# Patient Record
Sex: MALE | Race: WHITE | NOT HISPANIC OR LATINO | Employment: OTHER | URBAN - METROPOLITAN AREA
[De-identification: names, ages, dates, MRNs, and addresses within clinical notes are randomized per-mention and may not be internally consistent; named-entity substitution may affect disease eponyms.]

---

## 2017-01-01 ENCOUNTER — APPOINTMENT (OUTPATIENT)
Dept: CARDIAC REHAB | Facility: HOSPITAL | Age: 82
End: 2017-01-01

## 2017-01-01 ENCOUNTER — TREATMENT (OUTPATIENT)
Dept: CARDIAC REHAB | Facility: HOSPITAL | Age: 82
End: 2017-01-01

## 2017-01-01 ENCOUNTER — APPOINTMENT (OUTPATIENT)
Dept: RADIATION ONCOLOGY | Facility: HOSPITAL | Age: 82
End: 2017-01-01

## 2017-01-01 ENCOUNTER — TELEPHONE (OUTPATIENT)
Dept: CARDIOLOGY | Facility: CLINIC | Age: 82
End: 2017-01-01

## 2017-01-01 ENCOUNTER — RESULTS ENCOUNTER (OUTPATIENT)
Dept: INTERNAL MEDICINE | Facility: CLINIC | Age: 82
End: 2017-01-01

## 2017-01-01 ENCOUNTER — APPOINTMENT (OUTPATIENT)
Dept: CT IMAGING | Facility: HOSPITAL | Age: 82
End: 2017-01-01

## 2017-01-01 ENCOUNTER — TELEPHONE (OUTPATIENT)
Dept: NEUROLOGY | Facility: CLINIC | Age: 82
End: 2017-01-01

## 2017-01-01 ENCOUNTER — APPOINTMENT (OUTPATIENT)
Dept: CT IMAGING | Facility: HOSPITAL | Age: 82
End: 2017-01-01
Attending: INTERNAL MEDICINE

## 2017-01-01 ENCOUNTER — TELEPHONE (OUTPATIENT)
Dept: INTERNAL MEDICINE | Facility: CLINIC | Age: 82
End: 2017-01-01

## 2017-01-01 ENCOUNTER — APPOINTMENT (OUTPATIENT)
Dept: ONCOLOGY | Facility: CLINIC | Age: 82
End: 2017-01-01

## 2017-01-01 ENCOUNTER — OFFICE VISIT (OUTPATIENT)
Dept: ONCOLOGY | Facility: CLINIC | Age: 82
End: 2017-01-01

## 2017-01-01 ENCOUNTER — HOSPITAL ENCOUNTER (OUTPATIENT)
Dept: PET IMAGING | Facility: HOSPITAL | Age: 82
Discharge: HOME | End: 2017-10-25

## 2017-01-01 ENCOUNTER — APPOINTMENT (OUTPATIENT)
Dept: MRI IMAGING | Facility: HOSPITAL | Age: 82
End: 2017-01-01

## 2017-01-01 ENCOUNTER — OFFICE VISIT (OUTPATIENT)
Dept: CARDIAC REHAB | Facility: HOSPITAL | Age: 82
End: 2017-01-01

## 2017-01-01 ENCOUNTER — OFFICE VISIT (OUTPATIENT)
Dept: CARDIOLOGY | Facility: CLINIC | Age: 82
End: 2017-01-01

## 2017-01-01 ENCOUNTER — ANESTHESIA EVENT (OUTPATIENT)
Dept: GASTROENTEROLOGY | Facility: HOSPITAL | Age: 82
End: 2017-01-01

## 2017-01-01 ENCOUNTER — ANESTHESIA (OUTPATIENT)
Dept: GASTROENTEROLOGY | Facility: HOSPITAL | Age: 82
End: 2017-01-01

## 2017-01-01 ENCOUNTER — TELEPHONE (OUTPATIENT)
Dept: RADIATION ONCOLOGY | Facility: HOSPITAL | Age: 82
End: 2017-01-01

## 2017-01-01 ENCOUNTER — RADIATION ONCOLOGY WEEKLY ASSESSMENT (OUTPATIENT)
Dept: RADIATION ONCOLOGY | Facility: HOSPITAL | Age: 82
End: 2017-01-01

## 2017-01-01 ENCOUNTER — LAB (OUTPATIENT)
Dept: LAB | Facility: HOSPITAL | Age: 82
End: 2017-01-01

## 2017-01-01 ENCOUNTER — HOSPITAL ENCOUNTER (INPATIENT)
Facility: HOSPITAL | Age: 82
LOS: 10 days | Discharge: HOME HEALTH | End: 2017-09-22
Attending: EMERGENCY MEDICINE | Admitting: INTERNAL MEDICINE

## 2017-01-01 ENCOUNTER — HOSPITAL ENCOUNTER (OUTPATIENT)
Dept: GENERAL RADIOLOGY | Facility: HOSPITAL | Age: 82
Discharge: HOME | End: 2017-01-27

## 2017-01-01 ENCOUNTER — LAB REQUISITION (OUTPATIENT)
Dept: LAB | Facility: HOSPITAL | Age: 82
End: 2017-01-01

## 2017-01-01 ENCOUNTER — TRANSCRIBE ORDERS (OUTPATIENT)
Dept: ADMINISTRATIVE | Facility: HOSPITAL | Age: 82
End: 2017-01-01

## 2017-01-01 ENCOUNTER — APPOINTMENT (OUTPATIENT)
Dept: GENERAL RADIOLOGY | Facility: HOSPITAL | Age: 82
End: 2017-01-01
Attending: INTERNAL MEDICINE

## 2017-01-01 ENCOUNTER — APPOINTMENT (OUTPATIENT)
Dept: GENERAL RADIOLOGY | Facility: HOSPITAL | Age: 82
End: 2017-01-01

## 2017-01-01 ENCOUNTER — HOSPITAL ENCOUNTER (INPATIENT)
Facility: HOSPITAL | Age: 82
LOS: 4 days | Discharge: HOME HEALTH | End: 2017-06-08
Attending: EMERGENCY MEDICINE | Admitting: INTERNAL MEDICINE

## 2017-01-01 ENCOUNTER — TELEPHONE (OUTPATIENT)
Dept: CARDIOLOGY | Facility: HOSPITAL | Age: 82
End: 2017-01-01

## 2017-01-01 ENCOUNTER — HOSPITAL ENCOUNTER (INPATIENT)
Facility: HOSPITAL | Age: 82
LOS: 1 days | End: 2017-12-05
Attending: EMERGENCY MEDICINE | Admitting: INTERNAL MEDICINE

## 2017-01-01 ENCOUNTER — OFFICE VISIT (OUTPATIENT)
Dept: INTERNAL MEDICINE | Facility: CLINIC | Age: 82
End: 2017-01-01

## 2017-01-01 ENCOUNTER — TELEPHONE (OUTPATIENT)
Dept: GASTROENTEROLOGY | Facility: CLINIC | Age: 82
End: 2017-01-01

## 2017-01-01 ENCOUNTER — LAB (OUTPATIENT)
Dept: LAB | Facility: HOSPITAL | Age: 82
End: 2017-01-01
Attending: INTERNAL MEDICINE

## 2017-01-01 ENCOUNTER — DOCUMENTATION (OUTPATIENT)
Dept: PULMONOLOGY | Facility: HOSPITAL | Age: 82
End: 2017-01-01

## 2017-01-01 ENCOUNTER — DOCUMENTATION (OUTPATIENT)
Dept: RADIATION ONCOLOGY | Facility: HOSPITAL | Age: 82
End: 2017-01-01

## 2017-01-01 ENCOUNTER — OFFICE VISIT (OUTPATIENT)
Dept: GASTROENTEROLOGY | Facility: CLINIC | Age: 82
End: 2017-01-01

## 2017-01-01 ENCOUNTER — HOSPITAL ENCOUNTER (OUTPATIENT)
Facility: HOSPITAL | Age: 82
Setting detail: HOSPITAL OUTPATIENT SURGERY
Discharge: HOME | End: 2017-03-01
Attending: INTERNAL MEDICINE | Admitting: INTERNAL MEDICINE

## 2017-01-01 ENCOUNTER — HOSPITAL ENCOUNTER (INPATIENT)
Facility: HOSPITAL | Age: 82
LOS: 13 days | Discharge: NURSING, REHAB OR LONG-TERM CARE | End: 2017-12-01
Attending: EMERGENCY MEDICINE | Admitting: HOSPITALIST

## 2017-01-01 ENCOUNTER — HOSPITAL ENCOUNTER (OUTPATIENT)
Dept: CT IMAGING | Facility: HOSPITAL | Age: 82
Discharge: HOME | End: 2017-01-27
Attending: INTERNAL MEDICINE | Admitting: INTERNAL MEDICINE

## 2017-01-01 ENCOUNTER — HOSPITAL ENCOUNTER (OUTPATIENT)
Dept: CT IMAGING | Facility: HOSPITAL | Age: 82
Discharge: HOME | End: 2017-08-01
Attending: SURGERY | Admitting: SURGERY

## 2017-01-01 ENCOUNTER — HOSPITAL ENCOUNTER (OUTPATIENT)
Dept: PET IMAGING | Facility: HOSPITAL | Age: 82
Discharge: HOME | End: 2017-10-25
Admitting: NURSE PRACTITIONER

## 2017-01-01 ENCOUNTER — DOCUMENTATION (OUTPATIENT)
Dept: CARDIOLOGY | Facility: CLINIC | Age: 82
End: 2017-01-01

## 2017-01-01 ENCOUNTER — HOSPITAL ENCOUNTER (INPATIENT)
Facility: HOSPITAL | Age: 82
LOS: 11 days | Discharge: NURSING, REHAB OR LONG-TERM CARE | End: 2017-10-12
Attending: EMERGENCY MEDICINE | Admitting: HOSPITALIST

## 2017-01-01 ENCOUNTER — OFFICE VISIT (OUTPATIENT)
Dept: NEUROLOGY | Facility: CLINIC | Age: 82
End: 2017-01-01

## 2017-01-01 ENCOUNTER — HOSPITAL ENCOUNTER (INPATIENT)
Facility: HOSPITAL | Age: 82
LOS: 2 days | Discharge: HOME | End: 2017-08-28
Attending: INTERNAL MEDICINE | Admitting: INTERNAL MEDICINE

## 2017-01-01 ENCOUNTER — HOSPITAL ENCOUNTER (OUTPATIENT)
Dept: NUCLEAR MEDICINE | Facility: HOSPITAL | Age: 82
Discharge: HOME | End: 2017-01-27
Attending: INTERNAL MEDICINE

## 2017-01-01 ENCOUNTER — APPOINTMENT (OUTPATIENT)
Dept: CARDIOLOGY | Facility: HOSPITAL | Age: 82
End: 2017-01-01
Attending: INTERNAL MEDICINE

## 2017-01-01 ENCOUNTER — CLINICAL SUPPORT NO REQUIREMENTS (OUTPATIENT)
Dept: CARDIOLOGY | Facility: CLINIC | Age: 82
End: 2017-01-01

## 2017-01-01 ENCOUNTER — HOSPITAL ENCOUNTER (OUTPATIENT)
Dept: CARDIOLOGY | Facility: HOSPITAL | Age: 82
Discharge: HOME | End: 2017-08-25

## 2017-01-01 ENCOUNTER — CONSULT (OUTPATIENT)
Dept: RADIATION ONCOLOGY | Facility: HOSPITAL | Age: 82
End: 2017-01-01

## 2017-01-01 VITALS
SYSTOLIC BLOOD PRESSURE: 122 MMHG | HEIGHT: 70 IN | BODY MASS INDEX: 27.83 KG/M2 | HEART RATE: 97 BPM | WEIGHT: 194.4 LBS | DIASTOLIC BLOOD PRESSURE: 70 MMHG

## 2017-01-01 VITALS
HEART RATE: 88 BPM | OXYGEN SATURATION: 95 % | RESPIRATION RATE: 18 BRPM | TEMPERATURE: 95 F | WEIGHT: 185.13 LBS | SYSTOLIC BLOOD PRESSURE: 106 MMHG | BODY MASS INDEX: 26.5 KG/M2 | HEIGHT: 70 IN | DIASTOLIC BLOOD PRESSURE: 72 MMHG

## 2017-01-01 VITALS
OXYGEN SATURATION: 99 % | SYSTOLIC BLOOD PRESSURE: 121 MMHG | DIASTOLIC BLOOD PRESSURE: 60 MMHG | TEMPERATURE: 97.7 F | HEART RATE: 84 BPM | HEIGHT: 70 IN | WEIGHT: 171 LBS | RESPIRATION RATE: 17 BRPM | BODY MASS INDEX: 24.48 KG/M2

## 2017-01-01 VITALS
BODY MASS INDEX: 26.05 KG/M2 | HEART RATE: 80 BPM | RESPIRATION RATE: 16 BRPM | OXYGEN SATURATION: 96 % | SYSTOLIC BLOOD PRESSURE: 130 MMHG | WEIGHT: 182 LBS | HEIGHT: 70 IN | DIASTOLIC BLOOD PRESSURE: 54 MMHG

## 2017-01-01 VITALS
OXYGEN SATURATION: 96 % | BODY MASS INDEX: 27.2 KG/M2 | HEIGHT: 70 IN | SYSTOLIC BLOOD PRESSURE: 142 MMHG | WEIGHT: 190 LBS | DIASTOLIC BLOOD PRESSURE: 68 MMHG | HEART RATE: 91 BPM | RESPIRATION RATE: 16 BRPM

## 2017-01-01 VITALS
OXYGEN SATURATION: 97 % | TEMPERATURE: 98.2 F | DIASTOLIC BLOOD PRESSURE: 66 MMHG | HEART RATE: 77 BPM | RESPIRATION RATE: 16 BRPM | BODY MASS INDEX: 26.13 KG/M2 | SYSTOLIC BLOOD PRESSURE: 134 MMHG | WEIGHT: 182.13 LBS

## 2017-01-01 VITALS
BODY MASS INDEX: 27.97 KG/M2 | WEIGHT: 195.4 LBS | DIASTOLIC BLOOD PRESSURE: 74 MMHG | SYSTOLIC BLOOD PRESSURE: 124 MMHG | HEIGHT: 70 IN

## 2017-01-01 VITALS
WEIGHT: 175 LBS | HEART RATE: 89 BPM | OXYGEN SATURATION: 98 % | BODY MASS INDEX: 25.92 KG/M2 | SYSTOLIC BLOOD PRESSURE: 126 MMHG | RESPIRATION RATE: 18 BRPM | HEIGHT: 69 IN | DIASTOLIC BLOOD PRESSURE: 60 MMHG

## 2017-01-01 VITALS
BODY MASS INDEX: 27.19 KG/M2 | HEIGHT: 70 IN | SYSTOLIC BLOOD PRESSURE: 110 MMHG | HEART RATE: 75 BPM | DIASTOLIC BLOOD PRESSURE: 60 MMHG | WEIGHT: 189.9 LBS

## 2017-01-01 VITALS
DIASTOLIC BLOOD PRESSURE: 61 MMHG | TEMPERATURE: 97.2 F | WEIGHT: 175 LBS | SYSTOLIC BLOOD PRESSURE: 90 MMHG | BODY MASS INDEX: 26.22 KG/M2 | RESPIRATION RATE: 16 BRPM | OXYGEN SATURATION: 98 % | HEART RATE: 89 BPM

## 2017-01-01 VITALS
HEIGHT: 69 IN | RESPIRATION RATE: 16 BRPM | SYSTOLIC BLOOD PRESSURE: 118 MMHG | DIASTOLIC BLOOD PRESSURE: 76 MMHG | OXYGEN SATURATION: 98 % | WEIGHT: 175.4 LBS | TEMPERATURE: 97.5 F | HEART RATE: 88 BPM | BODY MASS INDEX: 25.98 KG/M2

## 2017-01-01 VITALS
HEIGHT: 70 IN | BODY MASS INDEX: 26.05 KG/M2 | HEART RATE: 68 BPM | DIASTOLIC BLOOD PRESSURE: 72 MMHG | WEIGHT: 182 LBS | SYSTOLIC BLOOD PRESSURE: 110 MMHG

## 2017-01-01 VITALS
HEIGHT: 70 IN | SYSTOLIC BLOOD PRESSURE: 124 MMHG | WEIGHT: 194.8 LBS | BODY MASS INDEX: 27.89 KG/M2 | RESPIRATION RATE: 18 BRPM | OXYGEN SATURATION: 96 % | DIASTOLIC BLOOD PRESSURE: 66 MMHG | HEART RATE: 82 BPM

## 2017-01-01 VITALS
DIASTOLIC BLOOD PRESSURE: 82 MMHG | BODY MASS INDEX: 26.48 KG/M2 | WEIGHT: 185 LBS | OXYGEN SATURATION: 97 % | HEART RATE: 84 BPM | HEIGHT: 70 IN | SYSTOLIC BLOOD PRESSURE: 138 MMHG | TEMPERATURE: 96.4 F | RESPIRATION RATE: 18 BRPM

## 2017-01-01 VITALS
HEART RATE: 77 BPM | WEIGHT: 188.5 LBS | DIASTOLIC BLOOD PRESSURE: 72 MMHG | HEIGHT: 70 IN | BODY MASS INDEX: 26.99 KG/M2 | SYSTOLIC BLOOD PRESSURE: 128 MMHG

## 2017-01-01 VITALS
HEART RATE: 100 BPM | OXYGEN SATURATION: 97 % | HEIGHT: 70 IN | BODY MASS INDEX: 25.91 KG/M2 | WEIGHT: 181 LBS | SYSTOLIC BLOOD PRESSURE: 144 MMHG | DIASTOLIC BLOOD PRESSURE: 80 MMHG

## 2017-01-01 VITALS
SYSTOLIC BLOOD PRESSURE: 138 MMHG | OXYGEN SATURATION: 100 % | WEIGHT: 192 LBS | DIASTOLIC BLOOD PRESSURE: 78 MMHG | HEIGHT: 70 IN | BODY MASS INDEX: 27.49 KG/M2 | HEART RATE: 75 BPM

## 2017-01-01 VITALS
OXYGEN SATURATION: 99 % | HEART RATE: 84 BPM | RESPIRATION RATE: 16 BRPM | TEMPERATURE: 96.6 F | WEIGHT: 187 LBS | SYSTOLIC BLOOD PRESSURE: 132 MMHG | BODY MASS INDEX: 26.77 KG/M2 | DIASTOLIC BLOOD PRESSURE: 66 MMHG | HEIGHT: 70 IN

## 2017-01-01 VITALS
SYSTOLIC BLOOD PRESSURE: 130 MMHG | WEIGHT: 192 LBS | HEIGHT: 70 IN | BODY MASS INDEX: 27.49 KG/M2 | HEART RATE: 89 BPM | DIASTOLIC BLOOD PRESSURE: 66 MMHG

## 2017-01-01 VITALS
WEIGHT: 190.6 LBS | HEART RATE: 80 BPM | HEIGHT: 70 IN | TEMPERATURE: 97.6 F | SYSTOLIC BLOOD PRESSURE: 120 MMHG | DIASTOLIC BLOOD PRESSURE: 60 MMHG | OXYGEN SATURATION: 98 % | BODY MASS INDEX: 27.29 KG/M2

## 2017-01-01 VITALS
WEIGHT: 184 LBS | BODY MASS INDEX: 26.34 KG/M2 | HEART RATE: 83 BPM | OXYGEN SATURATION: 96 % | DIASTOLIC BLOOD PRESSURE: 72 MMHG | SYSTOLIC BLOOD PRESSURE: 118 MMHG | HEIGHT: 70 IN

## 2017-01-01 VITALS
WEIGHT: 190 LBS | DIASTOLIC BLOOD PRESSURE: 68 MMHG | BODY MASS INDEX: 27.2 KG/M2 | HEIGHT: 70 IN | HEART RATE: 71 BPM | SYSTOLIC BLOOD PRESSURE: 128 MMHG | OXYGEN SATURATION: 97 % | RESPIRATION RATE: 16 BRPM

## 2017-01-01 VITALS
HEART RATE: 84 BPM | BODY MASS INDEX: 27.35 KG/M2 | OXYGEN SATURATION: 93 % | WEIGHT: 191 LBS | HEIGHT: 70 IN | DIASTOLIC BLOOD PRESSURE: 64 MMHG | SYSTOLIC BLOOD PRESSURE: 120 MMHG

## 2017-01-01 VITALS
HEART RATE: 73 BPM | HEIGHT: 70 IN | BODY MASS INDEX: 28.15 KG/M2 | TEMPERATURE: 97.1 F | RESPIRATION RATE: 18 BRPM | SYSTOLIC BLOOD PRESSURE: 141 MMHG | OXYGEN SATURATION: 96 % | WEIGHT: 196.6 LBS | DIASTOLIC BLOOD PRESSURE: 72 MMHG

## 2017-01-01 VITALS
BODY MASS INDEX: 27.83 KG/M2 | OXYGEN SATURATION: 96 % | HEIGHT: 70 IN | WEIGHT: 194.4 LBS | SYSTOLIC BLOOD PRESSURE: 124 MMHG | DIASTOLIC BLOOD PRESSURE: 74 MMHG | HEART RATE: 57 BPM

## 2017-01-01 VITALS
SYSTOLIC BLOOD PRESSURE: 100 MMHG | BODY MASS INDEX: 22.37 KG/M2 | OXYGEN SATURATION: 24 % | WEIGHT: 165.12 LBS | HEIGHT: 72 IN | DIASTOLIC BLOOD PRESSURE: 55 MMHG | TEMPERATURE: 104.4 F

## 2017-01-01 VITALS
HEART RATE: 106 BPM | BODY MASS INDEX: 27.49 KG/M2 | HEIGHT: 70 IN | SYSTOLIC BLOOD PRESSURE: 126 MMHG | WEIGHT: 192 LBS | OXYGEN SATURATION: 97 % | RESPIRATION RATE: 22 BRPM | DIASTOLIC BLOOD PRESSURE: 76 MMHG

## 2017-01-01 DIAGNOSIS — I71.30 RUPTURED ABDOMINAL AORTIC ANEURYSM (AAA) (HCC): ICD-10-CM

## 2017-01-01 DIAGNOSIS — I21.4 NSTEMI (NON-ST ELEVATED MYOCARDIAL INFARCTION) (HCC): Primary | ICD-10-CM

## 2017-01-01 DIAGNOSIS — I10 ESSENTIAL HYPERTENSION: ICD-10-CM

## 2017-01-01 DIAGNOSIS — I48.0 PAF (PAROXYSMAL ATRIAL FIBRILLATION) (HCC): ICD-10-CM

## 2017-01-01 DIAGNOSIS — I25.10 CORONARY ARTERY DISEASE INVOLVING NATIVE HEART WITHOUT ANGINA PECTORIS, UNSPECIFIED VESSEL OR LESION TYPE: ICD-10-CM

## 2017-01-01 DIAGNOSIS — E78.5 HYPERLIPIDEMIA, UNSPECIFIED HYPERLIPIDEMIA TYPE: ICD-10-CM

## 2017-01-01 DIAGNOSIS — L20.9 ATOPIC DERMATITIS, UNSPECIFIED TYPE: ICD-10-CM

## 2017-01-01 DIAGNOSIS — N30.01 ACUTE CYSTITIS WITH HEMATURIA: Primary | ICD-10-CM

## 2017-01-01 DIAGNOSIS — R53.1 GENERAL WEAKNESS: ICD-10-CM

## 2017-01-01 DIAGNOSIS — R91.8 LUNG MASS: Primary | ICD-10-CM

## 2017-01-01 DIAGNOSIS — C34.12 MALIGNANT NEOPLASM OF BRONCHUS OF LEFT UPPER LOBE (HCC): Primary | ICD-10-CM

## 2017-01-01 DIAGNOSIS — I71.40 ABDOMINAL AORTIC ANEURYSM (AAA) WITHOUT RUPTURE (HCC): Chronic | ICD-10-CM

## 2017-01-01 DIAGNOSIS — I42.9 CARDIOMYOPATHY (HCC): ICD-10-CM

## 2017-01-01 DIAGNOSIS — R31.9 HEMATURIA: ICD-10-CM

## 2017-01-01 DIAGNOSIS — R21 RASH OF BODY: ICD-10-CM

## 2017-01-01 DIAGNOSIS — R53.1 WEAKNESS: ICD-10-CM

## 2017-01-01 DIAGNOSIS — R41.82 ALTERED MENTAL STATUS, UNSPECIFIED ALTERED MENTAL STATUS TYPE: ICD-10-CM

## 2017-01-01 DIAGNOSIS — I50.32 DIASTOLIC CHF, CHRONIC (HCC): ICD-10-CM

## 2017-01-01 DIAGNOSIS — N18.30 CKD (CHRONIC KIDNEY DISEASE), STAGE III (HCC): ICD-10-CM

## 2017-01-01 DIAGNOSIS — T83.511A URINARY TRACT INFECTION ASSOCIATED WITH CATHETERIZATION OF URINARY TRACT, UNSPECIFIED INDWELLING URINARY CATHETER TYPE, INITIAL ENCOUNTER (HCC): ICD-10-CM

## 2017-01-01 DIAGNOSIS — R26.89 IMBALANCE: ICD-10-CM

## 2017-01-01 DIAGNOSIS — I50.23 ACUTE ON CHRONIC SYSTOLIC CONGESTIVE HEART FAILURE (HCC): ICD-10-CM

## 2017-01-01 DIAGNOSIS — J43.8 OTHER EMPHYSEMA (HCC): ICD-10-CM

## 2017-01-01 DIAGNOSIS — I63.212 CEREBROVASCULAR ACCIDENT (CVA) DUE TO OCCLUSION OF LEFT VERTEBRAL ARTERY (HCC): Primary | ICD-10-CM

## 2017-01-01 DIAGNOSIS — E78.5 HYPERLIPIDEMIA, UNSPECIFIED HYPERLIPIDEMIA TYPE: Primary | ICD-10-CM

## 2017-01-01 DIAGNOSIS — R21 RASH OF BODY: Primary | ICD-10-CM

## 2017-01-01 DIAGNOSIS — R41.0 DELIRIUM: ICD-10-CM

## 2017-01-01 DIAGNOSIS — K22.70 BARRETT'S ESOPHAGUS WITHOUT DYSPLASIA: Primary | ICD-10-CM

## 2017-01-01 DIAGNOSIS — I50.32 DIASTOLIC CHF, CHRONIC (HCC): Primary | ICD-10-CM

## 2017-01-01 DIAGNOSIS — J43.8 OTHER EMPHYSEMA (HCC): Primary | ICD-10-CM

## 2017-01-01 DIAGNOSIS — I25.5 ISCHEMIC CARDIOMYOPATHY: ICD-10-CM

## 2017-01-01 DIAGNOSIS — E53.8 B12 DEFICIENCY: ICD-10-CM

## 2017-01-01 DIAGNOSIS — R06.02 SOB (SHORTNESS OF BREATH): ICD-10-CM

## 2017-01-01 DIAGNOSIS — Z95.5 HISTORY OF CORONARY ARTERY STENT PLACEMENT: ICD-10-CM

## 2017-01-01 DIAGNOSIS — N39.0 URINARY TRACT INFECTION: ICD-10-CM

## 2017-01-01 DIAGNOSIS — R79.1 SUPRATHERAPEUTIC INR: ICD-10-CM

## 2017-01-01 DIAGNOSIS — L30.9 ECZEMA, UNSPECIFIED TYPE: ICD-10-CM

## 2017-01-01 DIAGNOSIS — Z23 NEED FOR PNEUMOCOCCAL VACCINATION: ICD-10-CM

## 2017-01-01 DIAGNOSIS — R50.9 FEVER, UNSPECIFIED FEVER CAUSE: Primary | ICD-10-CM

## 2017-01-01 DIAGNOSIS — N39.0 ACUTE UTI: ICD-10-CM

## 2017-01-01 DIAGNOSIS — I25.10 CORONARY ARTERY DISEASE INVOLVING NATIVE CORONARY ARTERY OF NATIVE HEART WITHOUT ANGINA PECTORIS: Primary | ICD-10-CM

## 2017-01-01 DIAGNOSIS — I25.5 ISCHEMIC CARDIOMYOPATHY: Primary | ICD-10-CM

## 2017-01-01 DIAGNOSIS — I48.0 PAF (PAROXYSMAL ATRIAL FIBRILLATION) (HCC): Primary | ICD-10-CM

## 2017-01-01 DIAGNOSIS — J18.9 HCAP (HEALTHCARE-ASSOCIATED PNEUMONIA): ICD-10-CM

## 2017-01-01 DIAGNOSIS — J44.1 CHRONIC OBSTRUCTIVE PULMONARY DISEASE WITH ACUTE EXACERBATION (HCC): ICD-10-CM

## 2017-01-01 DIAGNOSIS — C34.12 MALIGNANT NEOPLASM OF BRONCHUS OF LEFT UPPER LOBE (HCC): ICD-10-CM

## 2017-01-01 DIAGNOSIS — A41.9 SEPSIS, DUE TO UNSPECIFIED ORGANISM: Primary | ICD-10-CM

## 2017-01-01 DIAGNOSIS — Z79.01 LONG TERM CURRENT USE OF ANTICOAGULANT: ICD-10-CM

## 2017-01-01 DIAGNOSIS — E11.8 TYPE 2 DIABETES MELLITUS WITH COMPLICATION, WITHOUT LONG-TERM CURRENT USE OF INSULIN (HCC): ICD-10-CM

## 2017-01-01 DIAGNOSIS — I63.9 CEREBROVASCULAR ACCIDENT (CVA), UNSPECIFIED MECHANISM (HCC): Primary | ICD-10-CM

## 2017-01-01 DIAGNOSIS — R06.09 DYSPNEA ON EXERTION: ICD-10-CM

## 2017-01-01 DIAGNOSIS — R91.8 LUNG MASS: ICD-10-CM

## 2017-01-01 DIAGNOSIS — R50.9 FEVER AND CHILLS: Primary | ICD-10-CM

## 2017-01-01 DIAGNOSIS — N28.9 RENAL INSUFFICIENCY: ICD-10-CM

## 2017-01-01 DIAGNOSIS — R19.7 DIARRHEA, UNSPECIFIED TYPE: ICD-10-CM

## 2017-01-01 DIAGNOSIS — I48.91 ATRIAL FIBRILLATION WITH RVR (HCC): Primary | ICD-10-CM

## 2017-01-01 DIAGNOSIS — R06.02 SHORTNESS OF BREATH: Primary | ICD-10-CM

## 2017-01-01 DIAGNOSIS — N17.9 AKI (ACUTE KIDNEY INJURY) (HCC): ICD-10-CM

## 2017-01-01 DIAGNOSIS — E78.49 OTHER HYPERLIPIDEMIA: Primary | ICD-10-CM

## 2017-01-01 DIAGNOSIS — N18.30 CHRONIC KIDNEY DISEASE, STAGE 3 (HCC): ICD-10-CM

## 2017-01-01 DIAGNOSIS — I48.0 PAROXYSMAL ATRIAL FIBRILLATION (HCC): ICD-10-CM

## 2017-01-01 DIAGNOSIS — E11.9 TYPE 2 DIABETES MELLITUS WITHOUT COMPLICATION, WITHOUT LONG-TERM CURRENT USE OF INSULIN (HCC): ICD-10-CM

## 2017-01-01 DIAGNOSIS — I50.42 CHRONIC COMBINED SYSTOLIC AND DIASTOLIC CONGESTIVE HEART FAILURE (HCC): ICD-10-CM

## 2017-01-01 DIAGNOSIS — R33.9 URINARY RETENTION: ICD-10-CM

## 2017-01-01 DIAGNOSIS — N18.30 CHRONIC KIDNEY DISEASE, STAGE 3 (HCC): Primary | ICD-10-CM

## 2017-01-01 DIAGNOSIS — R73.9 HYPERGLYCEMIA: ICD-10-CM

## 2017-01-01 DIAGNOSIS — I10 ESSENTIAL HYPERTENSION: Primary | ICD-10-CM

## 2017-01-01 DIAGNOSIS — R53.1 GENERALIZED WEAKNESS: ICD-10-CM

## 2017-01-01 DIAGNOSIS — Z95.0 CARDIAC RESYNCHRONIZATION THERAPY PACEMAKER (CRT-P) IN PLACE: ICD-10-CM

## 2017-01-01 DIAGNOSIS — I95.9 HYPOTENSION, UNSPECIFIED HYPOTENSION TYPE: ICD-10-CM

## 2017-01-01 DIAGNOSIS — R77.8 ELEVATED TROPONIN: ICD-10-CM

## 2017-01-01 DIAGNOSIS — I25.10 CORONARY ARTERY DISEASE INVOLVING NATIVE HEART WITHOUT ANGINA PECTORIS, UNSPECIFIED VESSEL OR LESION TYPE: Primary | ICD-10-CM

## 2017-01-01 DIAGNOSIS — N39.0 URINARY TRACT INFECTION ASSOCIATED WITH CATHETERIZATION OF URINARY TRACT, UNSPECIFIED INDWELLING URINARY CATHETER TYPE, INITIAL ENCOUNTER (HCC): ICD-10-CM

## 2017-01-01 DIAGNOSIS — R91.8 PULMONARY INFILTRATE: Primary | ICD-10-CM

## 2017-01-01 DIAGNOSIS — E53.8 B12 DEFICIENCY: Primary | ICD-10-CM

## 2017-01-01 DIAGNOSIS — G47.00 INSOMNIA, UNSPECIFIED TYPE: Primary | ICD-10-CM

## 2017-01-01 DIAGNOSIS — R06.02 SHORTNESS OF BREATH: ICD-10-CM

## 2017-01-01 DIAGNOSIS — R26.2 DIFFICULTY WALKING: ICD-10-CM

## 2017-01-01 DIAGNOSIS — N40.0 PROSTATE HYPERTROPHY: ICD-10-CM

## 2017-01-01 DIAGNOSIS — K63.5 COLON POLYPS: ICD-10-CM

## 2017-01-01 DIAGNOSIS — I71.30 RUPTURED ABDOMINAL AORTIC ANEURYSM (AAA) (HCC): Primary | ICD-10-CM

## 2017-01-01 DIAGNOSIS — R91.8 PULMONARY INFILTRATE: ICD-10-CM

## 2017-01-01 DIAGNOSIS — I25.10 CORONARY ARTERY DISEASE INVOLVING NATIVE CORONARY ARTERY OF NATIVE HEART WITHOUT ANGINA PECTORIS: ICD-10-CM

## 2017-01-01 LAB
ABO GROUP BLD: NORMAL
ACT BLD: 265 SECONDS (ref 82–152)
ALBUMIN SERPL-MCNC: 2.5 G/DL (ref 3.5–5.2)
ALBUMIN SERPL-MCNC: 2.6 G/DL (ref 3.5–5.2)
ALBUMIN SERPL-MCNC: 2.7 G/DL (ref 3.5–5.2)
ALBUMIN SERPL-MCNC: 2.9 G/DL (ref 3.5–5.2)
ALBUMIN SERPL-MCNC: 3.1 G/DL (ref 3.5–5.2)
ALBUMIN SERPL-MCNC: 3.2 G/DL (ref 3.5–5.2)
ALBUMIN SERPL-MCNC: 3.3 G/DL (ref 3.5–5.2)
ALBUMIN SERPL-MCNC: 3.3 G/DL (ref 3.5–5.2)
ALBUMIN SERPL-MCNC: 3.7 G/DL (ref 3.5–5.2)
ALBUMIN SERPL-MCNC: 3.8 G/DL (ref 3.5–5.2)
ALBUMIN SERPL-MCNC: 3.9 G/DL (ref 3.5–5.2)
ALBUMIN SERPL-MCNC: 4 G/DL (ref 3.5–5.2)
ALBUMIN SERPL-MCNC: 4.1 G/DL (ref 3.5–5.2)
ALBUMIN SERPL-MCNC: 4.1 G/DL (ref 3.5–5.2)
ALBUMIN SERPL-MCNC: 4.3 G/DL (ref 3.5–5.2)
ALBUMIN SERPL-MCNC: 4.4 G/DL (ref 3.5–5.2)
ALBUMIN/GLOB SERPL: 0.7 G/DL
ALBUMIN/GLOB SERPL: 0.8 G/DL
ALBUMIN/GLOB SERPL: 0.8 G/DL
ALBUMIN/GLOB SERPL: 0.9 G/DL
ALBUMIN/GLOB SERPL: 1.1 G/DL
ALBUMIN/GLOB SERPL: 1.1 G/DL
ALBUMIN/GLOB SERPL: 1.2 G/DL
ALBUMIN/GLOB SERPL: 1.3 G/DL
ALBUMIN/GLOB SERPL: 1.4 G/DL
ALBUMIN/GLOB SERPL: 1.4 G/DL
ALBUMIN/GLOB SERPL: 1.6 G/DL
ALP SERPL-CCNC: 48 U/L (ref 40–129)
ALP SERPL-CCNC: 51 U/L (ref 39–117)
ALP SERPL-CCNC: 55 U/L (ref 39–117)
ALP SERPL-CCNC: 55 U/L (ref 39–117)
ALP SERPL-CCNC: 60 U/L (ref 39–117)
ALP SERPL-CCNC: 60 U/L (ref 39–117)
ALP SERPL-CCNC: 62 U/L (ref 39–117)
ALP SERPL-CCNC: 65 U/L (ref 40–129)
ALP SERPL-CCNC: 65 U/L (ref 40–129)
ALP SERPL-CCNC: 66 U/L (ref 40–129)
ALP SERPL-CCNC: 67 U/L (ref 39–117)
ALP SERPL-CCNC: 68 U/L (ref 39–117)
ALP SERPL-CCNC: 69 U/L (ref 39–117)
ALP SERPL-CCNC: 73 U/L (ref 39–117)
ALP SERPL-CCNC: 74 U/L (ref 39–117)
ALT SERPL W P-5'-P-CCNC: 10 U/L (ref 1–41)
ALT SERPL W P-5'-P-CCNC: 10 U/L (ref 1–41)
ALT SERPL W P-5'-P-CCNC: 12 U/L (ref 1–41)
ALT SERPL W P-5'-P-CCNC: 12 U/L (ref 1–41)
ALT SERPL W P-5'-P-CCNC: 12 U/L (ref 5–41)
ALT SERPL W P-5'-P-CCNC: 13 U/L (ref 1–41)
ALT SERPL W P-5'-P-CCNC: 15 U/L (ref 1–41)
ALT SERPL W P-5'-P-CCNC: 15 U/L (ref 1–41)
ALT SERPL W P-5'-P-CCNC: 16 U/L (ref 1–41)
ALT SERPL W P-5'-P-CCNC: 24 U/L (ref 1–41)
ALT SERPL W P-5'-P-CCNC: 31 U/L (ref 1–41)
ALT SERPL W P-5'-P-CCNC: 8 U/L (ref 1–41)
ALT SERPL W P-5'-P-CCNC: 9 U/L (ref 5–41)
ALT SERPL-CCNC: 11 U/L (ref 5–41)
ALT SERPL-CCNC: 13 U/L (ref 5–41)
AMMONIA BLD-SCNC: 32 UMOL/L (ref 16–60)
AMPHET+METHAMPHET UR QL: NEGATIVE
ANION GAP SERPL CALCULATED.3IONS-SCNC: 10.1 MMOL/L
ANION GAP SERPL CALCULATED.3IONS-SCNC: 11.1 MMOL/L
ANION GAP SERPL CALCULATED.3IONS-SCNC: 11.3 MMOL/L
ANION GAP SERPL CALCULATED.3IONS-SCNC: 11.4 MMOL/L
ANION GAP SERPL CALCULATED.3IONS-SCNC: 12 MMOL/L
ANION GAP SERPL CALCULATED.3IONS-SCNC: 12 MMOL/L
ANION GAP SERPL CALCULATED.3IONS-SCNC: 12.1 MMOL/L
ANION GAP SERPL CALCULATED.3IONS-SCNC: 12.2 MMOL/L
ANION GAP SERPL CALCULATED.3IONS-SCNC: 12.4 MMOL/L
ANION GAP SERPL CALCULATED.3IONS-SCNC: 12.5 MMOL/L
ANION GAP SERPL CALCULATED.3IONS-SCNC: 12.8 MMOL/L
ANION GAP SERPL CALCULATED.3IONS-SCNC: 12.9 MMOL/L
ANION GAP SERPL CALCULATED.3IONS-SCNC: 13 MMOL/L
ANION GAP SERPL CALCULATED.3IONS-SCNC: 13.2 MMOL/L
ANION GAP SERPL CALCULATED.3IONS-SCNC: 13.5 MMOL/L
ANION GAP SERPL CALCULATED.3IONS-SCNC: 13.6 MMOL/L
ANION GAP SERPL CALCULATED.3IONS-SCNC: 13.6 MMOL/L
ANION GAP SERPL CALCULATED.3IONS-SCNC: 13.7 MMOL/L
ANION GAP SERPL CALCULATED.3IONS-SCNC: 13.7 MMOL/L
ANION GAP SERPL CALCULATED.3IONS-SCNC: 13.8 MMOL/L
ANION GAP SERPL CALCULATED.3IONS-SCNC: 13.8 MMOL/L
ANION GAP SERPL CALCULATED.3IONS-SCNC: 13.9 MMOL/L
ANION GAP SERPL CALCULATED.3IONS-SCNC: 14.2 MMOL/L
ANION GAP SERPL CALCULATED.3IONS-SCNC: 14.5 MMOL/L
ANION GAP SERPL CALCULATED.3IONS-SCNC: 14.6 MMOL/L
ANION GAP SERPL CALCULATED.3IONS-SCNC: 14.8 MMOL/L
ANION GAP SERPL CALCULATED.3IONS-SCNC: 14.8 MMOL/L
ANION GAP SERPL CALCULATED.3IONS-SCNC: 14.9 MMOL/L
ANION GAP SERPL CALCULATED.3IONS-SCNC: 15 MMOL/L
ANION GAP SERPL CALCULATED.3IONS-SCNC: 15.3 MMOL/L
ANION GAP SERPL CALCULATED.3IONS-SCNC: 15.7 MMOL/L
ANION GAP SERPL CALCULATED.3IONS-SCNC: 15.8 MMOL/L
ANION GAP SERPL CALCULATED.3IONS-SCNC: 15.8 MMOL/L
ANION GAP SERPL CALCULATED.3IONS-SCNC: 16 MMOL/L
ANION GAP SERPL CALCULATED.3IONS-SCNC: 16.1 MMOL/L
ANION GAP SERPL CALCULATED.3IONS-SCNC: 16.4 MMOL/L
ANION GAP SERPL CALCULATED.3IONS-SCNC: 16.8 MMOL/L
ANION GAP SERPL CALCULATED.3IONS-SCNC: 16.8 MMOL/L
ANION GAP SERPL CALCULATED.3IONS-SCNC: 17 MMOL/L
ANION GAP SERPL CALCULATED.3IONS-SCNC: 17.1 MMOL/L
ANION GAP SERPL CALCULATED.3IONS-SCNC: 17.2 MMOL/L
ANION GAP SERPL CALCULATED.3IONS-SCNC: 17.4 MMOL/L
ANION GAP SERPL CALCULATED.3IONS-SCNC: 17.5 MMOL/L
ANION GAP SERPL CALCULATED.3IONS-SCNC: 8 MMOL/L
ANION GAP SERPL CALCULATED.3IONS-SCNC: 8.4 MMOL/L
ANION GAP SERPL CALCULATED.3IONS-SCNC: 8.5 MMOL/L
ANION GAP SERPL CALCULATED.3IONS-SCNC: 9.3 MMOL/L
ANION GAP SERPL CALCULATED.3IONS-SCNC: 9.8 MMOL/L
ANISOCYTOSIS BLD QL: NORMAL
APTT PPP: 32 SECONDS (ref 22.7–35.4)
ARTERIAL PATENCY WRIST A: POSITIVE
ASA PLATELET INHIBITION: 523 ARU
ASCENDING AORTA: 3.5 CM
AST SERPL-CCNC: 12 U/L (ref 1–40)
AST SERPL-CCNC: 14 U/L (ref 5–40)
AST SERPL-CCNC: 16 U/L (ref 1–40)
AST SERPL-CCNC: 17 U/L (ref 1–40)
AST SERPL-CCNC: 17 U/L (ref 5–40)
AST SERPL-CCNC: 18 U/L (ref 1–40)
AST SERPL-CCNC: 18 U/L (ref 5–40)
AST SERPL-CCNC: 19 U/L (ref 1–40)
AST SERPL-CCNC: 19 U/L (ref 5–40)
AST SERPL-CCNC: 27 U/L (ref 1–40)
AST SERPL-CCNC: 28 U/L (ref 1–40)
ATMOSPHERIC PRESS: 748.3 MMHG
B PERT DNA SPEC QL NAA+PROBE: NOT DETECTED
BACTERIA BLD CULT: ABNORMAL
BACTERIA SPEC AEROBE CULT: ABNORMAL
BACTERIA SPEC AEROBE CULT: NO GROWTH
BACTERIA SPEC AEROBE CULT: NORMAL
BACTERIA UR QL AUTO: ABNORMAL /HPF
BARBITURATES UR QL SCN: NEGATIVE
BASE EXCESS BLDA CALC-SCNC: -7.3 MMOL/L (ref 0–2)
BASOPHILS # BLD AUTO: 0 10*3/MM3 (ref 0–0.2)
BASOPHILS # BLD AUTO: 0.01 10*3/MM3 (ref 0–0.2)
BASOPHILS # BLD AUTO: 0.02 10*3/MM3 (ref 0–0.2)
BASOPHILS # BLD AUTO: 0.03 10*3/MM3 (ref 0–0.2)
BASOPHILS # BLD AUTO: 0.04 10*3/MM3 (ref 0–0.2)
BASOPHILS # BLD AUTO: 0.04 10*3/MM3 (ref 0–0.2)
BASOPHILS # BLD AUTO: 0.05 10*3/MM3 (ref 0–0.1)
BASOPHILS # BLD AUTO: 0.05 10*3/MM3 (ref 0–0.2)
BASOPHILS # BLD AUTO: 0.06 10*3/MM3 (ref 0–0.2)
BASOPHILS # BLD AUTO: 0.06 10*3/MM3 (ref 0–0.2)
BASOPHILS # BLD AUTO: 0.08 10*3/MM3 (ref 0–0.2)
BASOPHILS # BLD AUTO: 0.08 10*3/MM3 (ref 0–0.2)
BASOPHILS # BLD AUTO: 0.13 10*3/MM3 (ref 0–0.2)
BASOPHILS NFR BLD AUTO: 0 % (ref 0–1.5)
BASOPHILS NFR BLD AUTO: 0.1 % (ref 0–1.5)
BASOPHILS NFR BLD AUTO: 0.2 % (ref 0–1.5)
BASOPHILS NFR BLD AUTO: 0.3 % (ref 0–1.5)
BASOPHILS NFR BLD AUTO: 0.4 % (ref 0–1.5)
BASOPHILS NFR BLD AUTO: 0.5 % (ref 0–1.1)
BASOPHILS NFR BLD AUTO: 0.5 % (ref 0–1.5)
BASOPHILS NFR BLD AUTO: 0.6 % (ref 0–1.5)
BASOPHILS NFR BLD AUTO: 0.8 % (ref 0–1.5)
BASOPHILS NFR BLD AUTO: 0.9 % (ref 0–2)
BASOPHILS NFR BLD AUTO: 1 % (ref 0–2)
BASOPHILS NFR BLD AUTO: 1.6 % (ref 0–2)
BDY SITE: ABNORMAL
BENZODIAZ UR QL SCN: NEGATIVE
BH CV ECHO MEAS - ACS: 1.8 CM
BH CV ECHO MEAS - AO MAX PG (FULL): 3.9 MMHG
BH CV ECHO MEAS - AO MAX PG: 5.9 MMHG
BH CV ECHO MEAS - AO MEAN PG (FULL): 2 MMHG
BH CV ECHO MEAS - AO MEAN PG: 3 MMHG
BH CV ECHO MEAS - AO ROOT AREA (BSA CORRECTED): 1.7
BH CV ECHO MEAS - AO ROOT AREA: 8.6 CM^2
BH CV ECHO MEAS - AO ROOT DIAM: 3.3 CM
BH CV ECHO MEAS - AO V2 MAX: 121 CM/SEC
BH CV ECHO MEAS - AO V2 MEAN: 82.5 CM/SEC
BH CV ECHO MEAS - AO V2 VTI: 26.6 CM
BH CV ECHO MEAS - ASC AORTA: 3.5 CM
BH CV ECHO MEAS - AVA(I,A): 2.3 CM^2
BH CV ECHO MEAS - AVA(I,D): 2.3 CM^2
BH CV ECHO MEAS - AVA(V,A): 2.4 CM^2
BH CV ECHO MEAS - AVA(V,D): 2.4 CM^2
BH CV ECHO MEAS - BSA(HAYCOCK): 2 M^2
BH CV ECHO MEAS - BSA: 1.9 M^2
BH CV ECHO MEAS - BZI_BMI: 31.5 KILOGRAMS/M^2
BH CV ECHO MEAS - BZI_METRIC_HEIGHT: 165.1 CM
BH CV ECHO MEAS - BZI_METRIC_WEIGHT: 85.7 KG
BH CV ECHO MEAS - CONTRAST EF (2CH): 43.6 ML/M^2
BH CV ECHO MEAS - CONTRAST EF 4CH: 26.9 ML/M^2
BH CV ECHO MEAS - EDV(CUBED): 132.7 ML
BH CV ECHO MEAS - EDV(MOD-SP2): 165 ML
BH CV ECHO MEAS - EDV(MOD-SP4): 104 ML
BH CV ECHO MEAS - EDV(TEICH): 123.8 ML
BH CV ECHO MEAS - EF(CUBED): 26.6 %
BH CV ECHO MEAS - EF(MOD-SP2): 43.6 %
BH CV ECHO MEAS - EF(MOD-SP4): 26.9 %
BH CV ECHO MEAS - EF(TEICH): 21.4 %
BH CV ECHO MEAS - ESV(CUBED): 97.3 ML
BH CV ECHO MEAS - ESV(MOD-SP2): 93 ML
BH CV ECHO MEAS - ESV(MOD-SP4): 76 ML
BH CV ECHO MEAS - ESV(TEICH): 97.3 ML
BH CV ECHO MEAS - FS: 9.8 %
BH CV ECHO MEAS - IVS/LVPW: 1
BH CV ECHO MEAS - IVSD: 1.3 CM
BH CV ECHO MEAS - LAT PEAK E' VEL: 5 CM/SEC
BH CV ECHO MEAS - LV DIASTOLIC VOL/BSA (35-75): 53.9 ML/M^2
BH CV ECHO MEAS - LV MASS(C)D: 270.1 GRAMS
BH CV ECHO MEAS - LV MASS(C)DI: 139.8 GRAMS/M^2
BH CV ECHO MEAS - LV MAX PG: 2 MMHG
BH CV ECHO MEAS - LV MEAN PG: 1 MMHG
BH CV ECHO MEAS - LV SYSTOLIC VOL/BSA (12-30): 39.4 ML/M^2
BH CV ECHO MEAS - LV V1 MAX: 70.7 CM/SEC
BH CV ECHO MEAS - LV V1 MEAN: 44.1 CM/SEC
BH CV ECHO MEAS - LV V1 VTI: 14.5 CM
BH CV ECHO MEAS - LVIDD: 5.1 CM
BH CV ECHO MEAS - LVIDS: 4.6 CM
BH CV ECHO MEAS - LVLD AP2: 10.2 CM
BH CV ECHO MEAS - LVLD AP4: 9.1 CM
BH CV ECHO MEAS - LVLS AP2: 9.2 CM
BH CV ECHO MEAS - LVLS AP4: 9 CM
BH CV ECHO MEAS - LVOT AREA (M): 4.2 CM^2
BH CV ECHO MEAS - LVOT AREA: 4.2 CM^2
BH CV ECHO MEAS - LVOT DIAM: 2.3 CM
BH CV ECHO MEAS - LVPWD: 1.3 CM
BH CV ECHO MEAS - MED PEAK E' VEL: 5 CM/SEC
BH CV ECHO MEAS - MR MAX PG: 31.4 MMHG
BH CV ECHO MEAS - MR MAX VEL: 280 CM/SEC
BH CV ECHO MEAS - MV A DUR: 0.22 SEC
BH CV ECHO MEAS - MV A MAX VEL: 81.3 CM/SEC
BH CV ECHO MEAS - MV DEC SLOPE: 92.4 CM/SEC^2
BH CV ECHO MEAS - MV DEC TIME: 0.56 SEC
BH CV ECHO MEAS - MV E MAX VEL: 33.8 CM/SEC
BH CV ECHO MEAS - MV E/A: 0.42
BH CV ECHO MEAS - MV MAX PG: 3 MMHG
BH CV ECHO MEAS - MV MEAN PG: 1 MMHG
BH CV ECHO MEAS - MV P1/2T MAX VEL: 36.8 CM/SEC
BH CV ECHO MEAS - MV P1/2T: 116.7 MSEC
BH CV ECHO MEAS - MV V2 MAX: 87.3 CM/SEC
BH CV ECHO MEAS - MV V2 MEAN: 47.9 CM/SEC
BH CV ECHO MEAS - MV V2 VTI: 19.3 CM
BH CV ECHO MEAS - MVA P1/2T LCG: 6 CM^2
BH CV ECHO MEAS - MVA(P1/2T): 1.9 CM^2
BH CV ECHO MEAS - MVA(VTI): 3.1 CM^2
BH CV ECHO MEAS - PA ACC TIME: 0.11 SEC
BH CV ECHO MEAS - PA MAX PG (FULL): 3.6 MMHG
BH CV ECHO MEAS - PA MAX PG: 5.2 MMHG
BH CV ECHO MEAS - PA PR(ACCEL): 31.3 MMHG
BH CV ECHO MEAS - PA V2 MAX: 114 CM/SEC
BH CV ECHO MEAS - PULM A REVS DUR: 0.11 SEC
BH CV ECHO MEAS - PULM A REVS VEL: 32.2 CM/SEC
BH CV ECHO MEAS - PULM DIAS VEL: 46 CM/SEC
BH CV ECHO MEAS - PULM S/D: 0.95
BH CV ECHO MEAS - PULM SYS VEL: 43.7 CM/SEC
BH CV ECHO MEAS - PVA(V,A): 2.5 CM^2
BH CV ECHO MEAS - PVA(V,D): 2.5 CM^2
BH CV ECHO MEAS - QP/QS: 1
BH CV ECHO MEAS - RAP SYSTOLE: 8 MMHG
BH CV ECHO MEAS - RV MAX PG: 1.6 MMHG
BH CV ECHO MEAS - RV MEAN PG: 1 MMHG
BH CV ECHO MEAS - RV V1 MAX: 62.8 CM/SEC
BH CV ECHO MEAS - RV V1 MEAN: 38.6 CM/SEC
BH CV ECHO MEAS - RV V1 VTI: 13.3 CM
BH CV ECHO MEAS - RVOT AREA: 4.5 CM^2
BH CV ECHO MEAS - RVOT DIAM: 2.4 CM
BH CV ECHO MEAS - RVSP: 24 MMHG
BH CV ECHO MEAS - SI(AO): 117.8 ML/M^2
BH CV ECHO MEAS - SI(CUBED): 18.3 ML/M^2
BH CV ECHO MEAS - SI(LVOT): 31.2 ML/M^2
BH CV ECHO MEAS - SI(MOD-SP2): 37.3 ML/M^2
BH CV ECHO MEAS - SI(MOD-SP4): 14.5 ML/M^2
BH CV ECHO MEAS - SI(TEICH): 13.7 ML/M^2
BH CV ECHO MEAS - SV(AO): 227.5 ML
BH CV ECHO MEAS - SV(CUBED): 35.3 ML
BH CV ECHO MEAS - SV(LVOT): 60.2 ML
BH CV ECHO MEAS - SV(MOD-SP2): 72 ML
BH CV ECHO MEAS - SV(MOD-SP4): 28 ML
BH CV ECHO MEAS - SV(RVOT): 60.2 ML
BH CV ECHO MEAS - SV(TEICH): 26.5 ML
BH CV ECHO MEAS - TAPSE (>1.6): 1.8 CM2
BH CV ECHO MEAS - TR MAX VEL: 200 CM/SEC
BH CV VAS BP RIGHT ARM: NORMAL MMHG
BH CV XLRA - RV BASE: 3.4 CM
BH CV XLRA - TDI S': 13 CM/SEC
BILIRUB BLD-MCNC: NEGATIVE MG/DL
BILIRUB SERPL-MCNC: 0.3 MG/DL (ref 0.2–1.2)
BILIRUB SERPL-MCNC: 0.4 MG/DL (ref 0.1–1.2)
BILIRUB SERPL-MCNC: 0.5 MG/DL (ref 0.1–1.2)
BILIRUB SERPL-MCNC: 0.5 MG/DL (ref 0.2–1.2)
BILIRUB SERPL-MCNC: 0.6 MG/DL (ref 0.1–1.2)
BILIRUB SERPL-MCNC: 0.6 MG/DL (ref 0.1–1.2)
BILIRUB SERPL-MCNC: 0.6 MG/DL (ref 0.2–1.2)
BILIRUB SERPL-MCNC: 0.7 MG/DL (ref 0.1–1.2)
BILIRUB SERPL-MCNC: 0.7 MG/DL (ref 0.2–1.2)
BILIRUB SERPL-MCNC: 0.8 MG/DL (ref 0.1–1.2)
BILIRUB SERPL-MCNC: 1 MG/DL (ref 0.1–1.2)
BILIRUB SERPL-MCNC: 1 MG/DL (ref 0.1–1.2)
BILIRUB SERPL-MCNC: 1.1 MG/DL (ref 0.1–1.2)
BILIRUB SERPL-MCNC: 1.1 MG/DL (ref 0.1–1.2)
BILIRUB SERPL-MCNC: 1.2 MG/DL (ref 0.1–1.2)
BILIRUB UR QL STRIP: NEGATIVE
BLD GP AB SCN SERPL QL: NEGATIVE
BUN BLD-MCNC: 11 MG/DL (ref 8–23)
BUN BLD-MCNC: 12 MG/DL (ref 8–23)
BUN BLD-MCNC: 13 MG/DL (ref 8–23)
BUN BLD-MCNC: 14 MG/DL (ref 8–23)
BUN BLD-MCNC: 14 MG/DL (ref 8–23)
BUN BLD-MCNC: 15 MG/DL (ref 8–23)
BUN BLD-MCNC: 16 MG/DL (ref 8–23)
BUN BLD-MCNC: 17 MG/DL (ref 8–23)
BUN BLD-MCNC: 18 MG/DL (ref 8–23)
BUN BLD-MCNC: 18 MG/DL (ref 8–23)
BUN BLD-MCNC: 19 MG/DL (ref 8–23)
BUN BLD-MCNC: 21 MG/DL (ref 8–23)
BUN BLD-MCNC: 23 MG/DL (ref 8–23)
BUN BLD-MCNC: 24 MG/DL (ref 8–23)
BUN BLD-MCNC: 25 MG/DL (ref 8–23)
BUN BLD-MCNC: 25 MG/DL (ref 8–23)
BUN BLD-MCNC: 26 MG/DL (ref 8–23)
BUN BLD-MCNC: 27 MG/DL (ref 8–23)
BUN BLD-MCNC: 29 MG/DL (ref 8–23)
BUN BLD-MCNC: 30 MG/DL (ref 8–23)
BUN BLD-MCNC: 30 MG/DL (ref 8–23)
BUN BLD-MCNC: 31 MG/DL (ref 8–23)
BUN BLD-MCNC: 33 MG/DL (ref 8–23)
BUN BLD-MCNC: 34 MG/DL (ref 8–23)
BUN BLD-MCNC: 35 MG/DL (ref 8–23)
BUN BLD-MCNC: 36 MG/DL (ref 8–23)
BUN BLD-MCNC: 37 MG/DL (ref 8–23)
BUN BLD-MCNC: 38 MG/DL (ref 8–23)
BUN BLD-MCNC: 39 MG/DL (ref 8–23)
BUN BLD-MCNC: 40 MG/DL (ref 8–23)
BUN BLD-MCNC: 45 MG/DL (ref 8–23)
BUN BLD-MCNC: 47 MG/DL (ref 8–23)
BUN BLD-MCNC: 50 MG/DL (ref 8–23)
BUN SERPL-MCNC: 32 MG/DL (ref 8–23)
BUN SERPL-MCNC: 33 MG/DL (ref 8–23)
BUN/CREAT SERPL: 10.4 (ref 7–25)
BUN/CREAT SERPL: 11 (ref 7–25)
BUN/CREAT SERPL: 11.4 (ref 7–25)
BUN/CREAT SERPL: 11.6 (ref 7–25)
BUN/CREAT SERPL: 11.7 (ref 7–25)
BUN/CREAT SERPL: 12.3 (ref 7–25)
BUN/CREAT SERPL: 13 (ref 7–25)
BUN/CREAT SERPL: 13.4 (ref 7–25)
BUN/CREAT SERPL: 13.7 (ref 7–25)
BUN/CREAT SERPL: 13.8 (ref 7–25)
BUN/CREAT SERPL: 13.9 (ref 7–25)
BUN/CREAT SERPL: 14 (ref 7–25)
BUN/CREAT SERPL: 14.4 (ref 7–25)
BUN/CREAT SERPL: 14.6 (ref 7–25)
BUN/CREAT SERPL: 14.7 (ref 7–25)
BUN/CREAT SERPL: 15 (ref 7–25)
BUN/CREAT SERPL: 15.3 (ref 7–25)
BUN/CREAT SERPL: 15.4 (ref 7–25)
BUN/CREAT SERPL: 15.5 (ref 7–25)
BUN/CREAT SERPL: 16 (ref 7–25)
BUN/CREAT SERPL: 16.1 (ref 7–25)
BUN/CREAT SERPL: 16.3 (ref 7–25)
BUN/CREAT SERPL: 16.6 (ref 7–25)
BUN/CREAT SERPL: 16.8 (ref 7–25)
BUN/CREAT SERPL: 16.9 (ref 7–25)
BUN/CREAT SERPL: 17.2 (ref 7–25)
BUN/CREAT SERPL: 17.4 (ref 7–25)
BUN/CREAT SERPL: 17.6 (ref 7–25)
BUN/CREAT SERPL: 18 (ref 7–25)
BUN/CREAT SERPL: 18.3 (ref 7–25)
BUN/CREAT SERPL: 18.3 (ref 7–25)
BUN/CREAT SERPL: 18.6 (ref 7–25)
BUN/CREAT SERPL: 19.3 (ref 7–25)
BUN/CREAT SERPL: 19.6 (ref 7–25)
BUN/CREAT SERPL: 20.4 (ref 7–25)
BUN/CREAT SERPL: 21.1 (ref 7–25)
BUN/CREAT SERPL: 6.1 (ref 7–25)
BUN/CREAT SERPL: 6.6 (ref 7–25)
BUN/CREAT SERPL: 6.7 (ref 7–25)
BUN/CREAT SERPL: 7 (ref 7–25)
BUN/CREAT SERPL: 7.1 (ref 7–25)
BUN/CREAT SERPL: 7.2 (ref 7–25)
BUN/CREAT SERPL: 7.5 (ref 7–25)
BUN/CREAT SERPL: 7.5 (ref 7–25)
BUN/CREAT SERPL: 7.6 (ref 7–25)
BUN/CREAT SERPL: 7.7 (ref 7–25)
BUN/CREAT SERPL: 7.9 (ref 7–25)
BUN/CREAT SERPL: 8.2 (ref 7–25)
BUN/CREAT SERPL: 9.3 (ref 7–25)
BUN/CREAT SERPL: 9.4 (ref 7–25)
BUN/CREAT SERPL: 9.4 (ref 7–25)
BUN/CREAT SERPL: 9.8 (ref 7–25)
BURR CELLS BLD QL SMEAR: NORMAL
C DIFF TOX GENS STL QL NAA+PROBE: NEGATIVE
C PNEUM DNA NPH QL NAA+NON-PROBE: NOT DETECTED
C-ANCA TITR SER IF: ABNORMAL TITER
CALCIUM SERPL-MCNC: 8.6 MG/DL (ref 8.8–10.5)
CALCIUM SERPL-MCNC: 9 MG/DL (ref 8.8–10.5)
CALCIUM SPEC-SCNC: 7.5 MG/DL (ref 8.6–10.5)
CALCIUM SPEC-SCNC: 7.7 MG/DL (ref 8.6–10.5)
CALCIUM SPEC-SCNC: 7.8 MG/DL (ref 8.6–10.5)
CALCIUM SPEC-SCNC: 7.8 MG/DL (ref 8.6–10.5)
CALCIUM SPEC-SCNC: 7.9 MG/DL (ref 8.6–10.5)
CALCIUM SPEC-SCNC: 8 MG/DL (ref 8.6–10.5)
CALCIUM SPEC-SCNC: 8.1 MG/DL (ref 8.6–10.5)
CALCIUM SPEC-SCNC: 8.1 MG/DL (ref 8.6–10.5)
CALCIUM SPEC-SCNC: 8.2 MG/DL (ref 8.6–10.5)
CALCIUM SPEC-SCNC: 8.3 MG/DL (ref 8.6–10.5)
CALCIUM SPEC-SCNC: 8.3 MG/DL (ref 8.8–10.5)
CALCIUM SPEC-SCNC: 8.4 MG/DL (ref 8.6–10.5)
CALCIUM SPEC-SCNC: 8.5 MG/DL (ref 8.6–10.5)
CALCIUM SPEC-SCNC: 8.6 MG/DL (ref 8.6–10.5)
CALCIUM SPEC-SCNC: 8.6 MG/DL (ref 8.8–10.5)
CALCIUM SPEC-SCNC: 8.7 MG/DL (ref 8.6–10.5)
CALCIUM SPEC-SCNC: 8.7 MG/DL (ref 8.6–10.5)
CALCIUM SPEC-SCNC: 8.8 MG/DL (ref 8.6–10.5)
CALCIUM SPEC-SCNC: 8.9 MG/DL (ref 8.6–10.5)
CALCIUM SPEC-SCNC: 9 MG/DL (ref 8.6–10.5)
CALCIUM SPEC-SCNC: 9.1 MG/DL (ref 8.6–10.5)
CALCIUM SPEC-SCNC: 9.1 MG/DL (ref 8.6–10.5)
CALCIUM SPEC-SCNC: 9.2 MG/DL (ref 8.6–10.5)
CALCIUM SPEC-SCNC: 9.5 MG/DL (ref 8.6–10.5)
CALCIUM SPEC-SCNC: 9.5 MG/DL (ref 8.6–10.5)
CANNABINOIDS SERPL QL: NEGATIVE
CHLORIDE SERPL-SCNC: 100 MMOL/L (ref 98–107)
CHLORIDE SERPL-SCNC: 100 MMOL/L (ref 98–107)
CHLORIDE SERPL-SCNC: 101 MMOL/L (ref 98–107)
CHLORIDE SERPL-SCNC: 102 MMOL/L (ref 98–107)
CHLORIDE SERPL-SCNC: 103 MMOL/L (ref 98–107)
CHLORIDE SERPL-SCNC: 104 MMOL/L (ref 98–107)
CHLORIDE SERPL-SCNC: 105 MMOL/L (ref 98–107)
CHLORIDE SERPL-SCNC: 106 MMOL/L (ref 98–107)
CHLORIDE SERPL-SCNC: 107 MMOL/L (ref 98–107)
CHLORIDE SERPL-SCNC: 107 MMOL/L (ref 98–107)
CHLORIDE SERPL-SCNC: 108 MMOL/L (ref 98–107)
CHLORIDE SERPL-SCNC: 109 MMOL/L (ref 98–107)
CHLORIDE SERPL-SCNC: 109 MMOL/L (ref 98–107)
CHLORIDE SERPL-SCNC: 96 MMOL/L (ref 98–107)
CHLORIDE SERPL-SCNC: 99 MMOL/L (ref 98–107)
CHOLEST SERPL-MCNC: 110 MG/DL (ref 0–200)
CHOLEST SERPL-MCNC: 112 MG/DL (ref 0–200)
CHOLEST SERPL-MCNC: 142 MG/DL (ref 0–200)
CHOLEST SERPL-MCNC: 189 MG/DL (ref 0–200)
CHOLEST/HDLC SERPL: 3.1 {RATIO}
CLARITY UR: ABNORMAL
CLARITY UR: CLEAR
CLARITY, POC: CLEAR
CO2 SERPL-SCNC: 15.2 MMOL/L (ref 22–29)
CO2 SERPL-SCNC: 16.2 MMOL/L (ref 22–29)
CO2 SERPL-SCNC: 17.6 MMOL/L (ref 22–29)
CO2 SERPL-SCNC: 18.3 MMOL/L (ref 22–29)
CO2 SERPL-SCNC: 18.5 MMOL/L (ref 22–29)
CO2 SERPL-SCNC: 18.6 MMOL/L (ref 22–29)
CO2 SERPL-SCNC: 19 MMOL/L (ref 22–29)
CO2 SERPL-SCNC: 19 MMOL/L (ref 22–29)
CO2 SERPL-SCNC: 19.2 MMOL/L (ref 22–29)
CO2 SERPL-SCNC: 19.7 MMOL/L (ref 22–29)
CO2 SERPL-SCNC: 19.9 MMOL/L (ref 22–29)
CO2 SERPL-SCNC: 20.1 MMOL/L (ref 22–29)
CO2 SERPL-SCNC: 20.9 MMOL/L (ref 22–29)
CO2 SERPL-SCNC: 21 MMOL/L (ref 22–29)
CO2 SERPL-SCNC: 21.2 MMOL/L (ref 22–29)
CO2 SERPL-SCNC: 22 MMOL/L (ref 22–29)
CO2 SERPL-SCNC: 22.2 MMOL/L (ref 22–29)
CO2 SERPL-SCNC: 22.4 MMOL/L (ref 22–29)
CO2 SERPL-SCNC: 22.5 MMOL/L (ref 22–29)
CO2 SERPL-SCNC: 22.5 MMOL/L (ref 22–29)
CO2 SERPL-SCNC: 22.6 MMOL/L (ref 22–29)
CO2 SERPL-SCNC: 22.8 MMOL/L (ref 22–29)
CO2 SERPL-SCNC: 22.9 MMOL/L (ref 22–29)
CO2 SERPL-SCNC: 23 MMOL/L (ref 22–29)
CO2 SERPL-SCNC: 23.2 MMOL/L (ref 22–29)
CO2 SERPL-SCNC: 23.3 MMOL/L (ref 22–29)
CO2 SERPL-SCNC: 23.5 MMOL/L (ref 22–29)
CO2 SERPL-SCNC: 23.6 MMOL/L (ref 22–29)
CO2 SERPL-SCNC: 23.7 MMOL/L (ref 22–29)
CO2 SERPL-SCNC: 23.8 MMOL/L (ref 22–29)
CO2 SERPL-SCNC: 24.1 MMOL/L (ref 22–29)
CO2 SERPL-SCNC: 24.1 MMOL/L (ref 22–29)
CO2 SERPL-SCNC: 24.2 MMOL/L (ref 22–29)
CO2 SERPL-SCNC: 24.6 MMOL/L (ref 22–29)
CO2 SERPL-SCNC: 25.2 MMOL/L (ref 22–29)
CO2 SERPL-SCNC: 26 MMOL/L (ref 22–29)
CO2 SERPL-SCNC: 26.2 MMOL/L (ref 22–29)
CO2 SERPL-SCNC: 27.7 MMOL/L (ref 22–29)
COCAINE UR QL: NEGATIVE
COLOR UR: ABNORMAL
COLOR UR: ABNORMAL
COLOR UR: YELLOW
CORTIS SERPL-MCNC: 13.47 MCG/DL
CREAT BLD-MCNC: 1.56 MG/DL (ref 0.76–1.27)
CREAT BLD-MCNC: 1.61 MG/DL (ref 0.76–1.27)
CREAT BLD-MCNC: 1.63 MG/DL (ref 0.76–1.27)
CREAT BLD-MCNC: 1.67 MG/DL (ref 0.76–1.27)
CREAT BLD-MCNC: 1.67 MG/DL (ref 0.76–1.27)
CREAT BLD-MCNC: 1.69 MG/DL (ref 0.76–1.27)
CREAT BLD-MCNC: 1.71 MG/DL (ref 0.76–1.27)
CREAT BLD-MCNC: 1.72 MG/DL (ref 0.76–1.27)
CREAT BLD-MCNC: 1.72 MG/DL (ref 0.76–1.27)
CREAT BLD-MCNC: 1.74 MG/DL (ref 0.76–1.27)
CREAT BLD-MCNC: 1.78 MG/DL (ref 0.76–1.27)
CREAT BLD-MCNC: 1.8 MG/DL (ref 0.76–1.27)
CREAT BLD-MCNC: 1.81 MG/DL (ref 0.76–1.27)
CREAT BLD-MCNC: 1.81 MG/DL (ref 0.76–1.27)
CREAT BLD-MCNC: 1.84 MG/DL (ref 0.76–1.27)
CREAT BLD-MCNC: 1.86 MG/DL (ref 0.76–1.27)
CREAT BLD-MCNC: 1.86 MG/DL (ref 0.76–1.27)
CREAT BLD-MCNC: 1.88 MG/DL (ref 0.76–1.27)
CREAT BLD-MCNC: 1.89 MG/DL (ref 0.76–1.27)
CREAT BLD-MCNC: 1.89 MG/DL (ref 0.76–1.27)
CREAT BLD-MCNC: 1.9 MG/DL (ref 0.76–1.27)
CREAT BLD-MCNC: 1.91 MG/DL (ref 0.76–1.27)
CREAT BLD-MCNC: 1.94 MG/DL (ref 0.76–1.27)
CREAT BLD-MCNC: 2.01 MG/DL (ref 0.76–1.27)
CREAT BLD-MCNC: 2.04 MG/DL (ref 0.76–1.27)
CREAT BLD-MCNC: 2.04 MG/DL (ref 0.76–1.27)
CREAT BLD-MCNC: 2.05 MG/DL (ref 0.76–1.27)
CREAT BLD-MCNC: 2.07 MG/DL (ref 0.76–1.27)
CREAT BLD-MCNC: 2.08 MG/DL (ref 0.76–1.27)
CREAT BLD-MCNC: 2.08 MG/DL (ref 0.76–1.27)
CREAT BLD-MCNC: 2.1 MG/DL (ref 0.76–1.27)
CREAT BLD-MCNC: 2.12 MG/DL (ref 0.76–1.27)
CREAT BLD-MCNC: 2.14 MG/DL (ref 0.76–1.27)
CREAT BLD-MCNC: 2.23 MG/DL (ref 0.76–1.27)
CREAT BLD-MCNC: 2.26 MG/DL (ref 0.76–1.27)
CREAT BLD-MCNC: 2.28 MG/DL (ref 0.76–1.27)
CREAT BLD-MCNC: 2.32 MG/DL (ref 0.76–1.27)
CREAT BLD-MCNC: 2.33 MG/DL (ref 0.76–1.27)
CREAT BLD-MCNC: 2.38 MG/DL (ref 0.76–1.27)
CREAT BLD-MCNC: 2.45 MG/DL (ref 0.76–1.27)
CREAT BLD-MCNC: 2.45 MG/DL (ref 0.76–1.27)
CREAT BLD-MCNC: 2.46 MG/DL (ref 0.76–1.27)
CREAT BLD-MCNC: 2.84 MG/DL (ref 0.76–1.27)
CREAT BLD-MCNC: 3.35 MG/DL (ref 0.76–1.27)
CREAT BLDA-MCNC: 0.7 MG/DL (ref 0.6–1.3)
CREAT BLDA-MCNC: 1.7 MG/DL (ref 0.6–1.3)
CREAT SERPL-MCNC: 1.84 MG/DL (ref 0.76–1.27)
CREAT SERPL-MCNC: 1.92 MG/DL (ref 0.76–1.27)
CREAT UR-MCNC: 29.4 MG/DL
CREAT UR-MCNC: 57.2 MG/DL
CRP SERPL-MCNC: 12.34 MG/DL (ref 0–0.5)
CRP SERPL-MCNC: 12.85 MG/DL (ref 0–0.5)
CRP SERPL-MCNC: 8.55 MG/DL (ref 0–0.5)
CYTO UR: NORMAL
CYTO UR: NORMAL
D DIMER PPP FEU-MCNC: 0.71 MCGFEU/ML (ref 0–0.49)
D-LACTATE SERPL-SCNC: 0.8 MMOL/L (ref 0.5–2)
D-LACTATE SERPL-SCNC: 1.5 MMOL/L (ref 0.5–2)
D-LACTATE SERPL-SCNC: 1.7 MMOL/L (ref 0.5–2)
D-LACTATE SERPL-SCNC: 1.7 MMOL/L (ref 0.5–2)
D-LACTATE SERPL-SCNC: 2 MMOL/L (ref 0.5–2)
D-LACTATE SERPL-SCNC: 2.9 MMOL/L (ref 0.5–2)
DEPRECATED RDW RBC AUTO: 45.7 FL (ref 37–54)
DEPRECATED RDW RBC AUTO: 46 FL (ref 37–54)
DEPRECATED RDW RBC AUTO: 46.6 FL (ref 37–54)
DEPRECATED RDW RBC AUTO: 47 FL (ref 37–54)
DEPRECATED RDW RBC AUTO: 47 FL (ref 37–54)
DEPRECATED RDW RBC AUTO: 47.1 FL (ref 37–54)
DEPRECATED RDW RBC AUTO: 47.2 FL (ref 37–54)
DEPRECATED RDW RBC AUTO: 47.2 FL (ref 37–54)
DEPRECATED RDW RBC AUTO: 47.3 FL (ref 37–54)
DEPRECATED RDW RBC AUTO: 47.3 FL (ref 37–54)
DEPRECATED RDW RBC AUTO: 47.4 FL (ref 37–54)
DEPRECATED RDW RBC AUTO: 47.4 FL (ref 37–54)
DEPRECATED RDW RBC AUTO: 47.8 FL (ref 37–54)
DEPRECATED RDW RBC AUTO: 47.9 FL (ref 37–54)
DEPRECATED RDW RBC AUTO: 47.9 FL (ref 37–54)
DEPRECATED RDW RBC AUTO: 48 FL (ref 37–54)
DEPRECATED RDW RBC AUTO: 48.1 FL (ref 37–54)
DEPRECATED RDW RBC AUTO: 48.3 FL (ref 37–54)
DEPRECATED RDW RBC AUTO: 48.5 FL (ref 37–54)
DEPRECATED RDW RBC AUTO: 48.6 FL (ref 37–49)
DEPRECATED RDW RBC AUTO: 48.7 FL (ref 37–54)
DEPRECATED RDW RBC AUTO: 48.8 FL (ref 37–54)
DEPRECATED RDW RBC AUTO: 48.9 FL (ref 37–54)
DEPRECATED RDW RBC AUTO: 48.9 FL (ref 37–54)
DEPRECATED RDW RBC AUTO: 49.3 FL (ref 37–54)
DEPRECATED RDW RBC AUTO: 49.9 FL (ref 37–54)
DEPRECATED RDW RBC AUTO: 52.5 FL (ref 37–54)
DEPRECATED RDW RBC AUTO: 56.9 FL (ref 37–54)
DEPRECATED RDW RBC AUTO: 57.5 FL (ref 37–54)
DEPRECATED RDW RBC AUTO: 57.8 FL (ref 37–54)
DEPRECATED RDW RBC AUTO: 58 FL (ref 37–54)
DEPRECATED RDW RBC AUTO: 58 FL (ref 37–54)
DEPRECATED RDW RBC AUTO: 58.1 FL (ref 37–54)
DEPRECATED RDW RBC AUTO: 58.2 FL (ref 37–54)
DEPRECATED RDW RBC AUTO: 58.3 FL (ref 37–54)
DEPRECATED RDW RBC AUTO: 58.4 FL (ref 37–54)
DEPRECATED RDW RBC AUTO: 58.9 FL (ref 37–54)
DEPRECATED RDW RBC AUTO: 59.4 FL (ref 37–54)
DEPRECATED RDW RBC AUTO: 59.5 FL (ref 37–54)
DEPRECATED RDW RBC AUTO: 59.9 FL (ref 37–54)
DEPRECATED RDW RBC AUTO: 60.1 FL (ref 37–54)
DEPRECATED RDW RBC AUTO: 60.1 FL (ref 37–54)
DEPRECATED RDW RBC AUTO: 60.7 FL (ref 37–54)
DIGOXIN SERPL-MCNC: 0.5 NG/ML (ref 0.6–1.2)
DIGOXIN SERPL-MCNC: 0.5 NG/ML (ref 0.6–1.2)
DIGOXIN SERPL-MCNC: 0.7 NG/ML (ref 0.6–1.2)
DIGOXIN SERPL-MCNC: 0.8 NG/ML (ref 0.6–1.2)
DIGOXIN SERPL-MCNC: 0.9 NG/ML (ref 0.6–1.2)
DIGOXIN SERPL-MCNC: 1 NG/ML (ref 0.6–1.2)
DIGOXIN SERPL-MCNC: 1.3 NG/ML (ref 0.6–1.2)
E/E' RATIO: 7
EOSINOPHIL # BLD AUTO: 0 10*3/MM3 (ref 0–0.7)
EOSINOPHIL # BLD AUTO: 0 10*3/MM3 (ref 0–0.7)
EOSINOPHIL # BLD AUTO: 0.01 10*3/MM3 (ref 0–0.7)
EOSINOPHIL # BLD AUTO: 0.01 10*3/MM3 (ref 0–0.7)
EOSINOPHIL # BLD AUTO: 0.04 10*3/MM3 (ref 0–0.7)
EOSINOPHIL # BLD AUTO: 0.05 10*3/MM3 (ref 0–0.7)
EOSINOPHIL # BLD AUTO: 0.17 10*3/MM3 (ref 0–0.7)
EOSINOPHIL # BLD AUTO: 0.21 10*3/MM3 (ref 0–0.7)
EOSINOPHIL # BLD AUTO: 0.22 10*3/MM3 (ref 0–0.7)
EOSINOPHIL # BLD AUTO: 0.23 10*3/MM3 (ref 0–0.7)
EOSINOPHIL # BLD AUTO: 0.24 10*3/MM3 (ref 0–0.7)
EOSINOPHIL # BLD AUTO: 0.25 10*3/MM3 (ref 0–0.7)
EOSINOPHIL # BLD AUTO: 0.25 10*3/MM3 (ref 0–0.7)
EOSINOPHIL # BLD AUTO: 0.26 10*3/MM3 (ref 0–0.7)
EOSINOPHIL # BLD AUTO: 0.26 10*3/MM3 (ref 0–0.7)
EOSINOPHIL # BLD AUTO: 0.3 10*3/MM3 (ref 0–0.7)
EOSINOPHIL # BLD AUTO: 0.34 10*3/MM3 (ref 0–0.7)
EOSINOPHIL # BLD AUTO: 0.36 10*3/MM3 (ref 0–0.7)
EOSINOPHIL # BLD AUTO: 0.38 10*3/MM3 (ref 0–0.7)
EOSINOPHIL # BLD AUTO: 0.41 10*3/MM3 (ref 0–0.36)
EOSINOPHIL # BLD AUTO: 0.42 10*3/MM3 (ref 0–0.7)
EOSINOPHIL # BLD AUTO: 0.51 10*3/MM3 (ref 0–0.7)
EOSINOPHIL # BLD AUTO: 0.53 10*3/MM3 (ref 0–0.7)
EOSINOPHIL # BLD AUTO: 0.55 10*3/MM3 (ref 0–0.7)
EOSINOPHIL # BLD AUTO: 0.55 10*3/MM3 (ref 0–0.7)
EOSINOPHIL # BLD AUTO: 0.6 10*3/MM3 (ref 0–0.7)
EOSINOPHIL # BLD AUTO: 0.61 10*3/MM3 (ref 0.1–0.3)
EOSINOPHIL # BLD AUTO: 0.62 10*3/MM3 (ref 0–0.7)
EOSINOPHIL # BLD AUTO: 0.63 10*3/MM3 (ref 0–0.7)
EOSINOPHIL # BLD AUTO: 0.65 10*3/MM3 (ref 0–0.7)
EOSINOPHIL # BLD AUTO: 0.68 10*3/MM3 (ref 0.1–0.3)
EOSINOPHIL # BLD AUTO: 0.69 10*3/MM3 (ref 0–0.7)
EOSINOPHIL # BLD AUTO: 0.73 10*3/MM3 (ref 0–0.7)
EOSINOPHIL # BLD AUTO: 0.94 10*3/MM3 (ref 0.1–0.3)
EOSINOPHIL NFR BLD AUTO: 0 % (ref 0.3–6.2)
EOSINOPHIL NFR BLD AUTO: 0 % (ref 0.3–6.2)
EOSINOPHIL NFR BLD AUTO: 0.1 % (ref 0.3–6.2)
EOSINOPHIL NFR BLD AUTO: 0.1 % (ref 0.3–6.2)
EOSINOPHIL NFR BLD AUTO: 0.4 % (ref 0.3–6.2)
EOSINOPHIL NFR BLD AUTO: 0.5 % (ref 0.3–6.2)
EOSINOPHIL NFR BLD AUTO: 1.8 % (ref 0.3–6.2)
EOSINOPHIL NFR BLD AUTO: 11.6 % (ref 0–4)
EOSINOPHIL NFR BLD AUTO: 2.4 % (ref 0.3–6.2)
EOSINOPHIL NFR BLD AUTO: 2.4 % (ref 0.3–6.2)
EOSINOPHIL NFR BLD AUTO: 2.5 % (ref 0.3–6.2)
EOSINOPHIL NFR BLD AUTO: 2.7 % (ref 0.3–6.2)
EOSINOPHIL NFR BLD AUTO: 3.1 % (ref 0.3–6.2)
EOSINOPHIL NFR BLD AUTO: 3.1 % (ref 0.3–6.2)
EOSINOPHIL NFR BLD AUTO: 3.7 % (ref 0.3–6.2)
EOSINOPHIL NFR BLD AUTO: 3.8 % (ref 0.3–6.2)
EOSINOPHIL NFR BLD AUTO: 3.9 % (ref 1–5)
EOSINOPHIL NFR BLD AUTO: 4 % (ref 0.3–6.2)
EOSINOPHIL NFR BLD AUTO: 4.4 % (ref 0.3–6.2)
EOSINOPHIL NFR BLD AUTO: 4.6 % (ref 0.3–6.2)
EOSINOPHIL NFR BLD AUTO: 4.6 % (ref 0.3–6.2)
EOSINOPHIL NFR BLD AUTO: 4.7 % (ref 0.3–6.2)
EOSINOPHIL NFR BLD AUTO: 5 % (ref 0.3–6.2)
EOSINOPHIL NFR BLD AUTO: 5 % (ref 0.3–6.2)
EOSINOPHIL NFR BLD AUTO: 5.5 % (ref 0.3–6.2)
EOSINOPHIL NFR BLD AUTO: 5.6 % (ref 0.3–6.2)
EOSINOPHIL NFR BLD AUTO: 6.2 % (ref 0.3–6.2)
EOSINOPHIL NFR BLD AUTO: 6.3 % (ref 0.3–6.2)
EOSINOPHIL NFR BLD AUTO: 6.3 % (ref 0.3–6.2)
EOSINOPHIL NFR BLD AUTO: 6.6 % (ref 0.3–6.2)
EOSINOPHIL NFR BLD AUTO: 6.8 % (ref 0.3–6.2)
EOSINOPHIL NFR BLD AUTO: 7.3 % (ref 0.3–6.2)
EOSINOPHIL NFR BLD AUTO: 7.4 % (ref 0.3–6.2)
EOSINOPHIL NFR BLD AUTO: 7.5 % (ref 0–4)
EOSINOPHIL NFR BLD AUTO: 7.5 % (ref 0–4)
EOSINOPHIL NFR BLD AUTO: 7.7 % (ref 0.3–6.2)
EOSINOPHIL SPEC QL MICRO: 0 % EOS/100 CELLS (ref 0–0)
ERYTHROCYTE [DISTWIDTH] IN BLOOD BY AUTOMATED COUNT: 15.2 % (ref 11.5–14.5)
ERYTHROCYTE [DISTWIDTH] IN BLOOD BY AUTOMATED COUNT: 15.4 % (ref 11.5–14.5)
ERYTHROCYTE [DISTWIDTH] IN BLOOD BY AUTOMATED COUNT: 15.4 % (ref 11.5–14.5)
ERYTHROCYTE [DISTWIDTH] IN BLOOD BY AUTOMATED COUNT: 15.5 % (ref 11.5–14.5)
ERYTHROCYTE [DISTWIDTH] IN BLOOD BY AUTOMATED COUNT: 15.6 % (ref 11.5–14.5)
ERYTHROCYTE [DISTWIDTH] IN BLOOD BY AUTOMATED COUNT: 15.6 % (ref 11.5–14.5)
ERYTHROCYTE [DISTWIDTH] IN BLOOD BY AUTOMATED COUNT: 15.7 % (ref 11.5–14.5)
ERYTHROCYTE [DISTWIDTH] IN BLOOD BY AUTOMATED COUNT: 15.7 % (ref 11.5–14.5)
ERYTHROCYTE [DISTWIDTH] IN BLOOD BY AUTOMATED COUNT: 15.8 % (ref 11.5–14.5)
ERYTHROCYTE [DISTWIDTH] IN BLOOD BY AUTOMATED COUNT: 15.9 % (ref 11.5–14.5)
ERYTHROCYTE [DISTWIDTH] IN BLOOD BY AUTOMATED COUNT: 16 % (ref 11.5–14.5)
ERYTHROCYTE [DISTWIDTH] IN BLOOD BY AUTOMATED COUNT: 16.1 % (ref 11.5–14.5)
ERYTHROCYTE [DISTWIDTH] IN BLOOD BY AUTOMATED COUNT: 16.1 % (ref 11.5–14.5)
ERYTHROCYTE [DISTWIDTH] IN BLOOD BY AUTOMATED COUNT: 16.2 % (ref 11.5–14.5)
ERYTHROCYTE [DISTWIDTH] IN BLOOD BY AUTOMATED COUNT: 16.3 % (ref 11.5–14.5)
ERYTHROCYTE [DISTWIDTH] IN BLOOD BY AUTOMATED COUNT: 16.5 % (ref 11.5–14.5)
ERYTHROCYTE [DISTWIDTH] IN BLOOD BY AUTOMATED COUNT: 16.6 % (ref 11.5–14.5)
ERYTHROCYTE [DISTWIDTH] IN BLOOD BY AUTOMATED COUNT: 16.7 % (ref 11.5–14.5)
ERYTHROCYTE [DISTWIDTH] IN BLOOD BY AUTOMATED COUNT: 16.9 % (ref 11.5–14.5)
ERYTHROCYTE [DISTWIDTH] IN BLOOD BY AUTOMATED COUNT: 17 % (ref 11.5–14.5)
ERYTHROCYTE [DISTWIDTH] IN BLOOD BY AUTOMATED COUNT: 17.5 % (ref 11.7–14.5)
ERYTHROCYTE [DISTWIDTH] IN BLOOD BY AUTOMATED COUNT: 20.1 % (ref 11.5–14.5)
ERYTHROCYTE [DISTWIDTH] IN BLOOD BY AUTOMATED COUNT: 20.2 % (ref 11.5–14.5)
ERYTHROCYTE [DISTWIDTH] IN BLOOD BY AUTOMATED COUNT: 20.2 % (ref 11.5–14.5)
ERYTHROCYTE [DISTWIDTH] IN BLOOD BY AUTOMATED COUNT: 20.4 % (ref 11.5–14.5)
ERYTHROCYTE [DISTWIDTH] IN BLOOD BY AUTOMATED COUNT: 20.5 % (ref 11.5–14.5)
ERYTHROCYTE [DISTWIDTH] IN BLOOD BY AUTOMATED COUNT: 20.6 % (ref 11.5–14.5)
ERYTHROCYTE [DISTWIDTH] IN BLOOD BY AUTOMATED COUNT: 20.7 % (ref 11.5–14.5)
FLUAV AG NPH QL: NEGATIVE
FLUAV H1 2009 PAND RNA NPH QL NAA+PROBE: NOT DETECTED
FLUAV H1 HA GENE NPH QL NAA+PROBE: NOT DETECTED
FLUAV H3 RNA NPH QL NAA+PROBE: NOT DETECTED
FLUAV SUBTYP SPEC NAA+PROBE: NOT DETECTED
FLUBV AG NPH QL IA: NEGATIVE
FLUBV RNA ISLT QL NAA+PROBE: NOT DETECTED
FOLATE SERPL-MCNC: 7.31 NG/ML (ref 4.78–24.2)
GAS FLOW AIRWAY: 6 LPM
GFR SERPL CREATININE-BSD FRML MDRD: 18 ML/MIN/1.73
GFR SERPL CREATININE-BSD FRML MDRD: 22 ML/MIN/1.73
GFR SERPL CREATININE-BSD FRML MDRD: 25 ML/MIN/1.73
GFR SERPL CREATININE-BSD FRML MDRD: 26 ML/MIN/1.73
GFR SERPL CREATININE-BSD FRML MDRD: 27 ML/MIN/1.73
GFR SERPL CREATININE-BSD FRML MDRD: 27 ML/MIN/1.73
GFR SERPL CREATININE-BSD FRML MDRD: 28 ML/MIN/1.73
GFR SERPL CREATININE-BSD FRML MDRD: 30 ML/MIN/1.73
GFR SERPL CREATININE-BSD FRML MDRD: 30 ML/MIN/1.73
GFR SERPL CREATININE-BSD FRML MDRD: 31 ML/MIN/1.73
GFR SERPL CREATININE-BSD FRML MDRD: 32 ML/MIN/1.73
GFR SERPL CREATININE-BSD FRML MDRD: 33 ML/MIN/1.73
GFR SERPL CREATININE-BSD FRML MDRD: 34 ML/MIN/1.73
GFR SERPL CREATININE-BSD FRML MDRD: 35 ML/MIN/1.73
GFR SERPL CREATININE-BSD FRML MDRD: 36 ML/MIN/1.73
GFR SERPL CREATININE-BSD FRML MDRD: 37 ML/MIN/1.73
GFR SERPL CREATININE-BSD FRML MDRD: 38 ML/MIN/1.73
GFR SERPL CREATININE-BSD FRML MDRD: 39 ML/MIN/1.73
GFR SERPL CREATININE-BSD FRML MDRD: 40 ML/MIN/1.73
GFR SERPL CREATININE-BSD FRML MDRD: 40 ML/MIN/1.73
GFR SERPL CREATININE-BSD FRML MDRD: 41 ML/MIN/1.73
GFR SERPL CREATININE-BSD FRML MDRD: 41 ML/MIN/1.73
GFR SERPL CREATININE-BSD FRML MDRD: 43 ML/MIN/1.73
GLOBULIN SER CALC-MCNC: 2.5 GM/DL
GLOBULIN SER CALC-MCNC: 2.9 GM/DL
GLOBULIN UR ELPH-MCNC: 2.9 GM/DL
GLOBULIN UR ELPH-MCNC: 3.1 GM/DL
GLOBULIN UR ELPH-MCNC: 3.2 GM/DL
GLOBULIN UR ELPH-MCNC: 3.2 GM/DL
GLOBULIN UR ELPH-MCNC: 3.3 GM/DL
GLOBULIN UR ELPH-MCNC: 3.6 GM/DL
GLOBULIN UR ELPH-MCNC: 3.7 GM/DL
GLOBULIN UR ELPH-MCNC: 3.8 GM/DL
GLOBULIN UR ELPH-MCNC: 3.9 GM/DL
GLOBULIN UR ELPH-MCNC: 4.1 GM/DL
GLOBULIN UR ELPH-MCNC: 4.5 GM/DL
GLUCOSE BLD-MCNC: 100 MG/DL (ref 65–99)
GLUCOSE BLD-MCNC: 100 MG/DL (ref 65–99)
GLUCOSE BLD-MCNC: 106 MG/DL (ref 65–99)
GLUCOSE BLD-MCNC: 107 MG/DL (ref 65–99)
GLUCOSE BLD-MCNC: 109 MG/DL (ref 65–99)
GLUCOSE BLD-MCNC: 109 MG/DL (ref 65–99)
GLUCOSE BLD-MCNC: 111 MG/DL (ref 65–99)
GLUCOSE BLD-MCNC: 111 MG/DL (ref 65–99)
GLUCOSE BLD-MCNC: 112 MG/DL (ref 65–99)
GLUCOSE BLD-MCNC: 113 MG/DL (ref 65–99)
GLUCOSE BLD-MCNC: 113 MG/DL (ref 65–99)
GLUCOSE BLD-MCNC: 115 MG/DL (ref 65–99)
GLUCOSE BLD-MCNC: 117 MG/DL (ref 65–99)
GLUCOSE BLD-MCNC: 120 MG/DL (ref 65–99)
GLUCOSE BLD-MCNC: 121 MG/DL (ref 65–99)
GLUCOSE BLD-MCNC: 122 MG/DL (ref 65–99)
GLUCOSE BLD-MCNC: 122 MG/DL (ref 65–99)
GLUCOSE BLD-MCNC: 124 MG/DL (ref 65–99)
GLUCOSE BLD-MCNC: 128 MG/DL (ref 65–99)
GLUCOSE BLD-MCNC: 129 MG/DL (ref 65–99)
GLUCOSE BLD-MCNC: 130 MG/DL (ref 65–99)
GLUCOSE BLD-MCNC: 131 MG/DL (ref 65–99)
GLUCOSE BLD-MCNC: 133 MG/DL (ref 65–99)
GLUCOSE BLD-MCNC: 134 MG/DL (ref 65–99)
GLUCOSE BLD-MCNC: 136 MG/DL (ref 65–99)
GLUCOSE BLD-MCNC: 137 MG/DL (ref 65–99)
GLUCOSE BLD-MCNC: 140 MG/DL (ref 65–99)
GLUCOSE BLD-MCNC: 142 MG/DL (ref 65–99)
GLUCOSE BLD-MCNC: 144 MG/DL (ref 65–99)
GLUCOSE BLD-MCNC: 145 MG/DL (ref 65–99)
GLUCOSE BLD-MCNC: 146 MG/DL (ref 65–99)
GLUCOSE BLD-MCNC: 147 MG/DL (ref 65–99)
GLUCOSE BLD-MCNC: 148 MG/DL (ref 65–99)
GLUCOSE BLD-MCNC: 151 MG/DL (ref 65–99)
GLUCOSE BLD-MCNC: 153 MG/DL (ref 65–99)
GLUCOSE BLD-MCNC: 153 MG/DL (ref 65–99)
GLUCOSE BLD-MCNC: 154 MG/DL (ref 65–99)
GLUCOSE BLD-MCNC: 160 MG/DL (ref 65–99)
GLUCOSE BLD-MCNC: 179 MG/DL (ref 65–99)
GLUCOSE BLD-MCNC: 184 MG/DL (ref 65–99)
GLUCOSE BLD-MCNC: 234 MG/DL (ref 65–99)
GLUCOSE BLD-MCNC: 90 MG/DL (ref 65–99)
GLUCOSE BLD-MCNC: 94 MG/DL (ref 65–99)
GLUCOSE BLD-MCNC: 97 MG/DL (ref 65–99)
GLUCOSE BLD-MCNC: 99 MG/DL (ref 65–99)
GLUCOSE BLDC GLUCOMTR-MCNC: 100 MG/DL (ref 70–130)
GLUCOSE BLDC GLUCOMTR-MCNC: 103 MG/DL (ref 70–130)
GLUCOSE BLDC GLUCOMTR-MCNC: 105 MG/DL (ref 70–130)
GLUCOSE BLDC GLUCOMTR-MCNC: 106 MG/DL (ref 70–130)
GLUCOSE BLDC GLUCOMTR-MCNC: 107 MG/DL (ref 70–130)
GLUCOSE BLDC GLUCOMTR-MCNC: 108 MG/DL (ref 70–130)
GLUCOSE BLDC GLUCOMTR-MCNC: 108 MG/DL (ref 70–130)
GLUCOSE BLDC GLUCOMTR-MCNC: 111 MG/DL (ref 70–130)
GLUCOSE BLDC GLUCOMTR-MCNC: 111 MG/DL (ref 70–130)
GLUCOSE BLDC GLUCOMTR-MCNC: 112 MG/DL (ref 70–130)
GLUCOSE BLDC GLUCOMTR-MCNC: 112 MG/DL (ref 70–130)
GLUCOSE BLDC GLUCOMTR-MCNC: 113 MG/DL (ref 70–130)
GLUCOSE BLDC GLUCOMTR-MCNC: 114 MG/DL (ref 70–130)
GLUCOSE BLDC GLUCOMTR-MCNC: 114 MG/DL (ref 70–130)
GLUCOSE BLDC GLUCOMTR-MCNC: 115 MG/DL (ref 70–130)
GLUCOSE BLDC GLUCOMTR-MCNC: 117 MG/DL (ref 70–130)
GLUCOSE BLDC GLUCOMTR-MCNC: 123 MG/DL (ref 70–130)
GLUCOSE BLDC GLUCOMTR-MCNC: 125 MG/DL (ref 70–130)
GLUCOSE BLDC GLUCOMTR-MCNC: 128 MG/DL (ref 70–130)
GLUCOSE BLDC GLUCOMTR-MCNC: 129 MG/DL (ref 70–130)
GLUCOSE BLDC GLUCOMTR-MCNC: 129 MG/DL (ref 70–130)
GLUCOSE BLDC GLUCOMTR-MCNC: 131 MG/DL (ref 70–130)
GLUCOSE BLDC GLUCOMTR-MCNC: 134 MG/DL (ref 70–130)
GLUCOSE BLDC GLUCOMTR-MCNC: 135 MG/DL (ref 70–130)
GLUCOSE BLDC GLUCOMTR-MCNC: 136 MG/DL (ref 70–130)
GLUCOSE BLDC GLUCOMTR-MCNC: 137 MG/DL (ref 70–130)
GLUCOSE BLDC GLUCOMTR-MCNC: 138 MG/DL (ref 70–130)
GLUCOSE BLDC GLUCOMTR-MCNC: 143 MG/DL (ref 70–130)
GLUCOSE BLDC GLUCOMTR-MCNC: 145 MG/DL (ref 70–130)
GLUCOSE BLDC GLUCOMTR-MCNC: 146 MG/DL (ref 70–130)
GLUCOSE BLDC GLUCOMTR-MCNC: 148 MG/DL (ref 70–130)
GLUCOSE BLDC GLUCOMTR-MCNC: 150 MG/DL (ref 70–130)
GLUCOSE BLDC GLUCOMTR-MCNC: 150 MG/DL (ref 70–130)
GLUCOSE BLDC GLUCOMTR-MCNC: 151 MG/DL (ref 70–130)
GLUCOSE BLDC GLUCOMTR-MCNC: 156 MG/DL (ref 70–130)
GLUCOSE BLDC GLUCOMTR-MCNC: 156 MG/DL (ref 70–130)
GLUCOSE BLDC GLUCOMTR-MCNC: 158 MG/DL (ref 70–130)
GLUCOSE BLDC GLUCOMTR-MCNC: 163 MG/DL (ref 70–130)
GLUCOSE BLDC GLUCOMTR-MCNC: 180 MG/DL (ref 70–130)
GLUCOSE BLDC GLUCOMTR-MCNC: 188 MG/DL (ref 70–130)
GLUCOSE BLDC GLUCOMTR-MCNC: 192 MG/DL (ref 70–130)
GLUCOSE BLDC GLUCOMTR-MCNC: 210 MG/DL (ref 70–130)
GLUCOSE BLDC GLUCOMTR-MCNC: 243 MG/DL (ref 70–130)
GLUCOSE BLDC GLUCOMTR-MCNC: 88 MG/DL (ref 70–130)
GLUCOSE BLDC GLUCOMTR-MCNC: 88 MG/DL (ref 70–130)
GLUCOSE BLDC GLUCOMTR-MCNC: 93 MG/DL (ref 70–130)
GLUCOSE BLDC GLUCOMTR-MCNC: 99 MG/DL (ref 70–130)
GLUCOSE SERPL-MCNC: 131 MG/DL (ref 65–99)
GLUCOSE SERPL-MCNC: 143 MG/DL (ref 65–99)
GLUCOSE UR STRIP-MCNC: ABNORMAL MG/DL
GLUCOSE UR STRIP-MCNC: NEGATIVE MG/DL
GRAM STN SPEC: NORMAL
GRAM STN SPEC: NORMAL
HADV DNA SPEC NAA+PROBE: NOT DETECTED
HBA1C MFR BLD: 5.87 % (ref 4.8–5.6)
HBA1C MFR BLD: 6 % (ref 4.8–5.6)
HBA1C MFR BLD: 6.1 % (ref 4.8–5.6)
HBA1C MFR BLD: 6.3 %
HBA1C MFR BLD: 6.57 % (ref 4.8–5.6)
HBV SURFACE AG SERPL QL IA: NORMAL
HCO3 BLDA-SCNC: 15.1 MMOL/L (ref 22–28)
HCOV 229E RNA SPEC QL NAA+PROBE: NOT DETECTED
HCOV HKU1 RNA SPEC QL NAA+PROBE: NOT DETECTED
HCOV NL63 RNA SPEC QL NAA+PROBE: NOT DETECTED
HCOV OC43 RNA SPEC QL NAA+PROBE: NOT DETECTED
HCT VFR BLD AUTO: 28.7 % (ref 40.4–52.2)
HCT VFR BLD AUTO: 29.6 % (ref 40.4–52.2)
HCT VFR BLD AUTO: 31.5 % (ref 40.4–52.2)
HCT VFR BLD AUTO: 31.8 % (ref 40.4–52.2)
HCT VFR BLD AUTO: 31.8 % (ref 40.4–52.2)
HCT VFR BLD AUTO: 32 % (ref 40.4–52.2)
HCT VFR BLD AUTO: 32.1 % (ref 40.4–52.2)
HCT VFR BLD AUTO: 32.2 % (ref 40.4–52.2)
HCT VFR BLD AUTO: 32.2 % (ref 40.4–52.2)
HCT VFR BLD AUTO: 33.1 % (ref 40.4–52.2)
HCT VFR BLD AUTO: 33.5 % (ref 40.4–52.2)
HCT VFR BLD AUTO: 33.6 % (ref 40.4–52.2)
HCT VFR BLD AUTO: 33.7 % (ref 40.4–52.2)
HCT VFR BLD AUTO: 33.9 % (ref 40.4–52.2)
HCT VFR BLD AUTO: 34 % (ref 40.4–52.2)
HCT VFR BLD AUTO: 35 % (ref 40.4–52.2)
HCT VFR BLD AUTO: 35.2 % (ref 40.4–52.2)
HCT VFR BLD AUTO: 35.2 % (ref 40.4–52.2)
HCT VFR BLD AUTO: 35.5 % (ref 40.4–52.2)
HCT VFR BLD AUTO: 35.8 % (ref 40–49)
HCT VFR BLD AUTO: 35.9 % (ref 40.4–52.2)
HCT VFR BLD AUTO: 36.1 % (ref 40.4–52.2)
HCT VFR BLD AUTO: 36.7 % (ref 40.4–52.2)
HCT VFR BLD AUTO: 36.7 % (ref 40.4–52.2)
HCT VFR BLD AUTO: 37.9 % (ref 42–52)
HCT VFR BLD AUTO: 38.1 % (ref 40.4–52.2)
HCT VFR BLD AUTO: 39.4 % (ref 40.4–52.2)
HCT VFR BLD AUTO: 40.3 % (ref 40.4–52.2)
HCT VFR BLD AUTO: 40.6 % (ref 40.4–52.2)
HCT VFR BLD AUTO: 41.3 % (ref 40.4–52.2)
HCT VFR BLD AUTO: 42.3 % (ref 40.4–52.2)
HCT VFR BLD AUTO: 43 % (ref 40.4–52.2)
HCT VFR BLD AUTO: 43 % (ref 40.4–52.2)
HCT VFR BLD AUTO: 43.1 % (ref 40.4–52.2)
HCT VFR BLD AUTO: 43.2 % (ref 40.4–52.2)
HCT VFR BLD AUTO: 43.5 % (ref 40.4–52.2)
HCT VFR BLD AUTO: 44 % (ref 40.4–52.2)
HCT VFR BLD AUTO: 44.1 % (ref 42–52)
HCT VFR BLD AUTO: 44.2 % (ref 40.4–52.2)
HCT VFR BLD AUTO: 44.3 % (ref 40.4–52.2)
HCT VFR BLD AUTO: 44.6 % (ref 40.4–52.2)
HCT VFR BLD AUTO: 44.8 % (ref 40.4–52.2)
HCT VFR BLD AUTO: 45.1 % (ref 40.4–52.2)
HCT VFR BLD AUTO: 45.4 % (ref 42–52)
HCT VFR BLD AUTO: 45.7 % (ref 40.4–52.2)
HCT VFR BLD AUTO: 46.8 % (ref 40.4–52.2)
HCV AB SER DONR QL: NORMAL
HDLC SERPL-MCNC: 33 MG/DL (ref 40–60)
HDLC SERPL-MCNC: 40 MG/DL (ref 40–60)
HDLC SERPL-MCNC: 46 MG/DL (ref 40–60)
HDLC SERPL-MCNC: 61 MG/DL (ref 40–60)
HEPATITIS C RNA-NAA: NEGATIVE
HGB BLD-MCNC: 10.2 G/DL (ref 13.7–17.6)
HGB BLD-MCNC: 10.3 G/DL (ref 13.7–17.6)
HGB BLD-MCNC: 10.3 G/DL (ref 13.7–17.6)
HGB BLD-MCNC: 10.4 G/DL (ref 13.7–17.6)
HGB BLD-MCNC: 10.6 G/DL (ref 13.7–17.6)
HGB BLD-MCNC: 10.7 G/DL (ref 13.7–17.6)
HGB BLD-MCNC: 10.8 G/DL (ref 13.7–17.6)
HGB BLD-MCNC: 10.9 G/DL (ref 13.7–17.6)
HGB BLD-MCNC: 10.9 G/DL (ref 13.7–17.6)
HGB BLD-MCNC: 11.1 G/DL (ref 13.7–17.6)
HGB BLD-MCNC: 11.2 G/DL (ref 13.5–16.5)
HGB BLD-MCNC: 11.3 G/DL (ref 13.7–17.6)
HGB BLD-MCNC: 11.6 G/DL (ref 13.7–17.6)
HGB BLD-MCNC: 11.7 G/DL (ref 13.7–17.6)
HGB BLD-MCNC: 11.7 G/DL (ref 14–18)
HGB BLD-MCNC: 12.7 G/DL (ref 13.7–17.6)
HGB BLD-MCNC: 12.7 G/DL (ref 13.7–17.6)
HGB BLD-MCNC: 13 G/DL (ref 13.7–17.6)
HGB BLD-MCNC: 13.1 G/DL (ref 13.7–17.6)
HGB BLD-MCNC: 13.2 G/DL (ref 13.7–17.6)
HGB BLD-MCNC: 13.3 G/DL (ref 13.7–17.6)
HGB BLD-MCNC: 13.4 G/DL (ref 13.7–17.6)
HGB BLD-MCNC: 13.4 G/DL (ref 13.7–17.6)
HGB BLD-MCNC: 13.5 G/DL (ref 13.7–17.6)
HGB BLD-MCNC: 13.7 G/DL (ref 13.7–17.6)
HGB BLD-MCNC: 13.8 G/DL (ref 13.7–17.6)
HGB BLD-MCNC: 13.8 G/DL (ref 13.7–17.6)
HGB BLD-MCNC: 14 G/DL (ref 13.7–17.6)
HGB BLD-MCNC: 14.1 G/DL (ref 13.7–17.6)
HGB BLD-MCNC: 14.1 G/DL (ref 13.7–17.6)
HGB BLD-MCNC: 14.1 G/DL (ref 14–18)
HGB BLD-MCNC: 14.3 G/DL (ref 13.7–17.6)
HGB BLD-MCNC: 14.4 G/DL (ref 14–18)
HGB BLD-MCNC: 14.6 G/DL (ref 13.7–17.6)
HGB BLD-MCNC: 15 G/DL (ref 13.7–17.6)
HGB BLD-MCNC: 8.8 G/DL (ref 13.7–17.6)
HGB BLD-MCNC: 9.1 G/DL (ref 13.7–17.6)
HGB BLD-MCNC: 9.5 G/DL (ref 13.7–17.6)
HGB BLD-MCNC: 9.6 G/DL (ref 13.7–17.6)
HGB BLD-MCNC: 9.6 G/DL (ref 13.7–17.6)
HGB BLD-MCNC: 9.7 G/DL (ref 13.7–17.6)
HGB UR QL STRIP.AUTO: ABNORMAL
HGB UR QL STRIP.AUTO: NEGATIVE
HIV1 P24 AG SER QL: NORMAL
HIV1+2 AB SER QL: NORMAL
HMPV RNA NPH QL NAA+NON-PROBE: NOT DETECTED
HOLD SPECIMEN: NORMAL
HPIV1 RNA SPEC QL NAA+PROBE: NOT DETECTED
HPIV2 RNA SPEC QL NAA+PROBE: NOT DETECTED
HPIV3 RNA NPH QL NAA+PROBE: NOT DETECTED
HPIV4 P GENE NPH QL NAA+PROBE: NOT DETECTED
HYALINE CASTS UR QL AUTO: ABNORMAL /LPF
HYPOCHROMIA BLD QL: NORMAL
IMM GRANULOCYTES # BLD: 0 10*3/MM3 (ref 0–0.03)
IMM GRANULOCYTES # BLD: 0 10*3/MM3 (ref 0–0.03)
IMM GRANULOCYTES # BLD: 0.01 10*3/MM3 (ref 0–0.03)
IMM GRANULOCYTES # BLD: 0.02 10*3/MM3 (ref 0–0.03)
IMM GRANULOCYTES # BLD: 0.03 10*3/MM3 (ref 0–0.03)
IMM GRANULOCYTES # BLD: 0.04 10*3/MM3 (ref 0–0.03)
IMM GRANULOCYTES # BLD: 0.05 10*3/MM3 (ref 0–0.03)
IMM GRANULOCYTES # BLD: 0.06 10*3/MM3 (ref 0–0.03)
IMM GRANULOCYTES # BLD: 0.07 10*3/MM3 (ref 0–0.03)
IMM GRANULOCYTES # BLD: 0.1 10*3/MM3 (ref 0–0.03)
IMM GRANULOCYTES # BLD: 0.12 10*3/MM3 (ref 0–0.03)
IMM GRANULOCYTES # BLD: 0.12 10*3/MM3 (ref 0–0.03)
IMM GRANULOCYTES # BLD: 0.14 10*3/MM3 (ref 0–0.03)
IMM GRANULOCYTES # BLD: 0.18 10*3/MM3 (ref 0–0.03)
IMM GRANULOCYTES # BLD: 0.19 10*3/MM3 (ref 0–0.03)
IMM GRANULOCYTES # BLD: 0.25 10*3/MM3 (ref 0–0.03)
IMM GRANULOCYTES # BLD: 0.26 10*3/MM3 (ref 0–0.03)
IMM GRANULOCYTES # BLD: 0.3 10*3/MM3 (ref 0–0.03)
IMM GRANULOCYTES NFR BLD: 0 % (ref 0–0.5)
IMM GRANULOCYTES NFR BLD: 0 % (ref 0–0.5)
IMM GRANULOCYTES NFR BLD: 0.1 % (ref 0–0.5)
IMM GRANULOCYTES NFR BLD: 0.2 % (ref 0–0.5)
IMM GRANULOCYTES NFR BLD: 0.3 % (ref 0–0.5)
IMM GRANULOCYTES NFR BLD: 0.4 % (ref 0–0.5)
IMM GRANULOCYTES NFR BLD: 0.5 % (ref 0–0.5)
IMM GRANULOCYTES NFR BLD: 0.6 % (ref 0–0.5)
IMM GRANULOCYTES NFR BLD: 0.6 % (ref 0–0.5)
IMM GRANULOCYTES NFR BLD: 0.7 % (ref 0–0.5)
IMM GRANULOCYTES NFR BLD: 0.8 % (ref 0–0.5)
IMM GRANULOCYTES NFR BLD: 1 % (ref 0–0.5)
IMM GRANULOCYTES NFR BLD: 1 % (ref 0–0.5)
IMM GRANULOCYTES NFR BLD: 1.1 % (ref 0–0.5)
IMM GRANULOCYTES NFR BLD: 1.2 % (ref 0–0.5)
IMM GRANULOCYTES NFR BLD: 1.4 % (ref 0–0.5)
IMM GRANULOCYTES NFR BLD: 1.7 % (ref 0–0.5)
IMM GRANULOCYTES NFR BLD: 1.7 % (ref 0–0.5)
IMM GRANULOCYTES NFR BLD: 2.7 % (ref 0–0.5)
IMM GRANULOCYTES NFR BLD: 2.9 % (ref 0–0.5)
IMM GRANULOCYTES NFR BLD: 3.4 % (ref 0–0.5)
INR PPP: 1.22 (ref 0.9–1.1)
INR PPP: 1.44 (ref 0.9–1.1)
INR PPP: 1.44 (ref 0.9–1.1)
INR PPP: 1.45 (ref 0.9–1.1)
INR PPP: 1.46 (ref 0.9–1.1)
INR PPP: 1.54 (ref 0.9–1.1)
INR PPP: 1.61 (ref 0.9–1.1)
INR PPP: 1.65 (ref 0.9–1.1)
INR PPP: 1.67 (ref 0.9–1.1)
INR PPP: 1.7 (ref 0.9–1.1)
INR PPP: 1.71 (ref 0.9–1.1)
INR PPP: 1.76 (ref 0.9–1.1)
INR PPP: 1.79 (ref 0.9–1.1)
INR PPP: 1.83 (ref 0.9–1.1)
INR PPP: 1.88 (ref 0.9–1.1)
INR PPP: 1.93 (ref 0.9–1.1)
INR PPP: 1.94 (ref 0.9–1.1)
INR PPP: 1.95 (ref 0.9–1.1)
INR PPP: 1.96 (ref 0.9–1.1)
INR PPP: 1.98 (ref 0.9–1.1)
INR PPP: 1.99 (ref 0.9–1.1)
INR PPP: 2.02 (ref 0.9–1.1)
INR PPP: 2.03 (ref 0.9–1.1)
INR PPP: 2.14 (ref 0.9–1.1)
INR PPP: 2.14 (ref 0.9–1.1)
INR PPP: 2.17 (ref 0.9–1.1)
INR PPP: 2.19 (ref 0.9–1.1)
INR PPP: 2.2 (ref 0.9–1.1)
INR PPP: 2.22 (ref 0.9–1.1)
INR PPP: 2.34 (ref 0.9–1.1)
INR PPP: 2.38 (ref 0.9–1.1)
INR PPP: 2.4 (ref 0.9–1.1)
INR PPP: 2.43 (ref 0.9–1.1)
INR PPP: 2.53 (ref 0.9–1.1)
INR PPP: 2.53 (ref 0.9–1.1)
INR PPP: 2.58 (ref 0.9–1.1)
INR PPP: 2.58 (ref 0.9–1.1)
INR PPP: 2.59 (ref 0.9–1.1)
INR PPP: 2.64 (ref 0.9–1.1)
INR PPP: 2.66 (ref 0.9–1.1)
INR PPP: 2.92 (ref 0.9–1.1)
INR PPP: 2.95 (ref 0.9–1.1)
INR PPP: 3 (ref 0.9–1.1)
INR PPP: 3.14 (ref 0.9–1.1)
INR PPP: 3.28 (ref 0.9–1.1)
INR PPP: 3.55 (ref 0.9–1.1)
INR PPP: 3.77 (ref 0.9–1.1)
INR PPP: 3.86 (ref 0.9–1.1)
INR PPP: 4.59 (ref 0.9–1.1)
INR PPP: 4.6 (ref 0.9–1.1)
INR PPP: 4.64 (ref 0.9–1.1)
KETONES UR QL STRIP: ABNORMAL
KETONES UR QL STRIP: NEGATIVE
KETONES UR QL: NEGATIVE
L PNEUMO1 AG UR QL IA: NEGATIVE
L PNEUMO1 AG UR QL IA: NEGATIVE
LAB AP CASE REPORT: NORMAL
LAB AP CASE REPORT: NORMAL
LAB AP CLINICAL INFORMATION: NORMAL
LACTOFERRIN STL QL LA: POSITIVE
LDLC SERPL CALC-MCNC: 48 MG/DL (ref 0–100)
LDLC SERPL CALC-MCNC: 51 MG/DL (ref 0–100)
LDLC SERPL CALC-MCNC: 66 MG/DL (ref 0–100)
LDLC SERPL CALC-MCNC: 90 MG/DL (ref 0–100)
LDLC/HDLC SERPL: 1.28 {RATIO}
LDLC/HDLC SERPL: 1.44 {RATIO}
LDLC/HDLC SERPL: 1.45 {RATIO}
LEFT ATRIUM VOLUME INDEX: 64 ML/M2
LEUKOCYTE EST, POC: NEGATIVE
LEUKOCYTE ESTERASE UR QL STRIP.AUTO: ABNORMAL
LEUKOCYTE ESTERASE UR QL STRIP.AUTO: NEGATIVE
LIPASE SERPL-CCNC: 58 U/L (ref 13–60)
LV EF 2D ECHO EST: 35 %
LYMPHOCYTES # BLD AUTO: 0.25 10*3/MM3 (ref 0.9–4.8)
LYMPHOCYTES # BLD AUTO: 0.59 10*3/MM3 (ref 0.9–4.8)
LYMPHOCYTES # BLD AUTO: 0.82 10*3/MM3 (ref 0.9–4.8)
LYMPHOCYTES # BLD AUTO: 1.12 10*3/MM3 (ref 0.9–4.8)
LYMPHOCYTES # BLD AUTO: 1.13 10*3/MM3 (ref 0.9–4.8)
LYMPHOCYTES # BLD AUTO: 1.21 10*3/MM3 (ref 0.9–4.8)
LYMPHOCYTES # BLD AUTO: 1.26 10*3/MM3 (ref 0.9–4.8)
LYMPHOCYTES # BLD AUTO: 1.42 10*3/MM3 (ref 0.9–4.8)
LYMPHOCYTES # BLD AUTO: 1.44 10*3/MM3 (ref 0.9–4.8)
LYMPHOCYTES # BLD AUTO: 1.47 10*3/MM3 (ref 0.9–4.8)
LYMPHOCYTES # BLD AUTO: 1.61 10*3/MM3 (ref 0.9–4.8)
LYMPHOCYTES # BLD AUTO: 1.62 10*3/MM3 (ref 0.9–4.8)
LYMPHOCYTES # BLD AUTO: 1.72 10*3/MM3 (ref 0.9–4.8)
LYMPHOCYTES # BLD AUTO: 1.75 10*3/MM3 (ref 0.9–4.8)
LYMPHOCYTES # BLD AUTO: 1.78 10*3/MM3 (ref 0.9–4.8)
LYMPHOCYTES # BLD AUTO: 1.82 10*3/MM3 (ref 0.9–4.8)
LYMPHOCYTES # BLD AUTO: 1.82 10*3/MM3 (ref 0.9–4.8)
LYMPHOCYTES # BLD AUTO: 1.93 10*3/MM3 (ref 1–3.5)
LYMPHOCYTES # BLD AUTO: 1.95 10*3/MM3 (ref 0.9–4.8)
LYMPHOCYTES # BLD AUTO: 2.05 10*3/MM3 (ref 0.9–4.8)
LYMPHOCYTES # BLD AUTO: 2.08 10*3/MM3 (ref 0.9–4.8)
LYMPHOCYTES # BLD AUTO: 2.09 10*3/MM3 (ref 0.9–4.8)
LYMPHOCYTES # BLD AUTO: 2.1 10*3/MM3 (ref 0.9–4.8)
LYMPHOCYTES # BLD AUTO: 2.11 10*3/MM3 (ref 0.9–4.8)
LYMPHOCYTES # BLD AUTO: 2.12 10*3/MM3 (ref 0.6–4.8)
LYMPHOCYTES # BLD AUTO: 2.14 10*3/MM3 (ref 0.6–4.8)
LYMPHOCYTES # BLD AUTO: 2.15 10*3/MM3 (ref 0.9–4.8)
LYMPHOCYTES # BLD AUTO: 2.17 10*3/MM3 (ref 0.9–4.8)
LYMPHOCYTES # BLD AUTO: 2.22 10*3/MM3 (ref 0.9–4.8)
LYMPHOCYTES # BLD AUTO: 2.24 10*3/MM3 (ref 0.9–4.8)
LYMPHOCYTES # BLD AUTO: 2.27 10*3/MM3 (ref 0.6–4.8)
LYMPHOCYTES # BLD AUTO: 2.34 10*3/MM3 (ref 0.9–4.8)
LYMPHOCYTES # BLD AUTO: 2.36 10*3/MM3 (ref 0.9–4.8)
LYMPHOCYTES # BLD AUTO: 2.43 10*3/MM3 (ref 0.9–4.8)
LYMPHOCYTES # BLD AUTO: 2.52 10*3/MM3 (ref 0.9–4.8)
LYMPHOCYTES # BLD AUTO: 2.73 10*3/MM3 (ref 0.9–4.8)
LYMPHOCYTES NFR BLD AUTO: 1.9 % (ref 19.6–45.3)
LYMPHOCYTES NFR BLD AUTO: 10.2 % (ref 19.6–45.3)
LYMPHOCYTES NFR BLD AUTO: 10.7 % (ref 19.6–45.3)
LYMPHOCYTES NFR BLD AUTO: 10.8 % (ref 19.6–45.3)
LYMPHOCYTES NFR BLD AUTO: 14.4 % (ref 19.6–45.3)
LYMPHOCYTES NFR BLD AUTO: 15.9 % (ref 19.6–45.3)
LYMPHOCYTES NFR BLD AUTO: 17.3 % (ref 19.6–45.3)
LYMPHOCYTES NFR BLD AUTO: 17.9 % (ref 19.6–45.3)
LYMPHOCYTES NFR BLD AUTO: 18.2 % (ref 20–49)
LYMPHOCYTES NFR BLD AUTO: 18.3 % (ref 19.6–45.3)
LYMPHOCYTES NFR BLD AUTO: 19.5 % (ref 19.6–45.3)
LYMPHOCYTES NFR BLD AUTO: 19.5 % (ref 19.6–45.3)
LYMPHOCYTES NFR BLD AUTO: 19.6 % (ref 19.6–45.3)
LYMPHOCYTES NFR BLD AUTO: 19.8 % (ref 19.6–45.3)
LYMPHOCYTES NFR BLD AUTO: 20.2 % (ref 19.6–45.3)
LYMPHOCYTES NFR BLD AUTO: 20.4 % (ref 19.6–45.3)
LYMPHOCYTES NFR BLD AUTO: 20.9 % (ref 19.6–45.3)
LYMPHOCYTES NFR BLD AUTO: 21.4 % (ref 19.6–45.3)
LYMPHOCYTES NFR BLD AUTO: 21.4 % (ref 19.6–45.3)
LYMPHOCYTES NFR BLD AUTO: 21.5 % (ref 19.6–45.3)
LYMPHOCYTES NFR BLD AUTO: 21.6 % (ref 19.6–45.3)
LYMPHOCYTES NFR BLD AUTO: 21.7 % (ref 19.6–45.3)
LYMPHOCYTES NFR BLD AUTO: 21.8 % (ref 19.6–45.3)
LYMPHOCYTES NFR BLD AUTO: 22 % (ref 19.6–45.3)
LYMPHOCYTES NFR BLD AUTO: 22.3 % (ref 19.6–45.3)
LYMPHOCYTES NFR BLD AUTO: 23 % (ref 19.6–45.3)
LYMPHOCYTES NFR BLD AUTO: 23.6 % (ref 20–45)
LYMPHOCYTES NFR BLD AUTO: 23.7 % (ref 19.6–45.3)
LYMPHOCYTES NFR BLD AUTO: 23.8 % (ref 19.6–45.3)
LYMPHOCYTES NFR BLD AUTO: 24.7 % (ref 19.6–45.3)
LYMPHOCYTES NFR BLD AUTO: 24.9 % (ref 19.6–45.3)
LYMPHOCYTES NFR BLD AUTO: 25.5 % (ref 19.6–45.3)
LYMPHOCYTES NFR BLD AUTO: 26.1 % (ref 19.6–45.3)
LYMPHOCYTES NFR BLD AUTO: 26.2 % (ref 20–45)
LYMPHOCYTES NFR BLD AUTO: 28 % (ref 20–45)
LYMPHOCYTES NFR BLD AUTO: 3.6 % (ref 19.6–45.3)
Lab: NORMAL
Lab: NORMAL
M PNEUMO IGG SER IA-ACNC: NOT DETECTED
MAGNESIUM SERPL-MCNC: 1.5 MG/DL (ref 1.6–2.4)
MAGNESIUM SERPL-MCNC: 1.9 MG/DL (ref 1.6–2.4)
MAGNESIUM SERPL-MCNC: 2 MG/DL (ref 1.6–2.4)
MAGNESIUM SERPL-MCNC: 2 MG/DL (ref 1.6–2.4)
MAGNESIUM SERPL-MCNC: 2.2 MG/DL (ref 1.6–2.4)
MAGNESIUM SERPL-MCNC: 2.4 MG/DL (ref 1.6–2.4)
MCH RBC QN AUTO: 23.5 PG (ref 27–32.7)
MCH RBC QN AUTO: 23.6 PG (ref 27–32.7)
MCH RBC QN AUTO: 23.6 PG (ref 27–32.7)
MCH RBC QN AUTO: 23.7 PG (ref 27–32.7)
MCH RBC QN AUTO: 23.7 PG (ref 27–32.7)
MCH RBC QN AUTO: 23.8 PG (ref 27–32.7)
MCH RBC QN AUTO: 23.9 PG (ref 27–32.7)
MCH RBC QN AUTO: 24 PG (ref 27–32.7)
MCH RBC QN AUTO: 24 PG (ref 27–32.7)
MCH RBC QN AUTO: 24.1 PG (ref 27–32.7)
MCH RBC QN AUTO: 24.2 PG (ref 27–32.7)
MCH RBC QN AUTO: 24.2 PG (ref 27–32.7)
MCH RBC QN AUTO: 24.3 PG (ref 27–33)
MCH RBC QN AUTO: 24.4 PG (ref 27–32.7)
MCH RBC QN AUTO: 24.6 PG (ref 27–32.7)
MCH RBC QN AUTO: 24.6 PG (ref 27–32.7)
MCH RBC QN AUTO: 24.7 PG (ref 27–32.7)
MCH RBC QN AUTO: 24.7 PG (ref 27–32.7)
MCH RBC QN AUTO: 25 PG (ref 27–32.7)
MCH RBC QN AUTO: 25.1 PG (ref 27–32.7)
MCH RBC QN AUTO: 25.2 PG (ref 27–32.7)
MCH RBC QN AUTO: 25.2 PG (ref 27–32.7)
MCH RBC QN AUTO: 25.3 PG (ref 27–32.7)
MCH RBC QN AUTO: 25.4 PG (ref 27–32.7)
MCH RBC QN AUTO: 25.6 PG (ref 27–32.7)
MCH RBC QN AUTO: 25.7 PG (ref 27–32.7)
MCH RBC QN AUTO: 25.8 PG (ref 27–31)
MCH RBC QN AUTO: 25.8 PG (ref 27–32.7)
MCH RBC QN AUTO: 25.9 PG (ref 27–32.7)
MCH RBC QN AUTO: 25.9 PG (ref 27–32.7)
MCH RBC QN AUTO: 26.2 PG (ref 27–31)
MCH RBC QN AUTO: 26.2 PG (ref 27–32.7)
MCH RBC QN AUTO: 26.3 PG (ref 27–32.7)
MCH RBC QN AUTO: 26.4 PG (ref 27–31)
MCH RBC QN AUTO: 26.4 PG (ref 27–32.7)
MCH RBC QN AUTO: 26.4 PG (ref 27–32.7)
MCH RBC QN AUTO: 26.6 PG (ref 27–32.7)
MCH RBC QN AUTO: 26.7 PG (ref 27–32.7)
MCH RBC QN AUTO: 27.2 PG (ref 27–32.7)
MCH RBC QN AUTO: 27.3 PG (ref 27–32.7)
MCH RBC QN AUTO: 27.4 PG (ref 27–32.7)
MCH RBC QN AUTO: 27.5 PG (ref 27–32.7)
MCHC RBC AUTO-ENTMCNC: 29.8 G/DL (ref 32.6–36.4)
MCHC RBC AUTO-ENTMCNC: 29.9 G/DL (ref 32.6–36.4)
MCHC RBC AUTO-ENTMCNC: 29.9 G/DL (ref 32.6–36.4)
MCHC RBC AUTO-ENTMCNC: 30.1 G/DL (ref 32.6–36.4)
MCHC RBC AUTO-ENTMCNC: 30.1 G/DL (ref 32.6–36.4)
MCHC RBC AUTO-ENTMCNC: 30.2 G/DL (ref 32.6–36.4)
MCHC RBC AUTO-ENTMCNC: 30.3 G/DL (ref 32.6–36.4)
MCHC RBC AUTO-ENTMCNC: 30.4 G/DL (ref 32.6–36.4)
MCHC RBC AUTO-ENTMCNC: 30.5 G/DL (ref 32.6–36.4)
MCHC RBC AUTO-ENTMCNC: 30.5 G/DL (ref 32.6–36.4)
MCHC RBC AUTO-ENTMCNC: 30.6 G/DL (ref 32.6–36.4)
MCHC RBC AUTO-ENTMCNC: 30.6 G/DL (ref 32.6–36.4)
MCHC RBC AUTO-ENTMCNC: 30.7 G/DL (ref 32.6–36.4)
MCHC RBC AUTO-ENTMCNC: 30.8 G/DL (ref 32.6–36.4)
MCHC RBC AUTO-ENTMCNC: 30.9 G/DL (ref 31–37)
MCHC RBC AUTO-ENTMCNC: 30.9 G/DL (ref 32.6–36.4)
MCHC RBC AUTO-ENTMCNC: 30.9 G/DL (ref 32.6–36.4)
MCHC RBC AUTO-ENTMCNC: 31 G/DL (ref 32.6–36.4)
MCHC RBC AUTO-ENTMCNC: 31.1 G/DL (ref 32.6–36.4)
MCHC RBC AUTO-ENTMCNC: 31.1 G/DL (ref 32.6–36.4)
MCHC RBC AUTO-ENTMCNC: 31.2 G/DL (ref 32.6–36.4)
MCHC RBC AUTO-ENTMCNC: 31.3 G/DL (ref 32–35)
MCHC RBC AUTO-ENTMCNC: 31.5 G/DL (ref 32.6–36.4)
MCHC RBC AUTO-ENTMCNC: 31.5 G/DL (ref 32.6–36.4)
MCHC RBC AUTO-ENTMCNC: 31.6 G/DL (ref 32.6–36.4)
MCHC RBC AUTO-ENTMCNC: 31.7 G/DL (ref 31–37)
MCHC RBC AUTO-ENTMCNC: 31.9 G/DL (ref 32.6–36.4)
MCHC RBC AUTO-ENTMCNC: 32 G/DL (ref 31–37)
MCHC RBC AUTO-ENTMCNC: 32.1 G/DL (ref 32.6–36.4)
MCHC RBC AUTO-ENTMCNC: 32.2 G/DL (ref 32.6–36.4)
MCHC RBC AUTO-ENTMCNC: 32.3 G/DL (ref 32.6–36.4)
MCHC RBC AUTO-ENTMCNC: 32.4 G/DL (ref 32.6–36.4)
MCHC RBC AUTO-ENTMCNC: 32.5 G/DL (ref 32.6–36.4)
MCHC RBC AUTO-ENTMCNC: 32.9 G/DL (ref 32.6–36.4)
MCV RBC AUTO: 77.4 FL (ref 79.8–96.2)
MCV RBC AUTO: 77.8 FL (ref 79.8–96.2)
MCV RBC AUTO: 77.8 FL (ref 83–97)
MCV RBC AUTO: 77.9 FL (ref 79.8–96.2)
MCV RBC AUTO: 78 FL (ref 79.8–96.2)
MCV RBC AUTO: 78.3 FL (ref 79.8–96.2)
MCV RBC AUTO: 78.5 FL (ref 79.8–96.2)
MCV RBC AUTO: 79 FL (ref 79.8–96.2)
MCV RBC AUTO: 79.2 FL (ref 79.8–96.2)
MCV RBC AUTO: 79.3 FL (ref 79.8–96.2)
MCV RBC AUTO: 79.3 FL (ref 79.8–96.2)
MCV RBC AUTO: 79.4 FL (ref 79.8–96.2)
MCV RBC AUTO: 79.8 FL (ref 79.8–96.2)
MCV RBC AUTO: 79.9 FL (ref 79.8–96.2)
MCV RBC AUTO: 80 FL (ref 79.8–96.2)
MCV RBC AUTO: 80.1 FL (ref 79.8–96.2)
MCV RBC AUTO: 80.5 FL (ref 79.8–96.2)
MCV RBC AUTO: 80.6 FL (ref 79.8–96.2)
MCV RBC AUTO: 81.1 FL (ref 79.8–96.2)
MCV RBC AUTO: 81.3 FL (ref 79.8–96.2)
MCV RBC AUTO: 81.4 FL (ref 79.8–96.2)
MCV RBC AUTO: 81.4 FL (ref 79.8–96.2)
MCV RBC AUTO: 81.4 FL (ref 80–94)
MCV RBC AUTO: 81.6 FL (ref 79.8–96.2)
MCV RBC AUTO: 81.7 FL (ref 79.8–96.2)
MCV RBC AUTO: 82 FL (ref 80–94)
MCV RBC AUTO: 82.1 FL (ref 79.8–96.2)
MCV RBC AUTO: 82.3 FL (ref 79.8–96.2)
MCV RBC AUTO: 82.4 FL (ref 79.8–96.2)
MCV RBC AUTO: 82.8 FL (ref 79.8–96.2)
MCV RBC AUTO: 83 FL (ref 79.8–96.2)
MCV RBC AUTO: 83 FL (ref 79.8–96.2)
MCV RBC AUTO: 83.3 FL (ref 79.8–96.2)
MCV RBC AUTO: 83.4 FL (ref 79.8–96.2)
MCV RBC AUTO: 83.5 FL (ref 79.8–96.2)
MCV RBC AUTO: 83.7 FL (ref 79.8–96.2)
MCV RBC AUTO: 83.9 FL (ref 79.8–96.2)
MCV RBC AUTO: 84.1 FL (ref 79.8–96.2)
MCV RBC AUTO: 84.2 FL (ref 79.8–96.2)
MCV RBC AUTO: 84.5 FL (ref 79.8–96.2)
MCV RBC AUTO: 84.6 FL (ref 79.8–96.2)
MCV RBC AUTO: 85 FL (ref 79.8–96.2)
MCV RBC AUTO: 85.5 FL (ref 79.8–96.2)
MCV RBC AUTO: 85.6 FL (ref 80–94)
METHADONE UR QL SCN: NEGATIVE
MODALITY: ABNORMAL
MONOCYTES # BLD AUTO: 0.38 10*3/MM3 (ref 0.2–1.2)
MONOCYTES # BLD AUTO: 0.56 10*3/MM3 (ref 0.2–1.2)
MONOCYTES # BLD AUTO: 0.56 10*3/MM3 (ref 0.2–1.2)
MONOCYTES # BLD AUTO: 0.57 10*3/MM3 (ref 0.2–1.2)
MONOCYTES # BLD AUTO: 0.58 10*3/MM3 (ref 0.2–1.2)
MONOCYTES # BLD AUTO: 0.6 10*3/MM3 (ref 0–1)
MONOCYTES # BLD AUTO: 0.61 10*3/MM3 (ref 0–1)
MONOCYTES # BLD AUTO: 0.63 10*3/MM3 (ref 0.25–0.8)
MONOCYTES # BLD AUTO: 0.67 10*3/MM3 (ref 0.2–1.2)
MONOCYTES # BLD AUTO: 0.71 10*3/MM3 (ref 0–1)
MONOCYTES # BLD AUTO: 0.73 10*3/MM3 (ref 0.2–1.2)
MONOCYTES # BLD AUTO: 0.75 10*3/MM3 (ref 0.2–1.2)
MONOCYTES # BLD AUTO: 0.77 10*3/MM3 (ref 0.2–1.2)
MONOCYTES # BLD AUTO: 0.85 10*3/MM3 (ref 0.2–1.2)
MONOCYTES # BLD AUTO: 0.9 10*3/MM3 (ref 0.2–1.2)
MONOCYTES # BLD AUTO: 0.91 10*3/MM3 (ref 0.2–1.2)
MONOCYTES # BLD AUTO: 0.94 10*3/MM3 (ref 0.2–1.2)
MONOCYTES # BLD AUTO: 0.96 10*3/MM3 (ref 0.2–1.2)
MONOCYTES # BLD AUTO: 0.96 10*3/MM3 (ref 0.2–1.2)
MONOCYTES # BLD AUTO: 0.98 10*3/MM3 (ref 0.2–1.2)
MONOCYTES # BLD AUTO: 1.02 10*3/MM3 (ref 0.2–1.2)
MONOCYTES # BLD AUTO: 1.02 10*3/MM3 (ref 0.2–1.2)
MONOCYTES # BLD AUTO: 1.03 10*3/MM3 (ref 0.2–1.2)
MONOCYTES # BLD AUTO: 1.03 10*3/MM3 (ref 0.2–1.2)
MONOCYTES # BLD AUTO: 1.06 10*3/MM3 (ref 0.2–1.2)
MONOCYTES # BLD AUTO: 1.08 10*3/MM3 (ref 0.2–1.2)
MONOCYTES # BLD AUTO: 1.09 10*3/MM3 (ref 0.2–1.2)
MONOCYTES # BLD AUTO: 1.1 10*3/MM3 (ref 0.2–1.2)
MONOCYTES # BLD AUTO: 1.1 10*3/MM3 (ref 0.2–1.2)
MONOCYTES # BLD AUTO: 1.11 10*3/MM3 (ref 0.2–1.2)
MONOCYTES # BLD AUTO: 1.11 10*3/MM3 (ref 0.2–1.2)
MONOCYTES # BLD AUTO: 1.13 10*3/MM3 (ref 0.2–1.2)
MONOCYTES # BLD AUTO: 1.16 10*3/MM3 (ref 0.2–1.2)
MONOCYTES # BLD AUTO: 1.17 10*3/MM3 (ref 0.2–1.2)
MONOCYTES # BLD AUTO: 1.19 10*3/MM3 (ref 0.2–1.2)
MONOCYTES # BLD AUTO: 1.21 10*3/MM3 (ref 0.2–1.2)
MONOCYTES NFR BLD AUTO: 10.1 % (ref 5–12)
MONOCYTES NFR BLD AUTO: 10.3 % (ref 5–12)
MONOCYTES NFR BLD AUTO: 10.4 % (ref 5–12)
MONOCYTES NFR BLD AUTO: 10.4 % (ref 5–12)
MONOCYTES NFR BLD AUTO: 10.5 % (ref 5–12)
MONOCYTES NFR BLD AUTO: 10.6 % (ref 5–12)
MONOCYTES NFR BLD AUTO: 10.6 % (ref 5–12)
MONOCYTES NFR BLD AUTO: 10.7 % (ref 5–12)
MONOCYTES NFR BLD AUTO: 10.8 % (ref 5–12)
MONOCYTES NFR BLD AUTO: 10.8 % (ref 5–12)
MONOCYTES NFR BLD AUTO: 11 % (ref 5–12)
MONOCYTES NFR BLD AUTO: 11.2 % (ref 5–12)
MONOCYTES NFR BLD AUTO: 11.4 % (ref 5–12)
MONOCYTES NFR BLD AUTO: 11.6 % (ref 5–12)
MONOCYTES NFR BLD AUTO: 11.7 % (ref 5–12)
MONOCYTES NFR BLD AUTO: 11.9 % (ref 5–12)
MONOCYTES NFR BLD AUTO: 12.7 % (ref 5–12)
MONOCYTES NFR BLD AUTO: 13.5 % (ref 5–12)
MONOCYTES NFR BLD AUTO: 15.1 % (ref 5–12)
MONOCYTES NFR BLD AUTO: 2.8 % (ref 5–12)
MONOCYTES NFR BLD AUTO: 5.7 % (ref 5–12)
MONOCYTES NFR BLD AUTO: 5.9 % (ref 4–12)
MONOCYTES NFR BLD AUTO: 6.1 % (ref 5–12)
MONOCYTES NFR BLD AUTO: 6.9 % (ref 5–12)
MONOCYTES NFR BLD AUTO: 7.2 % (ref 5–12)
MONOCYTES NFR BLD AUTO: 7.4 % (ref 3–8)
MONOCYTES NFR BLD AUTO: 7.4 % (ref 5–12)
MONOCYTES NFR BLD AUTO: 7.5 % (ref 3–8)
MONOCYTES NFR BLD AUTO: 7.8 % (ref 3–8)
MONOCYTES NFR BLD AUTO: 8.4 % (ref 5–12)
MONOCYTES NFR BLD AUTO: 8.5 % (ref 5–12)
MONOCYTES NFR BLD AUTO: 8.8 % (ref 5–12)
MONOCYTES NFR BLD AUTO: 9.4 % (ref 5–12)
MONOCYTES NFR BLD AUTO: 9.6 % (ref 5–12)
MONOCYTES NFR BLD AUTO: 9.6 % (ref 5–12)
MONOCYTES NFR BLD AUTO: 9.9 % (ref 5–12)
MUCOUS THREADS URNS QL MICRO: ABNORMAL /HPF
MYELOPEROXIDASE AB SER-ACNC: <9 U/ML (ref 0–9)
NEUTROPHILS # BLD AUTO: 10.96 10*3/MM3 (ref 1.9–8.1)
NEUTROPHILS # BLD AUTO: 12.52 10*3/MM3 (ref 1.9–8.1)
NEUTROPHILS # BLD AUTO: 14.61 10*3/MM3 (ref 1.9–8.1)
NEUTROPHILS # BLD AUTO: 3.17 10*3/MM3 (ref 1.9–8.1)
NEUTROPHILS # BLD AUTO: 3.64 10*3/MM3 (ref 1.9–8.1)
NEUTROPHILS # BLD AUTO: 3.74 10*3/MM3 (ref 1.9–8.1)
NEUTROPHILS # BLD AUTO: 3.95 10*3/MM3 (ref 1.9–8.1)
NEUTROPHILS # BLD AUTO: 4.31 10*3/MM3 (ref 1.9–8.1)
NEUTROPHILS # BLD AUTO: 4.35 10*3/MM3 (ref 1.5–8.3)
NEUTROPHILS # BLD AUTO: 4.45 10*3/MM3 (ref 1.5–8.3)
NEUTROPHILS # BLD AUTO: 4.72 10*3/MM3 (ref 1.9–8.1)
NEUTROPHILS # BLD AUTO: 4.78 10*3/MM3 (ref 1.9–8.1)
NEUTROPHILS # BLD AUTO: 4.82 10*3/MM3 (ref 1.9–8.1)
NEUTROPHILS # BLD AUTO: 5.21 10*3/MM3 (ref 1.9–8.1)
NEUTROPHILS # BLD AUTO: 5.24 10*3/MM3 (ref 1.9–8.1)
NEUTROPHILS # BLD AUTO: 5.32 10*3/MM3 (ref 1.9–8.1)
NEUTROPHILS # BLD AUTO: 5.41 10*3/MM3 (ref 1.5–8.3)
NEUTROPHILS # BLD AUTO: 5.7 10*3/MM3 (ref 1.9–8.1)
NEUTROPHILS # BLD AUTO: 5.72 10*3/MM3 (ref 1.9–8.1)
NEUTROPHILS # BLD AUTO: 5.82 10*3/MM3 (ref 1.9–8.1)
NEUTROPHILS # BLD AUTO: 5.85 10*3/MM3 (ref 1.9–8.1)
NEUTROPHILS # BLD AUTO: 5.95 10*3/MM3 (ref 1.9–8.1)
NEUTROPHILS # BLD AUTO: 5.97 10*3/MM3 (ref 1.9–8.1)
NEUTROPHILS # BLD AUTO: 6.14 10*3/MM3 (ref 1.9–8.1)
NEUTROPHILS # BLD AUTO: 6.23 10*3/MM3 (ref 1.9–8.1)
NEUTROPHILS # BLD AUTO: 6.59 10*3/MM3 (ref 1.9–8.1)
NEUTROPHILS # BLD AUTO: 6.72 10*3/MM3 (ref 1.9–8.1)
NEUTROPHILS # BLD AUTO: 6.74 10*3/MM3 (ref 1.9–8.1)
NEUTROPHILS # BLD AUTO: 6.84 10*3/MM3 (ref 1.9–8.1)
NEUTROPHILS # BLD AUTO: 6.94 10*3/MM3 (ref 1.9–8.1)
NEUTROPHILS # BLD AUTO: 6.97 10*3/MM3 (ref 1.9–8.1)
NEUTROPHILS # BLD AUTO: 7.42 10*3/MM3 (ref 1.5–7)
NEUTROPHILS # BLD AUTO: 7.89 10*3/MM3 (ref 1.9–8.1)
NEUTROPHILS # BLD AUTO: 8.33 10*3/MM3 (ref 1.9–8.1)
NEUTROPHILS # BLD AUTO: 9.38 10*3/MM3 (ref 1.9–8.1)
NEUTROPHILS # BLD AUTO: 9.7 10*3/MM3 (ref 1.9–8.1)
NEUTROPHILS NFR BLD AUTO: 53.7 % (ref 45–70)
NEUTROPHILS NFR BLD AUTO: 54.8 % (ref 45–70)
NEUTROPHILS NFR BLD AUTO: 55.2 % (ref 42.7–76)
NEUTROPHILS NFR BLD AUTO: 55.9 % (ref 42.7–76)
NEUTROPHILS NFR BLD AUTO: 56.2 % (ref 42.7–76)
NEUTROPHILS NFR BLD AUTO: 57.5 % (ref 42.7–76)
NEUTROPHILS NFR BLD AUTO: 58.2 % (ref 42.7–76)
NEUTROPHILS NFR BLD AUTO: 58.6 % (ref 42.7–76)
NEUTROPHILS NFR BLD AUTO: 59.3 % (ref 42.7–76)
NEUTROPHILS NFR BLD AUTO: 59.5 % (ref 42.7–76)
NEUTROPHILS NFR BLD AUTO: 59.6 % (ref 42.7–76)
NEUTROPHILS NFR BLD AUTO: 59.8 % (ref 45–70)
NEUTROPHILS NFR BLD AUTO: 60 % (ref 42.7–76)
NEUTROPHILS NFR BLD AUTO: 60.2 % (ref 42.7–76)
NEUTROPHILS NFR BLD AUTO: 61.2 % (ref 42.7–76)
NEUTROPHILS NFR BLD AUTO: 62.2 % (ref 42.7–76)
NEUTROPHILS NFR BLD AUTO: 62.4 % (ref 42.7–76)
NEUTROPHILS NFR BLD AUTO: 62.8 % (ref 42.7–76)
NEUTROPHILS NFR BLD AUTO: 62.9 % (ref 42.7–76)
NEUTROPHILS NFR BLD AUTO: 63.4 % (ref 42.7–76)
NEUTROPHILS NFR BLD AUTO: 64.2 % (ref 42.7–76)
NEUTROPHILS NFR BLD AUTO: 64.7 % (ref 42.7–76)
NEUTROPHILS NFR BLD AUTO: 64.9 % (ref 42.7–76)
NEUTROPHILS NFR BLD AUTO: 65 % (ref 42.7–76)
NEUTROPHILS NFR BLD AUTO: 65 % (ref 42.7–76)
NEUTROPHILS NFR BLD AUTO: 65.7 % (ref 42.7–76)
NEUTROPHILS NFR BLD AUTO: 65.8 % (ref 42.7–76)
NEUTROPHILS NFR BLD AUTO: 67.1 % (ref 42.7–76)
NEUTROPHILS NFR BLD AUTO: 69.8 % (ref 39–75)
NEUTROPHILS NFR BLD AUTO: 73 % (ref 42.7–76)
NEUTROPHILS NFR BLD AUTO: 78.4 % (ref 42.7–76)
NEUTROPHILS NFR BLD AUTO: 80.4 % (ref 42.7–76)
NEUTROPHILS NFR BLD AUTO: 81.8 % (ref 42.7–76)
NEUTROPHILS NFR BLD AUTO: 82.4 % (ref 42.7–76)
NEUTROPHILS NFR BLD AUTO: 89.1 % (ref 42.7–76)
NEUTROPHILS NFR BLD AUTO: 93.9 % (ref 42.7–76)
NITRITE UR QL STRIP: NEGATIVE
NITRITE UR QL STRIP: POSITIVE
NITRITE UR-MCNC: NEGATIVE MG/ML
NRBC BLD AUTO-RTO: 0 /100 WBC (ref 0–0)
NRBC BLD MANUAL-RTO: 0 /100 WBC (ref 0–0)
NT-PROBNP SERPL-MCNC: 8482 PG/ML (ref 0–1800)
OPIATES UR QL: NEGATIVE
OXYCODONE UR QL SCN: POSITIVE
P-ANCA ATYPICAL TITR SER IF: ABNORMAL TITER
P-ANCA TITR SER IF: ABNORMAL TITER
PATH REPORT.FINAL DX SPEC: NORMAL
PATH REPORT.FINAL DX SPEC: NORMAL
PATH REPORT.GROSS SPEC: NORMAL
PATH REPORT.GROSS SPEC: NORMAL
PCO2 BLDA: 20.1 MM HG (ref 35–45)
PH BLDA: 7.48 PH UNITS (ref 7.35–7.45)
PH UR STRIP.AUTO: 6 [PH] (ref 4.5–8)
PH UR STRIP.AUTO: 6 [PH] (ref 4.5–8)
PH UR STRIP.AUTO: 6 [PH] (ref 5–8)
PH UR STRIP.AUTO: 6.5 [PH] (ref 5–8)
PH UR STRIP.AUTO: 7.5 [PH] (ref 4.5–8)
PH UR STRIP.AUTO: 7.5 [PH] (ref 5–8)
PH UR: 5 [PH] (ref 5–8)
PHOSPHATE SERPL-MCNC: 1.7 MG/DL (ref 2.5–4.5)
PHOSPHATE SERPL-MCNC: 2.1 MG/DL (ref 2.5–4.5)
PHOSPHATE SERPL-MCNC: 2.1 MG/DL (ref 2.5–4.5)
PHOSPHATE SERPL-MCNC: 2.2 MG/DL (ref 2.5–4.5)
PHOSPHATE SERPL-MCNC: 2.5 MG/DL (ref 2.5–4.5)
PHOSPHATE SERPL-MCNC: 2.6 MG/DL (ref 2.5–4.5)
PHOSPHATE SERPL-MCNC: 2.7 MG/DL (ref 2.5–4.5)
PHOSPHATE SERPL-MCNC: 3.4 MG/DL (ref 2.5–4.5)
PHOSPHATE SERPL-MCNC: 3.5 MG/DL (ref 2.5–4.5)
PHOSPHATE SERPL-MCNC: 3.9 MG/DL (ref 2.5–4.5)
PLAT MORPH BLD: NORMAL
PLATELET # BLD AUTO: 117 10*3/MM3 (ref 140–500)
PLATELET # BLD AUTO: 126 10*3/MM3 (ref 140–500)
PLATELET # BLD AUTO: 133 10*3/MM3 (ref 140–500)
PLATELET # BLD AUTO: 162 10*3/MM3 (ref 140–500)
PLATELET # BLD AUTO: 165 10*3/MM3 (ref 140–500)
PLATELET # BLD AUTO: 172 10*3/MM3 (ref 140–500)
PLATELET # BLD AUTO: 175 10*3/MM3 (ref 140–500)
PLATELET # BLD AUTO: 181 10*3/MM3 (ref 140–500)
PLATELET # BLD AUTO: 181 10*3/MM3 (ref 140–500)
PLATELET # BLD AUTO: 182 10*3/MM3 (ref 140–500)
PLATELET # BLD AUTO: 189 10*3/MM3 (ref 140–500)
PLATELET # BLD AUTO: 191 10*3/MM3 (ref 140–500)
PLATELET # BLD AUTO: 193 10*3/MM3 (ref 140–500)
PLATELET # BLD AUTO: 197 10*3/MM3 (ref 140–500)
PLATELET # BLD AUTO: 198 10*3/MM3 (ref 140–500)
PLATELET # BLD AUTO: 199 10*3/MM3 (ref 140–500)
PLATELET # BLD AUTO: 199 10*3/MM3 (ref 140–500)
PLATELET # BLD AUTO: 202 10*3/MM3 (ref 140–500)
PLATELET # BLD AUTO: 202 10*3/MM3 (ref 140–500)
PLATELET # BLD AUTO: 205 10*3/MM3 (ref 140–500)
PLATELET # BLD AUTO: 209 10*3/MM3 (ref 140–500)
PLATELET # BLD AUTO: 210 10*3/MM3 (ref 140–500)
PLATELET # BLD AUTO: 210 10*3/MM3 (ref 140–500)
PLATELET # BLD AUTO: 219 10*3/MM3 (ref 140–500)
PLATELET # BLD AUTO: 219 10*3/MM3 (ref 140–500)
PLATELET # BLD AUTO: 221 10*3/MM3 (ref 140–500)
PLATELET # BLD AUTO: 224 10*3/MM3 (ref 140–500)
PLATELET # BLD AUTO: 224 10*3/MM3 (ref 140–500)
PLATELET # BLD AUTO: 226 10*3/MM3 (ref 140–500)
PLATELET # BLD AUTO: 229 10*3/MM3 (ref 140–500)
PLATELET # BLD AUTO: 234 10*3/MM3 (ref 140–500)
PLATELET # BLD AUTO: 234 10*3/MM3 (ref 140–500)
PLATELET # BLD AUTO: 235 10*3/MM3 (ref 140–500)
PLATELET # BLD AUTO: 244 10*3/MM3 (ref 140–500)
PLATELET # BLD AUTO: 250 10*3/MM3 (ref 140–500)
PLATELET # BLD AUTO: 253 10*3/MM3 (ref 140–500)
PLATELET # BLD AUTO: 259 10*3/MM3 (ref 140–500)
PLATELET # BLD AUTO: 267 10*3/MM3 (ref 140–500)
PLATELET # BLD AUTO: 269 10*3/MM3 (ref 140–500)
PLATELET # BLD AUTO: 278 10*3/MM3 (ref 140–500)
PLATELET # BLD AUTO: 281 10*3/MM3 (ref 140–500)
PLATELET # BLD AUTO: 281 10*3/MM3 (ref 140–500)
PLATELET # BLD AUTO: 286 10*3/MM3 (ref 140–500)
PLATELET # BLD AUTO: 290 10*3/MM3 (ref 140–500)
PLATELET # BLD AUTO: 327 10*3/MM3 (ref 150–375)
PLATELET # BLD AUTO: 342 10*3/MM3 (ref 140–500)
PMV BLD AUTO: 10 FL (ref 6–12)
PMV BLD AUTO: 10 FL (ref 6–12)
PMV BLD AUTO: 10.1 FL (ref 6–12)
PMV BLD AUTO: 10.2 FL (ref 6–12)
PMV BLD AUTO: 10.3 FL (ref 6–12)
PMV BLD AUTO: 10.3 FL (ref 6–12)
PMV BLD AUTO: 10.3 FL (ref 8.9–12.1)
PMV BLD AUTO: 10.4 FL (ref 6–12)
PMV BLD AUTO: 10.5 FL (ref 6–12)
PMV BLD AUTO: 10.7 FL (ref 6–12)
PMV BLD AUTO: 10.7 FL (ref 6–12)
PMV BLD AUTO: 10.7 FL (ref 7.4–10.4)
PMV BLD AUTO: 10.7 FL (ref 7.4–10.4)
PMV BLD AUTO: 10.8 FL (ref 6–12)
PMV BLD AUTO: 10.9 FL (ref 6–12)
PMV BLD AUTO: 11 FL (ref 6–12)
PMV BLD AUTO: 11.1 FL (ref 6–12)
PMV BLD AUTO: 11.2 FL (ref 6–12)
PMV BLD AUTO: 11.2 FL (ref 6–12)
PMV BLD AUTO: 11.3 FL (ref 6–12)
PMV BLD AUTO: 11.4 FL (ref 6–12)
PMV BLD AUTO: 11.6 FL (ref 6–12)
PMV BLD AUTO: 11.8 FL (ref 6–12)
PMV BLD AUTO: 11.8 FL (ref 6–12)
PMV BLD AUTO: 9.9 FL (ref 6–12)
PO2 BLDA: 93.3 MM HG (ref 80–100)
POTASSIUM BLD-SCNC: 3.3 MMOL/L (ref 3.5–5.2)
POTASSIUM BLD-SCNC: 3.4 MMOL/L (ref 3.5–5.2)
POTASSIUM BLD-SCNC: 3.4 MMOL/L (ref 3.5–5.2)
POTASSIUM BLD-SCNC: 3.5 MMOL/L (ref 3.5–5.2)
POTASSIUM BLD-SCNC: 3.6 MMOL/L (ref 3.5–5.2)
POTASSIUM BLD-SCNC: 3.7 MMOL/L (ref 3.5–5.2)
POTASSIUM BLD-SCNC: 3.8 MMOL/L (ref 3.5–5.2)
POTASSIUM BLD-SCNC: 3.8 MMOL/L (ref 3.5–5.2)
POTASSIUM BLD-SCNC: 3.9 MMOL/L (ref 3.5–5.2)
POTASSIUM BLD-SCNC: 4 MMOL/L (ref 3.5–5.2)
POTASSIUM BLD-SCNC: 4.1 MMOL/L (ref 3.5–5.2)
POTASSIUM BLD-SCNC: 4.2 MMOL/L (ref 3.5–5.2)
POTASSIUM BLD-SCNC: 4.3 MMOL/L (ref 3.5–5.2)
POTASSIUM BLD-SCNC: 4.4 MMOL/L (ref 3.5–5.2)
POTASSIUM BLD-SCNC: 4.5 MMOL/L (ref 3.5–5.2)
POTASSIUM BLD-SCNC: 4.6 MMOL/L (ref 3.5–5.2)
POTASSIUM BLD-SCNC: 4.7 MMOL/L (ref 3.5–5.2)
POTASSIUM BLD-SCNC: 4.8 MMOL/L (ref 3.5–5.2)
POTASSIUM SERPL-SCNC: 4 MMOL/L (ref 3.5–5.2)
POTASSIUM SERPL-SCNC: 4.4 MMOL/L (ref 3.5–5.2)
PROCALCITONIN SERPL-MCNC: 0.12 NG/ML (ref 0.1–0.25)
PROCALCITONIN SERPL-MCNC: 0.15 NG/ML (ref 0.1–0.25)
PROCALCITONIN SERPL-MCNC: 0.16 NG/ML (ref 0.1–0.25)
PROCALCITONIN SERPL-MCNC: 0.2 NG/ML (ref 0.1–0.25)
PROCALCITONIN SERPL-MCNC: 0.23 NG/ML (ref 0.1–0.25)
PROCALCITONIN SERPL-MCNC: 0.27 NG/ML (ref 0.1–0.25)
PROT SERPL-MCNC: 6.2 G/DL (ref 6–8.5)
PROT SERPL-MCNC: 6.6 G/DL (ref 6–8.5)
PROT SERPL-MCNC: 6.6 G/DL (ref 6–8.5)
PROT SERPL-MCNC: 6.8 G/DL (ref 6–8.5)
PROT SERPL-MCNC: 6.9 G/DL (ref 6–8.5)
PROT SERPL-MCNC: 6.9 G/DL (ref 6–8.5)
PROT SERPL-MCNC: 7.1 G/DL (ref 6–8.5)
PROT SERPL-MCNC: 7.2 G/DL (ref 6–8.5)
PROT SERPL-MCNC: 7.3 G/DL (ref 6–8.5)
PROT SERPL-MCNC: 7.4 G/DL (ref 6–8.5)
PROT SERPL-MCNC: 7.5 G/DL (ref 6–8.5)
PROT SERPL-MCNC: 7.6 G/DL (ref 6–8.5)
PROT SERPL-MCNC: 7.8 G/DL (ref 6–8.5)
PROT UR QL STRIP: ABNORMAL
PROT UR QL STRIP: NEGATIVE
PROT UR STRIP-MCNC: NEGATIVE MG/DL
PROT UR-MCNC: 9 MG/DL
PROTEINASE3 AB SER IA-ACNC: <3.5 U/ML (ref 0–3.5)
PROTHROMBIN TIME: 14.9 SECONDS (ref 11.7–14.2)
PROTHROMBIN TIME: 17 SECONDS (ref 11.7–14.2)
PROTHROMBIN TIME: 17.1 SECONDS (ref 11.7–14.2)
PROTHROMBIN TIME: 17.1 SECONDS (ref 11.7–14.2)
PROTHROMBIN TIME: 17.2 SECONDS (ref 11.7–14.2)
PROTHROMBIN TIME: 17.9 SECONDS (ref 11.7–14.2)
PROTHROMBIN TIME: 18.6 SECONDS (ref 11.7–14.2)
PROTHROMBIN TIME: 18.9 SECONDS (ref 11.7–14.2)
PROTHROMBIN TIME: 19.1 SECONDS (ref 11.7–14.2)
PROTHROMBIN TIME: 19.4 SECONDS (ref 11.7–14.2)
PROTHROMBIN TIME: 19.5 SECONDS (ref 11.7–14.2)
PROTHROMBIN TIME: 19.9 SECONDS (ref 11.7–14.2)
PROTHROMBIN TIME: 20.2 SECONDS (ref 11.7–14.2)
PROTHROMBIN TIME: 20.5 SECONDS (ref 11.7–14.2)
PROTHROMBIN TIME: 20.6 SECONDS (ref 11.7–14.2)
PROTHROMBIN TIME: 20.6 SECONDS (ref 11.7–14.2)
PROTHROMBIN TIME: 21 SECONDS (ref 11.7–14.2)
PROTHROMBIN TIME: 21.4 SECONDS (ref 11.7–14.2)
PROTHROMBIN TIME: 21.5 SECONDS (ref 11.7–14.2)
PROTHROMBIN TIME: 21.6 SECONDS (ref 11.7–14.2)
PROTHROMBIN TIME: 21.7 SECONDS (ref 11.7–14.2)
PROTHROMBIN TIME: 21.9 SECONDS (ref 11.7–14.2)
PROTHROMBIN TIME: 21.9 SECONDS (ref 11.7–14.2)
PROTHROMBIN TIME: 22.2 SECONDS (ref 11.7–14.2)
PROTHROMBIN TIME: 22.2 SECONDS (ref 11.7–14.2)
PROTHROMBIN TIME: 23.2 SECONDS (ref 11.7–14.2)
PROTHROMBIN TIME: 23.2 SECONDS (ref 11.7–14.2)
PROTHROMBIN TIME: 23.5 SECONDS (ref 11.7–14.2)
PROTHROMBIN TIME: 23.6 SECONDS (ref 11.7–14.2)
PROTHROMBIN TIME: 23.7 SECONDS (ref 11.7–14.2)
PROTHROMBIN TIME: 23.9 SECONDS (ref 11.7–14.2)
PROTHROMBIN TIME: 24.9 SECONDS (ref 11.7–14.2)
PROTHROMBIN TIME: 25.2 SECONDS (ref 11.7–14.2)
PROTHROMBIN TIME: 25.4 SECONDS (ref 11.7–14.2)
PROTHROMBIN TIME: 25.6 SECONDS (ref 11.7–14.2)
PROTHROMBIN TIME: 26.4 SECONDS (ref 11.7–14.2)
PROTHROMBIN TIME: 26.4 SECONDS (ref 11.7–14.2)
PROTHROMBIN TIME: 26.9 SECONDS (ref 11.7–14.2)
PROTHROMBIN TIME: 27.3 SECONDS (ref 11.7–14.2)
PROTHROMBIN TIME: 27.5 SECONDS (ref 11.7–14.2)
PROTHROMBIN TIME: 29.6 SECONDS (ref 11.7–14.2)
PROTHROMBIN TIME: 29.8 SECONDS (ref 11.7–14.2)
PROTHROMBIN TIME: 30.2 SECONDS (ref 11.7–14.2)
PROTHROMBIN TIME: 31.3 SECONDS (ref 11.7–14.2)
PROTHROMBIN TIME: 32.4 SECONDS (ref 11.7–14.2)
PROTHROMBIN TIME: 34.4 SECONDS (ref 11.7–14.2)
PROTHROMBIN TIME: 36.1 SECONDS (ref 11.7–14.2)
PROTHROMBIN TIME: 38.9 SECONDS (ref 12.1–15)
PROTHROMBIN TIME: 42.1 SECONDS (ref 11.7–14.2)
PROTHROMBIN TIME: 42.4 SECONDS (ref 11.7–14.2)
PSA SERPL-MCNC: 0.68 NG/ML (ref 0–4)
PSA SERPL-MCNC: 1.88 NG/ML (ref 0–4)
RBC # BLD AUTO: 3.7 10*6/MM3 (ref 4.6–6)
RBC # BLD AUTO: 3.71 10*6/MM3 (ref 4.6–6)
RBC # BLD AUTO: 4 10*6/MM3 (ref 4.6–6)
RBC # BLD AUTO: 4.01 10*6/MM3 (ref 4.6–6)
RBC # BLD AUTO: 4.02 10*6/MM3 (ref 4.6–6)
RBC # BLD AUTO: 4.02 10*6/MM3 (ref 4.6–6)
RBC # BLD AUTO: 4.03 10*6/MM3 (ref 4.6–6)
RBC # BLD AUTO: 4.05 10*6/MM3 (ref 4.6–6)
RBC # BLD AUTO: 4.08 10*6/MM3 (ref 4.6–6)
RBC # BLD AUTO: 4.09 10*6/MM3 (ref 4.6–6)
RBC # BLD AUTO: 4.12 10*6/MM3 (ref 4.6–6)
RBC # BLD AUTO: 4.15 10*6/MM3 (ref 4.6–6)
RBC # BLD AUTO: 4.22 10*6/MM3 (ref 4.6–6)
RBC # BLD AUTO: 4.33 10*6/MM3 (ref 4.6–6)
RBC # BLD AUTO: 4.34 10*6/MM3 (ref 4.6–6)
RBC # BLD AUTO: 4.41 10*6/MM3 (ref 4.6–6)
RBC # BLD AUTO: 4.43 10*6/MM3 (ref 4.7–6.1)
RBC # BLD AUTO: 4.48 10*6/MM3 (ref 4.6–6)
RBC # BLD AUTO: 4.51 10*6/MM3 (ref 4.6–6)
RBC # BLD AUTO: 4.57 10*6/MM3 (ref 4.6–6)
RBC # BLD AUTO: 4.6 10*6/MM3 (ref 4.3–5.5)
RBC # BLD AUTO: 4.61 10*6/MM3 (ref 4.6–6)
RBC # BLD AUTO: 4.62 10*6/MM3 (ref 4.6–6)
RBC # BLD AUTO: 4.63 10*6/MM3 (ref 4.6–6)
RBC # BLD AUTO: 4.7 10*6/MM3 (ref 4.6–6)
RBC # BLD AUTO: 4.86 10*6/MM3 (ref 4.6–6)
RBC # BLD AUTO: 4.95 10*6/MM3 (ref 4.6–6)
RBC # BLD AUTO: 4.97 10*6/MM3 (ref 4.6–6)
RBC # BLD AUTO: 5 10*6/MM3 (ref 4.6–6)
RBC # BLD AUTO: 5.15 10*6/MM3 (ref 4.6–6)
RBC # BLD AUTO: 5.18 10*6/MM3 (ref 4.6–6)
RBC # BLD AUTO: 5.21 10*6/MM3 (ref 4.6–6)
RBC # BLD AUTO: 5.23 10*6/MM3 (ref 4.6–6)
RBC # BLD AUTO: 5.28 10*6/MM3 (ref 4.6–6)
RBC # BLD AUTO: 5.3 10*6/MM3 (ref 4.6–6)
RBC # BLD AUTO: 5.34 10*6/MM3 (ref 4.6–6)
RBC # BLD AUTO: 5.34 10*6/MM3 (ref 4.6–6)
RBC # BLD AUTO: 5.38 10*6/MM3 (ref 4.6–6)
RBC # BLD AUTO: 5.38 10*6/MM3 (ref 4.7–6.1)
RBC # BLD AUTO: 5.55 10*6/MM3 (ref 4.6–6)
RBC # BLD AUTO: 5.58 10*6/MM3 (ref 4.7–6.1)
RBC # BLD AUTO: 5.7 10*6/MM3 (ref 4.6–6)
RBC # UR STRIP: ABNORMAL /UL
RBC # UR: ABNORMAL /HPF
REF LAB TEST METHOD: ABNORMAL
RH BLD: POSITIVE
RHINOVIRUS RNA SPEC NAA+PROBE: NOT DETECTED
RPR SER QL: NORMAL
RSV RNA NPH QL NAA+NON-PROBE: NOT DETECTED
S PNEUM AG SPEC QL LA: NEGATIVE
S PNEUM AG SPEC QL LA: NEGATIVE
SAO2 % BLDCOA: 98.1 % (ref 92–99)
SODIUM BLD-SCNC: 135 MMOL/L (ref 136–145)
SODIUM BLD-SCNC: 135 MMOL/L (ref 136–145)
SODIUM BLD-SCNC: 136 MMOL/L (ref 136–145)
SODIUM BLD-SCNC: 137 MMOL/L (ref 136–145)
SODIUM BLD-SCNC: 138 MMOL/L (ref 136–145)
SODIUM BLD-SCNC: 139 MMOL/L (ref 136–145)
SODIUM BLD-SCNC: 140 MMOL/L (ref 136–145)
SODIUM BLD-SCNC: 141 MMOL/L (ref 136–145)
SODIUM BLD-SCNC: 142 MMOL/L (ref 136–145)
SODIUM BLD-SCNC: 143 MMOL/L (ref 136–145)
SODIUM SERPL-SCNC: 141 MMOL/L (ref 136–145)
SODIUM SERPL-SCNC: 144 MMOL/L (ref 136–145)
SODIUM UR-SCNC: 112 MMOL/L
SP GR UR STRIP: 1.01 (ref 1–1.03)
SP GR UR STRIP: 1.02 (ref 1–1.03)
SP GR UR STRIP: 1.02 (ref 1–1.03)
SP GR UR: 1.02 (ref 1–1.03)
SQUAMOUS #/AREA URNS HPF: ABNORMAL /HPF
T4 FREE SERPL-MCNC: 1.69 NG/DL (ref 0.93–1.7)
TOTAL RATE: 28 BREATHS/MINUTE
TRIGL SERPL-MCNC: 105 MG/DL (ref 0–150)
TRIGL SERPL-MCNC: 145 MG/DL (ref 0–150)
TRIGL SERPL-MCNC: 149 MG/DL (ref 0–150)
TRIGL SERPL-MCNC: 191 MG/DL (ref 0–150)
TROPONIN T SERPL-MCNC: 0.03 NG/ML (ref 0–0.03)
TROPONIN T SERPL-MCNC: 0.04 NG/ML (ref 0–0.03)
TROPONIN T SERPL-MCNC: 0.04 NG/ML (ref 0–0.03)
TROPONIN T SERPL-MCNC: 0.06 NG/ML (ref 0–0.03)
TROPONIN T SERPL-MCNC: 0.07 NG/ML (ref 0–0.03)
TROPONIN T SERPL-MCNC: 1.44 NG/ML (ref 0–0.03)
TROPONIN T SERPL-MCNC: 2.31 NG/ML (ref 0–0.03)
TSH SERPL DL<=0.005 MIU/L-ACNC: 1.18 MIU/ML (ref 0.27–4.2)
TSH SERPL DL<=0.05 MIU/L-ACNC: 1.17 MIU/ML (ref 0.27–4.2)
URATE SERPL-MCNC: 6.5 MG/DL (ref 3.4–7)
URATE SERPL-MCNC: 6.9 MG/DL (ref 3.4–7)
UROBILINOGEN UR QL STRIP: ABNORMAL
UROBILINOGEN UR QL STRIP: NORMAL
UROBILINOGEN UR QL: NORMAL
VANCOMYCIN SERPL-MCNC: 11.3 MCG/ML (ref 5–40)
VANCOMYCIN SERPL-MCNC: 12.4 MCG/ML (ref 5–40)
VANCOMYCIN SERPL-MCNC: 14.3 MCG/ML (ref 5–40)
VANCOMYCIN SERPL-MCNC: 15.9 MCG/ML (ref 5–40)
VANCOMYCIN SERPL-MCNC: 16.3 MCG/ML (ref 5–40)
VANCOMYCIN SERPL-MCNC: 16.4 MCG/ML (ref 5–40)
VANCOMYCIN SERPL-MCNC: 22.9 MCG/ML (ref 5–40)
VIT B12 SERPL-MCNC: 460 PG/ML (ref 211–946)
VLDLC SERPL CALC-MCNC: 38.2 MG/DL (ref 8–32)
VLDLC SERPL-MCNC: 21 MG/DL (ref 5–40)
VLDLC SERPL-MCNC: 29 MG/DL (ref 5–40)
VLDLC SERPL-MCNC: 29.8 MG/DL (ref 5–40)
WBC # BLD AUTO: 9.06 10*3/MM3 (ref 4.8–10.8)
WBC MORPH BLD: NORMAL
WBC NRBC COR # BLD: 10.01 10*3/MM3 (ref 4.5–10.7)
WBC NRBC COR # BLD: 10.21 10*3/MM3 (ref 4.5–10.7)
WBC NRBC COR # BLD: 10.35 10*3/MM3 (ref 4.5–10.7)
WBC NRBC COR # BLD: 10.38 10*3/MM3 (ref 4.5–10.7)
WBC NRBC COR # BLD: 10.41 10*3/MM3 (ref 4.5–10.7)
WBC NRBC COR # BLD: 10.62 10*3/MM3 (ref 4–10)
WBC NRBC COR # BLD: 10.65 10*3/MM3 (ref 4.5–10.7)
WBC NRBC COR # BLD: 11.21 10*3/MM3 (ref 4.5–10.7)
WBC NRBC COR # BLD: 11.42 10*3/MM3 (ref 4.5–10.7)
WBC NRBC COR # BLD: 11.54 10*3/MM3 (ref 4.5–10.7)
WBC NRBC COR # BLD: 11.77 10*3/MM3 (ref 4.5–10.7)
WBC NRBC COR # BLD: 11.96 10*3/MM3 (ref 4.5–10.7)
WBC NRBC COR # BLD: 12.02 10*3/MM3 (ref 4.5–10.7)
WBC NRBC COR # BLD: 13.34 10*3/MM3 (ref 4.5–10.7)
WBC NRBC COR # BLD: 13.64 10*3/MM3 (ref 4.5–10.7)
WBC NRBC COR # BLD: 16.4 10*3/MM3 (ref 4.5–10.7)
WBC NRBC COR # BLD: 5.18 10*3/MM3 (ref 4.5–10.7)
WBC NRBC COR # BLD: 5.53 10*3/MM3 (ref 4.5–10.7)
WBC NRBC COR # BLD: 5.53 10*3/MM3 (ref 4.5–10.7)
WBC NRBC COR # BLD: 5.79 10*3/MM3 (ref 4.5–10.7)
WBC NRBC COR # BLD: 6.27 10*3/MM3 (ref 4.5–10.7)
WBC NRBC COR # BLD: 6.5 10*3/MM3 (ref 4.5–10.7)
WBC NRBC COR # BLD: 6.62 10*3/MM3 (ref 4.5–10.7)
WBC NRBC COR # BLD: 7.15 10*3/MM3 (ref 4.5–10.7)
WBC NRBC COR # BLD: 7.33 10*3/MM3 (ref 4.5–10.7)
WBC NRBC COR # BLD: 7.73 10*3/MM3 (ref 4.5–10.7)
WBC NRBC COR # BLD: 8.06 10*3/MM3 (ref 4.5–10.7)
WBC NRBC COR # BLD: 8.06 10*3/MM3 (ref 4.5–10.7)
WBC NRBC COR # BLD: 8.1 10*3/MM3 (ref 4.8–10.8)
WBC NRBC COR # BLD: 8.12 10*3/MM3 (ref 4.8–10.8)
WBC NRBC COR # BLD: 8.27 10*3/MM3 (ref 4.5–10.7)
WBC NRBC COR # BLD: 8.43 10*3/MM3 (ref 4.5–10.7)
WBC NRBC COR # BLD: 8.51 10*3/MM3 (ref 4.5–10.7)
WBC NRBC COR # BLD: 8.85 10*3/MM3 (ref 4.5–10.7)
WBC NRBC COR # BLD: 8.87 10*3/MM3 (ref 4.5–10.7)
WBC NRBC COR # BLD: 8.94 10*3/MM3 (ref 4.5–10.7)
WBC NRBC COR # BLD: 8.98 10*3/MM3 (ref 4.5–10.7)
WBC NRBC COR # BLD: 9.01 10*3/MM3 (ref 4.5–10.7)
WBC NRBC COR # BLD: 9.21 10*3/MM3 (ref 4.5–10.7)
WBC NRBC COR # BLD: 9.29 10*3/MM3 (ref 4.5–10.7)
WBC NRBC COR # BLD: 9.61 10*3/MM3 (ref 4.5–10.7)
WBC NRBC COR # BLD: 9.65 10*3/MM3 (ref 4.5–10.7)
WBC NRBC COR # BLD: 9.77 10*3/MM3 (ref 4.5–10.7)
WBC NRBC COR # BLD: 9.84 10*3/MM3 (ref 4.5–10.7)
WBC NRBC COR # BLD: 9.93 10*3/MM3 (ref 4.5–10.7)
WBC UR QL AUTO: ABNORMAL /HPF
WHOLE BLOOD HOLD SPECIMEN: NORMAL
YEAST URNS QL MICRO: ABNORMAL /HPF

## 2017-01-01 PROCEDURE — G0463 HOSPITAL OUTPT CLINIC VISIT: HCPCS | Performed by: RADIOLOGY

## 2017-01-01 PROCEDURE — 99232 SBSQ HOSP IP/OBS MODERATE 35: CPT | Performed by: INTERNAL MEDICINE

## 2017-01-01 PROCEDURE — 80048 BASIC METABOLIC PNL TOTAL CA: CPT | Performed by: HOSPITALIST

## 2017-01-01 PROCEDURE — 81001 URINALYSIS AUTO W/SCOPE: CPT

## 2017-01-01 PROCEDURE — 80307 DRUG TEST PRSMV CHEM ANLYZR: CPT | Performed by: HOSPITALIST

## 2017-01-01 PROCEDURE — 93010 ELECTROCARDIOGRAM REPORT: CPT | Performed by: INTERNAL MEDICINE

## 2017-01-01 PROCEDURE — 93005 ELECTROCARDIOGRAM TRACING: CPT | Performed by: INTERNAL MEDICINE

## 2017-01-01 PROCEDURE — C9604 PERC D-E COR REVASC T CABG S: HCPCS | Performed by: INTERNAL MEDICINE

## 2017-01-01 PROCEDURE — 97110 THERAPEUTIC EXERCISES: CPT

## 2017-01-01 PROCEDURE — 80162 ASSAY OF DIGOXIN TOTAL: CPT | Performed by: HOSPITALIST

## 2017-01-01 PROCEDURE — 85610 PROTHROMBIN TIME: CPT | Performed by: INTERNAL MEDICINE

## 2017-01-01 PROCEDURE — 99231 SBSQ HOSP IP/OBS SF/LOW 25: CPT | Performed by: INTERNAL MEDICINE

## 2017-01-01 PROCEDURE — 25010000002 DIGOXIN PER 500 MCG: Performed by: NURSE PRACTITIONER

## 2017-01-01 PROCEDURE — 25010000002 PROPOFOL 10 MG/ML EMULSION: Performed by: ANESTHESIOLOGY

## 2017-01-01 PROCEDURE — 02H63KZ INSERTION OF DEFIBRILLATOR LEAD INTO RIGHT ATRIUM, PERCUTANEOUS APPROACH: ICD-10-PCS | Performed by: INTERNAL MEDICINE

## 2017-01-01 PROCEDURE — 80048 BASIC METABOLIC PNL TOTAL CA: CPT | Performed by: INTERNAL MEDICINE

## 2017-01-01 PROCEDURE — 85027 COMPLETE CBC AUTOMATED: CPT | Performed by: HOSPITALIST

## 2017-01-01 PROCEDURE — C1725 CATH, TRANSLUMIN NON-LASER: HCPCS | Performed by: INTERNAL MEDICINE

## 2017-01-01 PROCEDURE — 33225 L VENTRIC PACING LEAD ADD-ON: CPT | Performed by: INTERNAL MEDICINE

## 2017-01-01 PROCEDURE — 80061 LIPID PANEL: CPT | Performed by: PSYCHIATRY & NEUROLOGY

## 2017-01-01 PROCEDURE — 85610 PROTHROMBIN TIME: CPT | Performed by: EMERGENCY MEDICINE

## 2017-01-01 PROCEDURE — 85025 COMPLETE CBC W/AUTO DIFF WBC: CPT | Performed by: HOSPITALIST

## 2017-01-01 PROCEDURE — 93798 PHYS/QHP OP CAR RHAB W/ECG: CPT

## 2017-01-01 PROCEDURE — 80048 BASIC METABOLIC PNL TOTAL CA: CPT

## 2017-01-01 PROCEDURE — 84145 PROCALCITONIN (PCT): CPT | Performed by: EMERGENCY MEDICINE

## 2017-01-01 PROCEDURE — 25010000002 CEFTRIAXONE PER 250 MG: Performed by: INTERNAL MEDICINE

## 2017-01-01 PROCEDURE — 25010000002 FLUCONAZOLE PER 200 MG: Performed by: INTERNAL MEDICINE

## 2017-01-01 PROCEDURE — C1874 STENT, COATED/COV W/DEL SYS: HCPCS | Performed by: INTERNAL MEDICINE

## 2017-01-01 PROCEDURE — 94762 N-INVAS EAR/PLS OXIMTRY CONT: CPT

## 2017-01-01 PROCEDURE — 87493 C DIFF AMPLIFIED PROBE: CPT | Performed by: HOSPITALIST

## 2017-01-01 PROCEDURE — 94799 UNLISTED PULMONARY SVC/PX: CPT

## 2017-01-01 PROCEDURE — 74176 CT ABD & PELVIS W/O CONTRAST: CPT

## 2017-01-01 PROCEDURE — 88312 SPECIAL STAINS GROUP 1: CPT | Performed by: INTERNAL MEDICINE

## 2017-01-01 PROCEDURE — 99213 OFFICE O/P EST LOW 20 MIN: CPT | Performed by: INTERNAL MEDICINE

## 2017-01-01 PROCEDURE — 87633 RESP VIRUS 12-25 TARGETS: CPT | Performed by: EMERGENCY MEDICINE

## 2017-01-01 PROCEDURE — 84484 ASSAY OF TROPONIN QUANT: CPT | Performed by: HOSPITALIST

## 2017-01-01 PROCEDURE — 80053 COMPREHEN METABOLIC PANEL: CPT | Performed by: INTERNAL MEDICINE

## 2017-01-01 PROCEDURE — 90670 PCV13 VACCINE IM: CPT | Performed by: INTERNAL MEDICINE

## 2017-01-01 PROCEDURE — 99233 SBSQ HOSP IP/OBS HIGH 50: CPT | Performed by: INTERNAL MEDICINE

## 2017-01-01 PROCEDURE — 71250 CT THORAX DX C-: CPT

## 2017-01-01 PROCEDURE — 36415 COLL VENOUS BLD VENIPUNCTURE: CPT

## 2017-01-01 PROCEDURE — 83605 ASSAY OF LACTIC ACID: CPT | Performed by: PHYSICIAN ASSISTANT

## 2017-01-01 PROCEDURE — 78582 LUNG VENTILAT&PERFUS IMAGING: CPT

## 2017-01-01 PROCEDURE — 85610 PROTHROMBIN TIME: CPT | Performed by: HOSPITALIST

## 2017-01-01 PROCEDURE — 87581 M.PNEUMON DNA AMP PROBE: CPT | Performed by: HOSPITALIST

## 2017-01-01 PROCEDURE — G0432 EIA HIV-1/HIV-2 SCREEN: HCPCS

## 2017-01-01 PROCEDURE — 83735 ASSAY OF MAGNESIUM: CPT | Performed by: INTERNAL MEDICINE

## 2017-01-01 PROCEDURE — 83690 ASSAY OF LIPASE: CPT | Performed by: EMERGENCY MEDICINE

## 2017-01-01 PROCEDURE — 84145 PROCALCITONIN (PCT): CPT | Performed by: HOSPITALIST

## 2017-01-01 PROCEDURE — 25010000002 PIPERACILLIN SOD-TAZOBACTAM PER 1 G: Performed by: INTERNAL MEDICINE

## 2017-01-01 PROCEDURE — 0 FLUDEOXYGLUCOSE F18 SOLUTION: Performed by: NURSE PRACTITIONER

## 2017-01-01 PROCEDURE — 87798 DETECT AGENT NOS DNA AMP: CPT | Performed by: EMERGENCY MEDICINE

## 2017-01-01 PROCEDURE — 87205 SMEAR GRAM STAIN: CPT | Performed by: INTERNAL MEDICINE

## 2017-01-01 PROCEDURE — 84153 ASSAY OF PSA TOTAL: CPT | Performed by: HOSPITALIST

## 2017-01-01 PROCEDURE — G0378 HOSPITAL OBSERVATION PER HR: HCPCS

## 2017-01-01 PROCEDURE — 77331 SPECIAL RADIATION DOSIMETRY: CPT | Performed by: RADIOLOGY

## 2017-01-01 PROCEDURE — 81001 URINALYSIS AUTO W/SCOPE: CPT | Performed by: FAMILY MEDICINE

## 2017-01-01 PROCEDURE — 85027 COMPLETE CBC AUTOMATED: CPT | Performed by: INTERNAL MEDICINE

## 2017-01-01 PROCEDURE — 25010000002 VANCOMYCIN: Performed by: EMERGENCY MEDICINE

## 2017-01-01 PROCEDURE — 82962 GLUCOSE BLOOD TEST: CPT

## 2017-01-01 PROCEDURE — 84439 ASSAY OF FREE THYROXINE: CPT | Performed by: INTERNAL MEDICINE

## 2017-01-01 PROCEDURE — 97110 THERAPEUTIC EXERCISES: CPT | Performed by: PHYSICAL THERAPIST

## 2017-01-01 PROCEDURE — 85730 THROMBOPLASTIN TIME PARTIAL: CPT | Performed by: EMERGENCY MEDICINE

## 2017-01-01 PROCEDURE — 87581 M.PNEUMON DNA AMP PROBE: CPT | Performed by: EMERGENCY MEDICINE

## 2017-01-01 PROCEDURE — 80202 ASSAY OF VANCOMYCIN: CPT | Performed by: INTERNAL MEDICINE

## 2017-01-01 PROCEDURE — 84484 ASSAY OF TROPONIN QUANT: CPT | Performed by: PHYSICIAN ASSISTANT

## 2017-01-01 PROCEDURE — 86592 SYPHILIS TEST NON-TREP QUAL: CPT | Performed by: RADIOLOGY

## 2017-01-01 PROCEDURE — C1894 INTRO/SHEATH, NON-LASER: HCPCS | Performed by: INTERNAL MEDICINE

## 2017-01-01 PROCEDURE — 25010000002 PIPERACILLIN SOD-TAZOBACTAM PER 1 G: Performed by: PHYSICIAN ASSISTANT

## 2017-01-01 PROCEDURE — 93005 ELECTROCARDIOGRAM TRACING: CPT | Performed by: HOSPITALIST

## 2017-01-01 PROCEDURE — 82565 ASSAY OF CREATININE: CPT

## 2017-01-01 PROCEDURE — 4A023N7 MEASUREMENT OF CARDIAC SAMPLING AND PRESSURE, LEFT HEART, PERCUTANEOUS APPROACH: ICD-10-PCS | Performed by: INTERNAL MEDICINE

## 2017-01-01 PROCEDURE — 87040 BLOOD CULTURE FOR BACTERIA: CPT | Performed by: EMERGENCY MEDICINE

## 2017-01-01 PROCEDURE — 99214 OFFICE O/P EST MOD 30 MIN: CPT | Performed by: INTERNAL MEDICINE

## 2017-01-01 PROCEDURE — 80053 COMPREHEN METABOLIC PANEL: CPT | Performed by: PHYSICIAN ASSISTANT

## 2017-01-01 PROCEDURE — 82140 ASSAY OF AMMONIA: CPT | Performed by: INTERNAL MEDICINE

## 2017-01-01 PROCEDURE — C1887 CATHETER, GUIDING: HCPCS

## 2017-01-01 PROCEDURE — 99214 OFFICE O/P EST MOD 30 MIN: CPT | Performed by: PHYSICIAN ASSISTANT

## 2017-01-01 PROCEDURE — 93797 PHYS/QHP OP CAR RHAB WO ECG: CPT

## 2017-01-01 PROCEDURE — 25010000002 LORAZEPAM PER 2 MG: Performed by: HOSPITALIST

## 2017-01-01 PROCEDURE — 45380 COLONOSCOPY AND BIOPSY: CPT | Performed by: INTERNAL MEDICINE

## 2017-01-01 PROCEDURE — 85025 COMPLETE CBC W/AUTO DIFF WBC: CPT | Performed by: INTERNAL MEDICINE

## 2017-01-01 PROCEDURE — 84550 ASSAY OF BLOOD/URIC ACID: CPT | Performed by: INTERNAL MEDICINE

## 2017-01-01 PROCEDURE — 77290 THER RAD SIMULAJ FIELD CPLX: CPT | Performed by: RADIOLOGY

## 2017-01-01 PROCEDURE — 80061 LIPID PANEL: CPT | Performed by: INTERNAL MEDICINE

## 2017-01-01 PROCEDURE — 25010000003 EPINEPHRINE 30 MG/30ML SOLUTION

## 2017-01-01 PROCEDURE — 80162 ASSAY OF DIGOXIN TOTAL: CPT | Performed by: INTERNAL MEDICINE

## 2017-01-01 PROCEDURE — 83631 LACTOFERRIN FECAL (QUANT): CPT | Performed by: INTERNAL MEDICINE

## 2017-01-01 PROCEDURE — 93005 ELECTROCARDIOGRAM TRACING: CPT | Performed by: EMERGENCY MEDICINE

## 2017-01-01 PROCEDURE — 93306 TTE W/DOPPLER COMPLETE: CPT | Performed by: INTERNAL MEDICINE

## 2017-01-01 PROCEDURE — 82803 BLOOD GASES ANY COMBINATION: CPT

## 2017-01-01 PROCEDURE — 99239 HOSP IP/OBS DSCHRG MGMT >30: CPT | Performed by: INTERNAL MEDICINE

## 2017-01-01 PROCEDURE — 80053 COMPREHEN METABOLIC PANEL: CPT | Performed by: FAMILY MEDICINE

## 2017-01-01 PROCEDURE — 97162 PT EVAL MOD COMPLEX 30 MIN: CPT | Performed by: PHYSICAL THERAPIST

## 2017-01-01 PROCEDURE — 99223 1ST HOSP IP/OBS HIGH 75: CPT | Performed by: INTERNAL MEDICINE

## 2017-01-01 PROCEDURE — 84100 ASSAY OF PHOSPHORUS: CPT | Performed by: INTERNAL MEDICINE

## 2017-01-01 PROCEDURE — 71010 HC CHEST PA OR AP: CPT

## 2017-01-01 PROCEDURE — 93459 L HRT ART/GRFT ANGIO: CPT | Performed by: INTERNAL MEDICINE

## 2017-01-01 PROCEDURE — 87486 CHLMYD PNEUM DNA AMP PROBE: CPT | Performed by: HOSPITALIST

## 2017-01-01 PROCEDURE — 80162 ASSAY OF DIGOXIN TOTAL: CPT | Performed by: EMERGENCY MEDICINE

## 2017-01-01 PROCEDURE — A9552 F18 FDG: HCPCS | Performed by: NURSE PRACTITIONER

## 2017-01-01 PROCEDURE — 87040 BLOOD CULTURE FOR BACTERIA: CPT | Performed by: HOSPITALIST

## 2017-01-01 PROCEDURE — 0BBL8ZX EXCISION OF LEFT LUNG, VIA NATURAL OR ARTIFICIAL OPENING ENDOSCOPIC, DIAGNOSTIC: ICD-10-PCS | Performed by: RADIOLOGY

## 2017-01-01 PROCEDURE — 70450 CT HEAD/BRAIN W/O DYE: CPT

## 2017-01-01 PROCEDURE — 87040 BLOOD CULTURE FOR BACTERIA: CPT | Performed by: PHYSICIAN ASSISTANT

## 2017-01-01 PROCEDURE — 85576 BLOOD PLATELET AGGREGATION: CPT | Performed by: RADIOLOGY

## 2017-01-01 PROCEDURE — 87899 AGENT NOS ASSAY W/OPTIC: CPT | Performed by: INTERNAL MEDICINE

## 2017-01-01 PROCEDURE — 86850 RBC ANTIBODY SCREEN: CPT | Performed by: EMERGENCY MEDICINE

## 2017-01-01 PROCEDURE — 81001 URINALYSIS AUTO W/SCOPE: CPT | Performed by: EMERGENCY MEDICINE

## 2017-01-01 PROCEDURE — 36416 COLLJ CAPILLARY BLOOD SPEC: CPT | Performed by: INTERNAL MEDICINE

## 2017-01-01 PROCEDURE — 87186 SC STD MICRODIL/AGAR DIL: CPT | Performed by: EMERGENCY MEDICINE

## 2017-01-01 PROCEDURE — 80202 ASSAY OF VANCOMYCIN: CPT | Performed by: HOSPITALIST

## 2017-01-01 PROCEDURE — 02HK3KZ INSERTION OF DEFIBRILLATOR LEAD INTO RIGHT VENTRICLE, PERCUTANEOUS APPROACH: ICD-10-PCS | Performed by: INTERNAL MEDICINE

## 2017-01-01 PROCEDURE — 83735 ASSAY OF MAGNESIUM: CPT | Performed by: HOSPITALIST

## 2017-01-01 PROCEDURE — 25010000002 DIGOXIN PER 500 MCG: Performed by: INTERNAL MEDICINE

## 2017-01-01 PROCEDURE — 25010000002 EPINEPHRINE PF 1 MG/10ML SOLUTION PREFILLED SYRINGE

## 2017-01-01 PROCEDURE — 77435 SBRT MANAGEMENT: CPT | Performed by: RADIOLOGY

## 2017-01-01 PROCEDURE — C1769 GUIDE WIRE: HCPCS | Performed by: INTERNAL MEDICINE

## 2017-01-01 PROCEDURE — 77470 SPECIAL RADIATION TREATMENT: CPT | Performed by: RADIOLOGY

## 2017-01-01 PROCEDURE — 99238 HOSP IP/OBS DSCHRG MGMT 30/<: CPT | Performed by: INTERNAL MEDICINE

## 2017-01-01 PROCEDURE — 0BH17EZ INSERTION OF ENDOTRACHEAL AIRWAY INTO TRACHEA, VIA NATURAL OR ARTIFICIAL OPENING: ICD-10-PCS | Performed by: INTERNAL MEDICINE

## 2017-01-01 PROCEDURE — 88341 IMHCHEM/IMCYTCHM EA ADD ANTB: CPT | Performed by: HOSPITALIST

## 2017-01-01 PROCEDURE — 93000 ELECTROCARDIOGRAM COMPLETE: CPT | Performed by: INTERNAL MEDICINE

## 2017-01-01 PROCEDURE — 86901 BLOOD TYPING SEROLOGIC RH(D): CPT | Performed by: EMERGENCY MEDICINE

## 2017-01-01 PROCEDURE — 25010000002 ENOXAPARIN PER 10 MG: Performed by: INTERNAL MEDICINE

## 2017-01-01 PROCEDURE — 0 TECHNETIUM TC 99M PENTETATE KIT: Performed by: INTERNAL MEDICINE

## 2017-01-01 PROCEDURE — 36415 COLL VENOUS BLD VENIPUNCTURE: CPT | Performed by: INTERNAL MEDICINE

## 2017-01-01 PROCEDURE — 93005 ELECTROCARDIOGRAM TRACING: CPT | Performed by: PHYSICIAN ASSISTANT

## 2017-01-01 PROCEDURE — 90661 CCIIV3 VAC ABX FR 0.5 ML IM: CPT | Performed by: HOSPITALIST

## 2017-01-01 PROCEDURE — 87086 URINE CULTURE/COLONY COUNT: CPT | Performed by: EMERGENCY MEDICINE

## 2017-01-01 PROCEDURE — 99285 EMERGENCY DEPT VISIT HI MDM: CPT

## 2017-01-01 PROCEDURE — 83605 ASSAY OF LACTIC ACID: CPT | Performed by: HOSPITALIST

## 2017-01-01 PROCEDURE — 99215 OFFICE O/P EST HI 40 MIN: CPT | Performed by: INTERNAL MEDICINE

## 2017-01-01 PROCEDURE — 25010000002 ONDANSETRON PER 1 MG: Performed by: HOSPITALIST

## 2017-01-01 PROCEDURE — 96372 THER/PROPH/DIAG INJ SC/IM: CPT | Performed by: INTERNAL MEDICINE

## 2017-01-01 PROCEDURE — 43239 EGD BIOPSY SINGLE/MULTIPLE: CPT | Performed by: INTERNAL MEDICINE

## 2017-01-01 PROCEDURE — 0 TECHNETIUM ALBUMIN AGGREGATED: Performed by: INTERNAL MEDICINE

## 2017-01-01 PROCEDURE — FACE2FACE: Performed by: RADIOLOGY

## 2017-01-01 PROCEDURE — C1887 CATHETER, GUIDING: HCPCS | Performed by: INTERNAL MEDICINE

## 2017-01-01 PROCEDURE — 81003 URINALYSIS AUTO W/O SCOPE: CPT | Performed by: NURSE PRACTITIONER

## 2017-01-01 PROCEDURE — 74022 RADEX COMPL AQT ABD SERIES: CPT

## 2017-01-01 PROCEDURE — 82570 ASSAY OF URINE CREATININE: CPT

## 2017-01-01 PROCEDURE — 25010000002 VANCOMYCIN 10 G RECONSTITUTED SOLUTION: Performed by: EMERGENCY MEDICINE

## 2017-01-01 PROCEDURE — 77301 RADIOTHERAPY DOSE PLAN IMRT: CPT | Performed by: RADIOLOGY

## 2017-01-01 PROCEDURE — 77300 RADIATION THERAPY DOSE PLAN: CPT

## 2017-01-01 PROCEDURE — 86140 C-REACTIVE PROTEIN: CPT | Performed by: HOSPITALIST

## 2017-01-01 PROCEDURE — 85347 COAGULATION TIME ACTIVATED: CPT

## 2017-01-01 PROCEDURE — C1731 CATH, EP, 20 OR MORE ELEC: HCPCS | Performed by: INTERNAL MEDICINE

## 2017-01-01 PROCEDURE — 84132 ASSAY OF SERUM POTASSIUM: CPT | Performed by: INTERNAL MEDICINE

## 2017-01-01 PROCEDURE — 99232 SBSQ HOSP IP/OBS MODERATE 35: CPT | Performed by: NURSE PRACTITIONER

## 2017-01-01 PROCEDURE — 25010000002 FENTANYL CITRATE (PF) 100 MCG/2ML SOLUTION: Performed by: INTERNAL MEDICINE

## 2017-01-01 PROCEDURE — 80053 COMPREHEN METABOLIC PANEL: CPT | Performed by: EMERGENCY MEDICINE

## 2017-01-01 PROCEDURE — 99231 SBSQ HOSP IP/OBS SF/LOW 25: CPT | Performed by: PSYCHIATRY & NEUROLOGY

## 2017-01-01 PROCEDURE — 99213 OFFICE O/P EST LOW 20 MIN: CPT | Performed by: PHYSICIAN ASSISTANT

## 2017-01-01 PROCEDURE — 87798 DETECT AGENT NOS DNA AMP: CPT | Performed by: HOSPITALIST

## 2017-01-01 PROCEDURE — 77336 RADIATION PHYSICS CONSULT: CPT | Performed by: RADIOLOGY

## 2017-01-01 PROCEDURE — 72125 CT NECK SPINE W/O DYE: CPT

## 2017-01-01 PROCEDURE — 71020 HC CHEST PA AND LATERAL: CPT

## 2017-01-01 PROCEDURE — 83520 IMMUNOASSAY QUANT NOS NONAB: CPT | Performed by: INTERNAL MEDICINE

## 2017-01-01 PROCEDURE — 80069 RENAL FUNCTION PANEL: CPT | Performed by: HOSPITALIST

## 2017-01-01 PROCEDURE — C1777 LEAD, AICD, ENDO SINGLE COIL: HCPCS | Performed by: INTERNAL MEDICINE

## 2017-01-01 PROCEDURE — 81003 URINALYSIS AUTO W/O SCOPE: CPT | Performed by: INTERNAL MEDICINE

## 2017-01-01 PROCEDURE — 78815 PET IMAGE W/CT SKULL-THIGH: CPT

## 2017-01-01 PROCEDURE — 81001 URINALYSIS AUTO W/SCOPE: CPT | Performed by: INTERNAL MEDICINE

## 2017-01-01 PROCEDURE — 99221 1ST HOSP IP/OBS SF/LOW 40: CPT | Performed by: NURSE PRACTITIONER

## 2017-01-01 PROCEDURE — 84484 ASSAY OF TROPONIN QUANT: CPT | Performed by: INTERNAL MEDICINE

## 2017-01-01 PROCEDURE — 70547 MR ANGIOGRAPHY NECK W/O DYE: CPT

## 2017-01-01 PROCEDURE — C8929 TTE W OR WO FOL WCON,DOPPLER: HCPCS

## 2017-01-01 PROCEDURE — 25010000002 CEFEPIME PER 500 MG: Performed by: EMERGENCY MEDICINE

## 2017-01-01 PROCEDURE — 0 IOPAMIDOL PER 1 ML: Performed by: INTERNAL MEDICINE

## 2017-01-01 PROCEDURE — 63710000001 PREDNISONE PER 1 MG: Performed by: HOSPITALIST

## 2017-01-01 PROCEDURE — 87899 AGENT NOS ASSAY W/OPTIC: CPT

## 2017-01-01 PROCEDURE — 25010000002 PIPERACILLIN SOD-TAZOBACTAM PER 1 G: Performed by: HOSPITALIST

## 2017-01-01 PROCEDURE — 85025 COMPLETE CBC W/AUTO DIFF WBC: CPT

## 2017-01-01 PROCEDURE — 80069 RENAL FUNCTION PANEL: CPT | Performed by: INTERNAL MEDICINE

## 2017-01-01 PROCEDURE — 85007 BL SMEAR W/DIFF WBC COUNT: CPT | Performed by: INTERNAL MEDICINE

## 2017-01-01 PROCEDURE — 36415 COLL VENOUS BLD VENIPUNCTURE: CPT | Performed by: EMERGENCY MEDICINE

## 2017-01-01 PROCEDURE — 25010000002 HEPARIN (PORCINE) PER 1000 UNITS: Performed by: INTERNAL MEDICINE

## 2017-01-01 PROCEDURE — 84156 ASSAY OF PROTEIN URINE: CPT

## 2017-01-01 PROCEDURE — 85025 COMPLETE CBC W/AUTO DIFF WBC: CPT | Performed by: EMERGENCY MEDICINE

## 2017-01-01 PROCEDURE — 45385 COLONOSCOPY W/LESION REMOVAL: CPT | Performed by: INTERNAL MEDICINE

## 2017-01-01 PROCEDURE — 84153 ASSAY OF PSA TOTAL: CPT | Performed by: INTERNAL MEDICINE

## 2017-01-01 PROCEDURE — 33249 INSJ/RPLCMT DEFIB W/LEAD(S): CPT | Performed by: INTERNAL MEDICINE

## 2017-01-01 PROCEDURE — 93000 ELECTROCARDIOGRAM COMPLETE: CPT | Performed by: PHYSICIAN ASSISTANT

## 2017-01-01 PROCEDURE — 87086 URINE CULTURE/COLONY COUNT: CPT | Performed by: INTERNAL MEDICINE

## 2017-01-01 PROCEDURE — 25010000002 PERFLUTREN (DEFINITY) 8.476 MG IN SODIUM CHLORIDE 10 ML INJECTION: Performed by: HOSPITALIST

## 2017-01-01 PROCEDURE — 25010000002 MIDAZOLAM PER 1 MG: Performed by: INTERNAL MEDICINE

## 2017-01-01 PROCEDURE — 85379 FIBRIN DEGRADATION QUANT: CPT | Performed by: PHYSICIAN ASSISTANT

## 2017-01-01 PROCEDURE — 87070 CULTURE OTHR SPECIMN AEROBIC: CPT | Performed by: HOSPITALIST

## 2017-01-01 PROCEDURE — 99204 OFFICE O/P NEW MOD 45 MIN: CPT | Performed by: RADIOLOGY

## 2017-01-01 PROCEDURE — 87340 HEPATITIS B SURFACE AG IA: CPT

## 2017-01-01 PROCEDURE — 77373 STRTCTC BDY RAD THER TX DLVR: CPT | Performed by: RADIOLOGY

## 2017-01-01 PROCEDURE — 87521 HEPATITIS C PROBE&RVRS TRNSC: CPT

## 2017-01-01 PROCEDURE — 87086 URINE CULTURE/COLONY COUNT: CPT | Performed by: UROLOGY

## 2017-01-01 PROCEDURE — 77338 DESIGN MLC DEVICE FOR IMRT: CPT | Performed by: RADIOLOGY

## 2017-01-01 PROCEDURE — A9567 TECHNETIUM TC-99M AEROSOL: HCPCS | Performed by: INTERNAL MEDICINE

## 2017-01-01 PROCEDURE — 70490 CT SOFT TISSUE NECK W/O DYE: CPT

## 2017-01-01 PROCEDURE — 93005 ELECTROCARDIOGRAM TRACING: CPT | Performed by: NURSE PRACTITIONER

## 2017-01-01 PROCEDURE — A9540 TC99M MAA: HCPCS | Performed by: INTERNAL MEDICINE

## 2017-01-01 PROCEDURE — 86803 HEPATITIS C AB TEST: CPT

## 2017-01-01 PROCEDURE — 25010000002 CEFEPIME PER 500 MG: Performed by: HOSPITALIST

## 2017-01-01 PROCEDURE — 83880 ASSAY OF NATRIURETIC PEPTIDE: CPT | Performed by: PHYSICIAN ASSISTANT

## 2017-01-01 PROCEDURE — 77334 RADIATION TREATMENT AID(S): CPT | Performed by: RADIOLOGY

## 2017-01-01 PROCEDURE — 82570 ASSAY OF URINE CREATININE: CPT | Performed by: INTERNAL MEDICINE

## 2017-01-01 PROCEDURE — 93284 PRGRMG EVAL IMPLANTABLE DFB: CPT | Performed by: INTERNAL MEDICINE

## 2017-01-01 PROCEDURE — 92610 EVALUATE SWALLOWING FUNCTION: CPT

## 2017-01-01 PROCEDURE — 84550 ASSAY OF BLOOD/URIC ACID: CPT | Performed by: HOSPITALIST

## 2017-01-01 PROCEDURE — 88305 TISSUE EXAM BY PATHOLOGIST: CPT | Performed by: INTERNAL MEDICINE

## 2017-01-01 PROCEDURE — 74000 HC ABDOMEN KUB: CPT

## 2017-01-01 PROCEDURE — 25010000002 AMIODARONE PER 30 MG: Performed by: INTERNAL MEDICINE

## 2017-01-01 PROCEDURE — C1898 LEAD, PMKR, OTHER THAN TRANS: HCPCS | Performed by: INTERNAL MEDICINE

## 2017-01-01 PROCEDURE — 77263 THER RADIOLOGY TX PLNG CPLX: CPT | Performed by: RADIOLOGY

## 2017-01-01 PROCEDURE — 83605 ASSAY OF LACTIC ACID: CPT | Performed by: EMERGENCY MEDICINE

## 2017-01-01 PROCEDURE — 99223 1ST HOSP IP/OBS HIGH 75: CPT | Performed by: RADIOLOGY

## 2017-01-01 PROCEDURE — 25010000002 VITAMIN K1 1 MG/1 ML SOLUTION: Performed by: HOSPITALIST

## 2017-01-01 PROCEDURE — 86256 FLUORESCENT ANTIBODY TITER: CPT | Performed by: INTERNAL MEDICINE

## 2017-01-01 PROCEDURE — 36600 WITHDRAWAL OF ARTERIAL BLOOD: CPT

## 2017-01-01 PROCEDURE — 25010000002 INFLUENZA VAC SUBUNIT QUAD 0.5 ML SUSPENSION PREFILLED SYRINGE: Performed by: HOSPITALIST

## 2017-01-01 PROCEDURE — 0JH609Z INSERTION OF CARDIAC RESYNCHRONIZATION DEFIBRILLATOR PULSE GENERATOR INTO CHEST SUBCUTANEOUS TISSUE AND FASCIA, OPEN APPROACH: ICD-10-PCS | Performed by: INTERNAL MEDICINE

## 2017-01-01 PROCEDURE — 99222 1ST HOSP IP/OBS MODERATE 55: CPT | Performed by: PSYCHIATRY & NEUROLOGY

## 2017-01-01 PROCEDURE — B2111ZZ FLUOROSCOPY OF MULTIPLE CORONARY ARTERIES USING LOW OSMOLAR CONTRAST: ICD-10-PCS | Performed by: INTERNAL MEDICINE

## 2017-01-01 PROCEDURE — G0008 ADMIN INFLUENZA VIRUS VAC: HCPCS | Performed by: HOSPITALIST

## 2017-01-01 PROCEDURE — B2151ZZ FLUOROSCOPY OF LEFT HEART USING LOW OSMOLAR CONTRAST: ICD-10-PCS | Performed by: INTERNAL MEDICINE

## 2017-01-01 PROCEDURE — 25010000002 FUROSEMIDE PER 20 MG: Performed by: INTERNAL MEDICINE

## 2017-01-01 PROCEDURE — 86900 BLOOD TYPING SEROLOGIC ABO: CPT | Performed by: EMERGENCY MEDICINE

## 2017-01-01 PROCEDURE — 84443 ASSAY THYROID STIM HORMONE: CPT | Performed by: INTERNAL MEDICINE

## 2017-01-01 PROCEDURE — 99232 SBSQ HOSP IP/OBS MODERATE 35: CPT | Performed by: PSYCHIATRY & NEUROLOGY

## 2017-01-01 PROCEDURE — 88342 IMHCHEM/IMCYTCHM 1ST ANTB: CPT | Performed by: HOSPITALIST

## 2017-01-01 PROCEDURE — 85025 COMPLETE CBC W/AUTO DIFF WBC: CPT | Performed by: FAMILY MEDICINE

## 2017-01-01 PROCEDURE — 83036 HEMOGLOBIN GLYCOSYLATED A1C: CPT | Performed by: INTERNAL MEDICINE

## 2017-01-01 PROCEDURE — 70544 MR ANGIOGRAPHY HEAD W/O DYE: CPT

## 2017-01-01 PROCEDURE — 84484 ASSAY OF TROPONIN QUANT: CPT | Performed by: EMERGENCY MEDICINE

## 2017-01-01 PROCEDURE — 99153 MOD SED SAME PHYS/QHP EA: CPT | Performed by: INTERNAL MEDICINE

## 2017-01-01 PROCEDURE — 99152 MOD SED SAME PHYS/QHP 5/>YRS: CPT | Performed by: INTERNAL MEDICINE

## 2017-01-01 PROCEDURE — 70551 MRI BRAIN STEM W/O DYE: CPT

## 2017-01-01 PROCEDURE — 84300 ASSAY OF URINE SODIUM: CPT | Performed by: INTERNAL MEDICINE

## 2017-01-01 PROCEDURE — 25010000002 PIPERACILLIN SOD-TAZOBACTAM PER 1 G: Performed by: EMERGENCY MEDICINE

## 2017-01-01 PROCEDURE — 99233 SBSQ HOSP IP/OBS HIGH 50: CPT | Performed by: NURSE PRACTITIONER

## 2017-01-01 PROCEDURE — 88305 TISSUE EXAM BY PATHOLOGIST: CPT | Performed by: HOSPITALIST

## 2017-01-01 PROCEDURE — 25010000002 AMIODARONE PER 30 MG

## 2017-01-01 PROCEDURE — 92937 PRQ TRLUML REVSC CAB GRF 1: CPT | Performed by: INTERNAL MEDICINE

## 2017-01-01 PROCEDURE — 92950 HEART/LUNG RESUSCITATION CPR: CPT

## 2017-01-01 PROCEDURE — 77012 CT SCAN FOR NEEDLE BIOPSY: CPT

## 2017-01-01 PROCEDURE — 25010000002 VANCOMYCIN: Performed by: PHYSICIAN ASSISTANT

## 2017-01-01 PROCEDURE — 87150 DNA/RNA AMPLIFIED PROBE: CPT | Performed by: EMERGENCY MEDICINE

## 2017-01-01 PROCEDURE — 87633 RESP VIRUS 12-25 TARGETS: CPT | Performed by: HOSPITALIST

## 2017-01-01 PROCEDURE — 80053 COMPREHEN METABOLIC PANEL: CPT

## 2017-01-01 PROCEDURE — 87486 CHLMYD PNEUM DNA AMP PROBE: CPT | Performed by: EMERGENCY MEDICINE

## 2017-01-01 PROCEDURE — 06HM33Z INSERTION OF INFUSION DEVICE INTO RIGHT FEMORAL VEIN, PERCUTANEOUS APPROACH: ICD-10-PCS | Performed by: INTERNAL MEDICINE

## 2017-01-01 PROCEDURE — 87804 INFLUENZA ASSAY W/OPTIC: CPT | Performed by: EMERGENCY MEDICINE

## 2017-01-01 PROCEDURE — 97162 PT EVAL MOD COMPLEX 30 MIN: CPT

## 2017-01-01 PROCEDURE — 77293 RESPIRATOR MOTION MGMT SIMUL: CPT | Performed by: RADIOLOGY

## 2017-01-01 PROCEDURE — C1882 AICD, OTHER THAN SING/DUAL: HCPCS | Performed by: INTERNAL MEDICINE

## 2017-01-01 PROCEDURE — 82533 TOTAL CORTISOL: CPT | Performed by: INTERNAL MEDICINE

## 2017-01-01 PROCEDURE — 99222 1ST HOSP IP/OBS MODERATE 55: CPT | Performed by: INTERNAL MEDICINE

## 2017-01-01 PROCEDURE — 93290 INTERROG DEV EVAL ICPMS IP: CPT | Performed by: INTERNAL MEDICINE

## 2017-01-01 PROCEDURE — C1900 LEAD, CORONARY VENOUS: HCPCS | Performed by: INTERNAL MEDICINE

## 2017-01-01 PROCEDURE — 25010000002 MORPHINE PER 10 MG: Performed by: INTERNAL MEDICINE

## 2017-01-01 PROCEDURE — 25010000002 LORAZEPAM PER 2 MG: Performed by: PSYCHIATRY & NEUROLOGY

## 2017-01-01 PROCEDURE — G0009 ADMIN PNEUMOCOCCAL VACCINE: HCPCS | Performed by: INTERNAL MEDICINE

## 2017-01-01 PROCEDURE — 83036 HEMOGLOBIN GLYCOSYLATED A1C: CPT | Performed by: PSYCHIATRY & NEUROLOGY

## 2017-01-01 PROCEDURE — 94760 N-INVAS EAR/PLS OXIMETRY 1: CPT

## 2017-01-01 PROCEDURE — 99284 EMERGENCY DEPT VISIT MOD MDM: CPT

## 2017-01-01 PROCEDURE — 25010000002 VANCOMYCIN 10 G RECONSTITUTED SOLUTION: Performed by: HOSPITALIST

## 2017-01-01 PROCEDURE — 25010000002 CYANOCOBALAMIN PER 1000 MCG: Performed by: INTERNAL MEDICINE

## 2017-01-01 PROCEDURE — 93010 ELECTROCARDIOGRAM REPORT: CPT | Performed by: PHYSICIAN ASSISTANT

## 2017-01-01 PROCEDURE — 87205 SMEAR GRAM STAIN: CPT | Performed by: HOSPITALIST

## 2017-01-01 PROCEDURE — 25010000002 EPINEPHRINE PF 1 MG/10ML SOLUTION PREFILLED SYRINGE: Performed by: INTERNAL MEDICINE

## 2017-01-01 PROCEDURE — 027034Z DILATION OF CORONARY ARTERY, ONE ARTERY WITH DRUG-ELUTING INTRALUMINAL DEVICE, PERCUTANEOUS APPROACH: ICD-10-PCS | Performed by: INTERNAL MEDICINE

## 2017-01-01 PROCEDURE — 77370 RADIATION PHYSICS CONSULT: CPT | Performed by: RADIOLOGY

## 2017-01-01 PROCEDURE — 25010000002 MORPHINE PER 10 MG: Performed by: RADIOLOGY

## 2017-01-01 PROCEDURE — 85025 COMPLETE CBC W/AUTO DIFF WBC: CPT | Performed by: PHYSICIAN ASSISTANT

## 2017-01-01 PROCEDURE — 99212 OFFICE O/P EST SF 10 MIN: CPT | Performed by: NURSE PRACTITIONER

## 2017-01-01 PROCEDURE — 25010000002 VANCOMYCIN PER 500 MG: Performed by: INTERNAL MEDICINE

## 2017-01-01 DEVICE — LD DEFIB SPRINT QUATTRO SECUR S 65: Type: IMPLANTABLE DEVICE | Status: FUNCTIONAL

## 2017-01-01 DEVICE — IMPLANTABLE DEVICE: Type: IMPLANTABLE DEVICE | Status: FUNCTIONAL

## 2017-01-01 DEVICE — LD PM CAPSUREFIX NOVUS5076 52CM: Type: IMPLANTABLE DEVICE | Status: FUNCTIONAL

## 2017-01-01 DEVICE — XIENCE ALPINE EVEROLIMUS ELUTING CORONARY STENT SYSTEM 3.50 MM X 18 MM / RAPID-EXCHANGE
Type: IMPLANTABLE DEVICE | Status: FUNCTIONAL
Brand: XIENCE ALPINE

## 2017-01-01 DEVICE — GEN DEFIB AMPLIA MRI IS1 DF1 CRTD: Type: IMPLANTABLE DEVICE | Status: FUNCTIONAL

## 2017-01-01 RX ORDER — BETAMETHASONE DIPROPIONATE 0.5 MG/G
CREAM TOPICAL EVERY 12 HOURS SCHEDULED
Status: DISCONTINUED | OUTPATIENT
Start: 2017-01-01 | End: 2017-01-01 | Stop reason: HOSPADM

## 2017-01-01 RX ORDER — OXYCODONE HYDROCHLORIDE AND ACETAMINOPHEN 5; 325 MG/1; MG/1
1 TABLET ORAL EVERY 6 HOURS PRN
Qty: 25 TABLET | Refills: 0 | Status: SHIPPED | OUTPATIENT
Start: 2017-01-01 | End: 2017-01-01

## 2017-01-01 RX ORDER — FLUCONAZOLE 2 MG/ML
200 INJECTION, SOLUTION INTRAVENOUS DAILY
Status: COMPLETED | OUTPATIENT
Start: 2017-01-01 | End: 2017-01-01

## 2017-01-01 RX ORDER — DIGOXIN 125 MCG
125 TABLET ORAL EVERY OTHER DAY
Qty: 15 TABLET | Refills: 1 | Status: SHIPPED | OUTPATIENT
Start: 2017-01-01

## 2017-01-01 RX ORDER — SODIUM CHLORIDE 0.9 % (FLUSH) 0.9 %
1-10 SYRINGE (ML) INJECTION AS NEEDED
Status: DISCONTINUED | OUTPATIENT
Start: 2017-01-01 | End: 2017-01-01 | Stop reason: HOSPADM

## 2017-01-01 RX ORDER — WARFARIN SODIUM 1 MG/1
TABLET ORAL
Qty: 30 TABLET | Refills: 3 | Status: SHIPPED | OUTPATIENT
Start: 2017-01-01 | End: 2017-01-01 | Stop reason: HOSPADM

## 2017-01-01 RX ORDER — AMIODARONE HYDROCHLORIDE 50 MG/ML
INJECTION, SOLUTION INTRAVENOUS
Status: COMPLETED | OUTPATIENT
Start: 2017-01-01 | End: 2017-01-01

## 2017-01-01 RX ORDER — SODIUM CHLORIDE 0.9 % (FLUSH) 0.9 %
10 SYRINGE (ML) INJECTION AS NEEDED
Status: DISCONTINUED | OUTPATIENT
Start: 2017-01-01 | End: 2017-01-01 | Stop reason: HOSPADM

## 2017-01-01 RX ORDER — METOPROLOL TARTRATE 100 MG/1
100 TABLET ORAL EVERY 12 HOURS SCHEDULED
Status: DISCONTINUED | OUTPATIENT
Start: 2017-01-01 | End: 2017-01-01 | Stop reason: HOSPADM

## 2017-01-01 RX ORDER — FUROSEMIDE 80 MG
80 TABLET ORAL DAILY
Status: DISCONTINUED | OUTPATIENT
Start: 2017-01-01 | End: 2017-01-01 | Stop reason: HOSPADM

## 2017-01-01 RX ORDER — ATORVASTATIN CALCIUM 40 MG/1
40 TABLET, FILM COATED ORAL NIGHTLY
Status: DISCONTINUED | OUTPATIENT
Start: 2017-01-01 | End: 2017-01-01 | Stop reason: HOSPADM

## 2017-01-01 RX ORDER — ONDANSETRON 4 MG/1
4 TABLET, ORALLY DISINTEGRATING ORAL EVERY 6 HOURS PRN
Status: DISCONTINUED | OUTPATIENT
Start: 2017-01-01 | End: 2017-01-01 | Stop reason: HOSPADM

## 2017-01-01 RX ORDER — ISOSORBIDE MONONITRATE 30 MG/1
15 TABLET, EXTENDED RELEASE ORAL DAILY
Status: DISCONTINUED | OUTPATIENT
Start: 2017-01-01 | End: 2017-01-01

## 2017-01-01 RX ORDER — WARFARIN SODIUM 2 MG/1
TABLET ORAL
Qty: 100 TABLET | Refills: 0 | Status: SHIPPED | OUTPATIENT
Start: 2017-01-01 | End: 2017-01-01 | Stop reason: HOSPADM

## 2017-01-01 RX ORDER — ASPIRIN 300 MG/1
600 SUPPOSITORY RECTAL ONCE
Status: COMPLETED | OUTPATIENT
Start: 2017-01-01 | End: 2017-01-01

## 2017-01-01 RX ORDER — ONDANSETRON 2 MG/ML
4 INJECTION INTRAMUSCULAR; INTRAVENOUS EVERY 6 HOURS PRN
Status: DISCONTINUED | OUTPATIENT
Start: 2017-01-01 | End: 2017-01-01 | Stop reason: HOSPADM

## 2017-01-01 RX ORDER — NITROGLYCERIN 0.4 MG/1
0.4 TABLET SUBLINGUAL
Status: DISCONTINUED | OUTPATIENT
Start: 2017-01-01 | End: 2017-01-01 | Stop reason: HOSPADM

## 2017-01-01 RX ORDER — CYANOCOBALAMIN 1000 UG/ML
1000 INJECTION, SOLUTION INTRAMUSCULAR; SUBCUTANEOUS
Status: DISCONTINUED | OUTPATIENT
Start: 2017-01-01 | End: 2017-01-01 | Stop reason: HOSPADM

## 2017-01-01 RX ORDER — AMLODIPINE BESYLATE 5 MG/1
5 TABLET ORAL
Status: DISCONTINUED | OUTPATIENT
Start: 2017-01-01 | End: 2017-01-01 | Stop reason: HOSPADM

## 2017-01-01 RX ORDER — DIGOXIN 0.25 MG/ML
250 INJECTION INTRAMUSCULAR; INTRAVENOUS EVERY 6 HOURS
Status: COMPLETED | OUTPATIENT
Start: 2017-01-01 | End: 2017-01-01

## 2017-01-01 RX ORDER — AMIODARONE HYDROCHLORIDE 200 MG/1
200 TABLET ORAL EVERY 12 HOURS SCHEDULED
Status: DISCONTINUED | OUTPATIENT
Start: 2017-01-01 | End: 2017-01-01 | Stop reason: HOSPADM

## 2017-01-01 RX ORDER — ATORVASTATIN CALCIUM 20 MG/1
40 TABLET, FILM COATED ORAL NIGHTLY
Status: DISCONTINUED | OUTPATIENT
Start: 2017-01-01 | End: 2017-01-01 | Stop reason: HOSPADM

## 2017-01-01 RX ORDER — SULFAMETHOXAZOLE AND TRIMETHOPRIM 800; 160 MG/1; MG/1
1 TABLET ORAL 2 TIMES DAILY
Qty: 10 TABLET | Refills: 0 | Status: SHIPPED | OUTPATIENT
Start: 2017-01-01 | End: 2017-01-01

## 2017-01-01 RX ORDER — FUROSEMIDE 40 MG/1
40 TABLET ORAL EVERY OTHER DAY
Qty: 90 TABLET | Refills: 2
Start: 2017-01-01 | End: 2017-01-01 | Stop reason: HOSPADM

## 2017-01-01 RX ORDER — TAMSULOSIN HYDROCHLORIDE 0.4 MG/1
0.4 CAPSULE ORAL 2 TIMES DAILY
Status: DISCONTINUED | OUTPATIENT
Start: 2017-01-01 | End: 2017-01-01 | Stop reason: HOSPADM

## 2017-01-01 RX ORDER — SODIUM CHLORIDE 0.9 % (FLUSH) 0.9 %
10 SYRINGE (ML) INJECTION AS NEEDED
Status: DISCONTINUED | OUTPATIENT
Start: 2017-01-01 | End: 2017-01-01

## 2017-01-01 RX ORDER — WARFARIN SODIUM 2 MG/1
2 TABLET ORAL
Status: DISCONTINUED | OUTPATIENT
Start: 2017-01-01 | End: 2017-01-01

## 2017-01-01 RX ORDER — FAMOTIDINE 20 MG/1
20 TABLET, FILM COATED ORAL DAILY
Start: 2017-01-01

## 2017-01-01 RX ORDER — AMLODIPINE BESYLATE 5 MG/1
5 TABLET ORAL
Status: DISCONTINUED | OUTPATIENT
Start: 2017-01-01 | End: 2017-01-01

## 2017-01-01 RX ORDER — DIGOXIN 125 MCG
125 TABLET ORAL EVERY OTHER DAY
Status: DISCONTINUED | OUTPATIENT
Start: 2017-01-01 | End: 2017-01-01 | Stop reason: SDUPTHER

## 2017-01-01 RX ORDER — SENNA AND DOCUSATE SODIUM 50; 8.6 MG/1; MG/1
2 TABLET, FILM COATED ORAL NIGHTLY PRN
Status: DISCONTINUED | OUTPATIENT
Start: 2017-01-01 | End: 2017-01-01 | Stop reason: HOSPADM

## 2017-01-01 RX ORDER — ESOMEPRAZOLE MAGNESIUM 40 MG/1
40 CAPSULE, DELAYED RELEASE ORAL DAILY
Qty: 30 CAPSULE | Refills: 11 | Status: SHIPPED | OUTPATIENT
Start: 2017-01-01 | End: 2017-01-01

## 2017-01-01 RX ORDER — SODIUM CHLORIDE 9 MG/ML
150 INJECTION, SOLUTION INTRAVENOUS CONTINUOUS
Status: DISCONTINUED | OUTPATIENT
Start: 2017-01-01 | End: 2017-01-01

## 2017-01-01 RX ORDER — DIGOXIN 0.25 MG/ML
250 INJECTION INTRAMUSCULAR; INTRAVENOUS ONCE
Status: COMPLETED | OUTPATIENT
Start: 2017-01-01 | End: 2017-01-01

## 2017-01-01 RX ORDER — DEXTROSE MONOHYDRATE 25 G/50ML
25 INJECTION, SOLUTION INTRAVENOUS
Status: DISCONTINUED | OUTPATIENT
Start: 2017-01-01 | End: 2017-01-01 | Stop reason: HOSPADM

## 2017-01-01 RX ORDER — NICOTINE POLACRILEX 4 MG
15 LOZENGE BUCCAL
Status: DISCONTINUED | OUTPATIENT
Start: 2017-01-01 | End: 2017-01-01 | Stop reason: HOSPADM

## 2017-01-01 RX ORDER — CLOBETASOL PROPIONATE 0.5 MG/G
CREAM TOPICAL 2 TIMES DAILY
Qty: 60 G | Refills: 0 | Status: SHIPPED | OUTPATIENT
Start: 2017-01-01 | End: 2017-01-01

## 2017-01-01 RX ORDER — FUROSEMIDE 40 MG/1
40 TABLET ORAL DAILY
COMMUNITY
End: 2017-01-01 | Stop reason: SDUPTHER

## 2017-01-01 RX ORDER — METHYLPREDNISOLONE ACETATE 80 MG/ML
80 INJECTION, SUSPENSION INTRA-ARTICULAR; INTRALESIONAL; INTRAMUSCULAR; SOFT TISSUE ONCE
Status: COMPLETED | OUTPATIENT
Start: 2017-01-01 | End: 2017-01-01

## 2017-01-01 RX ORDER — TAMSULOSIN HYDROCHLORIDE 0.4 MG/1
0.4 CAPSULE ORAL DAILY
Status: DISCONTINUED | OUTPATIENT
Start: 2017-01-01 | End: 2017-01-01

## 2017-01-01 RX ORDER — FINASTERIDE 5 MG/1
5 TABLET, FILM COATED ORAL DAILY
Status: DISCONTINUED | OUTPATIENT
Start: 2017-01-01 | End: 2017-01-01 | Stop reason: HOSPADM

## 2017-01-01 RX ORDER — TERAZOSIN 5 MG/1
5 CAPSULE ORAL 2 TIMES DAILY
Status: DISCONTINUED | OUTPATIENT
Start: 2017-01-01 | End: 2017-01-01

## 2017-01-01 RX ORDER — POTASSIUM CHLORIDE 750 MG/1
40 CAPSULE, EXTENDED RELEASE ORAL ONCE
Status: COMPLETED | OUTPATIENT
Start: 2017-01-01 | End: 2017-01-01

## 2017-01-01 RX ORDER — POTASSIUM CHLORIDE 750 MG/1
20 CAPSULE, EXTENDED RELEASE ORAL DAILY
Status: DISCONTINUED | OUTPATIENT
Start: 2017-01-01 | End: 2017-01-01

## 2017-01-01 RX ORDER — FUROSEMIDE 40 MG/1
40 TABLET ORAL DAILY
Status: DISCONTINUED | OUTPATIENT
Start: 2017-01-01 | End: 2017-01-01

## 2017-01-01 RX ORDER — METOPROLOL SUCCINATE 50 MG/1
50 TABLET, EXTENDED RELEASE ORAL EVERY 12 HOURS SCHEDULED
Status: DISCONTINUED | OUTPATIENT
Start: 2017-01-01 | End: 2017-01-01

## 2017-01-01 RX ORDER — SENNA AND DOCUSATE SODIUM 50; 8.6 MG/1; MG/1
2 TABLET, FILM COATED ORAL NIGHTLY
Status: DISCONTINUED | OUTPATIENT
Start: 2017-01-01 | End: 2017-01-01

## 2017-01-01 RX ORDER — FINASTERIDE 5 MG/1
5 TABLET, FILM COATED ORAL DAILY
Qty: 30 TABLET | Refills: 2 | Status: SHIPPED | OUTPATIENT
Start: 2017-01-01 | End: 2017-01-01 | Stop reason: SDUPTHER

## 2017-01-01 RX ORDER — BISACODYL 10 MG
10 SUPPOSITORY, RECTAL RECTAL DAILY PRN
Status: DISCONTINUED | OUTPATIENT
Start: 2017-01-01 | End: 2017-01-01 | Stop reason: HOSPADM

## 2017-01-01 RX ORDER — ASPIRIN 81 MG/1
81 TABLET, CHEWABLE ORAL DAILY
Status: DISCONTINUED | OUTPATIENT
Start: 2017-01-01 | End: 2017-01-01

## 2017-01-01 RX ORDER — ACETAMINOPHEN 325 MG/1
650 TABLET ORAL EVERY 8 HOURS PRN
COMMUNITY

## 2017-01-01 RX ORDER — CARVEDILOL 6.25 MG/1
6.25 TABLET ORAL 2 TIMES DAILY
Status: DISCONTINUED | OUTPATIENT
Start: 2017-01-01 | End: 2017-01-01 | Stop reason: HOSPADM

## 2017-01-01 RX ORDER — CYANOCOBALAMIN 1000 UG/ML
1000 INJECTION, SOLUTION INTRAMUSCULAR; SUBCUTANEOUS
Status: DISCONTINUED | OUTPATIENT
Start: 2017-01-01 | End: 2017-01-01

## 2017-01-01 RX ORDER — FAMOTIDINE 20 MG/1
20 TABLET, FILM COATED ORAL 2 TIMES DAILY
Status: DISCONTINUED | OUTPATIENT
Start: 2017-01-01 | End: 2017-01-01

## 2017-01-01 RX ORDER — CEFTRIAXONE SODIUM 1 G/50ML
1 INJECTION, SOLUTION INTRAVENOUS ONCE
Status: COMPLETED | OUTPATIENT
Start: 2017-01-01 | End: 2017-01-01

## 2017-01-01 RX ORDER — POTASSIUM CHLORIDE 1500 MG/1
20 TABLET, FILM COATED, EXTENDED RELEASE ORAL DAILY
Qty: 90 TABLET | Refills: 3 | Status: SHIPPED | OUTPATIENT
Start: 2017-01-01 | End: 2017-01-01 | Stop reason: HOSPADM

## 2017-01-01 RX ORDER — CEFTRIAXONE SODIUM 1 G/50ML
1 INJECTION, SOLUTION INTRAVENOUS EVERY 24 HOURS
Status: COMPLETED | OUTPATIENT
Start: 2017-01-01 | End: 2017-01-01

## 2017-01-01 RX ORDER — SODIUM CHLORIDE 9 MG/ML
75 INJECTION, SOLUTION INTRAVENOUS CONTINUOUS
Status: DISCONTINUED | OUTPATIENT
Start: 2017-01-01 | End: 2017-01-01

## 2017-01-01 RX ORDER — BUSPIRONE HYDROCHLORIDE 15 MG/1
7.5 TABLET ORAL 2 TIMES DAILY
Status: DISCONTINUED | OUTPATIENT
Start: 2017-01-01 | End: 2017-01-01 | Stop reason: HOSPADM

## 2017-01-01 RX ORDER — CARVEDILOL 6.25 MG/1
6.25 TABLET ORAL 2 TIMES DAILY
Qty: 180 TABLET | Refills: 3 | Status: SHIPPED | OUTPATIENT
Start: 2017-01-01 | End: 2017-01-01 | Stop reason: HOSPADM

## 2017-01-01 RX ORDER — PROPOFOL 10 MG/ML
VIAL (ML) INTRAVENOUS AS NEEDED
Status: DISCONTINUED | OUTPATIENT
Start: 2017-01-01 | End: 2017-01-01 | Stop reason: SURG

## 2017-01-01 RX ORDER — OXYCODONE HYDROCHLORIDE AND ACETAMINOPHEN 5; 325 MG/1; MG/1
2 TABLET ORAL EVERY 6 HOURS PRN
Status: DISCONTINUED | OUTPATIENT
Start: 2017-01-01 | End: 2017-01-01 | Stop reason: HOSPADM

## 2017-01-01 RX ORDER — FUROSEMIDE 10 MG/ML
80 INJECTION INTRAMUSCULAR; INTRAVENOUS EVERY 6 HOURS
Status: COMPLETED | OUTPATIENT
Start: 2017-01-01 | End: 2017-01-01

## 2017-01-01 RX ORDER — WARFARIN SODIUM 2 MG/1
TABLET ORAL
Qty: 90 TABLET | Refills: 0 | Status: SHIPPED | OUTPATIENT
Start: 2017-01-01 | End: 2017-01-01 | Stop reason: SDUPTHER

## 2017-01-01 RX ORDER — WARFARIN SODIUM 4 MG/1
4 TABLET ORAL NIGHTLY
Status: DISCONTINUED | OUTPATIENT
Start: 2017-01-01 | End: 2017-01-01 | Stop reason: HOSPADM

## 2017-01-01 RX ORDER — BUSPIRONE HYDROCHLORIDE 7.5 MG/1
7.5 TABLET ORAL 2 TIMES DAILY
COMMUNITY

## 2017-01-01 RX ORDER — LACTULOSE 10 G/15ML
10 SOLUTION ORAL DAILY
Status: DISCONTINUED | OUTPATIENT
Start: 2017-01-01 | End: 2017-01-01

## 2017-01-01 RX ORDER — CARVEDILOL 6.25 MG/1
6.25 TABLET ORAL 2 TIMES DAILY WITH MEALS
Qty: 60 TABLET | Refills: 3 | OUTPATIENT
Start: 2017-01-01 | End: 2017-01-01 | Stop reason: SDUPTHER

## 2017-01-01 RX ORDER — WARFARIN SODIUM 4 MG/1
4 TABLET ORAL
Qty: 30 TABLET | Refills: 1 | Status: SHIPPED | OUTPATIENT
Start: 2017-01-01 | End: 2017-01-01 | Stop reason: HOSPADM

## 2017-01-01 RX ORDER — METOPROLOL TARTRATE 100 MG/1
100 TABLET ORAL EVERY 12 HOURS SCHEDULED
Qty: 60 TABLET | Refills: 1 | Status: SHIPPED | OUTPATIENT
Start: 2017-01-01

## 2017-01-01 RX ORDER — MORPHINE SULFATE 2 MG/ML
2 INJECTION, SOLUTION INTRAMUSCULAR; INTRAVENOUS EVERY 4 HOURS PRN
Status: DISCONTINUED | OUTPATIENT
Start: 2017-01-01 | End: 2017-01-01 | Stop reason: HOSPADM

## 2017-01-01 RX ORDER — ACETAMINOPHEN 650 MG/1
650 SUPPOSITORY RECTAL EVERY 4 HOURS PRN
Status: DISCONTINUED | OUTPATIENT
Start: 2017-01-01 | End: 2017-01-01 | Stop reason: HOSPADM

## 2017-01-01 RX ORDER — FLUCONAZOLE 200 MG/1
200 TABLET ORAL DAILY
Status: DISCONTINUED | OUTPATIENT
Start: 2017-01-01 | End: 2017-01-01 | Stop reason: HOSPADM

## 2017-01-01 RX ORDER — POTASSIUM CHLORIDE 750 MG/1
20 CAPSULE, EXTENDED RELEASE ORAL ONCE
Status: COMPLETED | OUTPATIENT
Start: 2017-01-01 | End: 2017-01-01

## 2017-01-01 RX ORDER — ACETAMINOPHEN 325 MG/1
650 TABLET ORAL EVERY 4 HOURS PRN
Status: DISCONTINUED | OUTPATIENT
Start: 2017-01-01 | End: 2017-01-01 | Stop reason: HOSPADM

## 2017-01-01 RX ORDER — FUROSEMIDE 40 MG/1
TABLET ORAL
Qty: 90 TABLET | Refills: 2 | OUTPATIENT
Start: 2017-01-01

## 2017-01-01 RX ORDER — DIGOXIN 125 MCG
125 TABLET ORAL EVERY OTHER DAY
Status: DISCONTINUED | OUTPATIENT
Start: 2017-01-01 | End: 2017-01-01 | Stop reason: HOSPADM

## 2017-01-01 RX ORDER — WARFARIN SODIUM 4 MG/1
4 TABLET ORAL
Status: DISCONTINUED | OUTPATIENT
Start: 2017-01-01 | End: 2017-01-01 | Stop reason: HOSPADM

## 2017-01-01 RX ORDER — CARVEDILOL 3.12 MG/1
3.12 TABLET ORAL 2 TIMES DAILY WITH MEALS
COMMUNITY
End: 2017-01-01 | Stop reason: DRUGHIGH

## 2017-01-01 RX ORDER — SODIUM CHLORIDE 9 MG/ML
75 INJECTION, SOLUTION INTRAVENOUS CONTINUOUS
Status: ACTIVE | OUTPATIENT
Start: 2017-01-01 | End: 2017-01-01

## 2017-01-01 RX ORDER — ISOSORBIDE MONONITRATE 30 MG/1
30 TABLET, EXTENDED RELEASE ORAL EVERY MORNING
Qty: 30 TABLET | Refills: 3 | Status: SHIPPED | OUTPATIENT
Start: 2017-01-01 | End: 2017-01-01

## 2017-01-01 RX ORDER — WARFARIN SODIUM 3 MG/1
3 TABLET ORAL NIGHTLY
Status: DISCONTINUED | OUTPATIENT
Start: 2017-01-01 | End: 2017-01-01

## 2017-01-01 RX ORDER — NALOXONE HYDROCHLORIDE 1 MG/ML
1 INJECTION INTRAMUSCULAR; INTRAVENOUS; SUBCUTANEOUS ONCE
Status: DISCONTINUED | OUTPATIENT
Start: 2017-01-01 | End: 2017-01-01 | Stop reason: HOSPADM

## 2017-01-01 RX ORDER — ACETAMINOPHEN 650 MG/1
650 SUPPOSITORY RECTAL ONCE
Status: COMPLETED | OUTPATIENT
Start: 2017-01-01 | End: 2017-01-01

## 2017-01-01 RX ORDER — POLYETHYLENE GLYCOL 3350 17 G/17G
17 POWDER, FOR SOLUTION ORAL DAILY PRN
Status: DISCONTINUED | OUTPATIENT
Start: 2017-01-01 | End: 2017-01-01 | Stop reason: HOSPADM

## 2017-01-01 RX ORDER — TAMSULOSIN HYDROCHLORIDE 0.4 MG/1
0.4 CAPSULE ORAL DAILY
Qty: 30 CAPSULE | Status: ON HOLD
Start: 2017-01-01 | End: 2017-01-01

## 2017-01-01 RX ORDER — CHOLECALCIFEROL (VITAMIN D3) 125 MCG
5 CAPSULE ORAL NIGHTLY
Start: 2017-01-01 | End: 2017-01-01 | Stop reason: HOSPADM

## 2017-01-01 RX ORDER — AMLODIPINE BESYLATE 5 MG/1
5 TABLET ORAL
Start: 2017-01-01

## 2017-01-01 RX ORDER — IPRATROPIUM BROMIDE AND ALBUTEROL SULFATE 2.5; .5 MG/3ML; MG/3ML
3 SOLUTION RESPIRATORY (INHALATION) ONCE
Status: COMPLETED | OUTPATIENT
Start: 2017-01-01 | End: 2017-01-01

## 2017-01-01 RX ORDER — FLUCONAZOLE 200 MG/1
200 TABLET ORAL DAILY
Qty: 8 TABLET | Refills: 0
Start: 2017-01-01 | End: 2017-01-01

## 2017-01-01 RX ORDER — TERAZOSIN 5 MG/1
5 CAPSULE ORAL 2 TIMES DAILY
Qty: 30 CAPSULE | Refills: 6 | Status: SHIPPED | OUTPATIENT
Start: 2017-01-01 | End: 2017-01-01 | Stop reason: SDUPTHER

## 2017-01-01 RX ORDER — PANTOPRAZOLE SODIUM 40 MG/1
40 TABLET, DELAYED RELEASE ORAL DAILY
COMMUNITY
End: 2017-01-01 | Stop reason: HOSPADM

## 2017-01-01 RX ORDER — FLUCONAZOLE 200 MG/1
200 TABLET ORAL DAILY
COMMUNITY
End: 2017-01-01

## 2017-01-01 RX ORDER — WARFARIN SODIUM 1 MG/1
1 TABLET ORAL
Status: DISCONTINUED | OUTPATIENT
Start: 2017-01-01 | End: 2017-01-01 | Stop reason: HOSPADM

## 2017-01-01 RX ORDER — AMIODARONE HYDROCHLORIDE 100 MG/1
100 TABLET ORAL EVERY 12 HOURS SCHEDULED
Start: 2017-01-01

## 2017-01-01 RX ORDER — WARFARIN SODIUM 3 MG/1
3 TABLET ORAL
Status: DISCONTINUED | OUTPATIENT
Start: 2017-01-01 | End: 2017-01-01

## 2017-01-01 RX ORDER — CYCLOBENZAPRINE HCL 5 MG
5 TABLET ORAL 3 TIMES DAILY PRN
Start: 2017-01-01

## 2017-01-01 RX ORDER — SACCHAROMYCES BOULARDII 250 MG
250 CAPSULE ORAL 2 TIMES DAILY
Status: DISCONTINUED | OUTPATIENT
Start: 2017-01-01 | End: 2017-01-01 | Stop reason: HOSPADM

## 2017-01-01 RX ORDER — CARVEDILOL 6.25 MG/1
6.25 TABLET ORAL 2 TIMES DAILY WITH MEALS
Qty: 60 TABLET | Refills: 3 | Status: SHIPPED | OUTPATIENT
Start: 2017-01-01 | End: 2017-01-01 | Stop reason: DRUGHIGH

## 2017-01-01 RX ORDER — LIDOCAINE WITH 8.4% SOD BICARB 0.9%(10ML)
10 SYRINGE (ML) INJECTION ONCE
Status: COMPLETED | OUTPATIENT
Start: 2017-01-01 | End: 2017-01-01

## 2017-01-01 RX ORDER — WARFARIN SODIUM 3 MG/1
1.5 TABLET ORAL
Status: DISCONTINUED | OUTPATIENT
Start: 2017-01-01 | End: 2017-01-01

## 2017-01-01 RX ORDER — POLYETHYLENE GLYCOL 3350 17 G/17G
17 POWDER, FOR SOLUTION ORAL DAILY
Start: 2017-01-01

## 2017-01-01 RX ORDER — FUROSEMIDE 40 MG/1
80 TABLET ORAL DAILY
Qty: 90 TABLET | Refills: 2 | Status: SHIPPED | OUTPATIENT
Start: 2017-01-01 | End: 2017-01-01

## 2017-01-01 RX ORDER — METHYLPREDNISOLONE 4 MG/1
TABLET ORAL
Qty: 1 TABLET | Refills: 2 | Status: SHIPPED | OUTPATIENT
Start: 2017-01-01 | End: 2017-01-01 | Stop reason: HOSPADM

## 2017-01-01 RX ORDER — SENNA AND DOCUSATE SODIUM 50; 8.6 MG/1; MG/1
2 TABLET, FILM COATED ORAL ONCE
Status: COMPLETED | OUTPATIENT
Start: 2017-01-01 | End: 2017-01-01

## 2017-01-01 RX ORDER — HYDROCODONE BITARTRATE AND ACETAMINOPHEN 7.5; 325 MG/1; MG/1
1 TABLET ORAL EVERY 4 HOURS PRN
Status: DISCONTINUED | OUTPATIENT
Start: 2017-01-01 | End: 2017-01-01 | Stop reason: HOSPADM

## 2017-01-01 RX ORDER — PROPOFOL 10 MG/ML
VIAL (ML) INTRAVENOUS CONTINUOUS PRN
Status: DISCONTINUED | OUTPATIENT
Start: 2017-01-01 | End: 2017-01-01 | Stop reason: SURG

## 2017-01-01 RX ORDER — SODIUM CHLORIDE 9 MG/ML
INJECTION, SOLUTION INTRAVENOUS CONTINUOUS PRN
Status: DISCONTINUED | OUTPATIENT
Start: 2017-01-01 | End: 2017-01-01 | Stop reason: HOSPADM

## 2017-01-01 RX ORDER — ATORVASTATIN CALCIUM 80 MG/1
80 TABLET, FILM COATED ORAL NIGHTLY
Status: DISCONTINUED | OUTPATIENT
Start: 2017-01-01 | End: 2017-01-01 | Stop reason: HOSPADM

## 2017-01-01 RX ORDER — METOPROLOL TARTRATE 100 MG/1
100 TABLET ORAL EVERY 12 HOURS SCHEDULED
Status: DISCONTINUED | OUTPATIENT
Start: 2017-01-01 | End: 2017-01-01

## 2017-01-01 RX ORDER — LOPERAMIDE HYDROCHLORIDE 2 MG/1
2 CAPSULE ORAL 4 TIMES DAILY PRN
Status: DISCONTINUED | OUTPATIENT
Start: 2017-01-01 | End: 2017-01-01 | Stop reason: HOSPADM

## 2017-01-01 RX ORDER — MORPHINE SULFATE 2 MG/ML
2 INJECTION, SOLUTION INTRAMUSCULAR; INTRAVENOUS ONCE
Status: COMPLETED | OUTPATIENT
Start: 2017-01-01 | End: 2017-01-01

## 2017-01-01 RX ORDER — VANCOMYCIN HYDROCHLORIDE 1 G/200ML
INJECTION, SOLUTION INTRAVENOUS CONTINUOUS PRN
Status: DISCONTINUED | OUTPATIENT
Start: 2017-01-01 | End: 2017-01-01 | Stop reason: HOSPADM

## 2017-01-01 RX ORDER — FAMOTIDINE 20 MG/1
20 TABLET, FILM COATED ORAL DAILY
Status: DISCONTINUED | OUTPATIENT
Start: 2017-01-01 | End: 2017-01-01 | Stop reason: HOSPADM

## 2017-01-01 RX ORDER — ACETAMINOPHEN 500 MG
1000 TABLET ORAL ONCE
Status: COMPLETED | OUTPATIENT
Start: 2017-01-01 | End: 2017-01-01

## 2017-01-01 RX ORDER — TERAZOSIN 5 MG/1
5 CAPSULE ORAL NIGHTLY
Qty: 180 CAPSULE | Refills: 2 | Status: SHIPPED | OUTPATIENT
Start: 2017-01-01 | End: 2017-01-01 | Stop reason: SDUPTHER

## 2017-01-01 RX ORDER — WARFARIN SODIUM 1 MG/1
0.5 TABLET ORAL
Status: DISCONTINUED | OUTPATIENT
Start: 2017-01-01 | End: 2017-01-01

## 2017-01-01 RX ORDER — NYSTATIN AND TRIAMCINOLONE ACETONIDE 100000; 1 [USP'U]/G; MG/G
OINTMENT TOPICAL 2 TIMES DAILY
Qty: 60 G | Refills: 0 | Status: SHIPPED | OUTPATIENT
Start: 2017-01-01 | End: 2017-01-01 | Stop reason: SDUPTHER

## 2017-01-01 RX ORDER — ONDANSETRON 4 MG/1
4 TABLET, FILM COATED ORAL EVERY 6 HOURS PRN
Status: DISCONTINUED | OUTPATIENT
Start: 2017-01-01 | End: 2017-01-01 | Stop reason: HOSPADM

## 2017-01-01 RX ORDER — POTASSIUM CHLORIDE 1.5 G/1.77G
40 POWDER, FOR SOLUTION ORAL AS NEEDED
Status: DISCONTINUED | OUTPATIENT
Start: 2017-01-01 | End: 2017-01-01

## 2017-01-01 RX ORDER — CLOPIDOGREL BISULFATE 75 MG/1
75 TABLET ORAL DAILY
Status: DISCONTINUED | OUTPATIENT
Start: 2017-01-01 | End: 2017-01-01 | Stop reason: HOSPADM

## 2017-01-01 RX ORDER — PHYTONADIONE 2 MG/ML
5 INJECTION, EMULSION INTRAMUSCULAR; INTRAVENOUS; SUBCUTANEOUS ONCE
Status: COMPLETED | OUTPATIENT
Start: 2017-01-01 | End: 2017-01-01

## 2017-01-01 RX ORDER — FUROSEMIDE 40 MG/1
40 TABLET ORAL DAILY
Qty: 30 TABLET | Refills: 3 | Status: SHIPPED | OUTPATIENT
Start: 2017-01-01 | End: 2017-01-01 | Stop reason: SDUPTHER

## 2017-01-01 RX ORDER — OXYCODONE HYDROCHLORIDE AND ACETAMINOPHEN 5; 325 MG/1; MG/1
1 TABLET ORAL EVERY 6 HOURS PRN
Status: DISCONTINUED | OUTPATIENT
Start: 2017-01-01 | End: 2017-01-01 | Stop reason: HOSPADM

## 2017-01-01 RX ORDER — L.ACID,PARA/B.BIFIDUM/S.THERM 8B CELL
1 CAPSULE ORAL DAILY
Status: DISCONTINUED | OUTPATIENT
Start: 2017-01-01 | End: 2017-01-01 | Stop reason: HOSPADM

## 2017-01-01 RX ORDER — FAMOTIDINE 20 MG/1
20 TABLET, FILM COATED ORAL DAILY
Status: DISCONTINUED | OUTPATIENT
Start: 2017-01-01 | End: 2017-01-01 | Stop reason: SDUPTHER

## 2017-01-01 RX ORDER — DIGOXIN 125 MCG
125 TABLET ORAL
Status: DISCONTINUED | OUTPATIENT
Start: 2017-01-01 | End: 2017-01-01

## 2017-01-01 RX ORDER — WARFARIN SODIUM 2 MG/1
TABLET ORAL
Qty: 90 TABLET | Refills: 0 | Status: SHIPPED | OUTPATIENT
Start: 2017-01-01 | End: 2017-01-01 | Stop reason: HOSPADM

## 2017-01-01 RX ORDER — CYCLOBENZAPRINE HCL 5 MG
5 TABLET ORAL 3 TIMES DAILY PRN
Status: DISCONTINUED | OUTPATIENT
Start: 2017-01-01 | End: 2017-01-01 | Stop reason: HOSPADM

## 2017-01-01 RX ORDER — PANTOPRAZOLE SODIUM 40 MG/1
40 TABLET, DELAYED RELEASE ORAL DAILY
Qty: 90 TABLET | Refills: 3 | Status: SHIPPED | OUTPATIENT
Start: 2017-01-01 | End: 2017-01-01 | Stop reason: HOSPADM

## 2017-01-01 RX ORDER — HYDROCODONE BITARTRATE AND ACETAMINOPHEN 5; 325 MG/1; MG/1
1 TABLET ORAL EVERY 4 HOURS PRN
Status: DISPENSED | OUTPATIENT
Start: 2017-01-01 | End: 2017-01-01

## 2017-01-01 RX ORDER — PREDNISONE 20 MG/1
20 TABLET ORAL 2 TIMES DAILY WITH MEALS
Status: COMPLETED | OUTPATIENT
Start: 2017-01-01 | End: 2017-01-01

## 2017-01-01 RX ORDER — AMIODARONE HYDROCHLORIDE 200 MG/1
100 TABLET ORAL EVERY 12 HOURS SCHEDULED
Status: DISCONTINUED | OUTPATIENT
Start: 2017-01-01 | End: 2017-01-01 | Stop reason: HOSPADM

## 2017-01-01 RX ORDER — METOPROLOL SUCCINATE 25 MG/1
25 TABLET, EXTENDED RELEASE ORAL EVERY 12 HOURS SCHEDULED
Status: DISCONTINUED | OUTPATIENT
Start: 2017-01-01 | End: 2017-01-01

## 2017-01-01 RX ORDER — METOPROLOL TARTRATE 50 MG/1
50 TABLET, FILM COATED ORAL EVERY 8 HOURS SCHEDULED
Status: DISCONTINUED | OUTPATIENT
Start: 2017-01-01 | End: 2017-01-01

## 2017-01-01 RX ORDER — OLANZAPINE 7.5 MG/1
7.5 TABLET ORAL NIGHTLY
Status: DISCONTINUED | OUTPATIENT
Start: 2017-01-01 | End: 2017-01-01

## 2017-01-01 RX ORDER — DIGOXIN 0.25 MG/ML
500 INJECTION INTRAMUSCULAR; INTRAVENOUS ONCE
Status: COMPLETED | OUTPATIENT
Start: 2017-01-01 | End: 2017-01-01

## 2017-01-01 RX ORDER — FENTANYL CITRATE 50 UG/ML
INJECTION, SOLUTION INTRAMUSCULAR; INTRAVENOUS AS NEEDED
Status: DISCONTINUED | OUTPATIENT
Start: 2017-01-01 | End: 2017-01-01 | Stop reason: HOSPADM

## 2017-01-01 RX ORDER — CEFTRIAXONE SODIUM 1 G/50ML
1 INJECTION, SOLUTION INTRAVENOUS EVERY 24 HOURS
Status: DISCONTINUED | OUTPATIENT
Start: 2017-01-01 | End: 2017-01-01

## 2017-01-01 RX ORDER — FINASTERIDE 5 MG/1
5 TABLET, FILM COATED ORAL DAILY
Qty: 90 TABLET | Refills: 1 | Status: SHIPPED | OUTPATIENT
Start: 2017-01-01 | End: 2017-01-01

## 2017-01-01 RX ORDER — CLOPIDOGREL BISULFATE 75 MG/1
75 TABLET ORAL DAILY
Status: DISCONTINUED | OUTPATIENT
Start: 2017-01-01 | End: 2017-01-01

## 2017-01-01 RX ORDER — ATORVASTATIN CALCIUM 20 MG/1
20 TABLET, FILM COATED ORAL NIGHTLY
Status: DISCONTINUED | OUTPATIENT
Start: 2017-01-01 | End: 2017-01-01

## 2017-01-01 RX ORDER — NALOXONE HCL 0.4 MG/ML
0.4 VIAL (ML) INJECTION
Status: DISCONTINUED | OUTPATIENT
Start: 2017-01-01 | End: 2017-01-01 | Stop reason: HOSPADM

## 2017-01-01 RX ORDER — HEPARIN SODIUM 1000 [USP'U]/ML
INJECTION, SOLUTION INTRAVENOUS; SUBCUTANEOUS AS NEEDED
Status: DISCONTINUED | OUTPATIENT
Start: 2017-01-01 | End: 2017-01-01 | Stop reason: HOSPADM

## 2017-01-01 RX ORDER — CLOPIDOGREL BISULFATE 75 MG/1
75 TABLET ORAL DAILY
Qty: 90 TABLET | Refills: 3 | Status: SHIPPED | OUTPATIENT
Start: 2017-01-01 | End: 2017-01-01 | Stop reason: HOSPADM

## 2017-01-01 RX ORDER — LORAZEPAM 2 MG/ML
0.5 INJECTION INTRAMUSCULAR EVERY 4 HOURS PRN
Status: DISCONTINUED | OUTPATIENT
Start: 2017-01-01 | End: 2017-01-01

## 2017-01-01 RX ORDER — PANTOPRAZOLE SODIUM 40 MG/1
40 TABLET, DELAYED RELEASE ORAL
Status: COMPLETED | OUTPATIENT
Start: 2017-01-01 | End: 2017-01-01

## 2017-01-01 RX ORDER — BISACODYL 10 MG
10 SUPPOSITORY, RECTAL RECTAL DAILY PRN
Start: 2017-01-01 | End: 2017-01-01

## 2017-01-01 RX ORDER — TAMSULOSIN HYDROCHLORIDE 0.4 MG/1
0.4 CAPSULE ORAL DAILY
Status: DISCONTINUED | OUTPATIENT
Start: 2017-01-01 | End: 2017-01-01 | Stop reason: HOSPADM

## 2017-01-01 RX ORDER — NYSTATIN AND TRIAMCINOLONE ACETONIDE 100000; 1 [USP'U]/G; MG/G
OINTMENT TOPICAL 2 TIMES DAILY
Status: DISCONTINUED | OUTPATIENT
Start: 2017-01-01 | End: 2017-01-01 | Stop reason: HOSPADM

## 2017-01-01 RX ORDER — OXYCODONE HYDROCHLORIDE AND ACETAMINOPHEN 5; 325 MG/1; MG/1
1 TABLET ORAL EVERY 6 HOURS PRN
COMMUNITY
End: 2017-01-01 | Stop reason: HOSPADM

## 2017-01-01 RX ORDER — HYDROMORPHONE HYDROCHLORIDE 1 MG/ML
0.5 INJECTION, SOLUTION INTRAMUSCULAR; INTRAVENOUS; SUBCUTANEOUS
Status: DISCONTINUED | OUTPATIENT
Start: 2017-01-01 | End: 2017-01-01 | Stop reason: HOSPADM

## 2017-01-01 RX ORDER — CARVEDILOL 25 MG/1
25 TABLET ORAL 2 TIMES DAILY WITH MEALS
COMMUNITY
End: 2017-01-01

## 2017-01-01 RX ORDER — LOPERAMIDE HYDROCHLORIDE 2 MG/1
2 CAPSULE ORAL 4 TIMES DAILY PRN
COMMUNITY
End: 2017-01-01

## 2017-01-01 RX ORDER — WARFARIN SODIUM 2 MG/1
2 TABLET ORAL
Status: COMPLETED | OUTPATIENT
Start: 2017-01-01 | End: 2017-01-01

## 2017-01-01 RX ORDER — TERAZOSIN 5 MG/1
5 CAPSULE ORAL 2 TIMES DAILY
Qty: 180 CAPSULE | Refills: 2 | Status: ON HOLD | OUTPATIENT
Start: 2017-01-01 | End: 2017-01-01

## 2017-01-01 RX ORDER — MIDAZOLAM HYDROCHLORIDE 1 MG/ML
INJECTION INTRAMUSCULAR; INTRAVENOUS AS NEEDED
Status: DISCONTINUED | OUTPATIENT
Start: 2017-01-01 | End: 2017-01-01 | Stop reason: HOSPADM

## 2017-01-01 RX ORDER — METOPROLOL TARTRATE 5 MG/5ML
INJECTION INTRAVENOUS AS NEEDED
Status: DISCONTINUED | OUTPATIENT
Start: 2017-01-01 | End: 2017-01-01 | Stop reason: HOSPADM

## 2017-01-01 RX ORDER — KETOTIFEN FUMARATE 0.35 MG/ML
1 SOLUTION/ DROPS OPHTHALMIC 2 TIMES DAILY
Status: DISCONTINUED | OUTPATIENT
Start: 2017-01-01 | End: 2017-01-01 | Stop reason: HOSPADM

## 2017-01-01 RX ORDER — WARFARIN SODIUM 1 MG/1
1 TABLET ORAL EVERY OTHER DAY
COMMUNITY
End: 2017-01-01 | Stop reason: HOSPADM

## 2017-01-01 RX ORDER — ATORVASTATIN CALCIUM 40 MG/1
40 TABLET, FILM COATED ORAL NIGHTLY
Qty: 30 TABLET | Refills: 6 | Status: SHIPPED | OUTPATIENT
Start: 2017-01-01

## 2017-01-01 RX ORDER — CLOPIDOGREL BISULFATE 75 MG/1
75 TABLET ORAL ONCE
Status: COMPLETED | OUTPATIENT
Start: 2017-01-01 | End: 2017-01-01

## 2017-01-01 RX ORDER — PANTOPRAZOLE SODIUM 40 MG/1
40 TABLET, DELAYED RELEASE ORAL
Status: DISCONTINUED | OUTPATIENT
Start: 2017-01-01 | End: 2017-01-01 | Stop reason: HOSPADM

## 2017-01-01 RX ORDER — SODIUM CHLORIDE, SODIUM LACTATE, POTASSIUM CHLORIDE, CALCIUM CHLORIDE 600; 310; 30; 20 MG/100ML; MG/100ML; MG/100ML; MG/100ML
1000 INJECTION, SOLUTION INTRAVENOUS CONTINUOUS PRN
Status: DISCONTINUED | OUTPATIENT
Start: 2017-01-01 | End: 2017-01-01 | Stop reason: HOSPADM

## 2017-01-01 RX ORDER — FUROSEMIDE 40 MG/1
TABLET ORAL
Qty: 90 TABLET | Refills: 2 | Status: CANCELLED | OUTPATIENT
Start: 2017-01-01

## 2017-01-01 RX ORDER — LIDOCAINE HYDROCHLORIDE 20 MG/ML
INJECTION, SOLUTION INFILTRATION; PERINEURAL AS NEEDED
Status: DISCONTINUED | OUTPATIENT
Start: 2017-01-01 | End: 2017-01-01 | Stop reason: HOSPADM

## 2017-01-01 RX ORDER — PANTOPRAZOLE SODIUM 40 MG/1
40 TABLET, DELAYED RELEASE ORAL DAILY
Status: DISCONTINUED | OUTPATIENT
Start: 2017-01-01 | End: 2017-01-01 | Stop reason: HOSPADM

## 2017-01-01 RX ORDER — HYDROCODONE BITARTRATE AND ACETAMINOPHEN 10; 325 MG/1; MG/1
1 TABLET ORAL EVERY 4 HOURS PRN
Status: DISCONTINUED | OUTPATIENT
Start: 2017-01-01 | End: 2017-01-01 | Stop reason: HOSPADM

## 2017-01-01 RX ORDER — LACTULOSE 10 G/15ML
10 SOLUTION ORAL ONCE
Status: COMPLETED | OUTPATIENT
Start: 2017-01-01 | End: 2017-01-01

## 2017-01-01 RX ORDER — ISOSORBIDE MONONITRATE 30 MG/1
15 TABLET, EXTENDED RELEASE ORAL DAILY
Status: DISCONTINUED | OUTPATIENT
Start: 2017-01-01 | End: 2017-01-01 | Stop reason: HOSPADM

## 2017-01-01 RX ORDER — PANTOPRAZOLE SODIUM 40 MG/1
40 TABLET, DELAYED RELEASE ORAL DAILY
Status: DISCONTINUED | OUTPATIENT
Start: 2017-01-01 | End: 2017-01-01 | Stop reason: SDUPTHER

## 2017-01-01 RX ORDER — FINASTERIDE 5 MG/1
5 TABLET, FILM COATED ORAL DAILY
Status: DISCONTINUED | OUTPATIENT
Start: 2017-01-01 | End: 2017-01-01

## 2017-01-01 RX ORDER — PANTOPRAZOLE SODIUM 20 MG/1
20 TABLET, DELAYED RELEASE ORAL
Status: DISCONTINUED | OUTPATIENT
Start: 2017-01-01 | End: 2017-01-01 | Stop reason: CLARIF

## 2017-01-01 RX ORDER — TEMAZEPAM 15 MG/1
15 CAPSULE ORAL NIGHTLY PRN
Status: DISCONTINUED | OUTPATIENT
Start: 2017-01-01 | End: 2017-01-01 | Stop reason: HOSPADM

## 2017-01-01 RX ORDER — DOXAZOSIN 8 MG/1
8 TABLET ORAL NIGHTLY
COMMUNITY
End: 2017-01-01

## 2017-01-01 RX ORDER — POLYETHYLENE GLYCOL 3350 17 G/17G
17 POWDER, FOR SOLUTION ORAL DAILY
Status: DISCONTINUED | OUTPATIENT
Start: 2017-01-01 | End: 2017-01-01

## 2017-01-01 RX ORDER — WARFARIN SODIUM 3 MG/1
TABLET ORAL
Qty: 90 TABLET | Refills: 0 | Status: SHIPPED | OUTPATIENT
Start: 2017-01-01 | End: 2017-01-01 | Stop reason: SDUPTHER

## 2017-01-01 RX ORDER — WARFARIN SODIUM 3 MG/1
3 TABLET ORAL
Status: DISCONTINUED | OUTPATIENT
Start: 2017-01-01 | End: 2017-01-01 | Stop reason: HOSPADM

## 2017-01-01 RX ORDER — ACETAMINOPHEN 650 MG/1
SUPPOSITORY RECTAL
Status: COMPLETED
Start: 2017-01-01 | End: 2017-01-01

## 2017-01-01 RX ORDER — LIDOCAINE HYDROCHLORIDE AND EPINEPHRINE 10; 10 MG/ML; UG/ML
INJECTION, SOLUTION INFILTRATION; PERINEURAL AS NEEDED
Status: DISCONTINUED | OUTPATIENT
Start: 2017-01-01 | End: 2017-01-01 | Stop reason: HOSPADM

## 2017-01-01 RX ORDER — SODIUM BICARBONATE 650 MG/1
650 TABLET ORAL 3 TIMES DAILY
Status: COMPLETED | OUTPATIENT
Start: 2017-01-01 | End: 2017-01-01

## 2017-01-01 RX ORDER — OXYCODONE HYDROCHLORIDE AND ACETAMINOPHEN 5; 325 MG/1; MG/1
1 TABLET ORAL EVERY 6 HOURS PRN
Qty: 12 TABLET | Refills: 0 | Status: SHIPPED | OUTPATIENT
Start: 2017-01-01 | End: 2017-01-01

## 2017-01-01 RX ORDER — PANTOPRAZOLE SODIUM 40 MG/1
40 TABLET, DELAYED RELEASE ORAL DAILY
Status: DISCONTINUED | OUTPATIENT
Start: 2017-01-01 | End: 2017-01-01

## 2017-01-01 RX ORDER — TERAZOSIN 1 MG/1
1 CAPSULE ORAL NIGHTLY
Status: DISCONTINUED | OUTPATIENT
Start: 2017-01-01 | End: 2017-01-01 | Stop reason: HOSPADM

## 2017-01-01 RX ORDER — SODIUM CHLORIDE 9 MG/ML
50 INJECTION, SOLUTION INTRAVENOUS CONTINUOUS
Status: DISCONTINUED | OUTPATIENT
Start: 2017-01-01 | End: 2017-01-01

## 2017-01-01 RX ORDER — PHYTONADIONE 1 MG/.5ML
INJECTION, EMULSION INTRAMUSCULAR; INTRAVENOUS; SUBCUTANEOUS ONCE
COMMUNITY
End: 2017-01-01

## 2017-01-01 RX ORDER — RANITIDINE 150 MG/1
150 TABLET ORAL DAILY
COMMUNITY
End: 2017-01-01 | Stop reason: HOSPADM

## 2017-01-01 RX ORDER — SULFAMETHOXAZOLE AND TRIMETHOPRIM 800; 160 MG/1; MG/1
1 TABLET ORAL 2 TIMES DAILY
Qty: 10 TABLET | Refills: 0 | Status: SHIPPED | OUTPATIENT
Start: 2017-01-01 | End: 2017-01-01 | Stop reason: SDUPTHER

## 2017-01-01 RX ORDER — SODIUM CHLORIDE 9 MG/ML
125 INJECTION, SOLUTION INTRAVENOUS CONTINUOUS
Status: ACTIVE | OUTPATIENT
Start: 2017-01-01 | End: 2017-01-01

## 2017-01-01 RX ORDER — POTASSIUM CHLORIDE 750 MG/1
40 CAPSULE, EXTENDED RELEASE ORAL AS NEEDED
Status: DISCONTINUED | OUTPATIENT
Start: 2017-01-01 | End: 2017-01-01

## 2017-01-01 RX ORDER — AMIODARONE HYDROCHLORIDE 200 MG/1
200 TABLET ORAL EVERY 12 HOURS SCHEDULED
Status: DISCONTINUED | OUTPATIENT
Start: 2017-01-01 | End: 2017-01-01

## 2017-01-01 RX ORDER — HYDROCODONE BITARTRATE AND ACETAMINOPHEN 5; 325 MG/1; MG/1
1 TABLET ORAL EVERY 4 HOURS PRN
Status: DISCONTINUED | OUTPATIENT
Start: 2017-01-01 | End: 2017-01-01 | Stop reason: HOSPADM

## 2017-01-01 RX ORDER — ASPIRIN 300 MG/1
300 SUPPOSITORY RECTAL ONCE
Status: COMPLETED | OUTPATIENT
Start: 2017-01-01 | End: 2017-01-01

## 2017-01-01 RX ORDER — OLANZAPINE 5 MG/1
5 TABLET ORAL ONCE
Status: DISCONTINUED | OUTPATIENT
Start: 2017-01-01 | End: 2017-01-01

## 2017-01-01 RX ORDER — TAMSULOSIN HYDROCHLORIDE 0.4 MG/1
0.8 CAPSULE ORAL DAILY
Status: DISCONTINUED | OUTPATIENT
Start: 2017-01-01 | End: 2017-01-01

## 2017-01-01 RX ORDER — AMIODARONE HYDROCHLORIDE 200 MG/1
200 TABLET ORAL ONCE
Status: COMPLETED | OUTPATIENT
Start: 2017-01-01 | End: 2017-01-01

## 2017-01-01 RX ORDER — CLOPIDOGREL BISULFATE 75 MG/1
TABLET ORAL AS NEEDED
Status: DISCONTINUED | OUTPATIENT
Start: 2017-01-01 | End: 2017-01-01 | Stop reason: HOSPADM

## 2017-01-01 RX ORDER — LORAZEPAM 2 MG/ML
0.5 INJECTION INTRAMUSCULAR ONCE
Status: COMPLETED | OUTPATIENT
Start: 2017-01-01 | End: 2017-01-01

## 2017-01-01 RX ORDER — TERAZOSIN 5 MG/1
5 CAPSULE ORAL NIGHTLY
Qty: 180 CAPSULE | Refills: 2 | Status: SHIPPED | OUTPATIENT
Start: 2017-01-01 | End: 2017-01-01

## 2017-01-01 RX ORDER — SENNA AND DOCUSATE SODIUM 50; 8.6 MG/1; MG/1
2 TABLET, FILM COATED ORAL NIGHTLY
Status: DISCONTINUED | OUTPATIENT
Start: 2017-01-01 | End: 2017-01-01 | Stop reason: HOSPADM

## 2017-01-01 RX ORDER — POTASSIUM CHLORIDE 750 MG/1
20 CAPSULE, EXTENDED RELEASE ORAL 2 TIMES DAILY WITH MEALS
Status: DISCONTINUED | OUTPATIENT
Start: 2017-01-01 | End: 2017-01-01 | Stop reason: HOSPADM

## 2017-01-01 RX ORDER — TAMSULOSIN HYDROCHLORIDE 0.4 MG/1
0.4 CAPSULE ORAL 2 TIMES DAILY
Qty: 30 CAPSULE
Start: 2017-01-01

## 2017-01-01 RX ORDER — POTASSIUM CHLORIDE 1.5 G/1.77G
20 POWDER, FOR SOLUTION ORAL DAILY
Status: DISCONTINUED | OUTPATIENT
Start: 2017-01-01 | End: 2017-01-01

## 2017-01-01 RX ORDER — WARFARIN SODIUM 1 MG/1
TABLET ORAL
Start: 2017-01-01

## 2017-01-01 RX ORDER — SULFAMETHOXAZOLE AND TRIMETHOPRIM 800; 160 MG/1; MG/1
1 TABLET ORAL 2 TIMES DAILY
COMMUNITY
End: 2017-01-01

## 2017-01-01 RX ORDER — FUROSEMIDE 40 MG/1
40 TABLET ORAL DAILY
Status: DISCONTINUED | OUTPATIENT
Start: 2017-01-01 | End: 2017-01-01 | Stop reason: HOSPADM

## 2017-01-01 RX ORDER — WARFARIN SODIUM 3 MG/1
TABLET ORAL
Qty: 90 TABLET | Refills: 0 | Status: SHIPPED | OUTPATIENT
Start: 2017-01-01 | End: 2017-01-01 | Stop reason: HOSPADM

## 2017-01-01 RX ORDER — FUROSEMIDE 40 MG/1
40 TABLET ORAL DAILY
Qty: 90 TABLET | Refills: 2 | Status: ON HOLD | OUTPATIENT
Start: 2017-01-01 | End: 2017-01-01

## 2017-01-01 RX ORDER — TRAMADOL HYDROCHLORIDE 50 MG/1
50 TABLET ORAL EVERY 4 HOURS PRN
COMMUNITY
End: 2017-01-01

## 2017-01-01 RX ORDER — CLOBETASOL PROPIONATE 0.5 MG/G
CREAM TOPICAL 2 TIMES DAILY
Qty: 60 G | Refills: 0 | Status: SHIPPED | OUTPATIENT
Start: 2017-01-01 | End: 2017-01-01 | Stop reason: SDUPTHER

## 2017-01-01 RX ORDER — FLUCONAZOLE 2 MG/ML
200 INJECTION, SOLUTION INTRAVENOUS DAILY
Status: DISCONTINUED | OUTPATIENT
Start: 2017-01-01 | End: 2017-01-01 | Stop reason: CLARIF

## 2017-01-01 RX ORDER — ISOSORBIDE MONONITRATE 30 MG/1
15 TABLET, EXTENDED RELEASE ORAL DAILY
Qty: 45 TABLET | Refills: 3 | Status: SHIPPED | OUTPATIENT
Start: 2017-01-01

## 2017-01-01 RX ORDER — METOPROLOL SUCCINATE 25 MG/1
25 TABLET, EXTENDED RELEASE ORAL EVERY 8 HOURS
Status: DISCONTINUED | OUTPATIENT
Start: 2017-01-01 | End: 2017-01-01

## 2017-01-01 RX ORDER — SODIUM CHLORIDE 9 MG/ML
125 INJECTION, SOLUTION INTRAVENOUS CONTINUOUS
Status: DISCONTINUED | OUTPATIENT
Start: 2017-01-01 | End: 2017-01-01 | Stop reason: HOSPADM

## 2017-01-01 RX ORDER — TERAZOSIN 5 MG/1
5 CAPSULE ORAL 2 TIMES DAILY
Status: DISCONTINUED | OUTPATIENT
Start: 2017-01-01 | End: 2017-01-01 | Stop reason: HOSPADM

## 2017-01-01 RX ORDER — METHYLPREDNISOLONE 4 MG/1
TABLET ORAL
Qty: 1 TABLET | Refills: 2 | Status: SHIPPED | OUTPATIENT
Start: 2017-01-01 | End: 2017-01-01 | Stop reason: SDUPTHER

## 2017-01-01 RX ORDER — ASPIRIN 81 MG/1
81 TABLET ORAL DAILY
Status: DISCONTINUED | OUTPATIENT
Start: 2017-01-01 | End: 2017-01-01 | Stop reason: HOSPADM

## 2017-01-01 RX ORDER — AMIODARONE HYDROCHLORIDE 200 MG/1
300 TABLET ORAL EVERY 12 HOURS SCHEDULED
Status: DISCONTINUED | OUTPATIENT
Start: 2017-01-01 | End: 2017-01-01

## 2017-01-01 RX ORDER — CARVEDILOL 6.25 MG/1
6.25 TABLET ORAL 2 TIMES DAILY
Status: DISCONTINUED | OUTPATIENT
Start: 2017-01-01 | End: 2017-01-01

## 2017-01-01 RX ORDER — LORAZEPAM 2 MG/ML
0.25 INJECTION INTRAMUSCULAR EVERY 8 HOURS PRN
Status: DISCONTINUED | OUTPATIENT
Start: 2017-01-01 | End: 2017-01-01

## 2017-01-01 RX ORDER — WARFARIN SODIUM 3 MG/1
TABLET ORAL
Qty: 90 TABLET | Refills: 0 | Status: CANCELLED | OUTPATIENT
Start: 2017-01-01

## 2017-01-01 RX ORDER — WARFARIN SODIUM 1 MG/1
1 TABLET ORAL
COMMUNITY
End: 2017-01-01 | Stop reason: HOSPADM

## 2017-01-01 RX ORDER — CHOLECALCIFEROL (VITAMIN D3) 125 MCG
5 CAPSULE ORAL NIGHTLY
Status: DISCONTINUED | OUTPATIENT
Start: 2017-01-01 | End: 2017-01-01 | Stop reason: HOSPADM

## 2017-01-01 RX ORDER — LACTULOSE 10 G/15ML
10 SOLUTION ORAL
Status: DISCONTINUED | OUTPATIENT
Start: 2017-01-01 | End: 2017-01-01 | Stop reason: HOSPADM

## 2017-01-01 RX ORDER — ASPIRIN 325 MG
325 TABLET ORAL DAILY
Status: DISCONTINUED | OUTPATIENT
Start: 2017-01-01 | End: 2017-01-01

## 2017-01-01 RX ORDER — TRIAMCINOLONE ACETONIDE 5 MG/G
CREAM TOPICAL
Qty: 15 G | Refills: 0 | Status: SHIPPED | OUTPATIENT
Start: 2017-01-01 | End: 2017-01-01

## 2017-01-01 RX ORDER — SODIUM CHLORIDE 0.9 % (FLUSH) 0.9 %
1-10 SYRINGE (ML) INJECTION AS NEEDED
Status: DISCONTINUED | OUTPATIENT
Start: 2017-01-01 | End: 2017-01-01

## 2017-01-01 RX ORDER — NYSTATIN AND TRIAMCINOLONE ACETONIDE 100000; 1 [USP'U]/G; MG/G
OINTMENT TOPICAL 2 TIMES DAILY
Qty: 60 G | Refills: 0 | Status: SHIPPED | OUTPATIENT
Start: 2017-01-01 | End: 2017-01-01

## 2017-01-01 RX ORDER — FUROSEMIDE 20 MG/1
20 TABLET ORAL DAILY
Status: DISCONTINUED | OUTPATIENT
Start: 2017-01-01 | End: 2017-01-01

## 2017-01-01 RX ORDER — WARFARIN SODIUM 4 MG/1
4 TABLET ORAL
Status: DISCONTINUED | OUTPATIENT
Start: 2017-01-01 | End: 2017-01-01

## 2017-01-01 RX ORDER — TERAZOSIN 5 MG/1
5 CAPSULE ORAL NIGHTLY
Qty: 90 CAPSULE | Refills: 1 | Status: SHIPPED | OUTPATIENT
Start: 2017-01-01 | End: 2017-01-01

## 2017-01-01 RX ORDER — NITROGLYCERIN 0.4 MG/1
TABLET SUBLINGUAL
Qty: 35 TABLET | Refills: 0 | Status: SHIPPED | OUTPATIENT
Start: 2017-01-01 | End: 2017-01-01 | Stop reason: HOSPADM

## 2017-01-01 RX ORDER — AMIODARONE HYDROCHLORIDE 200 MG/1
200 TABLET ORAL EVERY 12 HOURS SCHEDULED
Start: 2017-01-01 | End: 2017-01-01 | Stop reason: HOSPADM

## 2017-01-01 RX ORDER — POTASSIUM CHLORIDE 1.5 G/1.77G
20 POWDER, FOR SOLUTION ORAL DAILY
Status: DISCONTINUED | OUTPATIENT
Start: 2017-01-01 | End: 2017-01-01 | Stop reason: CLARIF

## 2017-01-01 RX ORDER — FUROSEMIDE 10 MG/ML
80 INJECTION INTRAMUSCULAR; INTRAVENOUS EVERY 12 HOURS
Status: DISCONTINUED | OUTPATIENT
Start: 2017-01-01 | End: 2017-01-01

## 2017-01-01 RX ORDER — WARFARIN SODIUM 1 MG/1
1 TABLET ORAL
Status: DISCONTINUED | OUTPATIENT
Start: 2017-01-01 | End: 2017-01-01

## 2017-01-01 RX ORDER — FUROSEMIDE 40 MG/1
80 TABLET ORAL DAILY
Qty: 90 TABLET | Refills: 2 | Status: ON HOLD | OUTPATIENT
Start: 2017-01-01 | End: 2017-01-01

## 2017-01-01 RX ORDER — ASPIRIN 300 MG/1
300 SUPPOSITORY RECTAL DAILY
Status: DISCONTINUED | OUTPATIENT
Start: 2017-01-01 | End: 2017-01-01

## 2017-01-01 RX ORDER — FINASTERIDE 5 MG/1
5 TABLET, FILM COATED ORAL DAILY
COMMUNITY

## 2017-01-01 RX ORDER — LORAZEPAM 2 MG/ML
1 INJECTION INTRAMUSCULAR EVERY 4 HOURS PRN
Status: DISCONTINUED | OUTPATIENT
Start: 2017-01-01 | End: 2017-01-01

## 2017-01-01 RX ORDER — LACTULOSE 10 G/15ML
10 SOLUTION ORAL DAILY PRN
Status: DISCONTINUED | OUTPATIENT
Start: 2017-01-01 | End: 2017-01-01 | Stop reason: HOSPADM

## 2017-01-01 RX ADMIN — TAMSULOSIN HYDROCHLORIDE 0.4 MG: 0.4 CAPSULE ORAL at 08:05

## 2017-01-01 RX ADMIN — PANTOPRAZOLE SODIUM 40 MG: 40 TABLET, DELAYED RELEASE ORAL at 09:21

## 2017-01-01 RX ADMIN — ACETAMINOPHEN 1000 MG: 500 TABLET ORAL at 23:57

## 2017-01-01 RX ADMIN — TAMSULOSIN HYDROCHLORIDE 0.4 MG: 0.4 CAPSULE ORAL at 17:35

## 2017-01-01 RX ADMIN — WARFARIN SODIUM 1 MG: 1 TABLET ORAL at 17:28

## 2017-01-01 RX ADMIN — VANCOMYCIN HYDROCHLORIDE 1250 MG: 10 INJECTION, POWDER, LYOPHILIZED, FOR SOLUTION INTRAVENOUS at 13:52

## 2017-01-01 RX ADMIN — AMIODARONE HYDROCHLORIDE 200 MG: 200 TABLET ORAL at 09:05

## 2017-01-01 RX ADMIN — CLOPIDOGREL 75 MG: 75 TABLET, FILM COATED ORAL at 08:41

## 2017-01-01 RX ADMIN — SODIUM BICARBONATE 650 MG: 650 TABLET ORAL at 08:56

## 2017-01-01 RX ADMIN — CLOPIDOGREL 75 MG: 75 TABLET, FILM COATED ORAL at 12:21

## 2017-01-01 RX ADMIN — TAZOBACTAM SODIUM AND PIPERACILLIN SODIUM 3.38 G: 375; 3 INJECTION, SOLUTION INTRAVENOUS at 17:51

## 2017-01-01 RX ADMIN — METOPROLOL TARTRATE 100 MG: 100 TABLET, FILM COATED ORAL at 09:59

## 2017-01-01 RX ADMIN — ISOSORBIDE MONONITRATE 15 MG: 30 TABLET ORAL at 09:02

## 2017-01-01 RX ADMIN — PROPOFOL 150 MG: 10 INJECTION, EMULSION INTRAVENOUS at 15:02

## 2017-01-01 RX ADMIN — POTASSIUM CHLORIDE 40 MEQ: 750 CAPSULE, EXTENDED RELEASE ORAL at 13:33

## 2017-01-01 RX ADMIN — CARVEDILOL 6.25 MG: 6.25 TABLET, FILM COATED ORAL at 17:36

## 2017-01-01 RX ADMIN — BUSPIRONE HYDROCHLORIDE 7.5 MG: 15 TABLET ORAL at 10:03

## 2017-01-01 RX ADMIN — BUSPIRONE HYDROCHLORIDE 7.5 MG: 15 TABLET ORAL at 09:20

## 2017-01-01 RX ADMIN — SODIUM CHLORIDE 75 ML/HR: 9 INJECTION, SOLUTION INTRAVENOUS at 04:08

## 2017-01-01 RX ADMIN — CEFTRIAXONE SODIUM 1 G: 1 INJECTION, SOLUTION INTRAVENOUS at 09:05

## 2017-01-01 RX ADMIN — ISOSORBIDE MONONITRATE 15 MG: 30 TABLET ORAL at 09:59

## 2017-01-01 RX ADMIN — GLYCERIN 2 DROP: .002; .002; .01 SOLUTION/ DROPS OPHTHALMIC at 16:51

## 2017-01-01 RX ADMIN — AMIODARONE HYDROCHLORIDE 300 MG: 200 TABLET ORAL at 21:35

## 2017-01-01 RX ADMIN — NYSTATIN AND TRIAMCINOLONE ACETONIDE: 100000; 1 OINTMENT TOPICAL at 08:23

## 2017-01-01 RX ADMIN — CLOPIDOGREL 75 MG: 75 TABLET, FILM COATED ORAL at 08:36

## 2017-01-01 RX ADMIN — ATORVASTATIN CALCIUM 40 MG: 20 TABLET, FILM COATED ORAL at 20:38

## 2017-01-01 RX ADMIN — EPINEPHRINE 1 MG: 0.1 INJECTION, SOLUTION ENDOTRACHEAL; INTRACARDIAC; INTRAVENOUS at 12:13

## 2017-01-01 RX ADMIN — KETOTIFEN FUMARATE 1 DROP: 0.35 SOLUTION/ DROPS OPHTHALMIC at 17:44

## 2017-01-01 RX ADMIN — ATORVASTATIN CALCIUM 40 MG: 20 TABLET, FILM COATED ORAL at 21:30

## 2017-01-01 RX ADMIN — NYSTATIN AND TRIAMCINOLONE ACETONIDE: 100000; 1 OINTMENT TOPICAL at 17:45

## 2017-01-01 RX ADMIN — AMIODARONE HYDROCHLORIDE 200 MG: 200 TABLET ORAL at 21:13

## 2017-01-01 RX ADMIN — PANTOPRAZOLE SODIUM 40 MG: 40 TABLET, DELAYED RELEASE ORAL at 06:25

## 2017-01-01 RX ADMIN — ONDANSETRON 4 MG: 2 INJECTION INTRAMUSCULAR; INTRAVENOUS at 11:57

## 2017-01-01 RX ADMIN — CARVEDILOL 6.25 MG: 6.25 TABLET, FILM COATED ORAL at 08:46

## 2017-01-01 RX ADMIN — ATORVASTATIN CALCIUM 80 MG: 80 TABLET, FILM COATED ORAL at 21:58

## 2017-01-01 RX ADMIN — CARVEDILOL 6.25 MG: 6.25 TABLET, FILM COATED ORAL at 18:01

## 2017-01-01 RX ADMIN — AMIODARONE HYDROCHLORIDE 100 MG: 200 TABLET ORAL at 09:17

## 2017-01-01 RX ADMIN — METOPROLOL TARTRATE 100 MG: 100 TABLET, FILM COATED ORAL at 20:39

## 2017-01-01 RX ADMIN — METOPROLOL TARTRATE 100 MG: 100 TABLET ORAL at 09:21

## 2017-01-01 RX ADMIN — SODIUM BICARBONATE 50 MEQ: 84 INJECTION, SOLUTION INTRAVENOUS at 12:10

## 2017-01-01 RX ADMIN — FAMOTIDINE 20 MG: 20 TABLET, FILM COATED ORAL at 08:26

## 2017-01-01 RX ADMIN — AMIODARONE HYDROCHLORIDE 100 MG: 200 TABLET ORAL at 20:38

## 2017-01-01 RX ADMIN — POLYETHYLENE GLYCOL 3350 17 G: 17 POWDER, FOR SOLUTION ORAL at 16:34

## 2017-01-01 RX ADMIN — WARFARIN SODIUM 3 MG: 3 TABLET ORAL at 17:51

## 2017-01-01 RX ADMIN — METOPROLOL TARTRATE 50 MG: 50 TABLET, FILM COATED ORAL at 21:30

## 2017-01-01 RX ADMIN — POTASSIUM CHLORIDE 20 MEQ: 1.5 POWDER, FOR SOLUTION ORAL at 09:27

## 2017-01-01 RX ADMIN — CEFTRIAXONE SODIUM 1 G: 1 INJECTION, SOLUTION INTRAVENOUS at 11:14

## 2017-01-01 RX ADMIN — ASPIRIN 81 MG: 81 TABLET ORAL at 09:24

## 2017-01-01 RX ADMIN — FAMOTIDINE 20 MG: 20 TABLET, FILM COATED ORAL at 08:53

## 2017-01-01 RX ADMIN — METOPROLOL TARTRATE 100 MG: 100 TABLET, FILM COATED ORAL at 08:16

## 2017-01-01 RX ADMIN — ATORVASTATIN CALCIUM 40 MG: 20 TABLET, FILM COATED ORAL at 20:33

## 2017-01-01 RX ADMIN — ISOSORBIDE MONONITRATE 15 MG: 30 TABLET ORAL at 08:42

## 2017-01-01 RX ADMIN — POTASSIUM PHOSPHATE, MONOBASIC AND POTASSIUM PHOSPHATE, DIBASIC 10 MMOL: 224; 236 INJECTION, SOLUTION, CONCENTRATE INTRAVENOUS at 14:25

## 2017-01-01 RX ADMIN — FLUCONAZOLE 200 MG: 200 TABLET ORAL at 08:16

## 2017-01-01 RX ADMIN — FAMOTIDINE 20 MG: 20 TABLET, FILM COATED ORAL at 09:59

## 2017-01-01 RX ADMIN — HYDROCODONE BITARTRATE AND ACETAMINOPHEN 1 TABLET: 5; 325 TABLET ORAL at 09:11

## 2017-01-01 RX ADMIN — METOPROLOL TARTRATE 100 MG: 100 TABLET, FILM COATED ORAL at 20:52

## 2017-01-01 RX ADMIN — METOPROLOL TARTRATE 100 MG: 100 TABLET, FILM COATED ORAL at 08:19

## 2017-01-01 RX ADMIN — ACETAMINOPHEN 650 MG: 325 TABLET ORAL at 23:32

## 2017-01-01 RX ADMIN — TAMSULOSIN HYDROCHLORIDE 0.4 MG: 0.4 CAPSULE ORAL at 08:44

## 2017-01-01 RX ADMIN — AMIODARONE HYDROCHLORIDE 100 MG: 200 TABLET ORAL at 08:36

## 2017-01-01 RX ADMIN — WATER 2 G: 1 INJECTION INTRAMUSCULAR; INTRAVENOUS; SUBCUTANEOUS at 14:17

## 2017-01-01 RX ADMIN — SODIUM CHLORIDE 75 ML/HR: 9 INJECTION, SOLUTION INTRAVENOUS at 05:48

## 2017-01-01 RX ADMIN — DIGOXIN 500 MCG: 0.25 INJECTION INTRAMUSCULAR; INTRAVENOUS at 17:51

## 2017-01-01 RX ADMIN — VANCOMYCIN HYDROCHLORIDE 750 MG: 750 INJECTION, POWDER, LYOPHILIZED, FOR SOLUTION INTRAVENOUS at 11:52

## 2017-01-01 RX ADMIN — Medication 250 MG: at 08:53

## 2017-01-01 RX ADMIN — BETAMETHASONE DIPROPIONATE: 0.5 CREAM, AUGMENTED TOPICAL at 21:52

## 2017-01-01 RX ADMIN — BUSPIRONE HYDROCHLORIDE 7.5 MG: 15 TABLET ORAL at 09:19

## 2017-01-01 RX ADMIN — KETOTIFEN FUMARATE 1 DROP: 0.35 SOLUTION/ DROPS OPHTHALMIC at 08:35

## 2017-01-01 RX ADMIN — FINASTERIDE 5 MG: 5 TABLET, FILM COATED ORAL at 08:35

## 2017-01-01 RX ADMIN — SODIUM CHLORIDE 50 ML/HR: 9 INJECTION, SOLUTION INTRAVENOUS at 19:42

## 2017-01-01 RX ADMIN — ATORVASTATIN CALCIUM 20 MG: 20 TABLET, FILM COATED ORAL at 20:42

## 2017-01-01 RX ADMIN — LORAZEPAM 0.25 MG: 2 INJECTION INTRAMUSCULAR; INTRAVENOUS at 20:49

## 2017-01-01 RX ADMIN — CARVEDILOL 6.25 MG: 6.25 TABLET, FILM COATED ORAL at 17:42

## 2017-01-01 RX ADMIN — SODIUM BICARBONATE 650 MG: 650 TABLET ORAL at 20:58

## 2017-01-01 RX ADMIN — PANTOPRAZOLE SODIUM 40 MG: 40 TABLET, DELAYED RELEASE ORAL at 08:53

## 2017-01-01 RX ADMIN — AMIODARONE HYDROCHLORIDE 200 MG: 200 TABLET ORAL at 20:59

## 2017-01-01 RX ADMIN — FLUCONAZOLE 200 MG: 2 INJECTION INTRAVENOUS at 09:35

## 2017-01-01 RX ADMIN — ISOSORBIDE MONONITRATE 15 MG: 30 TABLET ORAL at 08:56

## 2017-01-01 RX ADMIN — VANCOMYCIN HYDROCHLORIDE 1750 MG: 1 INJECTION, POWDER, LYOPHILIZED, FOR SOLUTION INTRAVENOUS at 01:40

## 2017-01-01 RX ADMIN — METOPROLOL TARTRATE 100 MG: 100 TABLET, FILM COATED ORAL at 08:44

## 2017-01-01 RX ADMIN — TAMSULOSIN HYDROCHLORIDE 0.4 MG: 0.4 CAPSULE ORAL at 08:38

## 2017-01-01 RX ADMIN — HYDROCODONE BITARTRATE AND ACETAMINOPHEN 1 TABLET: 5; 325 TABLET ORAL at 22:03

## 2017-01-01 RX ADMIN — DOCUSATE SODIUM -SENNOSIDES 2 TABLET: 50; 8.6 TABLET, COATED ORAL at 20:25

## 2017-01-01 RX ADMIN — ISOSORBIDE MONONITRATE 15 MG: 30 TABLET ORAL at 09:04

## 2017-01-01 RX ADMIN — TAMSULOSIN HYDROCHLORIDE 0.4 MG: 0.4 CAPSULE ORAL at 21:47

## 2017-01-01 RX ADMIN — SENNOSIDES AND DOCUSATE SODIUM 2 TABLET: 8.6; 5 TABLET ORAL at 20:15

## 2017-01-01 RX ADMIN — OXYCODONE HYDROCHLORIDE AND ACETAMINOPHEN 1 TABLET: 5; 325 TABLET ORAL at 08:19

## 2017-01-01 RX ADMIN — LACTULOSE 10 G: 10 SOLUTION ORAL at 13:36

## 2017-01-01 RX ADMIN — AMIODARONE HYDROCHLORIDE 100 MG: 200 TABLET ORAL at 21:14

## 2017-01-01 RX ADMIN — Medication 1 CAPSULE: at 09:04

## 2017-01-01 RX ADMIN — TEMAZEPAM 15 MG: 15 CAPSULE ORAL at 22:53

## 2017-01-01 RX ADMIN — Medication 1 CAPSULE: at 09:22

## 2017-01-01 RX ADMIN — ISOSORBIDE MONONITRATE 15 MG: 30 TABLET ORAL at 09:26

## 2017-01-01 RX ADMIN — CARVEDILOL 6.25 MG: 6.25 TABLET, FILM COATED ORAL at 23:38

## 2017-01-01 RX ADMIN — CEFTRIAXONE SODIUM 1 G: 1 INJECTION, SOLUTION INTRAVENOUS at 09:43

## 2017-01-01 RX ADMIN — PROPOFOL 140 MCG/KG/MIN: 10 INJECTION, EMULSION INTRAVENOUS at 15:02

## 2017-01-01 RX ADMIN — Medication 1 CAPSULE: at 08:42

## 2017-01-01 RX ADMIN — TAMSULOSIN HYDROCHLORIDE 0.4 MG: 0.4 CAPSULE ORAL at 08:41

## 2017-01-01 RX ADMIN — FLUCONAZOLE 200 MG: 200 TABLET ORAL at 08:46

## 2017-01-01 RX ADMIN — ASPIRIN 81 MG: 81 TABLET ORAL at 09:26

## 2017-01-01 RX ADMIN — METOPROLOL TARTRATE 100 MG: 100 TABLET, FILM COATED ORAL at 20:27

## 2017-01-01 RX ADMIN — ISOSORBIDE MONONITRATE 15 MG: 30 TABLET ORAL at 08:23

## 2017-01-01 RX ADMIN — WARFARIN SODIUM 0.5 MG: 1 TABLET ORAL at 17:31

## 2017-01-01 RX ADMIN — OXYCODONE HYDROCHLORIDE AND ACETAMINOPHEN 1 TABLET: 5; 325 TABLET ORAL at 11:18

## 2017-01-01 RX ADMIN — ACETAMINOPHEN 650 MG: 650 SUPPOSITORY RECTAL at 13:43

## 2017-01-01 RX ADMIN — CLOPIDOGREL 75 MG: 75 TABLET, FILM COATED ORAL at 09:19

## 2017-01-01 RX ADMIN — PANTOPRAZOLE SODIUM 40 MG: 40 TABLET, DELAYED RELEASE ORAL at 17:28

## 2017-01-01 RX ADMIN — ACETAMINOPHEN 650 MG: 325 TABLET ORAL at 02:54

## 2017-01-01 RX ADMIN — DIGOXIN 125 MCG: 0.12 TABLET ORAL at 12:12

## 2017-01-01 RX ADMIN — FINASTERIDE 5 MG: 5 TABLET, FILM COATED ORAL at 09:59

## 2017-01-01 RX ADMIN — METOPROLOL SUCCINATE 50 MG: 50 TABLET, FILM COATED, EXTENDED RELEASE ORAL at 10:35

## 2017-01-01 RX ADMIN — LORAZEPAM 0.5 MG: 2 INJECTION, SOLUTION INTRAMUSCULAR; INTRAVENOUS at 00:00

## 2017-01-01 RX ADMIN — SODIUM BICARBONATE 650 MG: 650 TABLET ORAL at 17:33

## 2017-01-01 RX ADMIN — AMIODARONE HYDROCHLORIDE 100 MG: 200 TABLET ORAL at 20:41

## 2017-01-01 RX ADMIN — ISOSORBIDE MONONITRATE 15 MG: 30 TABLET ORAL at 08:36

## 2017-01-01 RX ADMIN — AMLODIPINE BESYLATE 5 MG: 5 TABLET ORAL at 08:37

## 2017-01-01 RX ADMIN — METOPROLOL TARTRATE 100 MG: 100 TABLET, FILM COATED ORAL at 08:52

## 2017-01-01 RX ADMIN — WARFARIN SODIUM 0.5 MG: 1 TABLET ORAL at 17:42

## 2017-01-01 RX ADMIN — ISOSORBIDE MONONITRATE 15 MG: 30 TABLET ORAL at 09:25

## 2017-01-01 RX ADMIN — OXYCODONE HYDROCHLORIDE AND ACETAMINOPHEN 1 TABLET: 5; 325 TABLET ORAL at 08:26

## 2017-01-01 RX ADMIN — FLUCONAZOLE 200 MG: 2 INJECTION INTRAVENOUS at 09:03

## 2017-01-01 RX ADMIN — OXYCODONE HYDROCHLORIDE AND ACETAMINOPHEN 1 TABLET: 5; 325 TABLET ORAL at 01:02

## 2017-01-01 RX ADMIN — ENOXAPARIN SODIUM 90 MG: 100 INJECTION SUBCUTANEOUS at 21:00

## 2017-01-01 RX ADMIN — PANTOPRAZOLE SODIUM 40 MG: 40 TABLET, DELAYED RELEASE ORAL at 09:19

## 2017-01-01 RX ADMIN — FUROSEMIDE 80 MG: 80 TABLET ORAL at 08:50

## 2017-01-01 RX ADMIN — FUROSEMIDE 80 MG: 80 TABLET ORAL at 09:07

## 2017-01-01 RX ADMIN — AMIODARONE HYDROCHLORIDE 200 MG: 200 TABLET ORAL at 20:09

## 2017-01-01 RX ADMIN — FINASTERIDE 5 MG: 5 TABLET, FILM COATED ORAL at 09:04

## 2017-01-01 RX ADMIN — ISOSORBIDE MONONITRATE 15 MG: 30 TABLET ORAL at 08:22

## 2017-01-01 RX ADMIN — MORPHINE SULFATE 2 MG: 2 INJECTION, SOLUTION INTRAMUSCULAR; INTRAVENOUS at 11:58

## 2017-01-01 RX ADMIN — ASPIRIN 81 MG: 81 TABLET ORAL at 08:07

## 2017-01-01 RX ADMIN — AMIODARONE HYDROCHLORIDE 100 MG: 200 TABLET ORAL at 20:29

## 2017-01-01 RX ADMIN — METOPROLOL SUCCINATE 50 MG: 50 TABLET, FILM COATED, EXTENDED RELEASE ORAL at 08:32

## 2017-01-01 RX ADMIN — METOPROLOL TARTRATE 100 MG: 100 TABLET, FILM COATED ORAL at 21:13

## 2017-01-01 RX ADMIN — ATORVASTATIN CALCIUM 40 MG: 20 TABLET, FILM COATED ORAL at 20:15

## 2017-01-01 RX ADMIN — PHYTONADIONE 5 MG: 1 INJECTION, EMULSION INTRAMUSCULAR; INTRAVENOUS; SUBCUTANEOUS at 15:51

## 2017-01-01 RX ADMIN — METOPROLOL TARTRATE 100 MG: 100 TABLET, FILM COATED ORAL at 20:55

## 2017-01-01 RX ADMIN — FAMOTIDINE 20 MG: 20 TABLET, FILM COATED ORAL at 08:05

## 2017-01-01 RX ADMIN — TAZOBACTAM SODIUM AND PIPERACILLIN SODIUM 3.38 G: 375; 3 INJECTION, SOLUTION INTRAVENOUS at 05:46

## 2017-01-01 RX ADMIN — FUROSEMIDE 80 MG: 80 TABLET ORAL at 09:26

## 2017-01-01 RX ADMIN — FAMOTIDINE 20 MG: 20 TABLET, FILM COATED ORAL at 08:35

## 2017-01-01 RX ADMIN — BUSPIRONE HYDROCHLORIDE 7.5 MG: 15 TABLET ORAL at 17:43

## 2017-01-01 RX ADMIN — ATORVASTATIN CALCIUM 40 MG: 20 TABLET, FILM COATED ORAL at 20:39

## 2017-01-01 RX ADMIN — BUSPIRONE HYDROCHLORIDE 7.5 MG: 15 TABLET ORAL at 17:34

## 2017-01-01 RX ADMIN — WARFARIN SODIUM 1.5 MG: 3 TABLET ORAL at 17:34

## 2017-01-01 RX ADMIN — METOPROLOL TARTRATE 50 MG: 50 TABLET, FILM COATED ORAL at 16:47

## 2017-01-01 RX ADMIN — ONDANSETRON 4 MG: 2 INJECTION INTRAMUSCULAR; INTRAVENOUS at 21:48

## 2017-01-01 RX ADMIN — POTASSIUM CHLORIDE 20 MEQ: 750 CAPSULE, EXTENDED RELEASE ORAL at 08:53

## 2017-01-01 RX ADMIN — FUROSEMIDE 40 MG: 40 TABLET ORAL at 11:56

## 2017-01-01 RX ADMIN — ATORVASTATIN CALCIUM 40 MG: 20 TABLET, FILM COATED ORAL at 20:09

## 2017-01-01 RX ADMIN — DIGOXIN 125 MCG: 0.12 TABLET ORAL at 12:10

## 2017-01-01 RX ADMIN — WARFARIN SODIUM 4 MG: 4 TABLET ORAL at 17:07

## 2017-01-01 RX ADMIN — TAMSULOSIN HYDROCHLORIDE 0.4 MG: 0.4 CAPSULE ORAL at 09:19

## 2017-01-01 RX ADMIN — METOPROLOL TARTRATE 100 MG: 100 TABLET, FILM COATED ORAL at 08:41

## 2017-01-01 RX ADMIN — Medication 250 MG: at 08:42

## 2017-01-01 RX ADMIN — TERAZOSIN HYDROCHLORIDE 5 MG: 5 CAPSULE ORAL at 17:50

## 2017-01-01 RX ADMIN — Medication 250 MG: at 20:07

## 2017-01-01 RX ADMIN — CARVEDILOL 6.25 MG: 6.25 TABLET, FILM COATED ORAL at 08:07

## 2017-01-01 RX ADMIN — PREDNISONE 20 MG: 20 TABLET ORAL at 18:59

## 2017-01-01 RX ADMIN — SODIUM BICARBONATE 650 MG: 650 TABLET ORAL at 09:59

## 2017-01-01 RX ADMIN — DOCUSATE SODIUM -SENNOSIDES 2 TABLET: 50; 8.6 TABLET, COATED ORAL at 20:21

## 2017-01-01 RX ADMIN — BUSPIRONE HYDROCHLORIDE 7.5 MG: 15 TABLET ORAL at 08:43

## 2017-01-01 RX ADMIN — AMIODARONE HYDROCHLORIDE 200 MG: 200 TABLET ORAL at 10:04

## 2017-01-01 RX ADMIN — CEFTRIAXONE SODIUM 1 G: 1 INJECTION, SOLUTION INTRAVENOUS at 08:23

## 2017-01-01 RX ADMIN — ATORVASTATIN CALCIUM 40 MG: 20 TABLET, FILM COATED ORAL at 20:52

## 2017-01-01 RX ADMIN — TAMSULOSIN HYDROCHLORIDE 0.4 MG: 0.4 CAPSULE ORAL at 09:25

## 2017-01-01 RX ADMIN — DOCUSATE SODIUM -SENNOSIDES 2 TABLET: 50; 8.6 TABLET, COATED ORAL at 20:29

## 2017-01-01 RX ADMIN — LACTULOSE 10 G: 10 SOLUTION ORAL at 17:49

## 2017-01-01 RX ADMIN — GLYCERIN 2 DROP: .002; .002; .01 SOLUTION/ DROPS OPHTHALMIC at 15:20

## 2017-01-01 RX ADMIN — TAMSULOSIN HYDROCHLORIDE 0.4 MG: 0.4 CAPSULE ORAL at 17:43

## 2017-01-01 RX ADMIN — CLOPIDOGREL 75 MG: 75 TABLET, FILM COATED ORAL at 09:01

## 2017-01-01 RX ADMIN — OXYCODONE HYDROCHLORIDE AND ACETAMINOPHEN 2 TABLET: 5; 325 TABLET ORAL at 22:03

## 2017-01-01 RX ADMIN — Medication 1 CAPSULE: at 09:12

## 2017-01-01 RX ADMIN — ISOSORBIDE MONONITRATE 15 MG: 30 TABLET ORAL at 08:06

## 2017-01-01 RX ADMIN — FINASTERIDE 5 MG: 5 TABLET, FILM COATED ORAL at 09:19

## 2017-01-01 RX ADMIN — FAMOTIDINE 20 MG: 20 TABLET, FILM COATED ORAL at 09:01

## 2017-01-01 RX ADMIN — ATORVASTATIN CALCIUM 40 MG: 20 TABLET, FILM COATED ORAL at 23:04

## 2017-01-01 RX ADMIN — TAMSULOSIN HYDROCHLORIDE 0.4 MG: 0.4 CAPSULE ORAL at 08:42

## 2017-01-01 RX ADMIN — Medication 1 CAPSULE: at 08:31

## 2017-01-01 RX ADMIN — AMIODARONE HYDROCHLORIDE 200 MG: 200 TABLET ORAL at 08:42

## 2017-01-01 RX ADMIN — TAMSULOSIN HYDROCHLORIDE 0.4 MG: 0.4 CAPSULE ORAL at 10:03

## 2017-01-01 RX ADMIN — DIGOXIN 125 MCG: 0.12 TABLET ORAL at 12:30

## 2017-01-01 RX ADMIN — SODIUM CHLORIDE 75 ML/HR: 9 INJECTION, SOLUTION INTRAVENOUS at 18:30

## 2017-01-01 RX ADMIN — ATORVASTATIN CALCIUM 40 MG: 20 TABLET, FILM COATED ORAL at 20:17

## 2017-01-01 RX ADMIN — METOPROLOL TARTRATE 100 MG: 100 TABLET ORAL at 21:59

## 2017-01-01 RX ADMIN — Medication 1 CAPSULE: at 08:19

## 2017-01-01 RX ADMIN — Medication 1 CAPSULE: at 08:33

## 2017-01-01 RX ADMIN — BUSPIRONE HYDROCHLORIDE 7.5 MG: 15 TABLET ORAL at 08:38

## 2017-01-01 RX ADMIN — SODIUM CHLORIDE 500 ML: 9 INJECTION, SOLUTION INTRAVENOUS at 11:55

## 2017-01-01 RX ADMIN — ENOXAPARIN SODIUM 90 MG: 100 INJECTION SUBCUTANEOUS at 13:13

## 2017-01-01 RX ADMIN — DEXTROSE MONOHYDRATE 1 MG/MIN: 50 INJECTION, SOLUTION INTRAVENOUS at 12:27

## 2017-01-01 RX ADMIN — SODIUM CHLORIDE 1000 ML: 9 INJECTION, SOLUTION INTRAVENOUS at 14:04

## 2017-01-01 RX ADMIN — TAMSULOSIN HYDROCHLORIDE 0.4 MG: 0.4 CAPSULE ORAL at 17:49

## 2017-01-01 RX ADMIN — TAMSULOSIN HYDROCHLORIDE 0.4 MG: 0.4 CAPSULE ORAL at 17:42

## 2017-01-01 RX ADMIN — DIGOXIN 250 MCG: 0.25 INJECTION INTRAMUSCULAR; INTRAVENOUS at 10:45

## 2017-01-01 RX ADMIN — CARVEDILOL 6.25 MG: 6.25 TABLET, FILM COATED ORAL at 09:07

## 2017-01-01 RX ADMIN — OXYCODONE HYDROCHLORIDE AND ACETAMINOPHEN 1 TABLET: 5; 325 TABLET ORAL at 17:59

## 2017-01-01 RX ADMIN — ATORVASTATIN CALCIUM 40 MG: 20 TABLET, FILM COATED ORAL at 20:13

## 2017-01-01 RX ADMIN — ATORVASTATIN CALCIUM 40 MG: 20 TABLET, FILM COATED ORAL at 20:58

## 2017-01-01 RX ADMIN — FUROSEMIDE 80 MG: 80 TABLET ORAL at 08:43

## 2017-01-01 RX ADMIN — CEFTRIAXONE SODIUM 1 G: 1 INJECTION, SOLUTION INTRAVENOUS at 11:53

## 2017-01-01 RX ADMIN — DIGOXIN 125 MCG: 0.12 TABLET ORAL at 11:45

## 2017-01-01 RX ADMIN — WARFARIN SODIUM 1.5 MG: 3 TABLET ORAL at 21:47

## 2017-01-01 RX ADMIN — FAMOTIDINE 20 MG: 20 TABLET, FILM COATED ORAL at 08:19

## 2017-01-01 RX ADMIN — AMIODARONE HYDROCHLORIDE 200 MG: 200 TABLET ORAL at 21:49

## 2017-01-01 RX ADMIN — BUSPIRONE HYDROCHLORIDE 7.5 MG: 15 TABLET ORAL at 17:35

## 2017-01-01 RX ADMIN — ISOSORBIDE MONONITRATE 15 MG: 30 TABLET ORAL at 08:16

## 2017-01-01 RX ADMIN — HYDROCODONE BITARTRATE AND ACETAMINOPHEN 1 TABLET: 5; 325 TABLET ORAL at 08:55

## 2017-01-01 RX ADMIN — CLOPIDOGREL 75 MG: 75 TABLET, FILM COATED ORAL at 08:32

## 2017-01-01 RX ADMIN — CYCLOBENZAPRINE HYDROCHLORIDE 5 MG: 5 TABLET, FILM COATED ORAL at 16:18

## 2017-01-01 RX ADMIN — CLOPIDOGREL 75 MG: 75 TABLET, FILM COATED ORAL at 08:50

## 2017-01-01 RX ADMIN — PANTOPRAZOLE SODIUM 40 MG: 40 TABLET, DELAYED RELEASE ORAL at 06:31

## 2017-01-01 RX ADMIN — DOCUSATE SODIUM -SENNOSIDES 2 TABLET: 50; 8.6 TABLET, COATED ORAL at 20:37

## 2017-01-01 RX ADMIN — AMIODARONE HYDROCHLORIDE 200 MG: 200 TABLET ORAL at 20:12

## 2017-01-01 RX ADMIN — CLOPIDOGREL 75 MG: 75 TABLET, FILM COATED ORAL at 09:22

## 2017-01-01 RX ADMIN — CARVEDILOL 6.25 MG: 6.25 TABLET, FILM COATED ORAL at 22:20

## 2017-01-01 RX ADMIN — CYANOCOBALAMIN 1000 MCG: 1000 INJECTION, SOLUTION INTRAMUSCULAR at 11:03

## 2017-01-01 RX ADMIN — METOPROLOL TARTRATE 100 MG: 100 TABLET, FILM COATED ORAL at 20:30

## 2017-01-01 RX ADMIN — AMIODARONE HYDROCHLORIDE 100 MG: 200 TABLET ORAL at 08:37

## 2017-01-01 RX ADMIN — AZITHROMYCIN DIHYDRATE 500 MG: 500 INJECTION, POWDER, LYOPHILIZED, FOR SOLUTION INTRAVENOUS at 08:31

## 2017-01-01 RX ADMIN — SODIUM CHLORIDE 1000 ML: 9 INJECTION, SOLUTION INTRAVENOUS at 23:54

## 2017-01-01 RX ADMIN — ASPIRIN 300 MG: 300 SUPPOSITORY RECTAL at 18:16

## 2017-01-01 RX ADMIN — FAMOTIDINE 20 MG: 20 TABLET, FILM COATED ORAL at 09:12

## 2017-01-01 RX ADMIN — Medication 250 MG: at 09:20

## 2017-01-01 RX ADMIN — AMIODARONE HYDROCHLORIDE 100 MG: 200 TABLET ORAL at 02:29

## 2017-01-01 RX ADMIN — ISOSORBIDE MONONITRATE 15 MG: 30 TABLET ORAL at 09:06

## 2017-01-01 RX ADMIN — FUROSEMIDE 80 MG: 10 INJECTION, SOLUTION INTRAMUSCULAR; INTRAVENOUS at 12:21

## 2017-01-01 RX ADMIN — CLOPIDOGREL 75 MG: 75 TABLET, FILM COATED ORAL at 18:12

## 2017-01-01 RX ADMIN — DIGOXIN 125 MCG: 0.12 TABLET ORAL at 08:41

## 2017-01-01 RX ADMIN — PERFLUTREN 3 ML: 6.52 INJECTION, SUSPENSION INTRAVENOUS at 10:44

## 2017-01-01 RX ADMIN — METOPROLOL TARTRATE 100 MG: 100 TABLET, FILM COATED ORAL at 09:12

## 2017-01-01 RX ADMIN — TERAZOSIN HYDROCHLORIDE 5 MG: 5 CAPSULE ORAL at 22:21

## 2017-01-01 RX ADMIN — CEFTRIAXONE SODIUM 1 G: 1 INJECTION, SOLUTION INTRAVENOUS at 12:10

## 2017-01-01 RX ADMIN — TAMSULOSIN HYDROCHLORIDE 0.4 MG: 0.4 CAPSULE ORAL at 18:21

## 2017-01-01 RX ADMIN — OXYCODONE HYDROCHLORIDE AND ACETAMINOPHEN 1 TABLET: 5; 325 TABLET ORAL at 19:54

## 2017-01-01 RX ADMIN — ATORVASTATIN CALCIUM 40 MG: 20 TABLET, FILM COATED ORAL at 20:59

## 2017-01-01 RX ADMIN — DIGOXIN 125 MCG: 0.12 TABLET ORAL at 12:04

## 2017-01-01 RX ADMIN — ISOSORBIDE MONONITRATE 15 MG: 30 TABLET ORAL at 09:19

## 2017-01-01 RX ADMIN — BETAMETHASONE DIPROPIONATE: 0.5 CREAM, AUGMENTED TOPICAL at 22:27

## 2017-01-01 RX ADMIN — FAMOTIDINE 20 MG: 20 TABLET, FILM COATED ORAL at 09:24

## 2017-01-01 RX ADMIN — SODIUM CHLORIDE 125 ML/HR: 9 INJECTION, SOLUTION INTRAVENOUS at 09:09

## 2017-01-01 RX ADMIN — PREDNISONE 20 MG: 20 TABLET ORAL at 08:28

## 2017-01-01 RX ADMIN — HYDROCODONE BITARTRATE AND ACETAMINOPHEN 1 TABLET: 5; 325 TABLET ORAL at 13:52

## 2017-01-01 RX ADMIN — METOPROLOL TARTRATE 100 MG: 100 TABLET, FILM COATED ORAL at 08:42

## 2017-01-01 RX ADMIN — ATORVASTATIN CALCIUM 40 MG: 20 TABLET, FILM COATED ORAL at 20:12

## 2017-01-01 RX ADMIN — Medication 1 CAPSULE: at 08:53

## 2017-01-01 RX ADMIN — TAMSULOSIN HYDROCHLORIDE 0.4 MG: 0.4 CAPSULE ORAL at 09:22

## 2017-01-01 RX ADMIN — CEFTRIAXONE SODIUM 1 G: 1 INJECTION, SOLUTION INTRAVENOUS at 09:47

## 2017-01-01 RX ADMIN — CARVEDILOL 6.25 MG: 6.25 TABLET, FILM COATED ORAL at 23:05

## 2017-01-01 RX ADMIN — METOPROLOL TARTRATE 100 MG: 100 TABLET, FILM COATED ORAL at 09:36

## 2017-01-01 RX ADMIN — METOPROLOL TARTRATE 100 MG: 100 TABLET, FILM COATED ORAL at 08:32

## 2017-01-01 RX ADMIN — ASPIRIN 600 MG: 300 SUPPOSITORY RECTAL at 20:06

## 2017-01-01 RX ADMIN — METOPROLOL TARTRATE 100 MG: 100 TABLET, FILM COATED ORAL at 20:15

## 2017-01-01 RX ADMIN — FUROSEMIDE 80 MG: 80 TABLET ORAL at 08:22

## 2017-01-01 RX ADMIN — POTASSIUM CHLORIDE 40 MEQ: 750 CAPSULE, EXTENDED RELEASE ORAL at 09:34

## 2017-01-01 RX ADMIN — TAZOBACTAM SODIUM AND PIPERACILLIN SODIUM 3.38 G: 375; 3 INJECTION, SOLUTION INTRAVENOUS at 14:56

## 2017-01-01 RX ADMIN — DIGOXIN 125 MCG: 0.12 TABLET ORAL at 08:37

## 2017-01-01 RX ADMIN — FUROSEMIDE 20 MG: 20 TABLET ORAL at 09:22

## 2017-01-01 RX ADMIN — ENOXAPARIN SODIUM 90 MG: 100 INJECTION SUBCUTANEOUS at 20:39

## 2017-01-01 RX ADMIN — DIGOXIN 125 MCG: 0.12 TABLET ORAL at 12:14

## 2017-01-01 RX ADMIN — KETOTIFEN FUMARATE 1 DROP: 0.35 SOLUTION/ DROPS OPHTHALMIC at 08:28

## 2017-01-01 RX ADMIN — HYDROCODONE BITARTRATE AND ACETAMINOPHEN 1 TABLET: 5; 325 TABLET ORAL at 08:41

## 2017-01-01 RX ADMIN — Medication 1 CAPSULE: at 08:50

## 2017-01-01 RX ADMIN — PANTOPRAZOLE SODIUM 40 MG: 40 TABLET, DELAYED RELEASE ORAL at 08:50

## 2017-01-01 RX ADMIN — METOPROLOL TARTRATE 100 MG: 100 TABLET, FILM COATED ORAL at 20:59

## 2017-01-01 RX ADMIN — WATER 2 G: 1 INJECTION INTRAMUSCULAR; INTRAVENOUS; SUBCUTANEOUS at 14:00

## 2017-01-01 RX ADMIN — DIGOXIN 125 MCG: 0.12 TABLET ORAL at 09:24

## 2017-01-01 RX ADMIN — CLOPIDOGREL 75 MG: 75 TABLET, FILM COATED ORAL at 09:26

## 2017-01-01 RX ADMIN — METOPROLOL TARTRATE 100 MG: 100 TABLET, FILM COATED ORAL at 08:46

## 2017-01-01 RX ADMIN — SODIUM BICARBONATE 650 MG: 650 TABLET ORAL at 11:45

## 2017-01-01 RX ADMIN — PANTOPRAZOLE SODIUM 40 MG: 40 TABLET, DELAYED RELEASE ORAL at 10:45

## 2017-01-01 RX ADMIN — A/SINGAPORE/GP1908/2015 IVR-180 (H1N1) (AN A/MICHIGAN/45/2015-LIKE VIRUS), A/SINGAPORE/GP2050/2015 (H3N2) (AN A/HONG KONG/4801/2014 - LIKE VIRUS), B/UTAH/9/2014 (A B/PHUKET/3073/2013-LIKE VIRUS), B/HONG KONG/259/2010 (A B/BRISBANE/60/08-LIKE VIRUS) 0.5 ML: 15; 15; 15; 15 INJECTION, SUSPENSION INTRAMUSCULAR at 12:53

## 2017-01-01 RX ADMIN — OXYCODONE HYDROCHLORIDE AND ACETAMINOPHEN 1 TABLET: 5; 325 TABLET ORAL at 09:33

## 2017-01-01 RX ADMIN — ISOSORBIDE MONONITRATE 15 MG: 30 TABLET ORAL at 09:23

## 2017-01-01 RX ADMIN — TAMSULOSIN HYDROCHLORIDE 0.4 MG: 0.4 CAPSULE ORAL at 08:23

## 2017-01-01 RX ADMIN — Medication 1 CAPSULE: at 08:46

## 2017-01-01 RX ADMIN — OXYCODONE HYDROCHLORIDE AND ACETAMINOPHEN 2 TABLET: 5; 325 TABLET ORAL at 20:18

## 2017-01-01 RX ADMIN — FLUCONAZOLE 200 MG: 2 INJECTION INTRAVENOUS at 22:18

## 2017-01-01 RX ADMIN — PANTOPRAZOLE SODIUM 40 MG: 40 TABLET, DELAYED RELEASE ORAL at 09:27

## 2017-01-01 RX ADMIN — CEFTRIAXONE SODIUM 1 G: 1 INJECTION, SOLUTION INTRAVENOUS at 11:15

## 2017-01-01 RX ADMIN — EPHEDRINE SULFATE 10 MG: 50 INJECTION INTRAMUSCULAR; INTRAVENOUS; SUBCUTANEOUS at 15:40

## 2017-01-01 RX ADMIN — ISOSORBIDE MONONITRATE 15 MG: 30 TABLET ORAL at 08:41

## 2017-01-01 RX ADMIN — AMIODARONE HYDROCHLORIDE 200 MG: 200 TABLET ORAL at 20:33

## 2017-01-01 RX ADMIN — METOPROLOL TARTRATE 100 MG: 100 TABLET, FILM COATED ORAL at 20:41

## 2017-01-01 RX ADMIN — Medication 1 DOSE: at 13:45

## 2017-01-01 RX ADMIN — METOPROLOL TARTRATE 100 MG: 100 TABLET, FILM COATED ORAL at 08:06

## 2017-01-01 RX ADMIN — AMIODARONE HYDROCHLORIDE 200 MG: 200 TABLET ORAL at 08:16

## 2017-01-01 RX ADMIN — TAMSULOSIN HYDROCHLORIDE 0.4 MG: 0.4 CAPSULE ORAL at 09:04

## 2017-01-01 RX ADMIN — DOCUSATE SODIUM -SENNOSIDES 2 TABLET: 50; 8.6 TABLET, COATED ORAL at 20:41

## 2017-01-01 RX ADMIN — METOPROLOL TARTRATE 100 MG: 100 TABLET, FILM COATED ORAL at 20:09

## 2017-01-01 RX ADMIN — MORPHINE SULFATE 2 MG: 2 INJECTION, SOLUTION INTRAMUSCULAR; INTRAVENOUS at 11:14

## 2017-01-01 RX ADMIN — FINASTERIDE 5 MG: 5 TABLET, FILM COATED ORAL at 08:28

## 2017-01-01 RX ADMIN — BETAMETHASONE DIPROPIONATE 1 APPLICATION: 0.5 CREAM, AUGMENTED TOPICAL at 09:05

## 2017-01-01 RX ADMIN — ATORVASTATIN CALCIUM 40 MG: 20 TABLET, FILM COATED ORAL at 20:53

## 2017-01-01 RX ADMIN — METOPROLOL TARTRATE 100 MG: 100 TABLET, FILM COATED ORAL at 20:38

## 2017-01-01 RX ADMIN — CARVEDILOL 6.25 MG: 6.25 TABLET, FILM COATED ORAL at 18:08

## 2017-01-01 RX ADMIN — ATORVASTATIN CALCIUM 40 MG: 20 TABLET, FILM COATED ORAL at 22:23

## 2017-01-01 RX ADMIN — CLOPIDOGREL 75 MG: 75 TABLET, FILM COATED ORAL at 08:22

## 2017-01-01 RX ADMIN — TAMSULOSIN HYDROCHLORIDE 0.4 MG: 0.4 CAPSULE ORAL at 10:35

## 2017-01-01 RX ADMIN — ATORVASTATIN CALCIUM 40 MG: 20 TABLET, FILM COATED ORAL at 21:52

## 2017-01-01 RX ADMIN — POTASSIUM CHLORIDE 40 MEQ: 750 CAPSULE, EXTENDED RELEASE ORAL at 14:57

## 2017-01-01 RX ADMIN — METOPROLOL TARTRATE 100 MG: 100 TABLET, FILM COATED ORAL at 08:27

## 2017-01-01 RX ADMIN — ENOXAPARIN SODIUM 90 MG: 100 INJECTION SUBCUTANEOUS at 08:52

## 2017-01-01 RX ADMIN — SODIUM CHLORIDE 75 ML/HR: 9 INJECTION, SOLUTION INTRAVENOUS at 15:57

## 2017-01-01 RX ADMIN — AMIODARONE HYDROCHLORIDE 100 MG: 200 TABLET ORAL at 20:20

## 2017-01-01 RX ADMIN — DIGOXIN 125 MCG: 0.12 TABLET ORAL at 09:21

## 2017-01-01 RX ADMIN — SODIUM CHLORIDE 75 ML/HR: 9 INJECTION, SOLUTION INTRAVENOUS at 00:10

## 2017-01-01 RX ADMIN — AMIODARONE HYDROCHLORIDE 200 MG: 200 TABLET ORAL at 20:39

## 2017-01-01 RX ADMIN — LACTULOSE 10 G: 10 SOLUTION ORAL at 23:52

## 2017-01-01 RX ADMIN — TAMSULOSIN HYDROCHLORIDE 0.4 MG: 0.4 CAPSULE ORAL at 17:28

## 2017-01-01 RX ADMIN — DOCUSATE SODIUM -SENNOSIDES 2 TABLET: 50; 8.6 TABLET, COATED ORAL at 20:27

## 2017-01-01 RX ADMIN — ATORVASTATIN CALCIUM 40 MG: 20 TABLET, FILM COATED ORAL at 20:26

## 2017-01-01 RX ADMIN — CLOPIDOGREL 75 MG: 75 TABLET, FILM COATED ORAL at 08:26

## 2017-01-01 RX ADMIN — AMIODARONE HYDROCHLORIDE 300 MG: 200 TABLET ORAL at 08:26

## 2017-01-01 RX ADMIN — DIGOXIN 125 MCG: 0.12 TABLET ORAL at 12:17

## 2017-01-01 RX ADMIN — WARFARIN SODIUM 1.5 MG: 3 TABLET ORAL at 17:33

## 2017-01-01 RX ADMIN — BUSPIRONE HYDROCHLORIDE 7.5 MG: 15 TABLET ORAL at 18:22

## 2017-01-01 RX ADMIN — POTASSIUM CHLORIDE 20 MEQ: 750 CAPSULE, EXTENDED RELEASE ORAL at 08:42

## 2017-01-01 RX ADMIN — DIGOXIN 125 MCG: 0.12 TABLET ORAL at 09:12

## 2017-01-01 RX ADMIN — TAZOBACTAM SODIUM AND PIPERACILLIN SODIUM 3.38 G: 375; 3 INJECTION, SOLUTION INTRAVENOUS at 22:28

## 2017-01-01 RX ADMIN — CLOPIDOGREL 75 MG: 75 TABLET, FILM COATED ORAL at 21:48

## 2017-01-01 RX ADMIN — FAMOTIDINE 20 MG: 20 TABLET, FILM COATED ORAL at 08:42

## 2017-01-01 RX ADMIN — TEMAZEPAM 15 MG: 15 CAPSULE ORAL at 22:18

## 2017-01-01 RX ADMIN — Medication 1 CAPSULE: at 09:26

## 2017-01-01 RX ADMIN — Medication 1 CAPSULE: at 08:06

## 2017-01-01 RX ADMIN — TERAZOSIN HYDROCHLORIDE 5 MG: 5 CAPSULE ORAL at 08:46

## 2017-01-01 RX ADMIN — DOCUSATE SODIUM -SENNOSIDES 2 TABLET: 50; 8.6 TABLET, COATED ORAL at 14:50

## 2017-01-01 RX ADMIN — TERAZOSIN HYDROCHLORIDE 5 MG: 5 CAPSULE ORAL at 09:26

## 2017-01-01 RX ADMIN — Medication 250 MG: at 16:47

## 2017-01-01 RX ADMIN — METOPROLOL TARTRATE 100 MG: 100 TABLET, FILM COATED ORAL at 21:47

## 2017-01-01 RX ADMIN — ATORVASTATIN CALCIUM 20 MG: 20 TABLET, FILM COATED ORAL at 20:41

## 2017-01-01 RX ADMIN — TAMSULOSIN HYDROCHLORIDE 0.4 MG: 0.4 CAPSULE ORAL at 17:40

## 2017-01-01 RX ADMIN — ATORVASTATIN CALCIUM 40 MG: 20 TABLET, FILM COATED ORAL at 21:36

## 2017-01-01 RX ADMIN — DOCUSATE SODIUM -SENNOSIDES 2 TABLET: 50; 8.6 TABLET, COATED ORAL at 20:58

## 2017-01-01 RX ADMIN — Medication 1 CAPSULE: at 09:36

## 2017-01-01 RX ADMIN — AMIODARONE HYDROCHLORIDE 200 MG: 200 TABLET ORAL at 20:56

## 2017-01-01 RX ADMIN — PANTOPRAZOLE SODIUM 40 MG: 40 TABLET, DELAYED RELEASE ORAL at 09:41

## 2017-01-01 RX ADMIN — HYDROCODONE BITARTRATE AND ACETAMINOPHEN 1 TABLET: 5; 325 TABLET ORAL at 22:17

## 2017-01-01 RX ADMIN — EPINEPHRINE 1 MG: 0.1 INJECTION, SOLUTION ENDOTRACHEAL; INTRACARDIAC; INTRAVENOUS at 12:08

## 2017-01-01 RX ADMIN — TAMSULOSIN HYDROCHLORIDE 0.4 MG: 0.4 CAPSULE ORAL at 08:31

## 2017-01-01 RX ADMIN — AMIODARONE HYDROCHLORIDE 200 MG: 200 TABLET ORAL at 20:15

## 2017-01-01 RX ADMIN — TEMAZEPAM 15 MG: 15 CAPSULE ORAL at 23:06

## 2017-01-01 RX ADMIN — NYSTATIN AND TRIAMCINOLONE ACETONIDE 1 APPLICATION: 100000; 1 OINTMENT TOPICAL at 17:35

## 2017-01-01 RX ADMIN — DOCUSATE SODIUM -SENNOSIDES 2 TABLET: 50; 8.6 TABLET, COATED ORAL at 21:14

## 2017-01-01 RX ADMIN — ACETAMINOPHEN 650 MG: 325 TABLET ORAL at 18:12

## 2017-01-01 RX ADMIN — SODIUM BICARBONATE 650 MG: 650 TABLET ORAL at 17:35

## 2017-01-01 RX ADMIN — FLUCONAZOLE 200 MG: 2 INJECTION INTRAVENOUS at 09:01

## 2017-01-01 RX ADMIN — CEFTRIAXONE SODIUM 1 G: 1 INJECTION, SOLUTION INTRAVENOUS at 08:31

## 2017-01-01 RX ADMIN — WARFARIN SODIUM 2 MG: 2 TABLET ORAL at 16:47

## 2017-01-01 RX ADMIN — EPINEPHRINE 1 MG: 0.1 INJECTION, SOLUTION ENDOTRACHEAL; INTRACARDIAC; INTRAVENOUS at 12:22

## 2017-01-01 RX ADMIN — TAMSULOSIN HYDROCHLORIDE 0.4 MG: 0.4 CAPSULE ORAL at 08:50

## 2017-01-01 RX ADMIN — SODIUM CHLORIDE 75 ML/HR: 9 INJECTION, SOLUTION INTRAVENOUS at 20:51

## 2017-01-01 RX ADMIN — METOPROLOL TARTRATE 100 MG: 100 TABLET ORAL at 08:53

## 2017-01-01 RX ADMIN — TERAZOSIN HYDROCHLORIDE 5 MG: 5 CAPSULE ORAL at 18:00

## 2017-01-01 RX ADMIN — ACETAMINOPHEN 650 MG: 325 TABLET ORAL at 22:42

## 2017-01-01 RX ADMIN — TAMSULOSIN HYDROCHLORIDE 0.4 MG: 0.4 CAPSULE ORAL at 08:53

## 2017-01-01 RX ADMIN — BUSPIRONE HYDROCHLORIDE 7.5 MG: 15 TABLET ORAL at 17:30

## 2017-01-01 RX ADMIN — PANTOPRAZOLE SODIUM 40 MG: 40 TABLET, DELAYED RELEASE ORAL at 09:34

## 2017-01-01 RX ADMIN — TAZOBACTAM SODIUM AND PIPERACILLIN SODIUM 3.38 G: 375; 3 INJECTION, SOLUTION INTRAVENOUS at 04:57

## 2017-01-01 RX ADMIN — DIGOXIN 125 MCG: 0.12 TABLET ORAL at 08:42

## 2017-01-01 RX ADMIN — Medication 5 MG: at 20:39

## 2017-01-01 RX ADMIN — OXYCODONE HYDROCHLORIDE AND ACETAMINOPHEN 1 TABLET: 5; 325 TABLET ORAL at 13:53

## 2017-01-01 RX ADMIN — Medication 10 ML: at 09:21

## 2017-01-01 RX ADMIN — Medication 1 CAPSULE: at 09:20

## 2017-01-01 RX ADMIN — ATORVASTATIN CALCIUM 40 MG: 80 TABLET, FILM COATED ORAL at 22:20

## 2017-01-01 RX ADMIN — KETOTIFEN FUMARATE 1 DROP: 0.35 SOLUTION/ DROPS OPHTHALMIC at 08:53

## 2017-01-01 RX ADMIN — WARFARIN SODIUM 1 MG: 1 TABLET ORAL at 18:21

## 2017-01-01 RX ADMIN — AMIODARONE HYDROCHLORIDE 200 MG: 200 TABLET ORAL at 08:19

## 2017-01-01 RX ADMIN — AMIODARONE HYDROCHLORIDE 200 MG: 200 TABLET ORAL at 08:52

## 2017-01-01 RX ADMIN — METOPROLOL TARTRATE 100 MG: 100 TABLET, FILM COATED ORAL at 09:01

## 2017-01-01 RX ADMIN — PANTOPRAZOLE SODIUM 40 MG: 40 TABLET, DELAYED RELEASE ORAL at 08:38

## 2017-01-01 RX ADMIN — WARFARIN SODIUM 3 MG: 3 TABLET ORAL at 18:07

## 2017-01-01 RX ADMIN — HYDROCODONE BITARTRATE AND ACETAMINOPHEN 1 TABLET: 5; 325 TABLET ORAL at 10:03

## 2017-01-01 RX ADMIN — METOPROLOL TARTRATE 100 MG: 100 TABLET, FILM COATED ORAL at 09:19

## 2017-01-01 RX ADMIN — ATORVASTATIN CALCIUM 40 MG: 20 TABLET, FILM COATED ORAL at 20:56

## 2017-01-01 RX ADMIN — ATORVASTATIN CALCIUM 40 MG: 20 TABLET, FILM COATED ORAL at 20:29

## 2017-01-01 RX ADMIN — CLOPIDOGREL 75 MG: 75 TABLET, FILM COATED ORAL at 08:23

## 2017-01-01 RX ADMIN — Medication 1 CAPSULE: at 18:11

## 2017-01-01 RX ADMIN — CEFTRIAXONE SODIUM 1 G: 1 INJECTION, SOLUTION INTRAVENOUS at 11:30

## 2017-01-01 RX ADMIN — METOPROLOL TARTRATE 100 MG: 100 TABLET, FILM COATED ORAL at 21:03

## 2017-01-01 RX ADMIN — ENOXAPARIN SODIUM 90 MG: 100 INJECTION SUBCUTANEOUS at 08:06

## 2017-01-01 RX ADMIN — VANCOMYCIN HYDROCHLORIDE 1500 MG: 10 INJECTION, POWDER, LYOPHILIZED, FOR SOLUTION INTRAVENOUS at 14:24

## 2017-01-01 RX ADMIN — ISOSORBIDE MONONITRATE 15 MG: 30 TABLET ORAL at 08:38

## 2017-01-01 RX ADMIN — LACTULOSE 10 G: 10 SOLUTION ORAL at 10:05

## 2017-01-01 RX ADMIN — CARVEDILOL 6.25 MG: 6.25 TABLET, FILM COATED ORAL at 08:22

## 2017-01-01 RX ADMIN — BUSPIRONE HYDROCHLORIDE 7.5 MG: 15 TABLET ORAL at 17:49

## 2017-01-01 RX ADMIN — WARFARIN SODIUM 2 MG: 2 TABLET ORAL at 17:13

## 2017-01-01 RX ADMIN — WARFARIN SODIUM 3 MG: 3 TABLET ORAL at 18:00

## 2017-01-01 RX ADMIN — AMIODARONE HYDROCHLORIDE 100 MG: 200 TABLET ORAL at 20:27

## 2017-01-01 RX ADMIN — NYSTATIN AND TRIAMCINOLONE ACETONIDE 1 APPLICATION: 100000; 1 OINTMENT TOPICAL at 09:05

## 2017-01-01 RX ADMIN — POTASSIUM CHLORIDE 40 MEQ: 750 CAPSULE, EXTENDED RELEASE ORAL at 14:24

## 2017-01-01 RX ADMIN — AMLODIPINE BESYLATE 5 MG: 5 TABLET ORAL at 11:59

## 2017-01-01 RX ADMIN — FLUCONAZOLE 200 MG: 2 INJECTION INTRAVENOUS at 09:32

## 2017-01-01 RX ADMIN — ACETAMINOPHEN 650 MG: 325 TABLET ORAL at 22:02

## 2017-01-01 RX ADMIN — ENOXAPARIN SODIUM 90 MG: 100 INJECTION SUBCUTANEOUS at 08:15

## 2017-01-01 RX ADMIN — METOPROLOL TARTRATE 100 MG: 100 TABLET, FILM COATED ORAL at 08:36

## 2017-01-01 RX ADMIN — METOPROLOL TARTRATE 100 MG: 100 TABLET, FILM COATED ORAL at 08:35

## 2017-01-01 RX ADMIN — LORAZEPAM 0.25 MG: 2 INJECTION INTRAMUSCULAR; INTRAVENOUS at 00:25

## 2017-01-01 RX ADMIN — SODIUM CHLORIDE 125 ML/HR: 9 INJECTION, SOLUTION INTRAVENOUS at 17:46

## 2017-01-01 RX ADMIN — OXYCODONE HYDROCHLORIDE AND ACETAMINOPHEN 1 TABLET: 5; 325 TABLET ORAL at 02:40

## 2017-01-01 RX ADMIN — HYDROCODONE BITARTRATE AND ACETAMINOPHEN 1 TABLET: 5; 325 TABLET ORAL at 02:18

## 2017-01-01 RX ADMIN — SODIUM CHLORIDE 75 ML/HR: 9 INJECTION, SOLUTION INTRAVENOUS at 22:41

## 2017-01-01 RX ADMIN — Medication 250 MG: at 09:22

## 2017-01-01 RX ADMIN — AMIODARONE HYDROCHLORIDE 200 MG: 200 TABLET ORAL at 20:58

## 2017-01-01 RX ADMIN — DIGOXIN 125 MCG: 0.12 TABLET ORAL at 09:36

## 2017-01-01 RX ADMIN — WARFARIN SODIUM 2 MG: 2 TABLET ORAL at 23:38

## 2017-01-01 RX ADMIN — TERAZOSIN HYDROCHLORIDE 1 MG: 1 CAPSULE ORAL at 23:06

## 2017-01-01 RX ADMIN — ISOSORBIDE MONONITRATE 15 MG: 30 TABLET ORAL at 09:35

## 2017-01-01 RX ADMIN — METOPROLOL TARTRATE 100 MG: 100 TABLET, FILM COATED ORAL at 08:26

## 2017-01-01 RX ADMIN — SODIUM CHLORIDE 150 ML/HR: 9 INJECTION, SOLUTION INTRAVENOUS at 21:36

## 2017-01-01 RX ADMIN — PANTOPRAZOLE SODIUM 40 MG: 40 TABLET, DELAYED RELEASE ORAL at 08:33

## 2017-01-01 RX ADMIN — FAMOTIDINE 20 MG: 20 TABLET, FILM COATED ORAL at 08:52

## 2017-01-01 RX ADMIN — TERAZOSIN HYDROCHLORIDE 5 MG: 5 CAPSULE ORAL at 08:07

## 2017-01-01 RX ADMIN — PANTOPRAZOLE SODIUM 40 MG: 40 TABLET, DELAYED RELEASE ORAL at 12:14

## 2017-01-01 RX ADMIN — TAZOBACTAM SODIUM AND PIPERACILLIN SODIUM 3.38 G: 375; 3 INJECTION, SOLUTION INTRAVENOUS at 07:09

## 2017-01-01 RX ADMIN — BUSPIRONE HYDROCHLORIDE 7.5 MG: 15 TABLET ORAL at 08:41

## 2017-01-01 RX ADMIN — AMIODARONE HYDROCHLORIDE 200 MG: 200 TABLET ORAL at 20:52

## 2017-01-01 RX ADMIN — CLOPIDOGREL 75 MG: 75 TABLET, FILM COATED ORAL at 09:05

## 2017-01-01 RX ADMIN — ONDANSETRON 4 MG: 2 INJECTION INTRAMUSCULAR; INTRAVENOUS at 16:32

## 2017-01-01 RX ADMIN — WARFARIN SODIUM 0.5 MG: 1 TABLET ORAL at 17:41

## 2017-01-01 RX ADMIN — BUSPIRONE HYDROCHLORIDE 7.5 MG: 15 TABLET ORAL at 08:56

## 2017-01-01 RX ADMIN — AMIODARONE HYDROCHLORIDE 200 MG: 200 TABLET ORAL at 09:12

## 2017-01-01 RX ADMIN — TAMSULOSIN HYDROCHLORIDE 0.4 MG: 0.4 CAPSULE ORAL at 17:30

## 2017-01-01 RX ADMIN — FAMOTIDINE 20 MG: 20 TABLET, FILM COATED ORAL at 08:36

## 2017-01-01 RX ADMIN — AMIODARONE HYDROCHLORIDE 200 MG: 200 TABLET ORAL at 08:06

## 2017-01-01 RX ADMIN — Medication 250 MG: at 17:22

## 2017-01-01 RX ADMIN — TAZOBACTAM SODIUM AND PIPERACILLIN SODIUM 3.38 G: 375; 3 INJECTION, SOLUTION INTRAVENOUS at 21:42

## 2017-01-01 RX ADMIN — FAMOTIDINE 20 MG: 20 TABLET, FILM COATED ORAL at 08:16

## 2017-01-01 RX ADMIN — FLUDEOXYGLUCOSE F18 1 DOSE: 300 INJECTION INTRAVENOUS at 07:31

## 2017-01-01 RX ADMIN — DIGOXIN 125 MCG: 0.12 TABLET ORAL at 02:31

## 2017-01-01 RX ADMIN — BUSPIRONE HYDROCHLORIDE 7.5 MG: 15 TABLET ORAL at 08:36

## 2017-01-01 RX ADMIN — TAMSULOSIN HYDROCHLORIDE 0.4 MG: 0.4 CAPSULE ORAL at 08:26

## 2017-01-01 RX ADMIN — DIGOXIN 125 MCG: 0.12 TABLET ORAL at 11:30

## 2017-01-01 RX ADMIN — ISOSORBIDE MONONITRATE 15 MG: 30 TABLET ORAL at 08:44

## 2017-01-01 RX ADMIN — BETAMETHASONE DIPROPIONATE: 0.5 CREAM, AUGMENTED TOPICAL at 08:23

## 2017-01-01 RX ADMIN — TAMSULOSIN HYDROCHLORIDE 0.4 MG: 0.4 CAPSULE ORAL at 08:52

## 2017-01-01 RX ADMIN — POTASSIUM CHLORIDE 20 MEQ: 750 CAPSULE, EXTENDED RELEASE ORAL at 13:35

## 2017-01-01 RX ADMIN — WARFARIN SODIUM 3 MG: 3 TABLET ORAL at 20:08

## 2017-01-01 RX ADMIN — WARFARIN SODIUM 1 MG: 1 TABLET ORAL at 17:44

## 2017-01-01 RX ADMIN — FUROSEMIDE 80 MG: 80 TABLET ORAL at 08:23

## 2017-01-01 RX ADMIN — METOPROLOL TARTRATE 100 MG: 100 TABLET, FILM COATED ORAL at 20:21

## 2017-01-01 RX ADMIN — ATORVASTATIN CALCIUM 80 MG: 80 TABLET, FILM COATED ORAL at 23:38

## 2017-01-01 RX ADMIN — Medication 1 CAPSULE: at 08:52

## 2017-01-01 RX ADMIN — VANCOMYCIN HYDROCHLORIDE 1750 MG: 10 INJECTION, POWDER, LYOPHILIZED, FOR SOLUTION INTRAVENOUS at 18:53

## 2017-01-01 RX ADMIN — BUSPIRONE HYDROCHLORIDE 7.5 MG: 15 TABLET ORAL at 09:05

## 2017-01-01 RX ADMIN — PANTOPRAZOLE SODIUM 40 MG: 40 TABLET, DELAYED RELEASE ORAL at 06:01

## 2017-01-01 RX ADMIN — CLOPIDOGREL 75 MG: 75 TABLET, FILM COATED ORAL at 09:04

## 2017-01-01 RX ADMIN — SODIUM CHLORIDE 125 ML/HR: 9 INJECTION, SOLUTION INTRAVENOUS at 11:44

## 2017-01-01 RX ADMIN — METOPROLOL TARTRATE 100 MG: 100 TABLET, FILM COATED ORAL at 20:37

## 2017-01-01 RX ADMIN — Medication 1 CAPSULE: at 08:26

## 2017-01-01 RX ADMIN — METOPROLOL TARTRATE 50 MG: 50 TABLET, FILM COATED ORAL at 22:25

## 2017-01-01 RX ADMIN — FLUCONAZOLE 200 MG: 200 TABLET ORAL at 08:42

## 2017-01-01 RX ADMIN — KETOTIFEN FUMARATE 1 DROP: 0.35 SOLUTION/ DROPS OPHTHALMIC at 00:06

## 2017-01-01 RX ADMIN — FLUCONAZOLE 200 MG: 200 TABLET ORAL at 08:19

## 2017-01-01 RX ADMIN — ATORVASTATIN CALCIUM 80 MG: 80 TABLET, FILM COATED ORAL at 21:31

## 2017-01-01 RX ADMIN — AMIODARONE HYDROCHLORIDE 100 MG: 200 TABLET ORAL at 08:42

## 2017-01-01 RX ADMIN — FAMOTIDINE 20 MG: 20 TABLET, FILM COATED ORAL at 21:48

## 2017-01-01 RX ADMIN — BUSPIRONE HYDROCHLORIDE 7.5 MG: 15 TABLET ORAL at 17:40

## 2017-01-01 RX ADMIN — ISOSORBIDE MONONITRATE 15 MG: 30 TABLET ORAL at 09:18

## 2017-01-01 RX ADMIN — ISOSORBIDE MONONITRATE 15 MG: 30 TABLET ORAL at 18:11

## 2017-01-01 RX ADMIN — PANTOPRAZOLE SODIUM 40 MG: 40 TABLET, DELAYED RELEASE ORAL at 18:12

## 2017-01-01 RX ADMIN — FINASTERIDE 5 MG: 5 TABLET, FILM COATED ORAL at 08:37

## 2017-01-01 RX ADMIN — ISOSORBIDE MONONITRATE 15 MG: 30 TABLET ORAL at 08:46

## 2017-01-01 RX ADMIN — TAMSULOSIN HYDROCHLORIDE 0.4 MG: 0.4 CAPSULE ORAL at 09:01

## 2017-01-01 RX ADMIN — PANTOPRAZOLE SODIUM 40 MG: 40 TABLET, DELAYED RELEASE ORAL at 06:53

## 2017-01-01 RX ADMIN — SODIUM CHLORIDE 75 ML/HR: 9 INJECTION, SOLUTION INTRAVENOUS at 01:57

## 2017-01-01 RX ADMIN — SODIUM CHLORIDE 150 ML/HR: 9 INJECTION, SOLUTION INTRAVENOUS at 03:46

## 2017-01-01 RX ADMIN — METOPROLOL TARTRATE 100 MG: 100 TABLET, FILM COATED ORAL at 08:38

## 2017-01-01 RX ADMIN — FUROSEMIDE 20 MG: 20 TABLET ORAL at 08:37

## 2017-01-01 RX ADMIN — Medication 10 ML: at 11:32

## 2017-01-01 RX ADMIN — CLOPIDOGREL 75 MG: 75 TABLET, FILM COATED ORAL at 12:14

## 2017-01-01 RX ADMIN — CLOPIDOGREL 75 MG: 75 TABLET, FILM COATED ORAL at 08:56

## 2017-01-01 RX ADMIN — ISOSORBIDE MONONITRATE 15 MG: 30 TABLET ORAL at 08:50

## 2017-01-01 RX ADMIN — POTASSIUM CHLORIDE 40 MEQ: 750 CAPSULE, EXTENDED RELEASE ORAL at 12:22

## 2017-01-01 RX ADMIN — METOPROLOL TARTRATE 100 MG: 100 TABLET, FILM COATED ORAL at 21:36

## 2017-01-01 RX ADMIN — ISOSORBIDE MONONITRATE 15 MG: 30 TABLET ORAL at 08:52

## 2017-01-01 RX ADMIN — FAMOTIDINE 20 MG: 20 TABLET, FILM COATED ORAL at 08:38

## 2017-01-01 RX ADMIN — AMIODARONE HYDROCHLORIDE 300 MG: 50 INJECTION, SOLUTION INTRAVENOUS at 12:18

## 2017-01-01 RX ADMIN — AMIODARONE HYDROCHLORIDE 200 MG: 200 TABLET ORAL at 09:20

## 2017-01-01 RX ADMIN — TAMSULOSIN HYDROCHLORIDE 0.4 MG: 0.4 CAPSULE ORAL at 08:16

## 2017-01-01 RX ADMIN — FAMOTIDINE 20 MG: 20 TABLET, FILM COATED ORAL at 08:46

## 2017-01-01 RX ADMIN — METHYLPREDNISOLONE ACETATE 80 MG: 80 INJECTION, SUSPENSION INTRA-ARTICULAR; INTRALESIONAL; INTRAMUSCULAR; SOFT TISSUE at 12:11

## 2017-01-01 RX ADMIN — LORAZEPAM 0.25 MG: 2 INJECTION INTRAMUSCULAR; INTRAVENOUS at 21:21

## 2017-01-01 RX ADMIN — FAMOTIDINE 20 MG: 20 TABLET, FILM COATED ORAL at 08:44

## 2017-01-01 RX ADMIN — ISOSORBIDE MONONITRATE 15 MG: 30 TABLET ORAL at 08:26

## 2017-01-01 RX ADMIN — PANTOPRAZOLE SODIUM 40 MG: 40 TABLET, DELAYED RELEASE ORAL at 09:05

## 2017-01-01 RX ADMIN — DIGOXIN 250 MCG: 0.25 INJECTION INTRAMUSCULAR; INTRAVENOUS at 09:05

## 2017-01-01 RX ADMIN — LORAZEPAM 0.25 MG: 2 INJECTION INTRAMUSCULAR; INTRAVENOUS at 20:33

## 2017-01-01 RX ADMIN — Medication 250 MG: at 08:23

## 2017-01-01 RX ADMIN — FUROSEMIDE 80 MG: 80 TABLET ORAL at 09:22

## 2017-01-01 RX ADMIN — METOPROLOL TARTRATE 100 MG: 100 TABLET ORAL at 20:17

## 2017-01-01 RX ADMIN — PANTOPRAZOLE SODIUM 40 MG: 40 TABLET, DELAYED RELEASE ORAL at 08:22

## 2017-01-01 RX ADMIN — FUROSEMIDE 80 MG: 80 TABLET ORAL at 09:20

## 2017-01-01 RX ADMIN — SODIUM BICARBONATE 50 MEQ: 84 INJECTION, SOLUTION INTRAVENOUS at 12:25

## 2017-01-01 RX ADMIN — TERAZOSIN HYDROCHLORIDE 1 MG: 1 CAPSULE ORAL at 22:24

## 2017-01-01 RX ADMIN — ATORVASTATIN CALCIUM 40 MG: 20 TABLET, FILM COATED ORAL at 20:27

## 2017-01-01 RX ADMIN — METOPROLOL TARTRATE 100 MG: 100 TABLET, FILM COATED ORAL at 09:23

## 2017-01-01 RX ADMIN — ATORVASTATIN CALCIUM 40 MG: 20 TABLET, FILM COATED ORAL at 21:49

## 2017-01-01 RX ADMIN — VANCOMYCIN HYDROCHLORIDE 1750 MG: 1 INJECTION, POWDER, LYOPHILIZED, FOR SOLUTION INTRAVENOUS at 22:06

## 2017-01-01 RX ADMIN — POTASSIUM CHLORIDE 20 MEQ: 750 CAPSULE, EXTENDED RELEASE ORAL at 09:19

## 2017-01-01 RX ADMIN — CARVEDILOL 6.25 MG: 6.25 TABLET, FILM COATED ORAL at 09:24

## 2017-01-01 RX ADMIN — CEFTRIAXONE SODIUM 1 G: 1 INJECTION, SOLUTION INTRAVENOUS at 02:39

## 2017-01-01 RX ADMIN — CARVEDILOL 6.25 MG: 6.25 TABLET, FILM COATED ORAL at 09:26

## 2017-01-01 RX ADMIN — ISOSORBIDE MONONITRATE 15 MG: 30 TABLET ORAL at 08:19

## 2017-01-01 RX ADMIN — WARFARIN SODIUM 3 MG: 3 TABLET ORAL at 17:44

## 2017-01-01 RX ADMIN — METOPROLOL TARTRATE 100 MG: 100 TABLET, FILM COATED ORAL at 20:25

## 2017-01-01 RX ADMIN — ATORVASTATIN CALCIUM 40 MG: 20 TABLET, FILM COATED ORAL at 21:10

## 2017-01-01 RX ADMIN — PANTOPRAZOLE SODIUM 40 MG: 40 TABLET, DELAYED RELEASE ORAL at 08:23

## 2017-01-01 RX ADMIN — METOPROLOL TARTRATE 100 MG: 100 TABLET, FILM COATED ORAL at 08:53

## 2017-01-01 RX ADMIN — WARFARIN SODIUM 3 MG: 3 TABLET ORAL at 17:35

## 2017-01-01 RX ADMIN — PANTOPRAZOLE SODIUM 40 MG: 40 TABLET, DELAYED RELEASE ORAL at 08:43

## 2017-01-01 RX ADMIN — WARFARIN SODIUM 1 MG: 1 TABLET ORAL at 02:32

## 2017-01-01 RX ADMIN — BETAMETHASONE DIPROPIONATE: 0.5 CREAM, AUGMENTED TOPICAL at 21:46

## 2017-01-01 RX ADMIN — AMIODARONE HYDROCHLORIDE 300 MG: 200 TABLET ORAL at 14:56

## 2017-01-01 RX ADMIN — FUROSEMIDE 80 MG: 10 INJECTION, SOLUTION INTRAMUSCULAR; INTRAVENOUS at 00:48

## 2017-01-01 RX ADMIN — FUROSEMIDE 20 MG: 20 TABLET ORAL at 15:20

## 2017-01-01 RX ADMIN — TAMSULOSIN HYDROCHLORIDE 0.4 MG: 0.4 CAPSULE ORAL at 08:56

## 2017-01-01 RX ADMIN — DILTIAZEM HYDROCHLORIDE 5 MG/HR: 100 INJECTION, POWDER, LYOPHILIZED, FOR SOLUTION INTRAVENOUS at 12:23

## 2017-01-01 RX ADMIN — AMIODARONE HYDROCHLORIDE 150 MG: 50 INJECTION, SOLUTION INTRAVENOUS at 12:29

## 2017-01-01 RX ADMIN — METOPROLOL TARTRATE 100 MG: 100 TABLET, FILM COATED ORAL at 20:33

## 2017-01-01 RX ADMIN — TERAZOSIN HYDROCHLORIDE 5 MG: 5 CAPSULE ORAL at 18:07

## 2017-01-01 RX ADMIN — ATORVASTATIN CALCIUM 40 MG: 20 TABLET, FILM COATED ORAL at 21:46

## 2017-01-01 RX ADMIN — ATORVASTATIN CALCIUM 40 MG: 20 TABLET, FILM COATED ORAL at 20:20

## 2017-01-01 RX ADMIN — Medication 250 MG: at 21:30

## 2017-01-01 RX ADMIN — FLUCONAZOLE 200 MG: 200 TABLET ORAL at 08:06

## 2017-01-01 RX ADMIN — HYDROCODONE BITARTRATE AND ACETAMINOPHEN 1 TABLET: 5; 325 TABLET ORAL at 16:51

## 2017-01-01 RX ADMIN — TAMSULOSIN HYDROCHLORIDE 0.4 MG: 0.4 CAPSULE ORAL at 20:56

## 2017-01-01 RX ADMIN — METOPROLOL SUCCINATE 50 MG: 50 TABLET, FILM COATED, EXTENDED RELEASE ORAL at 20:53

## 2017-01-01 RX ADMIN — TERAZOSIN HYDROCHLORIDE 5 MG: 5 CAPSULE ORAL at 23:38

## 2017-01-01 RX ADMIN — ISOSORBIDE MONONITRATE 15 MG: 30 TABLET ORAL at 08:31

## 2017-01-01 RX ADMIN — SODIUM CHLORIDE 125 ML/HR: 9 INJECTION, SOLUTION INTRAVENOUS at 01:41

## 2017-01-01 RX ADMIN — METOPROLOL TARTRATE 100 MG: 100 TABLET ORAL at 12:00

## 2017-01-01 RX ADMIN — EPHEDRINE SULFATE 10 MG: 50 INJECTION INTRAMUSCULAR; INTRAVENOUS; SUBCUTANEOUS at 15:31

## 2017-01-01 RX ADMIN — TAMSULOSIN HYDROCHLORIDE 0.4 MG: 0.4 CAPSULE ORAL at 18:11

## 2017-01-01 RX ADMIN — Medication 1 CAPSULE: at 08:16

## 2017-01-01 RX ADMIN — PANTOPRAZOLE SODIUM 40 MG: 40 TABLET, DELAYED RELEASE ORAL at 08:07

## 2017-01-01 RX ADMIN — FUROSEMIDE 80 MG: 10 INJECTION, SOLUTION INTRAMUSCULAR; INTRAVENOUS at 18:02

## 2017-01-01 RX ADMIN — METOPROLOL TARTRATE 100 MG: 100 TABLET, FILM COATED ORAL at 02:31

## 2017-01-01 RX ADMIN — Medication 250 MG: at 18:11

## 2017-01-01 RX ADMIN — CLOPIDOGREL 75 MG: 75 TABLET, FILM COATED ORAL at 08:44

## 2017-01-01 RX ADMIN — METOPROLOL TARTRATE 100 MG: 100 TABLET, FILM COATED ORAL at 09:04

## 2017-01-01 RX ADMIN — SODIUM CHLORIDE 150 ML/HR: 9 INJECTION, SOLUTION INTRAVENOUS at 10:32

## 2017-01-01 RX ADMIN — METOPROLOL TARTRATE 100 MG: 100 TABLET, FILM COATED ORAL at 08:56

## 2017-01-01 RX ADMIN — Medication 1 CAPSULE: at 08:07

## 2017-01-01 RX ADMIN — TAZOBACTAM SODIUM AND PIPERACILLIN SODIUM 3.38 G: 375; 3 INJECTION, SOLUTION INTRAVENOUS at 06:24

## 2017-01-01 RX ADMIN — CLOPIDOGREL 75 MG: 75 TABLET, FILM COATED ORAL at 16:47

## 2017-01-01 RX ADMIN — POTASSIUM CHLORIDE 40 MEQ: 750 CAPSULE, EXTENDED RELEASE ORAL at 08:53

## 2017-01-01 RX ADMIN — METOPROLOL TARTRATE 100 MG: 100 TABLET, FILM COATED ORAL at 12:14

## 2017-01-01 RX ADMIN — CYCLOBENZAPRINE HYDROCHLORIDE 5 MG: 5 TABLET, FILM COATED ORAL at 10:24

## 2017-01-01 RX ADMIN — HYDROCODONE BITARTRATE AND ACETAMINOPHEN 1 TABLET: 5; 325 TABLET ORAL at 14:46

## 2017-01-01 RX ADMIN — METOPROLOL SUCCINATE 50 MG: 50 TABLET, FILM COATED, EXTENDED RELEASE ORAL at 21:00

## 2017-01-01 RX ADMIN — CARVEDILOL 6.25 MG: 6.25 TABLET, FILM COATED ORAL at 17:44

## 2017-01-01 RX ADMIN — FAMOTIDINE 20 MG: 20 TABLET, FILM COATED ORAL at 15:57

## 2017-01-01 RX ADMIN — DIGOXIN 125 MCG: 0.12 TABLET ORAL at 12:42

## 2017-01-01 RX ADMIN — DIGOXIN 125 MCG: 0.12 TABLET ORAL at 08:06

## 2017-01-01 RX ADMIN — FINASTERIDE 5 MG: 5 TABLET, FILM COATED ORAL at 08:36

## 2017-01-01 RX ADMIN — FINASTERIDE 5 MG: 5 TABLET, FILM COATED ORAL at 21:47

## 2017-01-01 RX ADMIN — AMIODARONE HYDROCHLORIDE 300 MG: 200 TABLET ORAL at 22:21

## 2017-01-01 RX ADMIN — AMIODARONE HYDROCHLORIDE 200 MG: 200 TABLET ORAL at 08:46

## 2017-01-01 RX ADMIN — BUSPIRONE HYDROCHLORIDE 7.5 MG: 15 TABLET ORAL at 17:38

## 2017-01-01 RX ADMIN — DOCUSATE SODIUM -SENNOSIDES 2 TABLET: 50; 8.6 TABLET, COATED ORAL at 21:55

## 2017-01-01 RX ADMIN — ATORVASTATIN CALCIUM 40 MG: 20 TABLET, FILM COATED ORAL at 21:13

## 2017-01-01 RX ADMIN — KETOTIFEN FUMARATE 1 DROP: 0.35 SOLUTION/ DROPS OPHTHALMIC at 18:11

## 2017-01-01 RX ADMIN — METOROPROLOL TARTRATE 5 MG: 5 INJECTION, SOLUTION INTRAVENOUS at 17:33

## 2017-01-01 RX ADMIN — HYDROCODONE BITARTRATE AND ACETAMINOPHEN 1 TABLET: 5; 325 TABLET ORAL at 21:48

## 2017-01-01 RX ADMIN — OXYCODONE HYDROCHLORIDE AND ACETAMINOPHEN 1 TABLET: 5; 325 TABLET ORAL at 00:22

## 2017-01-01 RX ADMIN — CLOPIDOGREL 75 MG: 75 TABLET, FILM COATED ORAL at 09:59

## 2017-01-01 RX ADMIN — ISOSORBIDE MONONITRATE 15 MG: 30 TABLET ORAL at 12:14

## 2017-01-01 RX ADMIN — FLUCONAZOLE 200 MG: 200 TABLET ORAL at 08:52

## 2017-01-01 RX ADMIN — HYDROCODONE BITARTRATE AND ACETAMINOPHEN 1 TABLET: 5; 325 TABLET ORAL at 08:39

## 2017-01-01 RX ADMIN — OXYCODONE HYDROCHLORIDE AND ACETAMINOPHEN 1 TABLET: 5; 325 TABLET ORAL at 14:12

## 2017-01-01 RX ADMIN — POTASSIUM CHLORIDE 20 MEQ: 750 CAPSULE, EXTENDED RELEASE ORAL at 08:23

## 2017-01-01 RX ADMIN — CARVEDILOL 6.25 MG: 6.25 TABLET, FILM COATED ORAL at 17:50

## 2017-01-01 RX ADMIN — AMIODARONE HYDROCHLORIDE 200 MG: 200 TABLET ORAL at 09:02

## 2017-01-01 RX ADMIN — AMIODARONE HYDROCHLORIDE 200 MG: 200 TABLET ORAL at 08:55

## 2017-01-01 RX ADMIN — FUROSEMIDE 20 MG: 20 TABLET ORAL at 08:41

## 2017-01-01 RX ADMIN — METOPROLOL TARTRATE 50 MG: 50 TABLET, FILM COATED ORAL at 21:11

## 2017-01-01 RX ADMIN — TAMSULOSIN HYDROCHLORIDE 0.4 MG: 0.4 CAPSULE ORAL at 09:20

## 2017-01-01 RX ADMIN — BISACODYL 10 MG: 10 SUPPOSITORY RECTAL at 04:03

## 2017-01-01 RX ADMIN — ATORVASTATIN CALCIUM 40 MG: 20 TABLET, FILM COATED ORAL at 20:05

## 2017-01-01 RX ADMIN — METOPROLOL TARTRATE 50 MG: 50 TABLET, FILM COATED ORAL at 15:57

## 2017-01-01 RX ADMIN — LORAZEPAM 0.25 MG: 2 INJECTION INTRAMUSCULAR; INTRAVENOUS at 16:01

## 2017-01-01 RX ADMIN — PANTOPRAZOLE SODIUM 40 MG: 40 TABLET, DELAYED RELEASE ORAL at 17:43

## 2017-01-01 RX ADMIN — ATORVASTATIN CALCIUM 40 MG: 20 TABLET, FILM COATED ORAL at 21:14

## 2017-01-01 RX ADMIN — PANTOPRAZOLE SODIUM 40 MG: 40 TABLET, DELAYED RELEASE ORAL at 08:26

## 2017-01-01 RX ADMIN — KIT FOR THE PREPARATION OF TECHNETIUM TC 99M PENTETATE 1 DOSE: 20 INJECTION, POWDER, LYOPHILIZED, FOR SOLUTION INTRAVENOUS; RESPIRATORY (INHALATION) at 13:45

## 2017-01-01 RX ADMIN — ONDANSETRON 4 MG: 2 INJECTION INTRAMUSCULAR; INTRAVENOUS at 17:08

## 2017-01-01 RX ADMIN — FUROSEMIDE 20 MG: 20 TABLET ORAL at 08:44

## 2017-01-01 RX ADMIN — SODIUM CHLORIDE, POTASSIUM CHLORIDE, SODIUM LACTATE AND CALCIUM CHLORIDE 1000 ML: 600; 310; 30; 20 INJECTION, SOLUTION INTRAVENOUS at 14:21

## 2017-01-01 RX ADMIN — WARFARIN SODIUM 4 MG: 4 TABLET ORAL at 20:39

## 2017-01-01 RX ADMIN — ISOSORBIDE MONONITRATE 15 MG: 30 TABLET ORAL at 08:53

## 2017-01-01 RX ADMIN — CLOPIDOGREL 75 MG: 75 TABLET, FILM COATED ORAL at 08:53

## 2017-01-01 RX ADMIN — POTASSIUM CHLORIDE 20 MEQ: 750 CAPSULE, EXTENDED RELEASE ORAL at 08:37

## 2017-01-01 RX ADMIN — CLOPIDOGREL 75 MG: 75 TABLET, FILM COATED ORAL at 09:24

## 2017-01-01 RX ADMIN — ISOSORBIDE MONONITRATE 15 MG: 30 TABLET ORAL at 09:12

## 2017-01-01 RX ADMIN — ACETAMINOPHEN 650 MG: 325 TABLET ORAL at 09:59

## 2017-01-01 RX ADMIN — POTASSIUM CHLORIDE 20 MEQ: 750 CAPSULE, EXTENDED RELEASE ORAL at 09:04

## 2017-01-01 RX ADMIN — ACETAMINOPHEN 650 MG: 325 TABLET ORAL at 18:30

## 2017-01-01 RX ADMIN — METOPROLOL TARTRATE 100 MG: 100 TABLET, FILM COATED ORAL at 20:12

## 2017-01-01 RX ADMIN — FUROSEMIDE 80 MG: 80 TABLET ORAL at 08:53

## 2017-01-01 RX ADMIN — FINASTERIDE 5 MG: 5 TABLET, FILM COATED ORAL at 08:41

## 2017-01-01 RX ADMIN — FUROSEMIDE 80 MG: 80 TABLET ORAL at 09:04

## 2017-01-01 RX ADMIN — BUSPIRONE HYDROCHLORIDE 7.5 MG: 15 TABLET ORAL at 21:48

## 2017-01-01 RX ADMIN — BUSPIRONE HYDROCHLORIDE 7.5 MG: 15 TABLET ORAL at 17:28

## 2017-01-01 RX ADMIN — FAMOTIDINE 20 MG: 20 TABLET, FILM COATED ORAL at 09:04

## 2017-01-01 RX ADMIN — AMIODARONE HYDROCHLORIDE 200 MG: 200 TABLET ORAL at 14:24

## 2017-01-01 RX ADMIN — PANTOPRAZOLE SODIUM 40 MG: 40 TABLET, DELAYED RELEASE ORAL at 08:42

## 2017-01-01 RX ADMIN — ISOSORBIDE MONONITRATE 15 MG: 30 TABLET ORAL at 08:32

## 2017-01-01 RX ADMIN — ASPIRIN 81 MG: 81 TABLET ORAL at 13:13

## 2017-01-01 RX ADMIN — CEFTRIAXONE SODIUM 1 G: 1 INJECTION, SOLUTION INTRAVENOUS at 09:16

## 2017-01-01 RX ADMIN — HYDROCODONE BITARTRATE AND ACETAMINOPHEN 1 TABLET: 5; 325 TABLET ORAL at 20:30

## 2017-01-01 RX ADMIN — CLOPIDOGREL 75 MG: 75 TABLET, FILM COATED ORAL at 08:38

## 2017-01-01 RX ADMIN — FUROSEMIDE 80 MG: 10 INJECTION, SOLUTION INTRAMUSCULAR; INTRAVENOUS at 06:42

## 2017-01-01 RX ADMIN — ATORVASTATIN CALCIUM 40 MG: 20 TABLET, FILM COATED ORAL at 21:00

## 2017-01-01 RX ADMIN — TERAZOSIN HYDROCHLORIDE 5 MG: 5 CAPSULE ORAL at 17:42

## 2017-01-01 RX ADMIN — BUSPIRONE HYDROCHLORIDE 7.5 MG: 15 TABLET ORAL at 08:53

## 2017-01-01 RX ADMIN — HYDROCODONE BITARTRATE AND ACETAMINOPHEN 1 TABLET: 5; 325 TABLET ORAL at 10:26

## 2017-01-01 RX ADMIN — NYSTATIN AND TRIAMCINOLONE ACETONIDE: 100000; 1 OINTMENT TOPICAL at 09:27

## 2017-01-01 RX ADMIN — KETOTIFEN FUMARATE 1 DROP: 0.35 SOLUTION/ DROPS OPHTHALMIC at 09:28

## 2017-01-01 RX ADMIN — AMIODARONE HYDROCHLORIDE 100 MG: 200 TABLET ORAL at 08:44

## 2017-01-01 RX ADMIN — TAMSULOSIN HYDROCHLORIDE 0.4 MG: 0.4 CAPSULE ORAL at 08:36

## 2017-01-01 RX ADMIN — WARFARIN SODIUM 3 MG: 3 TABLET ORAL at 20:59

## 2017-01-01 RX ADMIN — ATORVASTATIN CALCIUM 40 MG: 20 TABLET, FILM COATED ORAL at 21:59

## 2017-01-01 RX ADMIN — TERAZOSIN HYDROCHLORIDE 5 MG: 5 CAPSULE ORAL at 09:24

## 2017-01-01 RX ADMIN — TAMSULOSIN HYDROCHLORIDE 0.4 MG: 0.4 CAPSULE ORAL at 09:12

## 2017-01-01 RX ADMIN — METOPROLOL TARTRATE 50 MG: 50 TABLET, FILM COATED ORAL at 23:07

## 2017-01-01 RX ADMIN — METOPROLOL TARTRATE 100 MG: 100 TABLET, FILM COATED ORAL at 21:14

## 2017-01-01 RX ADMIN — BETAMETHASONE DIPROPIONATE: 0.5 CREAM, AUGMENTED TOPICAL at 09:27

## 2017-01-01 RX ADMIN — Medication 1 CAPSULE: at 08:23

## 2017-01-01 RX ADMIN — BISACODYL 10 MG: 10 SUPPOSITORY RECTAL at 11:16

## 2017-01-01 RX ADMIN — ATORVASTATIN CALCIUM 40 MG: 20 TABLET, FILM COATED ORAL at 02:32

## 2017-01-01 RX ADMIN — FAMOTIDINE 20 MG: 20 TABLET, FILM COATED ORAL at 08:41

## 2017-01-01 RX ADMIN — ISOSORBIDE MONONITRATE 15 MG: 30 TABLET ORAL at 21:47

## 2017-01-01 RX ADMIN — Medication 1 CAPSULE: at 09:24

## 2017-01-01 RX ADMIN — PANTOPRAZOLE SODIUM 40 MG: 40 TABLET, DELAYED RELEASE ORAL at 06:44

## 2017-01-01 RX ADMIN — SODIUM CHLORIDE 75 ML/HR: 9 INJECTION, SOLUTION INTRAVENOUS at 13:13

## 2017-01-01 RX ADMIN — CEFTRIAXONE SODIUM 1 G: 1 INJECTION, SOLUTION INTRAVENOUS at 11:44

## 2017-01-01 RX ADMIN — TAZOBACTAM SODIUM AND PIPERACILLIN SODIUM 3.38 G: 375; 3 INJECTION, SOLUTION INTRAVENOUS at 14:02

## 2017-01-01 RX ADMIN — DIGOXIN 250 MCG: 0.25 INJECTION INTRAMUSCULAR; INTRAVENOUS at 01:43

## 2017-01-01 RX ADMIN — ATORVASTATIN CALCIUM 40 MG: 20 TABLET, FILM COATED ORAL at 21:55

## 2017-01-01 RX ADMIN — CYANOCOBALAMIN 1000 MCG: 1000 INJECTION, SOLUTION INTRAMUSCULAR; SUBCUTANEOUS at 12:24

## 2017-01-01 RX ADMIN — TAZOBACTAM SODIUM AND PIPERACILLIN SODIUM 4.5 G: 500; 4 INJECTION, SOLUTION INTRAVENOUS at 20:38

## 2017-01-01 RX ADMIN — AMIODARONE HYDROCHLORIDE 200 MG: 200 TABLET ORAL at 08:33

## 2017-01-01 RX ADMIN — ACETAMINOPHEN 650 MG: 325 TABLET ORAL at 01:36

## 2017-01-01 RX ADMIN — OXYCODONE HYDROCHLORIDE AND ACETAMINOPHEN 1 TABLET: 5; 325 TABLET ORAL at 03:46

## 2017-01-01 RX ADMIN — SODIUM BICARBONATE 650 MG: 650 TABLET ORAL at 16:34

## 2017-01-01 RX ADMIN — METOPROLOL TARTRATE 100 MG: 100 TABLET, FILM COATED ORAL at 21:55

## 2017-01-01 RX ADMIN — FUROSEMIDE 80 MG: 80 TABLET ORAL at 08:32

## 2017-01-01 RX ADMIN — Medication 10 ML: at 15:23

## 2017-01-01 RX ADMIN — CLOPIDOGREL 75 MG: 75 TABLET, FILM COATED ORAL at 09:20

## 2017-01-01 RX ADMIN — KETOTIFEN FUMARATE 1 DROP: 0.35 SOLUTION/ DROPS OPHTHALMIC at 17:28

## 2017-01-01 RX ADMIN — CLOPIDOGREL 75 MG: 75 TABLET, FILM COATED ORAL at 09:25

## 2017-01-01 RX ADMIN — FAMOTIDINE 20 MG: 20 TABLET, FILM COATED ORAL at 08:55

## 2017-01-01 RX ADMIN — WARFARIN SODIUM 1 MG: 1 TABLET ORAL at 18:11

## 2017-01-01 RX ADMIN — FINASTERIDE 5 MG: 5 TABLET, FILM COATED ORAL at 08:56

## 2017-01-01 RX ADMIN — DOCUSATE SODIUM -SENNOSIDES 2 TABLET: 50; 8.6 TABLET, COATED ORAL at 20:55

## 2017-01-01 RX ADMIN — AMIODARONE HYDROCHLORIDE 100 MG: 200 TABLET ORAL at 21:55

## 2017-01-01 RX ADMIN — ACETAMINOPHEN 650 MG: 650 SUPPOSITORY RECTAL at 16:55

## 2017-01-01 RX ADMIN — EPHEDRINE SULFATE 10 MG: 50 INJECTION INTRAMUSCULAR; INTRAVENOUS; SUBCUTANEOUS at 15:46

## 2017-01-01 RX ADMIN — METOROPROLOL TARTRATE 5 MG: 5 INJECTION, SOLUTION INTRAVENOUS at 17:47

## 2017-01-01 RX ADMIN — DIGOXIN 125 MCG: 0.12 TABLET ORAL at 08:25

## 2017-01-01 RX ADMIN — SODIUM CHLORIDE 75 ML/HR: 9 INJECTION, SOLUTION INTRAVENOUS at 02:29

## 2017-01-01 RX ADMIN — EPINEPHRINE 1 MG: 0.1 INJECTION, SOLUTION ENDOTRACHEAL; INTRACARDIAC; INTRAVENOUS at 12:32

## 2017-01-01 RX ADMIN — FUROSEMIDE 80 MG: 80 TABLET ORAL at 18:12

## 2017-01-01 RX ADMIN — TEMAZEPAM 15 MG: 15 CAPSULE ORAL at 01:55

## 2017-01-01 RX ADMIN — SODIUM CHLORIDE 50 ML/HR: 9 INJECTION, SOLUTION INTRAVENOUS at 05:46

## 2017-01-01 RX ADMIN — SODIUM BICARBONATE 650 MG: 650 TABLET ORAL at 20:56

## 2017-01-01 RX ADMIN — FAMOTIDINE 20 MG: 20 TABLET, FILM COATED ORAL at 09:19

## 2017-01-01 RX ADMIN — FLUCONAZOLE 200 MG: 200 TABLET ORAL at 09:12

## 2017-01-01 RX ADMIN — FINASTERIDE 5 MG: 5 TABLET, FILM COATED ORAL at 09:25

## 2017-01-01 RX ADMIN — AMIODARONE HYDROCHLORIDE 200 MG: 200 TABLET ORAL at 09:36

## 2017-01-01 RX ADMIN — LORAZEPAM 0.5 MG: 2 INJECTION, SOLUTION INTRAMUSCULAR; INTRAVENOUS at 22:37

## 2017-01-01 RX ADMIN — TEMAZEPAM 15 MG: 15 CAPSULE ORAL at 00:06

## 2017-01-01 RX ADMIN — AMIODARONE HYDROCHLORIDE 100 MG: 200 TABLET ORAL at 08:53

## 2017-01-01 RX ADMIN — SODIUM CHLORIDE 1000 ML: 9 INJECTION, SOLUTION INTRAVENOUS at 16:57

## 2017-01-01 RX ADMIN — VANCOMYCIN HYDROCHLORIDE 750 MG: 750 INJECTION, POWDER, LYOPHILIZED, FOR SOLUTION INTRAVENOUS at 19:04

## 2017-01-01 RX ADMIN — AZITHROMYCIN DIHYDRATE 500 MG: 500 INJECTION, POWDER, LYOPHILIZED, FOR SOLUTION INTRAVENOUS at 10:35

## 2017-01-01 RX ADMIN — METOPROLOL SUCCINATE 50 MG: 50 TABLET, FILM COATED, EXTENDED RELEASE ORAL at 08:50

## 2017-01-01 RX ADMIN — FUROSEMIDE 40 MG: 40 TABLET ORAL at 08:45

## 2017-01-01 RX ADMIN — AMIODARONE HYDROCHLORIDE 100 MG: 200 TABLET ORAL at 20:26

## 2017-01-01 RX ADMIN — FUROSEMIDE 80 MG: 10 INJECTION, SOLUTION INTRAMUSCULAR; INTRAVENOUS at 17:44

## 2017-01-01 RX ADMIN — Medication 1 CAPSULE: at 08:41

## 2017-01-01 RX ADMIN — POTASSIUM CHLORIDE 20 MEQ: 750 CAPSULE, EXTENDED RELEASE ORAL at 08:46

## 2017-01-01 RX ADMIN — AMIODARONE HYDROCHLORIDE 200 MG: 200 TABLET ORAL at 20:37

## 2017-01-01 RX ADMIN — FINASTERIDE 5 MG: 5 TABLET, FILM COATED ORAL at 08:53

## 2017-01-01 RX ADMIN — SODIUM CHLORIDE 75 ML/HR: 9 INJECTION, SOLUTION INTRAVENOUS at 16:15

## 2017-01-01 RX ADMIN — Medication 250 MG: at 17:07

## 2017-01-01 RX ADMIN — BUSPIRONE HYDROCHLORIDE 7.5 MG: 15 TABLET ORAL at 18:11

## 2017-01-01 RX ADMIN — SODIUM CHLORIDE 75 ML/HR: 9 INJECTION, SOLUTION INTRAVENOUS at 20:15

## 2017-01-01 RX ADMIN — DIGOXIN 125 MCG: 0.12 TABLET ORAL at 20:08

## 2017-01-01 RX ADMIN — GLYCERIN 2 DROP: .002; .002; .01 SOLUTION/ DROPS OPHTHALMIC at 13:35

## 2017-01-01 RX ADMIN — POTASSIUM CHLORIDE 40 MEQ: 750 CAPSULE, EXTENDED RELEASE ORAL at 12:54

## 2017-01-01 RX ADMIN — FINASTERIDE 5 MG: 5 TABLET, FILM COATED ORAL at 08:43

## 2017-01-01 RX ADMIN — KETOTIFEN FUMARATE 1 DROP: 0.35 SOLUTION/ DROPS OPHTHALMIC at 17:38

## 2017-01-01 RX ADMIN — METOPROLOL TARTRATE 50 MG: 50 TABLET, FILM COATED ORAL at 05:50

## 2017-01-01 RX ADMIN — BUSPIRONE HYDROCHLORIDE 7.5 MG: 15 TABLET ORAL at 09:03

## 2017-01-01 RX ADMIN — Medication 1 CAPSULE: at 12:14

## 2017-01-01 RX ADMIN — AMIODARONE HYDROCHLORIDE 100 MG: 200 TABLET ORAL at 08:26

## 2017-01-01 RX ADMIN — FINASTERIDE 5 MG: 5 TABLET, FILM COATED ORAL at 09:01

## 2017-01-01 RX ADMIN — TAMSULOSIN HYDROCHLORIDE 0.4 MG: 0.4 CAPSULE ORAL at 17:38

## 2017-01-01 RX ADMIN — BUSPIRONE HYDROCHLORIDE 7.5 MG: 15 TABLET ORAL at 08:28

## 2017-01-01 RX ADMIN — DIGOXIN 125 MCG: 0.12 TABLET ORAL at 18:11

## 2017-01-01 RX ADMIN — OXYCODONE HYDROCHLORIDE AND ACETAMINOPHEN 1 TABLET: 5; 325 TABLET ORAL at 09:46

## 2017-01-01 RX ADMIN — BUSPIRONE HYDROCHLORIDE 7.5 MG: 15 TABLET ORAL at 17:33

## 2017-01-01 RX ADMIN — IPRATROPIUM BROMIDE AND ALBUTEROL SULFATE 3 ML: .5; 3 SOLUTION RESPIRATORY (INHALATION) at 14:57

## 2017-01-09 NOTE — PROGRESS NOTES
Date of Office Visit: 2017  Encounter Provider: Carolyn Ramirez MD  Place of Service: [facility] CARDIOLOGY  Patient Name: Aakash Sanchez Jr.  :1935      Patient ID:  Aakash Sanchez Jr. is a 81 y.o. male is here for  followup for heart failure and CAD.         History of Present Illness    I saw him when he was admitted to [facility] on  2014, for heart failure and atrial fibrillation. He came in with severe  short-windedness and was found to have heart failure, which was due to atrial fibrillation  with rapid ventricular response.      He is known to have a history of coronary artery disease and had coronary artery bypass  grafting done in , two vessel. He had prior to that a myocardial infarction in the  early . He is known to have hypertension and hyperlipidemia and a history of an  aortic aneurysm. Dr. Erna Felix did the repair for his aortic aneurysm. He has a  history of a spiculated lung mass for which he has been followed by Dr. Douglass.    Apparently, that was seen again on CT of his chest. 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 He is noted to have chronic  kidney disease (also stage III), which I believe was diagnosed at that same time. When we  saw him, we basically recommended rate control and diuresis, which was successful during  the hospital. We also did an investigation to make sure that there was no reversible  cause for his heart failure. He had a stress study, which showed old inferior lateral  infarct with a mild amount of anteroseptal ischemia. That was done in 2014. He also  had an echocardiogram done showing an ejection fraction of 40-45% with mild ventricular  dilation, inferior  wall akinesis, posterior wall akinesis, moderate left atrial dilation,  and moderate mitral insufficiency. He was started on warfarin during the hospitalization  and has remained on it.        He was seen 08/2014 and at that time he was having increasing short-windedness and fatigue. We sent him for a cardiac catheterization which showed 30% to 40% calcified distal left main stenosis, chronic total occlusion of the proximal left anterior descending artery, chronic total occlusion of the proximal circumflex, and chronic total occlusion of the proximal right coronary artery. The LIMA to LAD was patent. The SVG to PDA was patent and there was collaterals filling of the distal RCA and OM-2. His cardiac output by RAINA was 4.72 liters per minute with cardiac index of 2.3 liters per minute per meter squared. There was a mild elevation of pulmonary capillary wedge pressure. He was treated medically and released.    He had a Holter recording showing atrial fibrillation with significant episodes of wide complex beats of wide complex tachycardia which I thought was maybe aberrant conduction done on 2014. His Holter was done at [facility].          At his visit with me on 04/18/2016, he was having a lot of short-windedness with activity and chest pressure at rest or with activity. He had laboratory values done in 04/2016 which showed a creatinine of 2.61. He followed up with Dr. Ramon for this and he said that was his baseline. Because of his difficulty with breathing, several tests were ordered. He had a thyroid study because of amiodarone which was normal. His liver panel was normal. He had a 2-D echocardiogram with Doppler done on 04/27/2016 which showed an ejection fraction of 40% with moderate concentric left ventricular hypertrophy, moderately reduced left ventricular systolic function, grade IA diastolic dysfunction and moderate left atrial enlargement. There was global hypokinesis noted. He was also  supposed to have a chest x-ray but I do not see the results of that here. I did see from 06/2016 that his parathyroid hormone level was intact.      Mr. Aakash Sanchez has been in and out of the hospital multiple times since his last visit. He was hospitalized in 09/2016 and again in 11/2016, and then transferred to [facility] in 11/2016. In September he was diagnosed with respiratory failure which was hypoxic in nature and they felt like it was due to pneumonia. When he was hospitalized in November it was acute on chronic systolic ischemic heart failure and acute hypoxic respiratory failure. He had an echocardiogram performed on 11/25/2016 which revealed an ejection fraction of 53% with grade I diastolic dysfunction, mild aortic valve calcification, mild tricuspid calcification. He did have volume overload but he also had acute kidney injury. He was on amiodarone during the November hospitalization and there was a question about whether the amiodarone was causing interstitial lung disease. So the amiodarone was discontinued and he had lung biopsies taken. The biopsy results are not back yet.  His discharge summary from [facility] was for a discharge date of 11/30/2016. 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   He was hospitalized from 12/14/2016 to 12/17/2016 with mild colitis.  Dr. Rizvi from gastroenterology did see him during that time.           He did have a stress nuclear perfusion study, which was done on 08/09/2016, and it showed inferior wall infarct but no evidence of ischemia.       At this time he does not feel well.  He is lightheaded.  He did have a protein bar for breakfast this morning but nothing else.  He said his blood sugar was fine at home this morning.  He has not had a lot of water and feels relatively dehydrated.  He says he just feels kind of weak and dizzy.  He has no lower extremity edema.  He says his weight has  otherwise been very stable.  He has not had any difficulty breathing that is unusual.  He has some chronic dyspnea but it has not gotten worse.  He has had no abdominal distension and no exertional chest pain or pressure.  He does not feel his heart racing and he has had no falls or syncope.  He has noticed for the past two days that he has blood in his urine intermittently, bright red and he has had some flank pain which has been very mild and sort of coming and going.  He thinks it might be his back.  He has had some mild burning with urination as well.  He denies any fever, vomiting or abdominal pain.          Past Medical History   Diagnosis Date   • AAA (abdominal aortic aneurysm)      s/p repair, Dr. Felix   • Acute myocardial infarction    • Arteriosclerotic coronary artery disease    • Arthritis    • Atrial fibrillation    • Cardiomyopathy    • Chronic systolic (congestive) heart failure    • CKD (chronic kidney disease), stage III    • COPD (chronic obstructive pulmonary disease) 11/15/2016   • Coronary artery disease      MI 1990's, CABG x 2 V (LIMA-LAD, SVG-PDA), 1997   • Diabetes mellitus    • Dyspnea    • Fatigue    • Hyperlipidemia    • Hypertension    • Ischemic cardiomyopathy      LVEF 40%   • Lung mass 04/18/2016     spiculated, being followed   • Lung nodule seen on imaging study    • Malignant neoplasm    • PAF (paroxysmal atrial fibrillation)    • Pleural effusion    • Premature ventricular contractions    • Prostate cancer    • PVC (premature ventricular contraction)    • Thoracic aortic ectasia          Past Surgical History   Procedure Laterality Date   • Arterial aneurysm repair     • Appendectomy     • Coronary artery bypass graft     • Prostate surgery     • Cardiac surgery     • Bronchoscopy Right 10/5/2016     Procedure: BRONCHOSCOPY;  Surgeon: Jhony Quintanilla MD;  Location: [facility] ENDOSCOPY;  Service:    • Bronchoscopy N/A 11/29/2016     Procedure: BRONCHOSCOPY WITH FLUORO w/biopsy AND BAL;   Surgeon: Ervin Stiles MD;  Location: [facility] ENDOSCOPY;  Service:    • Cholecystectomy     • Colonoscopy N/A 12/16/2016     Procedure: COLONOSCOPY TO ASC COLON  WITH BIOPSY AND POLYPECTOMY;  Surgeon: Lane Brian MD;  Location: [facility] ENDOSCOPY;  Service:        Current Outpatient Prescriptions on File Prior to Visit   Medication Sig Dispense Refill   • acetaminophen (TYLENOL) 650 MG 8 hr tablet Take 650 mg by mouth every 8 (eight) hours as needed for mild pain (1-3).     • albuterol (PROVENTIL HFA;VENTOLIN HFA) 108 (90 BASE) MCG/ACT inhaler Inhale 2 puffs Every 4 (Four) Hours As Needed for wheezing. 1 inhaler 0   • aspirin 81 MG tablet Take  by mouth daily.     • atorvastatin (LIPITOR) 20 MG tablet Take 1 tablet by mouth Every Night. 30 tablet 11   • carvedilol (COREG) 25 MG tablet Take 1 tablet by mouth 2 (Two) Times a Day With Meals. 90 tablet 0   • clotrimazole (LOTRIMIN) 1 % cream Apply  topically Every 12 (Twelve) Hours. 24 g 0   • doxazosin (CARDURA) 8 MG tablet Take 8 mg by mouth Daily.     • furosemide (LASIX) 20 MG tablet Take 1 tablet by mouth Daily. 30 tablet 0   • insulin aspart (NovoLOG) 100 UNIT/ML injection Inject 0-7 Units under the skin 4 (Four) Times a Day Before Meals & at Bedtime. 100 Units 0   • insulin detemir (LEVEMIR) 100 UNIT/ML injection Inject 10 Units under the skin Every Night. 1 pen 0   • isosorbide mononitrate (IMDUR) 60 MG 24 hr tablet Take 1 tablet by mouth every morning. (Patient taking differently: Take 60 mg by mouth Daily.) 90 tablet 3   • omeprazole (priLOSEC) 40 MG capsule Take 1 capsule by mouth Daily. 30 capsule 0   • potassium chloride (MICRO-K) 10 MEQ CR capsule Take 2 capsules by mouth Daily. 180 capsule 1   • saccharomyces boulardii (FLORASTOR) 250 MG capsule Take 1 capsule by mouth 2 (Two) Times a Day. 60 capsule 0   • traMADol (ULTRAM) 50 MG tablet Take 50 mg by mouth every 6 (six) hours as needed for moderate pain (4-6).     • warfarin (COUMADIN) 1 MG tablet As  directed 30 tablet 0   • [DISCONTINUED] predniSONE (DELTASONE) 5 MG tablet Take 1 tablet by mouth Daily. 7 tablet 0     No current facility-administered medications on file prior to visit.        Social History     Social History   • Marital status:      Spouse name: N/A   • Number of children: N/A   • Years of education: N/A     Occupational History   • Not on file.     Social History Main Topics   • Smoking status: Former Smoker   • Smokeless tobacco: Former User     Quit date: 9/22/1985      Comment: caffeine use   • Alcohol use Yes      Comment: whiskey once a week    • Drug use: No   • Sexual activity: Defer     Other Topics Concern   • Not on file     Social History Narrative           Review of Systems   Constitution: Negative.   HENT: Negative for congestion and headaches.    Eyes: Negative for vision loss in left eye and vision loss in right eye.   Respiratory: Negative.  Negative for cough, hemoptysis, shortness of breath, sleep disturbances due to breathing, snoring, sputum production and wheezing.    Endocrine: Negative.    Hematologic/Lymphatic: Negative.    Skin: Negative for poor wound healing and rash.   Musculoskeletal: Negative for falls, gout, muscle cramps and myalgias.   Gastrointestinal: Negative for abdominal pain, diarrhea, dysphagia, hematemesis, melena, nausea and vomiting.   Neurological: Negative for excessive daytime sleepiness, dizziness, light-headedness, loss of balance, seizures and vertigo.   Psychiatric/Behavioral: Negative for depression and substance abuse. The patient is not nervous/anxious.        Procedures    ECG 12 Lead  Date/Time: 1/9/2017 9:19 AM  Performed by: SAVANAH BERGER  Authorized by: SAVANAH BERGER   Comparison: compared with previous ECG   Similar to previous ECG  Rhythm: sinus rhythm  Conduction: left bundle branch block  Clinical impression: abnormal ECG               Objective:      Vitals:    01/09/17 0854   BP: 110/72   Pulse: 68   Weight:  "182 lb (82.6 kg)   Height: 70\" (177.8 cm)     Body mass index is 26.11 kg/(m^2).    Physical Exam   Constitutional: He is oriented to person, place, and time. He appears well-developed and well-nourished. No distress.   HENT:   Head: Normocephalic and atraumatic.   Eyes: Conjunctivae are normal. No scleral icterus.   Neck: Neck supple. No JVD present. Carotid bruit is not present. No thyromegaly present.   Cardiovascular: Normal rate, regular rhythm, S1 normal, S2 normal, normal heart sounds and intact distal pulses.   No extrasystoles are present. PMI is not displaced.    No murmur heard.  Pulses:       Carotid pulses are 2+ on the right side, and 2+ on the left side.       Radial pulses are 2+ on the right side, and 2+ on the left side.        Dorsalis pedis pulses are 2+ on the right side, and 2+ on the left side.        Posterior tibial pulses are 2+ on the right side, and 2+ on the left side.   Pulmonary/Chest: Effort normal and breath sounds normal. No respiratory distress. He has no wheezes. He has no rhonchi. He has no rales. He exhibits no tenderness.   Abdominal: Soft. Bowel sounds are normal. He exhibits no distension, no abdominal bruit, no ascites and no mass. There is no tenderness.   Musculoskeletal: He exhibits no edema or deformity.   Lymphadenopathy:     He has no cervical adenopathy.   Neurological: He is alert and oriented to person, place, and time. No cranial nerve deficit.   Skin: Skin is warm and dry. No rash noted. He is not diaphoretic. No cyanosis. No pallor. Nails show no clubbing.   Psychiatric: He has a normal mood and affect. Judgment normal.   Vitals reviewed.      Lab Review:       Assessment:      Diagnosis Plan   1. Diastolic CHF, chronic     2. Coronary artery disease involving native heart without angina pectoris, unspecified vessel or lesion type     3. PAF (paroxysmal atrial fibrillation)     4. Chronic obstructive pulmonary disease with acute exacerbation       1. Acute " diastolic heart failure requiring admission from 01/16/2014-01/20/2014 at [facility]. He was treated with diuretics and controlled heart rate. No CHF now.    2. Mild ischemic cardiomyopathy with prior inferior and posterior myocardial infarction  noted on an echocardiogram done in 01/2014.  Now LVEF is 53% 11/2016  3. Atrial fibrillation. He is now in NSR. He is on warfarin.    4. Spiculated lung mass in the right middle lobe. He is following with Dr. Douglass.    5. Chronic kidney disease, stage III. Follows with Dr. Ramon.  6. Hypertension. Well controlled.  7. History of coronary artery disease with myocardial infarction in the early 1990s and  coronary artery bypass grafting (two-vessel) in 1997.    8. Abdominal aortic aneurysm status post repair with Dr. Erna Felix.    9. Increasing fatigue and dyspnea with activity 8/2014, now better. He had a mildly ischemic  stress nuclear study 1/2014 but was stable then. Cardiac catheterization done 8/2014 showed occluded    proximal native coronary arteries but patent LIMA to LAD and patent SVG to PDA with collaterals to the    circumflex system. Nonischemic stress nuclear 8/2016  10. Frequent PVCs, decrease Amiodarone 100mg QD. Bradycardia now but not PVCs.    11. Dyspnea with exertion and fatigue. Seems more pulmonary. Admissions for resp failure 9/2016 and 11/2016.   12. Hematuria with dysuria, urinanalysis today and see Dr. Ramon in am.   13. Dizzy and weak, better after water and chocolate.     Plan:       See back in 6 weeks, no med changes.     Coronary Artery Disease  Assessment  • The patient has no angina    Subjective - Objective  • There is a history of previous coronary artery bypass graft  • Current antiplatelet therapy includes aspirin 81 mg     Atrial Fibrillation and Atrial Flutter  Assessment  • The patient has paroxysmal atrial fibrillation  • This is non-valvular in etiology  • The patient's CHADS2-VASc score is 4  • A  [id number] score of 2 or more is considered a high risk for a thromboembolic event    Plan  • Attempt to maintain sinus rhythm  • Continue warfarin for antithrombotic therapy, bleeding issues discussed  • Continue beta blocker for rhythm control      Heart Failure  Assessment  • NYHA class III-A - There is limitation of physical activity. The patient is comfortable at rest, but ordinary activity causes fatigue, palpitations or shortness of breath.  • Beta blocker prescribed  • Diuretics prescribed  • Left ventricular function is normal by qualitative assessment    Plan  • The heart failure care plan was discussed with the patient today including: continuing the current program    Subjective/Objective    • Physical exam findings negative for rales, peripheral edema, elevated JVP and ascites.

## 2017-01-09 NOTE — MR AVS SNAPSHOT
Aakash ARREDONDO Daniel Suárez   1/9/2017 9:00 AM   Office Visit    Dept Phone:  [phone]   Encounter #:  [id number]    Provider:  Carolyn Ramirez MD   Department:  [facility] CARDIOLOGY                Your Full Care Plan              Today's Medication Changes          These changes are accurate as of: 1/9/17  9:41 AM.  If you have any questions, ask your nurse or doctor.               Stop taking medication(s)listed here:     predniSONE 5 MG tablet   Commonly known as:  DELTASONE   Stopped by:  Carolyn Ramirez MD                      Your Updated Medication List          This list is accurate as of: 1/9/17  9:41 AM.  Always use your most recent med list.                acetaminophen 650 MG 8 hr tablet   Commonly known as:  TYLENOL       albuterol 108 (90 BASE) MCG/ACT inhaler   Commonly known as:  PROVENTIL HFA;VENTOLIN HFA   Inhale 2 puffs Every 4 (Four) Hours As Needed for wheezing.       aspirin 81 MG tablet       atorvastatin 20 MG tablet   Commonly known as:  LIPITOR   Take 1 tablet by mouth Every Night.       carvedilol 25 MG tablet   Commonly known as:  COREG   Take 1 tablet by mouth 2 (Two) Times a Day With Meals.       clotrimazole 1 % cream   Commonly known as:  LOTRIMIN   Apply  topically Every 12 (Twelve) Hours.       doxazosin 8 MG tablet   Commonly known as:  CARDURA       furosemide 20 MG tablet   Commonly known as:  LASIX   Take 1 tablet by mouth Daily.       insulin aspart 100 UNIT/ML injection   Commonly known as:  novoLOG   Inject 0-7 Units under the skin 4 (Four) Times a Day Before Meals & at Bedtime.       insulin detemir 100 UNIT/ML injection   Commonly known as:  LEVEMIR   Inject 10 Units under the skin Every Night.       isosorbide mononitrate 60 MG 24 hr tablet   Commonly known as:  IMDUR   Take 1 tablet by mouth every morning.       loperamide 2 MG capsule   Commonly known as:  IMODIUM       omeprazole 40 MG capsule   Commonly known  as:  priLOSEC   Take 1 capsule by mouth Daily.       potassium chloride 10 MEQ CR capsule   Commonly known as:  MICRO-K   Take 2 capsules by mouth Daily.       saccharomyces boulardii 250 MG capsule   Commonly known as:  FLORASTOR   Take 1 capsule by mouth 2 (Two) Times a Day.       traMADol 50 MG tablet   Commonly known as:  ULTRAM       warfarin 1 MG tablet   Commonly known as:  COUMADIN   As directed               We Performed the Following     ECG 12 Lead       You Were Diagnosed With        Codes Comments    Diastolic CHF, chronic    -  Primary ICD-10-CM: I50.32  ICD-9-CM: 428.32, 428.0     Coronary artery disease involving native heart without angina pectoris, unspecified vessel or lesion type     ICD-10-CM: I25.10  ICD-9-CM: 414.01     PAF (paroxysmal atrial fibrillation)     ICD-10-CM: I48.0  ICD-9-CM: 427.31     Chronic obstructive pulmonary disease with acute exacerbation     ICD-10-CM: J44.1  ICD-9-CM: 491.21     Hematuria     ICD-10-CM: R31.9  ICD-9-CM: 599.70       Instructions     None    Patient Instructions History      Upcoming Appointments     Visit Type Date Time Department    FOLLOW UP 2017  9:00 AM [facility]    NEW PATIENT 2017 11:00 AM [facility]    FOLLOW UP 2017  1:30 PM [facility]    FOLLOW UP 2017  9:15 AM [facility] Signup     [facility] [employer] allows you to send messages to your doctor, view your test results, renew your prescriptions, schedule appointments, and more. To sign up, go to [employer] and click on the Sign Up Now link in the New User? box. Enter your [employer] Activation Code exactly as it appears below along with the last four digits of your Social Security Number and your Date of Birth () to complete the sign-up process. If you do not sign up before the expiration date, you must request a new code.    [employer] Activation Code: [email or web address]  Expires: 2017  9:41 AM    If you  "have questions, you can email [email or web address] or call [phone] to talk to our MyChart staff. Remember, MyChart is NOT to be used for urgent needs. For medical emergencies, dial 911.               Other Info from Your Visit           Your Appointments     Jan 18, 2017 11:00 AM EST   New Patient with Dimitrios Lindquist MD   [facility] INTERNAL MED AND PEDS (--)    [address]  [place] [address] [phone]           Bring all previous medical records and films, along with current medications and insurance information.            Feb 08, 2017  1:30 PM EST   Follow Up with Hai Pappas MD   [facility] GASTROENTEROLOGY (--)    [address]  [facility] [address] [phone]           Arrive 15 minutes prior to appointment.            Feb 13, 2017  9:15 AM EST   Follow Up with Carolyn Ramirez MD   [facility] CARDIOLOGY (--)    [address]  [place] [address] [phone]           Arrive 15 minutes prior to appointment.              Allergies     Nsaids      Per patient, kidney doctor says not to take NSAIDS because they could hurt his kidneys. Not a true allergy.       Vital Signs     Blood Pressure Pulse Height Weight Body Mass Index Smoking Status    110/72 68 70\" (177.8 cm) 182 lb (82.6 kg) 26.11 kg/m2 Former Smoker      Problems and Diagnoses Noted     Chronic airway obstruction    Coronary heart disease    Heart failure    Atrial fibrillation (irregular heartbeat)    Blood in urine          No Longer an Issue     Heart failure      Results         "

## 2017-01-11 NOTE — TELEPHONE ENCOUNTER
I called Willy and advised the info below. She says apparently he is in between PCPs at this time. He is supposed to see Dr. Lindquist but not till 1/18/17. He is also not sure who he sees nephrology wise. She asked if you would be okay with prescribing something until he sees Dr. Lindquist on 1/18. Please advise.    Thanks,  Caroline

## 2017-01-11 NOTE — TELEPHONE ENCOUNTER
Willy with [facility] called about pt. She asked if you had seen his 1/9/17 urinalysis and that he has a UTI. Patient also called about the results as well. Willy wants to know if you would address. Also she called in his PT INR which I put in your inbox. She can be reached at #[phone] if need be. Please advise.  Thanks,  Caroline

## 2017-01-12 NOTE — TELEPHONE ENCOUNTER
Erna from [facility] called and said patient's INR is 3.7.  (see in box for table.)  Patient did hold Coumadin.  Also pt started on Bactrim on 1/11 for 5 days for a UTI.  Also, pt fell yesterday and bumped his head.  It looks like a carpet burn on his forehead and patient refuses to go to the ER per Erna.  Thais

## 2017-01-13 NOTE — TELEPHONE ENCOUNTER
17  11:04 AM  Aakash Sanchez Jr.  1935    Home Phone [phone]   Mobile [phone]     Erna with [facility]  calls to report that this pt had another fall yesterday and denies injury. Erna believe these falls are related to orthostatic hypotension. Today, BP sitting was 96/54, HR 57 and standing 72/40, HR 72.    /58   /64  His weight has been stable at 176lbs x 10 days.    Mr. Sanchez that the lightheaded usually occurs about 1-2 hours after taking his medication and lasts throughout the day. He is takinmg carvedilol BID  60mg Imdur daily  20mg lasix daily    The pt would like to avoid going to the hospital if possible. Would you like to adjust his medications?    Carol MCLEAN RN

## 2017-01-13 NOTE — TELEPHONE ENCOUNTER
01/13/17  12:09 PM  Called Erna and instructed that pt should take 12.5mg carvedilol BID/ RBV. I also gave her the orthostatic BP to checked tomorrow morning, 1/14/17. She will put pt on schedule for visit tomorrow. I asked her to call results to answering service tomorrow - tmm.

## 2017-01-18 PROBLEM — N18.30 CKD (CHRONIC KIDNEY DISEASE), STAGE III (HCC): Status: ACTIVE | Noted: 2017-01-01

## 2017-01-18 PROBLEM — N39.0 URINARY TRACT INFECTION WITH HEMATURIA: Status: ACTIVE | Noted: 2017-01-01

## 2017-01-18 PROBLEM — R31.9 URINARY TRACT INFECTION WITH HEMATURIA: Status: ACTIVE | Noted: 2017-01-01

## 2017-01-18 NOTE — PATIENT INSTRUCTIONS
Assessment/Plan   Aakash was seen today for establish care and hyperlipidemia.    Diagnoses and all orders for this visit:    Acute cystitis with hematuria    Other emphysema    Essential hypertension    PAF (paroxysmal atrial fibrillation)      C dif likely resolved  Continue probiotic indefinitely  Eat high protein   High veggie diet    UTI - doubt still active, UA today  Seeing dr. Wan.  If has prostatic obstruction, he will have to address  Stay hydrated.      Having some low blood pressure, likely some new weight loss.    Previously on amiodarone, in NSR by palpation today, I did not repeat EKG.   Stay hydrated  INR monitoring with Dr. 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  Will follow with nephrology.  He has elevation from 1.5 in 12/16.    Spent 50 min in care of patient >50% record review, counseling, assimilating information from 4 hospitalizations.   Will recheck with home health next week.  i will work on arranging through our clinic. I want him hydrated for this blood draw.           patient reports shingles vaccine 2012 likely through pharmacy but will query allscripts  Pneumonia shots - reports up to date, but again not sure if prevnar or pneumovax  Tetanus likely in 2013  Colonoscopy with kalin 2012

## 2017-01-18 NOTE — MR AVS SNAPSHOT
Aakash ARREDONDO Daniel Suárez   1/18/2017 11:00 AM   Office Visit    Dept Phone:  [phone]   Encounter #:  [id number]    Provider:  Dimitrios Lindquist MD   Department:  [facility]                Your Full Care Plan              Your Updated Medication List          This list is accurate as of: 1/18/17 12:10 PM.  Always use your most recent med list.                acetaminophen 650 MG 8 hr tablet   Commonly known as:  TYLENOL       albuterol 108 (90 BASE) MCG/ACT inhaler   Commonly known as:  PROVENTIL HFA;VENTOLIN HFA   Inhale 2 puffs Every 4 (Four) Hours As Needed for wheezing.       aspirin 81 MG tablet       atorvastatin 20 MG tablet   Commonly known as:  LIPITOR   Take 1 tablet by mouth Every Night.       carvedilol 25 MG tablet   Commonly known as:  COREG   Take 1 tablet by mouth 2 (Two) Times a Day With Meals.       clotrimazole 1 % cream   Commonly known as:  LOTRIMIN   Apply  topically Every 12 (Twelve) Hours.       doxazosin 8 MG tablet   Commonly known as:  CARDURA       furosemide 20 MG tablet   Commonly known as:  LASIX   Take 1 tablet by mouth Daily.       insulin aspart 100 UNIT/ML injection   Commonly known as:  novoLOG   Inject 0-7 Units under the skin 4 (Four) Times a Day Before Meals & at Bedtime.       insulin detemir 100 UNIT/ML injection   Commonly known as:  LEVEMIR   Inject 10 Units under the skin Every Night.       isosorbide mononitrate 60 MG 24 hr tablet   Commonly known as:  IMDUR   Take 1 tablet by mouth every morning.       loperamide 2 MG capsule   Commonly known as:  IMODIUM       omeprazole 40 MG capsule   Commonly known as:  priLOSEC   Take 1 capsule by mouth Daily.       potassium chloride 10 MEQ CR capsule   Commonly known as:  MICRO-K   Take 2 capsules by mouth Daily.       saccharomyces boulardii 250 MG capsule   Commonly known as:  FLORASTOR   Take 1 capsule by mouth 2 (Two) Times a Day.       sulfamethoxazole-trimethoprim  800-160 MG per tablet   Commonly known as:  BACTRIM DS   Take 1 tablet by mouth 2 (Two) Times a Day.       traMADol 50 MG tablet   Commonly known as:  ULTRAM       * warfarin 1 MG tablet   Commonly known as:  COUMADIN   As directed       * warfarin 2 MG tablet   Commonly known as:  COUMADIN   TAKE ONE AND ONE-HALF (1 & 1/2) TABLET BY MOUTH 2 DAYS A WEEK AND TAKE 1 TABLET BY MOUTH 5 DAYS A WEEK AS DIRECTED       * warfarin 3 MG tablet   Commonly known as:  COUMADIN   TAKE ONE TABLET BY MOUTH DAILY       * Notice:  This list has 3 medication(s) that are the same as other medications prescribed for you. Read the directions carefully, and ask your doctor or other care provider to review them with you.            You Were Diagnosed With        Codes Comments    Acute cystitis with hematuria    -  Primary ICD-10-CM: N30.01  ICD-9-CM: 595.0     Other emphysema     ICD-10-CM: J43.8  ICD-9-CM: 492.8     Essential hypertension     ICD-10-CM: I10  ICD-9-CM: 401.9     PAF (paroxysmal atrial fibrillation)     ICD-10-CM: I48.0  ICD-9-CM: 427.31       Instructions      Assessment/Plan   Aakash was seen today for establish care and hyperlipidemia.    Diagnoses and all orders for this visit:    Acute cystitis with hematuria    Other emphysema    Essential hypertension    PAF (paroxysmal atrial fibrillation)      C dif likely resolved  Continue probiotic indefinitely  Eat high protein   High veggie diet    UTI - doubt still active, UA today  Seeing dr. Wan.  If has prostatic obstruction, he will have to address  Stay hydrated.      Having some low blood pressure, likely some new weight loss.    Previously on amiodarone, in NSR by palpation today, I did not repeat EKG.   Stay hydrated  INR monitoring with Dr. Ramirez    COpD seeing pulmonary  Start anoro  Continue albuterol inhaler 2-4 puff if needed  PFts with pulm, will review when available, appears to be ILD and copd from chart    DM likely resolved, HBa1c today  Continue  morning sugar monitor and monitor    CkD - cre 2.1.  Will follow with nephrology.  He has elevation from 1.5 in .    Spent 50 min in care of patient >50% record review, counseling, assimilating information from 4 hospitalizations.   Will recheck with home health next week.  i will work on arranging through our clinic. I want him hydrated for this blood draw.           patient reports shingles vaccine  likely through pharmacy but will query allscripts  Pneumonia shots - reports up to date, but again not sure if prevnar or pneumovax  Tetanus likely in   Colonoscopy with kalin        Patient Instructions History      Upcoming Appointments     Visit Type Date Time Department    NEW PATIENT 2017 11:00 AM [facility]    FOLLOW UP 2017  1:30 PM [facility]    FOLLOW UP 2017 10:30 AM [facility]      [employer] Signup     [facility] [employer] allows you to send messages to your doctor, view your test results, renew your prescriptions, schedule appointments, and more. To sign up, go to [employer] and click on the Sign Up Now link in the New User? box. Enter your [employer] Activation Code exactly as it appears below along with the last four digits of your Social Security Number and your Date of Birth () to complete the sign-up process. If you do not sign up before the expiration date, you must request a new code.    [employer] Activation Code: [email or web address]  Expires: 2017  9:41 AM    If you have questions, you can email "[email or web address][email or web address] or call [phone] to talk to our [employer] staff. Remember, [employer] is NOT to be used for urgent needs. For medical emergencies, dial 911.               Other Info from Your Visit           Your Appointments     2017  1:30 PM EST   Follow Up with Hai Pappas MD   [facility] (--)    [address]  [place] [address]   [phone]         "   Arrive 15 minutes prior to appointment.            Feb 17, 2017 10:30 AM EST   Follow Up with Carolyn Ramirez MD   [facility] (--)    [address]  [place] [address] [phone]           Arrive 15 minutes prior to appointment.              Allergies     Nsaids      Per patient, kidney doctor says not to take NSAIDS because they could hurt his kidneys. Not a true allergy.       Reason for Visit     Establish Care     Hyperlipidemia           Vital Signs     Blood Pressure Pulse Respirations Height Weight Oxygen Saturation    130/54 80 16 70\" (177.8 cm) 182 lb (82.6 kg) 96%    Body Mass Index Smoking Status                26.11 kg/m2 Former Smoker          Problems and Diagnoses Noted     CKD (chronic kidney disease), stage III    Chronic airway obstruction    High blood pressure    Atrial fibrillation (irregular heartbeat)    Urinary tract infection with hematuria        "

## 2017-01-18 NOTE — PROGRESS NOTES
Subjective   Aakash Sanchez Jr. is a 81 y.o. male.     History of Present Illness   80 yo male with pMHx HL, HTN,  He is here as new patient but he is also following up.  He was told he had UTI about wek and half ago treatment by phone with Dr. Ramirez.  Seeing Dr. Bernstein.  He did not do any testing- just told him to call back if necessary.  He did not see Dr. Bernstein but his nurse practitioner.      Recent hospitalization discharged 12/17/16.  He has PAF on coumadin , monitoring through home.  Previously followed by Dr. Ramirez.      PT and nurse coming from   Twice weekly.     He had CAD/Bypass in the 90s, CHF - last echo 11/16 - EF 53%    He had prostate cancer at one time, with seeds.  - diagnosed 2005.   Got very weak and deconditioned wit last 4 hospitalization.  Has successfully weaned off his oxygen.  Has steroid associated diabetes, slowly coming off diabetes  Home check before breakfast 120s.  Now off insulin.     [facility]aa surveillance with Dr. Felix, has appt in 4/16.    The following portions of the patient's history were reviewed and updated as appropriate: allergies, current medications, past family history, past medical history, past social history, past surgical history and problem list.    Review of Systems   Eyes: Negative.         Late on eye exams   Respiratory: Negative for cough and shortness of breath.    Cardiovascular: Negative.    Gastrointestinal:        Dailly bowel movement   Endocrine: Negative.    Genitourinary: Positive for frequency and hematuria. Negative for discharge, penile pain and testicular pain.   Musculoskeletal: Negative.    Allergic/Immunologic: Negative.    Neurological: Positive for weakness.        Diffuse weakness   Hematological: Negative.        Objective   Physical Exam   Constitutional: He is oriented to person, place, and time. He appears well-developed and well-nourished.   HENT:   Head: Normocephalic and atraumatic.   Right Ear: External ear normal.   Left  Ear: External ear normal.   Nose: Nose normal.   Mouth/Throat: No oropharyngeal exudate.   Eyes: Conjunctivae and EOM are normal. Pupils are equal, round, and reactive to light. Right eye exhibits no discharge. Left eye exhibits no discharge.   Neck: Normal range of motion. Neck supple. No thyromegaly present.   Cardiovascular: Normal rate, regular rhythm, normal heart sounds and intact distal pulses.    No murmur heard.  Pulmonary/Chest: Effort normal and breath sounds normal.   Abdominal: Soft. Bowel sounds are normal. He exhibits no distension.   No cVA tenderness   Genitourinary: Prostate normal and penis normal. Rectal exam shows guaiac negative stool. No penile tenderness.   Musculoskeletal: Normal range of motion.   Neurological: He is alert and oriented to person, place, and time. He has normal reflexes.   Skin: Skin is warm and dry.   Psychiatric: He has a normal mood and affect. His behavior is normal.   Nursing note and vitals reviewed.   1/9/16 - large wbc, resolved 1/15/16  CBC and CMP normal but creatinine 2.1, normocytic anemia  8/9/16 pharm stress teting.     C dif neg 12/15/16    UA  Hba1c    Assessment/Plan   Aakash was seen today for establish care and hyperlipidemia.    Diagnoses and all orders for this visit:    Acute cystitis with hematuria    Other emphysema    Essential hypertension    PAF (paroxysmal atrial fibrillation)      C dif likely resolved  Continue probiotic indefinitely  Eat high protein   High veggie diet    UTI - doubt still active, UA today  Seeing dr. Wan.  If has prostatic obstruction, he will have to address  Stay hydrated.      Having some low blood pressure, likely some new weight loss.    Previously on amiodarone, in NSR by palpation today, I did not repeat EKG.   Stay hydrated  INR monitoring with Dr. Ramirez    DM likely resolved, HBa1c today  Continue morning sugar monitor and monitor    CkD - cre 2.1.  Will follow with nephrology.  He has elevation from 1.5 in  12/16.    Spent 50 min in care of patient >50% record review, counseling, assimilating information from 4 hospitalizations.   Will recheck with home health next week.  i will work on arranging through our clinic. I want him hydrated for this blood draw.           patient reports shingles vaccine 2012 likely through pharmacy but will query allscripts  Pneumonia shots - reports up to date, but again not sure if prevnar or pneumovax  Tetanus likely in 2013  Colonoscopy with kalin Jean Baptiste

## 2017-01-20 NOTE — TELEPHONE ENCOUNTER
01/20/17  4:01 PM  Aakash Sanchez Jr.   1935    Home Phone [phone]   Mobile [phone]     Erna with [facility]  calls about this patient that she saw today. She states that overall, patient's orthostatic hypotension has been better. -120s with HR 70-80 on 12.5mg carvedilol BID.    During visit today, patient reported a return of these symptoms. Today, sitting /50 and standing BP 64/40. His HR 72. Patient states this is the worse he has felt since last week.    Current meds: 12.5mg carvedilol BID, 60mg imdur daily, and 20mg lasix daily.    Do you want to adjust his medications?    Erna [phone]    Carol MCLEAN RN

## 2017-01-20 NOTE — TELEPHONE ENCOUNTER
01/20/17  4:33 PM  Called Erna and gave her instructions to have patient take 6.25mg carvedilol BID and 30mg isosorbide daily. I sent in new orders for the new strengths of these medications to [facility] in [place] per Erna's request - tmm.

## 2017-01-24 NOTE — TELEPHONE ENCOUNTER
01/24/17  1:20 PM  Called Erna and instructed to cut imdur to 15mg daily and coreg to 3.125mg BID; RBV. Called Lisa at [facility] pharmacy; ok to cut imdur tablets that pt has at home - letym.

## 2017-01-24 NOTE — TELEPHONE ENCOUNTER
17  10:35 AM  Aakash Sanchez   1935    Home Phone [phone]   Mobile [phone]     Erna with [facility] calls with update about this patient. He reports issues with orthostasis when waking up in the morning. However, as the day goes on, he feels better and has less lightheadedness. Today, when sitting /58 and when standing BP 64/40; HR 83.    He is currently takin.25mg carvedilol BID  30mg imdur daily  20mg lasix daily    Do you want to further adjust medications?    Carol MCLEAN RN

## 2017-01-31 NOTE — TELEPHONE ENCOUNTER
Erna from home health called to report pt Orthostatic BP readings    Sittin/50   Standin/48  HR: 72  Pt states he feel a little better since you change the dose of his medicine.. Carol DOMINGUEZ

## 2017-02-14 NOTE — TELEPHONE ENCOUNTER
02/14/17  2:18 PM  Called Erna and instructed to stop imdur and increase lasix to 40mg/ RBV. Patient will keep his appt on 2/17 - tmm.

## 2017-02-14 NOTE — TELEPHONE ENCOUNTER
02/14/17  12:13 PM  Aakash Sanchez Jr.  1935    Erna calls today. She was going to discharge Mr. Sanchez from [place] [facility] today. He still has some orthostatic hypotension. Today, sitting BP 90/50, standing BP 80/40, and HR 70's. He is taking 30mg imdur daily, 3.125mg carvedilol, and 20mg lasix daily. He becomes lightheaded upon standing.     He has also gained 3lbs over 3 weeks. He has no edema, no increased SOA, and lungs are clear.     He has an appt with Dr. Ramirez this week on 2/17. She was going to discharge him from  today. Do you think he still needs to be followed? Do you want to further adjust his medication?    Carol MCLEAN RN

## 2017-02-17 PROBLEM — I95.9 HYPOTENSION: Status: ACTIVE | Noted: 2017-01-01

## 2017-02-17 NOTE — PROGRESS NOTES
Date of Office Visit: 2017  Encounter Provider: Carolyn Ramirez MD  Place of Service: [facility] CARDIOLOGY  Patient Name: Aakash Sanchez Jr.  :1935      Patient ID:  Aakash Sanchez Jr. is a 81 y.o. male is here for  followup for CAD, CMP and hypotension.         History of Present Illness  I saw him when he was admitted to [facility] on  2014, for heart failure and atrial fibrillation. He came in with severe  short-windedness and was found to have heart failure, which was due to atrial fibrillation  with rapid ventricular response.      He is known to have a history of coronary artery disease and had coronary artery bypass  grafting done in , two vessel. He had prior to that a myocardial infarction in the  early . He is known to have hypertension and hyperlipidemia and a history of an  aortic aneurysm. Dr. Erna Felix did the repair for his aortic aneurysm. He has a  history of a spiculated lung mass for which he has been followed by Dr. Douglass.    Apparently, that was seen again on CT of his chest. 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 He is noted to have chronic  kidney disease (also stage III), which I believe was diagnosed at that same time. When we  saw him, we basically recommended rate control and diuresis, which was successful during  the hospital. We also did an investigation to make sure that there was no reversible  cause for his heart failure. He had a stress study, which showed old inferior lateral  infarct with a mild amount of anteroseptal ischemia. That was done in 2014. He also  had an echocardiogram done showing an ejection fraction of 40-45% with mild ventricular  dilation, inferior  wall akinesis, posterior wall akinesis, moderate left atrial dilation,  and moderate mitral insufficiency. He was started on warfarin during the hospitalization  and has remained on it.        He was seen 08/2014 and at that time he was having increasing short-windedness and fatigue. We sent him for a cardiac catheterization which showed 30% to 40% calcified distal left main stenosis, chronic total occlusion of the proximal left anterior descending artery, chronic total occlusion of the proximal circumflex, and chronic total occlusion of the proximal right coronary artery. The LIMA to LAD was patent. The SVG to PDA was patent and there was collaterals filling of the distal RCA and OM-2. His cardiac output by RAINA was 4.72 liters per minute with cardiac index of 2.3 liters per minute per meter squared. There was a mild elevation of pulmonary capillary wedge pressure. He was treated medically and released.    He had a Holter recording showing atrial fibrillation with significant episodes of wide complex beats of wide complex tachycardia which I thought was maybe aberrant conduction done on 2014. His Holter was done at [facility].          At his visit with me on 04/18/2016, he was having a lot of short-windedness with activity and chest pressure at rest or with activity. He had laboratory values done in 04/2016 which showed a creatinine of 2.61. He followed up with Dr. Ramon for this and he said that was his baseline. Because of his difficulty with breathing, several tests were ordered. He had a thyroid study because of amiodarone which was normal. His liver panel was normal. He had a 2-D echocardiogram with Doppler done on 04/27/2016 which showed an ejection fraction of 40% with moderate concentric left ventricular hypertrophy, moderately reduced left ventricular systolic function, grade IA diastolic dysfunction and moderate left atrial enlargement. There was global hypokinesis noted. He was also  supposed to have a chest x-ray but I do not see the results of that here. I did see from 06/2016 that his parathyroid hormone level was intact.       He was in and out of the hospital multiple times since his last visit. He was hospitalized in 09/2016 and again in 11/2016, and then transferred to [facility] in 11/2016. In September he was diagnosed with respiratory failure which was hypoxic in nature and they felt like it was due to pneumonia. When he was hospitalized in November, it was acute on chronic systolic ischemic heart failure and acute hypoxic respiratory failure. He had an echocardiogram performed on 11/25/2016 which revealed an ejection fraction of 53% with grade I diastolic dysfunction, mild aortic valve calcification, mild tricuspid calcification. He did have volume overload but he also had acute kidney injury. He was on amiodarone during the November hospitalization and there was a question about whether the amiodarone was causing interstitial lung disease. So the amiodarone was discontinued and he had lung biopsies taken which showed acute with chronic inflammation of the tissue. His discharge summary from [facility] was for a discharge date of 11/30/2016. 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     He was hospitalized from 12/14/2016 to 12/17/2016 with mild colitis. Dr. Rizvi from gastroenterology did see him during that time.             He did have a stress nuclear perfusion study, which was done on 08/09/2016, and it showed inferior wall infarct but no evidence of ischemia.        Since I saw him last several things were discovered.  Home health was following with him and his blood pressure was consistently low, so we have decreased his medications quite a bit.  His breathing is better.  He is off oxygen.  He says he has not been lightheaded in about 3-4 days.  He has no nausea or vomiting.  No diarrhea.  He has no fevers or  chills.  His breathing is stable and good.  He is starting to gain some weight again.  He has no chest pain or pressure.  His appetite has improved.  He has no lower extremity edema.  Overall he feels better.  He would like to go to a less expensive formulation of potassium which I am fine with.  He has no difficulty swallowing.  He does notice that he will occasionally get chest pain at night when he lies down, but he never gets it during the day and he does not feel congested at that time either, so I am not sure what is causing that.            Past Medical History   Diagnosis Date   • AAA (abdominal aortic aneurysm)      s/p repair, Dr. Felix   • Acute myocardial infarction    • Arteriosclerotic coronary artery disease    • Arthritis    • Atrial fibrillation    • Cardiomyopathy    • Chronic systolic (congestive) heart failure    • CKD (chronic kidney disease), stage III    • COPD (chronic obstructive pulmonary disease) 11/15/2016   • Coronary artery disease      MI 1990's, CABG x 2 V (LIMA-LAD, SVG-PDA), 1997   • Diabetes mellitus    • Dyspnea    • Fatigue    • Hyperlipidemia    • Hypertension    • Ischemic cardiomyopathy      LVEF 40%   • Lung mass 04/18/2016     spiculated, being followed   • Lung nodule seen on imaging study    • Malignant neoplasm    • PAF (paroxysmal atrial fibrillation)    • Pleural effusion    • Premature ventricular contractions    • Prostate cancer    • PVC (premature ventricular contraction)    • Thoracic aortic ectasia    • Urinary tract infection with hematuria 1/18/2017         Past Surgical History   Procedure Laterality Date   • Arterial aneurysm repair     • Appendectomy     • Coronary artery bypass graft     • Prostate surgery     • Cardiac surgery     • Bronchoscopy Right 10/5/2016     Procedure: BRONCHOSCOPY;  Surgeon: Jhony Quintanilla MD;  Location: [facility] ENDOSCOPY;  Service:    • Bronchoscopy N/A 11/29/2016     Procedure: BRONCHOSCOPY WITH FLUORO w/biopsy AND BAL;  Surgeon:  Ervin Stiles MD;  Location: [facility] ENDOSCOPY;  Service:    • Cholecystectomy     • Colonoscopy N/A 12/16/2016     Procedure: COLONOSCOPY TO ASC COLON  WITH BIOPSY AND POLYPECTOMY;  Surgeon: Lane Brian MD;  Location: [facility] ENDOSCOPY;  Service:        Current Outpatient Prescriptions on File Prior to Visit   Medication Sig Dispense Refill   • acetaminophen (TYLENOL) 650 MG 8 hr tablet Take 650 mg by mouth every 8 (eight) hours as needed for mild pain (1-3).     • aspirin 81 MG tablet Take  by mouth daily.     • atorvastatin (LIPITOR) 20 MG tablet Take 1 tablet by mouth Every Night. 30 tablet 11   • doxazosin (CARDURA) 8 MG tablet Take 8 mg by mouth Daily.     • potassium chloride (MICRO-K) 10 MEQ CR capsule Take 2 capsules by mouth Daily. 180 capsule 1   • warfarin (COUMADIN) 1 MG tablet As directed 30 tablet 3   • warfarin (COUMADIN) 2 MG tablet TAKE ONE AND ONE-HALF (1 & 1/2) TABLET BY MOUTH 2 DAYS A WEEK AND TAKE 1 TABLET BY MOUTH 5 DAYS A WEEK AS DIRECTED 100 tablet 0   • warfarin (COUMADIN) 3 MG tablet TAKE ONE TABLET BY MOUTH DAILY 90 tablet 0   • [DISCONTINUED] albuterol (PROVENTIL HFA;VENTOLIN HFA) 108 (90 BASE) MCG/ACT inhaler Inhale 2 puffs Every 4 (Four) Hours As Needed for wheezing. 1 inhaler 0   • [DISCONTINUED] carvedilol (COREG) 6.25 MG tablet Take 1 tablet by mouth 2 (Two) Times a Day With Meals. 60 tablet 3   • [DISCONTINUED] clotrimazole (LOTRIMIN) 1 % cream Apply  topically Every 12 (Twelve) Hours. 24 g 0   • [DISCONTINUED] furosemide (LASIX) 20 MG tablet Take 1 tablet by mouth Daily. 30 tablet 0   • [DISCONTINUED] insulin aspart (NovoLOG) 100 UNIT/ML injection Inject 0-7 Units under the skin 4 (Four) Times a Day Before Meals & at Bedtime. 100 Units 0   • [DISCONTINUED] insulin detemir (LEVEMIR) 100 UNIT/ML injection Inject 10 Units under the skin Every Night. 1 pen 0   • [DISCONTINUED] isosorbide mononitrate (IMDUR) 30 MG 24 hr tablet Take 1 tablet by mouth Every Morning. 30 tablet 3   •  [DISCONTINUED] loperamide (IMODIUM) 2 MG capsule Take 2 mg by mouth 4 (Four) Times a Day As Needed for diarrhea.     • [DISCONTINUED] omeprazole (priLOSEC) 40 MG capsule Take 1 capsule by mouth Daily. 30 capsule 0   • [DISCONTINUED] saccharomyces boulardii (FLORASTOR) 250 MG capsule Take 1 capsule by mouth 2 (Two) Times a Day. 60 capsule 0   • [DISCONTINUED] sulfamethoxazole-trimethoprim (BACTRIM DS) 800-160 MG per tablet Take 1 tablet by mouth 2 (Two) Times a Day. 10 tablet 0   • [DISCONTINUED] traMADol (ULTRAM) 50 MG tablet Take 50 mg by mouth every 6 (six) hours as needed for moderate pain (4-6).       No current facility-administered medications on file prior to visit.        Social History     Social History   • Marital status:      Spouse name: N/A   • Number of children: N/A   • Years of education: N/A     Occupational History   • controller [employer]     Social History Main Topics   • Smoking status: Former Smoker     Packs/day: 1.00     Years: 25.00   • Smokeless tobacco: Former User     Quit date: 9/22/1985      Comment: caffeine use   • Alcohol use Yes      Comment: occasional   • Drug use: No   • Sexual activity: Defer      Comment:      Other Topics Concern   • Not on file     Social History Narrative        3 children     Sonlives with him.           Review of Systems   Constitution: Negative.   HENT: Negative for congestion and headaches.    Eyes: Negative for vision loss in left eye and vision loss in right eye.   Respiratory: Positive for shortness of breath. Negative for cough, hemoptysis, sleep disturbances due to breathing, snoring, sputum production and wheezing.    Endocrine: Negative.    Hematologic/Lymphatic: Negative.    Skin: Positive for itching. Negative for poor wound healing and rash.   Musculoskeletal: Negative for falls, gout, muscle cramps and myalgias.   Gastrointestinal: Negative for abdominal pain, diarrhea, dysphagia, hematemesis, melena, nausea and  "vomiting.   Genitourinary: Positive for frequency.   Neurological: Negative for excessive daytime sleepiness, dizziness, light-headedness, loss of balance, seizures and vertigo.   Psychiatric/Behavioral: Negative for depression and substance abuse. The patient is not nervous/anxious.        Procedures    ECG 12 Lead  Date/Time: 2/17/2017 10:58 AM  Performed by: SAVANAH BERGER  Authorized by: SAVANAH BERGER   Comparison: compared with previous ECG   Similar to previous ECG  Rhythm: sinus rhythm  Conduction: left bundle branch block  Clinical impression: abnormal ECG               Objective:      Vitals:    02/17/17 1033   BP: 110/60   BP Location: Right arm   Patient Position: Sitting   Pulse: 75   Weight: 189 lb 14.4 oz (86.1 kg)   Height: 70\" (177.8 cm)     Body mass index is 27.25 kg/(m^2).    Physical Exam   Constitutional: He is oriented to person, place, and time. He appears well-developed and well-nourished. No distress.   HENT:   Head: Normocephalic and atraumatic.   Eyes: Conjunctivae are normal. No scleral icterus.   Neck: Neck supple. No JVD present. Carotid bruit is not present. No thyromegaly present.   Cardiovascular: Normal rate, regular rhythm, S1 normal, S2 normal, normal heart sounds and intact distal pulses.   No extrasystoles are present. PMI is not displaced.  Exam reveals no gallop.    No murmur heard.  Pulses:       Carotid pulses are 2+ on the right side, and 2+ on the left side.       Radial pulses are 2+ on the right side, and 2+ on the left side.        Dorsalis pedis pulses are 2+ on the right side, and 2+ on the left side.        Posterior tibial pulses are 2+ on the right side, and 2+ on the left side.   Pulmonary/Chest: Effort normal and breath sounds normal. No respiratory distress. He has no wheezes. He has no rhonchi. He has no rales. He exhibits no tenderness.   Abdominal: Soft. Bowel sounds are normal. He exhibits no distension, no abdominal bruit and no mass. There is " no tenderness.   Musculoskeletal: He exhibits no edema or deformity.   Lymphadenopathy:     He has no cervical adenopathy.   Neurological: He is alert and oriented to person, place, and time. No cranial nerve deficit.   Skin: Skin is warm and dry. No rash noted. He is not diaphoretic. No cyanosis. No pallor. Nails show no clubbing.   Psychiatric: He has a normal mood and affect. Judgment normal.   Vitals reviewed.      Lab Review:       Assessment:      Diagnosis Plan   1. Coronary artery disease involving native heart without angina pectoris, unspecified vessel or lesion type     2. Diastolic CHF, chronic     3. Hyperlipidemia, unspecified hyperlipidemia type     4. PAF (paroxysmal atrial fibrillation)     5. Ischemic cardiomyopathy     6. Type 2 diabetes mellitus with complication, without long-term current use of insulin     7. Other emphysema     8. CKD (chronic kidney disease), stage III     9. Abdominal aortic aneurysm (AAA) without rupture     10. Hypotension, unspecified hypotension type       1. Acute diastolic heart failure requiring admission from 01/16/2014-01/20/2014 at [facility]. He was treated with diuretics and controlled heart rate. No CHF now.    2. Mild ischemic cardiomyopathy with prior inferior and posterior myocardial infarction  noted on an echocardiogram done in 01/2014.  Now LVEF is 53% 11/2016  3. Atrial fibrillation. He is now in NSR. He is on warfarin.    4. Spiculated lung mass in the right middle lobe. He is following with Dr. Douglass.    5. Chronic kidney disease, stage III. Follows with Dr. Ramon.  6. Hypertension. Well controlled.  7. History of coronary artery disease with myocardial infarction in the early 1990s and  coronary artery bypass grafting (two-vessel) in 1997. He had a mildly ischemic  stress nuclear study 1/2014 but was stable then. Cardiac catheterization done 8/2014 showed occluded    proximal native coronary arteries but patent LIMA to LAD and patent  SVG to PDA with collaterals to the    circumflex system. Nonischemic stress nuclear 8/2016.  No angina.   8. Abdominal aortic aneurysm status post repair with Dr. Erna Felix.    10. Frequent PVCs, better  11.  Admissions for resp failure 9/2016 and 11/2016.   12. Hematuria with dysuria, resolved.   13. Dizzy and weak, better after decreasing BP meds.      Plan:       See back in 6 weeks, no med changes, check BP at home.    Coronary Artery Disease  Assessment  • The patient has no angina    Subjective - Objective  • There is a history of previous coronary artery bypass graft  • Current antiplatelet therapy includes aspirin 81 mg

## 2017-03-01 NOTE — ANESTHESIA POSTPROCEDURE EVALUATION
Patient: Aakash Sanchez Jr.    Procedure Summary     Date Anesthesia Start Anesthesia Stop Room / Location    03/01/17 [id number]  [facility] /  [facility]       Procedure Diagnosis Surgeon Provider    ESOPHAGOGASTRODUODENOSCOPY with biopsies (cold) (N/A Esophagus); COLONOSCOPY with polypectomy (cold snare and hot snare) (N/A ) Diarrhea, unspecified type  (Diarrhea, unspecified type [R19.7]) MD Fady Page MD          Anesthesia Type: MAC  Last vitals  /66 (03/01/17 1603)    Temp      Pulse 76 (03/01/17 1603)   Resp 16 (03/01/17 1603)    SpO2 95 % (03/01/17 1603)      Post Anesthesia Care and Evaluation    Patient location during evaluation: bedside  Patient participation: complete - patient participated  Level of consciousness: awake  Pain score: 2  Pain management: adequate  Airway patency: patent  Anesthetic complications: No anesthetic complications    Cardiovascular status: acceptable  Respiratory status: acceptable  Hydration status: acceptable

## 2017-03-01 NOTE — ANESTHESIA PREPROCEDURE EVALUATION
Anesthesia Evaluation     Patient summary reviewed      Airway   Mallampati: II  TM distance: <3 FB  Neck ROM: limited  possible difficult intubation  Dental - normal exam     Pulmonary     breath sounds clear to auscultation  Cardiovascular   Exercise tolerance: good (4-7 METS)    Rhythm: regular  Rate: normal    (+) CABG,       Neuro/Psych  GI/Hepatic/Renal/Endo      Musculoskeletal     Abdominal    Substance History      OB/GYN          Other                                    Anesthesia Plan    ASA 3     MAC     intravenous induction   Anesthetic plan and risks discussed with patient.

## 2017-03-01 NOTE — DISCHARGE INSTRUCTIONS
For the next 24 hours patient needs to be with a responsible adult.    For 24 hours DO NOT drive, operate machinery, appliances, drink alcohol, make important decisions or sign legal documents.    Start with a light or bland diet and advance to regular diet as tolerated.    Follow recommendations on procedure report provided by your doctor.    Call Dr Corbett for problems [phone] and for biopsy results in 7-10 days.    Hold COUMADIN for 3 days per Dr. Corbett.     Problems may include but not limited to: large amounts of bleeding, trouble breathing, repeated vomiting, severe unrelieved pain, fever or chills.

## 2017-03-01 NOTE — PLAN OF CARE
Problem: Patient Care Overview (Adult)  Goal: Plan of Care Review  Outcome: Ongoing (interventions implemented as appropriate)    03/01/17 1353   Coping/Psychosocial Response Interventions   Plan Of Care Reviewed With patient       Goal: Adult Individualization and Mutuality  Outcome: Ongoing (interventions implemented as appropriate)    03/01/17 1353   Individualization   Patient Specific Preferences none       Goal: Discharge Needs Assessment  Outcome: Ongoing (interventions implemented as appropriate)    03/01/17 1353   Discharge Needs Assessment   Concerns To Be Addressed no discharge needs identified   Living Environment   Transportation Available family or friend will provide         Problem: GI Endoscopy (Adult)  Goal: Signs and Symptoms of Listed Potential Problems Will be Absent or Manageable (GI Endoscopy)  Outcome: Ongoing (interventions implemented as appropriate)    03/01/17 1353   GI Endoscopy   Problems Assessed (GI Endoscopy) pain   Problems Present (GI Endoscopy) none

## 2017-03-03 NOTE — PROGRESS NOTES
EGD with short segment Concepcion's.  Repeat EGD in 3 years should be considered.  He should be on daily PPI.    Multiple TAs on colonscopy.  Repeat colonoscopy in 1 year should be considered.  Follow up in office in 6-8 weeks.

## 2017-03-13 NOTE — TELEPHONE ENCOUNTER
Nexium (esomeprazole) previously prescribed by Dr Corbett.     Called pt and advised that per Dr Corbett: the path results from the EGD showed a short segment of Concepcion's (explained diagnosis to pt). Advised that MD recommends to continue the esomeprazole daily and repeat EGD in 3 years should be considered. Advised that the polyps that were removed during the c/s were not cancerous but they were precancerous and MD recommends that repeat c/s in 1 year should be considered. Advised that MD recommends to f/u in the office in 6-8 weeks. Pt verb understanding and states Nexium too expensive and he was wanting something else called in if possible. Spoke with Dr OTTO- he recommends Protonix 40 mg daily. Medication called in to [facility]. Appt made with pt to f/u 4/24/17 at 3:30 PM.

## 2017-03-13 NOTE — TELEPHONE ENCOUNTER
----- Message from Lane Corbett MD sent at 3/3/2017  1:19 PM EST -----  EGD with short segment Concepcion's.  Repeat EGD in 3 years should be considered.  He should be on daily PPI.    Multiple TAs on colonscopy.  Repeat colonoscopy in 1 year should be considered.  Follow up in office in 6-8 weeks.

## 2017-03-13 NOTE — TELEPHONE ENCOUNTER
----- Message from Curtis Jack sent at 3/13/2017  1:57 PM EDT -----  Regarding: PATH RESULTS  Contact: [phone]  PT CALLED FOR EGD AND C/S SCOPE PATH REPORT ..

## 2017-03-29 PROBLEM — R73.9 HYPERGLYCEMIA: Status: ACTIVE | Noted: 2017-01-01

## 2017-03-29 PROBLEM — R26.89 IMBALANCE: Status: ACTIVE | Noted: 2017-01-01

## 2017-03-29 NOTE — PROGRESS NOTES
Subjective   Aakash Sanchez Jr. is a 82 y.o. male.     History of Present Illness   81 yo mal eiwth pmhx HTN, HL, AF on coumadin. Dr. Ramirez managing that right now.    He is taking 3 mg and 4 mg alternating dosing.  He previously took 1.5 mg po daily.    Had colonoscopy with Dr. Myers, results reviewed.     He is not bleeding that he knows of. No gingival bleed.     He is taking his protonix with no problem.    Had some low blood pressures, does get dizzy if SBP <100.    History of COPD, no current inhalers.     He is having some trouble with balance. Will be very careful since his iNR is up, reports no syncope or presyncope.   The following portions of the patient's history were reviewed and updated as appropriate: allergies, current medications, past family history, past medical history, past social history, past surgical history and problem list.    Review of Systems   Constitutional: Negative.    HENT: Negative.    Respiratory: Negative.    Cardiovascular: Negative.  Negative for chest pain, palpitations and leg swelling.   Gastrointestinal: Negative.  Negative for blood in stool, constipation and rectal pain.   Genitourinary: Positive for difficulty urinating.        Better with his flomax   Musculoskeletal: Negative.    Skin: Negative.    Neurological: Positive for dizziness and light-headedness. Negative for speech difficulty and weakness.   Hematological: Negative.  Does not bruise/bleed easily.   All other systems reviewed and are negative.      Objective   Physical Exam   Constitutional: He is oriented to person, place, and time. He appears well-developed and well-nourished.   HENT:   Head: Normocephalic.   Right Ear: External ear normal.   Eyes: EOM are normal. Pupils are equal, round, and reactive to light.   Cardiovascular: Normal rate and regular rhythm.    No murmur heard.  Pulmonary/Chest: Breath sounds normal. No respiratory distress.   Neurological: He is alert and oriented to person, place,  and time.   Skin: Skin is warm.   Psychiatric: He has a normal mood and affect. His behavior is normal.   Nursing note and vitals reviewed.     8/9/16 - stress testing reviewed, EF 41%, small area old infarct.     INR 4.8 this morning  3/28/17 - 4.6    3/1/17 - does have some low grad dysplasia in several polyps, so repeat next year.  Assessment/Plan   Diagnoses and all orders for this visit:    Hyperlipidemia, unspecified hyperlipidemia type  -     Lipid Panel w/ Chol/HDL Ratio; Future  -     TSH; Future    Essential hypertension  -     TSH; Future    PAF (paroxysmal atrial fibrillation)  -     Lipid Panel w/ Chol/HDL Ratio; Future  -     TSH; Future    Long term current use of anticoagulant  -     POCT INR  -     Comprehensive metabolic panel; Future  -     TSH; Future    Hyperglycemia  -     Hemoglobin A1c; Future  -     TSH; Future    Imbalance  -     Vitamin B12; Future  -     Folate; Future         I did message Dr. Ramirez.  Hold coumadin today and Thursday.   Check INR Friday morning. Then call Dr. Ramirez or myself with the result.

## 2017-03-29 NOTE — PATIENT INSTRUCTIONS
8/9/16 - stress testing reviewed, EF 41%, small area old infarct.     INR 4.8 this morning  3/28/17 - 4.6    3/1/17 - does have some low grad dysplasia in several polyps, so repeat next year.  Assessment/Plan   Diagnoses and all orders for this visit:    Hyperlipidemia, unspecified hyperlipidemia type  -     Lipid Panel w/ Chol/HDL Ratio; Future  -     TSH; Future    Essential hypertension  -     TSH; Future    PAF (paroxysmal atrial fibrillation)  -     Lipid Panel w/ Chol/HDL Ratio; Future  -     TSH; Future    Long term current use of anticoagulant  -     POCT INR  -     Comprehensive metabolic panel; Future  -     TSH; Future    Hyperglycemia  -     Hemoglobin A1c; Future  -     TSH; Future    Imbalance  -     Vitamin B12; Future  -     Folate; Future         I did message Dr. Ramirez.  Hold coumadin today and Thursday.   Check INR Friday morning. Then call Dr. Ramirez or myself with the result.

## 2017-03-31 NOTE — PROGRESS NOTES
Date of Office Visit: 2017  Encounter Provider: Carolyn Ramirez MD  Place of Service: [facility] CARDIOLOGY  Patient Name: Aakash Sanchez Jr.  :1935      Patient ID:  Aakash Sanchez Jr. is a 82 y.o. male is here for  followup for chf        History of Present Illness    I saw him when he was admitted to [facility] on  2014, for heart failure and atrial fibrillation. He came in with severe  short-windedness and was found to have heart failure, which was due to atrial fibrillation  with rapid ventricular response.      He is known to have a history of coronary artery disease and had coronary artery bypass  grafting done in , two vessel. He had prior to that a myocardial infarction in the  early . He is known to have hypertension and hyperlipidemia and a history of an  aortic aneurysm. Dr. Erna Felix did the repair for his aortic aneurysm. He has a  history of a spiculated lung mass for which he has been followed by Dr. Douglass.    Apparently, that was seen again on CT of his chest. There was also a fusiform ectasia of  the ascending thoracic aorta, and they recommended a follow-up study because of the  spiculated ground glass opacity in the right middle lobe, which was evaluated during the  hospitalization in 2014 at [facility]. He is noted to have chronic  kidney disease (also stage III), which I believe was diagnosed at that same time. When we  saw him, we basically recommended rate control and diuresis, which was successful during  the hospital. We also did an investigation to make sure that there was no reversible  cause for his heart failure. He had a stress study, which showed old inferior lateral  infarct with a mild amount of anteroseptal ischemia. That was done in 2014. He also  had an echocardiogram done showing an ejection fraction of 40-45% with mild ventricular  dilation, inferior wall akinesis,  posterior wall akinesis, moderate left atrial dilation,  and moderate mitral insufficiency. He was started on warfarin during the hospitalization  and has remained on it.        He was seen 08/2014 and at that time he was having increasing short-windedness and fatigue. We sent him for a cardiac catheterization which showed 30% to 40% calcified distal left main stenosis, chronic total occlusion of the proximal left anterior descending artery, chronic total occlusion of the proximal circumflex, and chronic total occlusion of the proximal right coronary artery. The LIMA to LAD was patent. The SVG to PDA was patent and there was collaterals filling of the distal RCA and OM-2. His cardiac output by RAINA was 4.72 liters per minute with cardiac index of 2.3 liters per minute per meter squared. There was a mild elevation of pulmonary capillary wedge pressure. He was treated medically and released.    He had a Holter recording showing atrial fibrillation with significant episodes of wide complex beats of wide complex tachycardia which I thought was maybe aberrant conduction done on 2014. His Holter was done at [facility].          At his visit with me on 04/18/2016, he was having a lot of short-windedness with activity and chest pressure at rest or with activity. He had laboratory values done in 04/2016 which showed a creatinine of 2.61. He followed up with Dr. Ramon for this and he said that was his baseline. Because of his difficulty with breathing, several tests were ordered. He had a thyroid study because of amiodarone which was normal. His liver panel was normal. He had a 2-D echocardiogram with Doppler done on 04/27/2016 which showed an ejection fraction of 40% with moderate concentric left ventricular hypertrophy, moderately reduced left ventricular systolic function, grade IA diastolic dysfunction and moderate left atrial enlargement. There was global hypokinesis noted. He was also supposed to have a  chest x-ray but I do not see the results of that here. I did see from 06/2016 that his parathyroid hormone level was intact.       He was in and out of the hospital multiple times since his last visit. He was hospitalized in 09/2016 and again in 11/2016, and then transferred to [facility] in 11/2016. In September he was diagnosed with respiratory failure which was hypoxic in nature and they felt like it was due to pneumonia. When he was hospitalized in November, it was acute on chronic systolic ischemic heart failure and acute hypoxic respiratory failure. He had an echocardiogram performed on 11/25/2016 which revealed an ejection fraction of 53% with grade I diastolic dysfunction, mild aortic valve calcification, mild tricuspid calcification. He did have volume overload but he also had acute kidney injury. He was on amiodarone during the November hospitalization and there was a question about whether the amiodarone was causing interstitial lung disease. So the amiodarone was discontinued and he had lung biopsies taken which showed acute with chronic inflammation of the tissue. His discharge summary from [facility] was for a discharge date of 11/30/2016. 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       He was hospitalized from 12/14/2016 to 12/17/2016 with mild colitis. Dr. Rizvi from gastroenterology did see him during that time.               He did have a stress nuclear perfusion study, which was done on 08/09/2016, and it showed inferior wall infarct but no evidence of ischemia.         He is here today for followup and doing well.  He is completely off of supplemental oxygen.  He has occasional dizziness if his blood pressure is running low, but he does check it at home and it is mostly running in the 130s/80s.  Otherwise, he has had no tachycardia, dizziness or syncope.  He has no chest pain now since he has been on warfarin.  He says prior  to being on warfarin, he was having some intermittent chest pain.  He says his breathing is much better.  He is not really coughing up anything.  He has had no fevers or chills.  No diarrhea; his appetite is stable.  His weight has been very stable.  The [employer] medical equipment company actually picked up all his equipment for oxygen yesterday.     He did bring in all his daily blood pressure readings, which we reviewed here in the office.             Past Medical History:   Diagnosis Date   • AAA (abdominal aortic aneurysm)     s/p repair, Dr. Felix   • Acute myocardial infarction    • Arteriosclerotic coronary artery disease    • Arthritis    • Atrial fibrillation    • Cardiomyopathy    • Chronic systolic (congestive) heart failure    • CKD (chronic kidney disease), stage III    • COPD (chronic obstructive pulmonary disease) 11/15/2016   • Coronary artery disease     MI 1990's, CABG x 2 V (LIMA-LAD, SVG-PDA), 1997   • Diabetes mellitus    • Dyspnea    • Fatigue    • Hyperglycemia 3/29/2017   • Hyperlipidemia    • Hypertension    • Imbalance 3/29/2017   • Ischemic cardiomyopathy     LVEF 40%   • Lung mass 04/18/2016    spiculated, being followed   • Lung nodule seen on imaging study    • Malignant neoplasm    • PAF (paroxysmal atrial fibrillation)    • Pleural effusion    • Premature ventricular contractions    • Prostate cancer    • PVC (premature ventricular contraction)    • Thoracic aortic ectasia    • Urinary tract infection with hematuria 1/18/2017         Past Surgical History:   Procedure Laterality Date   • APPENDECTOMY     • ARTERIAL ANEURYSM REPAIR     • BRONCHOSCOPY Right 10/5/2016    Procedure: BRONCHOSCOPY;  Surgeon: Jhony Quintanilla MD;  Location: [facility] ENDOSCOPY;  Service:    • BRONCHOSCOPY N/A 11/29/2016    Procedure: BRONCHOSCOPY WITH FLUORO w/biopsy AND BAL;  Surgeon: Ervin Stiles MD;  Location: [facility] ENDOSCOPY;  Service:    • CARDIAC SURGERY     • CHOLECYSTECTOMY     • COLONOSCOPY N/A  12/16/2016    Procedure: COLONOSCOPY TO ASC COLON  WITH BIOPSY AND POLYPECTOMY;  Surgeon: Lane Brian MD;  Location: [facility] ENDOSCOPY;  Service:    • COLONOSCOPY N/A 3/1/2017    Procedure: COLONOSCOPY with polypectomy (cold snare and hot snare);  Surgeon: Lane Corbett MD;  Location: [facility] ENDOSCOPY;  Service:    • CORONARY ARTERY BYPASS GRAFT     • ENDOSCOPY N/A 3/1/2017    Procedure: ESOPHAGOGASTRODUODENOSCOPY with biopsies (cold);  Surgeon: Lane Corbett MD;  Location: [facility] ENDOSCOPY;  Service:    • PROSTATE SURGERY         Current Outpatient Prescriptions on File Prior to Visit   Medication Sig Dispense Refill   • acetaminophen (TYLENOL) 650 MG 8 hr tablet Take 650 mg by mouth every 8 (eight) hours as needed for mild pain (1-3).     • aspirin 81 MG tablet Take  by mouth daily.     • atorvastatin (LIPITOR) 20 MG tablet Take 1 tablet by mouth Every Night. 30 tablet 11   • carvedilol (COREG) 3.125 MG tablet Take 3.125 mg by mouth 2 (Two) Times a Day With Meals.     • doxazosin (CARDURA) 8 MG tablet Take 8 mg by mouth Daily.     • esomeprazole (nexIUM) 40 MG capsule Take 1 capsule by mouth Daily. 30 capsule 11   • furosemide (LASIX) 40 MG tablet Take 40 mg by mouth Daily.     • pantoprazole (PROTONIX) 40 MG EC tablet Take 1 tablet by mouth Daily. 90 tablet 3   • potassium chloride (K-TAB) 20 MEQ tablet controlled-release ER tablet Take 1 tablet by mouth Daily. 90 tablet 3   • warfarin (COUMADIN) 1 MG tablet As directed 30 tablet 3   • warfarin (COUMADIN) 2 MG tablet TAKE ONE AND ONE-HALF (1 & 1/2) TABLET BY MOUTH 2 DAYS A WEEK AND TAKE 1 TABLET BY MOUTH 5 DAYS A WEEK AS DIRECTED 100 tablet 0   • warfarin (COUMADIN) 3 MG tablet TAKE ONE TABLET BY MOUTH DAILY 90 tablet 0     No current facility-administered medications on file prior to visit.        Social History     Social History   • Marital status:      Spouse name: N/A   • Number of children: N/A   • Years of education: N/A  "    Occupational History   • controller [employer]     Social History Main Topics   • Smoking status: Former Smoker     Packs/day: 1.00     Years: 25.00   • Smokeless tobacco: Former User     Quit date: 9/22/1985      Comment: caffeine use   • Alcohol use Yes      Comment: occasional   • Drug use: No   • Sexual activity: Defer      Comment:      Other Topics Concern   • Not on file     Social History Narrative        3 children     Sonlives with him.           Review of Systems   Constitution: Negative.   HENT: Negative for congestion and headaches.    Eyes: Negative for vision loss in left eye and vision loss in right eye.   Cardiovascular: Positive for dyspnea on exertion.   Respiratory: Negative.  Negative for cough, hemoptysis, shortness of breath, sleep disturbances due to breathing, snoring, sputum production and wheezing.    Endocrine: Negative.    Hematologic/Lymphatic: Negative.    Skin: Negative for poor wound healing and rash.   Musculoskeletal: Positive for joint pain. Negative for falls, gout, muscle cramps and myalgias.   Gastrointestinal: Negative for abdominal pain, diarrhea, dysphagia, hematemesis, melena, nausea and vomiting.   Neurological: Negative for excessive daytime sleepiness, dizziness, light-headedness, loss of balance, seizures and vertigo.   Psychiatric/Behavioral: Negative for depression and substance abuse. The patient is not nervous/anxious.        Procedures    ECG 12 Lead  Date/Time: 3/31/2017 10:17 AM  Performed by: SAVANAH BERGER  Authorized by: SAVANAH BERGER   Comparison: compared with previous ECG   Similar to previous ECG  Rhythm: sinus rhythm  Conduction: left bundle branch block  Clinical impression: abnormal ECG               Objective:      Vitals:    03/31/17 1002   BP: 128/72   BP Location: Right arm   Patient Position: Sitting   Pulse: 77   Weight: 188 lb 8 oz (85.5 kg)   Height: 70\" (177.8 cm)     Body mass index is 27.05 " kg/(m^2).    Physical Exam   Constitutional: He is oriented to person, place, and time. He appears well-developed and well-nourished. No distress.   HENT:   Head: Normocephalic and atraumatic.   Eyes: Conjunctivae are normal. No scleral icterus.   Neck: Neck supple. No JVD present. Carotid bruit is not present. No thyromegaly present.   Cardiovascular: Normal rate, regular rhythm, S1 normal, S2 normal, normal heart sounds and intact distal pulses.   No extrasystoles are present. PMI is not displaced.    No murmur heard.  Pulses:       Carotid pulses are 2+ on the right side, and 2+ on the left side.       Radial pulses are 2+ on the right side, and 2+ on the left side.        Dorsalis pedis pulses are 2+ on the right side, and 2+ on the left side.        Posterior tibial pulses are 2+ on the right side, and 2+ on the left side.   Pulmonary/Chest: Effort normal and breath sounds normal. No respiratory distress. He has no wheezes. He has no rhonchi. He has no rales. He exhibits no tenderness.   Abdominal: Soft. Bowel sounds are normal. He exhibits no distension, no abdominal bruit, no ascites and no mass. There is no tenderness.   Musculoskeletal: He exhibits no edema or deformity.   Lymphadenopathy:     He has no cervical adenopathy.   Neurological: He is alert and oriented to person, place, and time. No cranial nerve deficit.   Skin: Skin is warm and dry. No rash noted. He is not diaphoretic. No cyanosis. No pallor. Nails show no clubbing.   Psychiatric: He has a normal mood and affect. Judgment normal.   Vitals reviewed.      Lab Review:       Assessment:      Diagnosis Plan   1. Diastolic CHF, chronic     2. Hyperlipidemia, unspecified hyperlipidemia type     3. Essential hypertension     4. Ischemic cardiomyopathy     5. PAF (paroxysmal atrial fibrillation)     6. Coronary artery disease involving native heart without angina pectoris, unspecified vessel or lesion type     7. Type 2 diabetes mellitus with  complication, without long-term current use of insulin     8. Other emphysema     9. CKD (chronic kidney disease), stage III       1. Acute diastolic heart failure requiring admission from 01/16/2014-01/20/2014 at [facility]. He was treated with diuretics and controlled heart rate. No CHF now.    2. Mild ischemic cardiomyopathy with prior inferior and posterior myocardial infarction  noted on an echocardiogram done in 01/2014.  Now LVEF is 53% 11/2016  3. Atrial fibrillation. He is now in NSR. He is on warfarin.    4. Spiculated lung mass in the right middle lobe. He is following with Dr. Douglass.    5. Chronic kidney disease, stage III. Follows with Dr. Ramon.  6. Hypertension. Well controlled.  7. History of coronary artery disease with myocardial infarction in the early 1990s and  coronary artery bypass grafting (two-vessel) in 1997. He had a mildly ischemic  stress nuclear study 1/2014 but was stable then. Cardiac catheterization done 8/2014 showed occluded    proximal native coronary arteries but patent LIMA to LAD and patent SVG to PDA with collaterals to the    circumflex system. Nonischemic stress nuclear 8/2016.  No angina.   8. Abdominal aortic aneurysm status post repair with Dr. Erna Felix.    10. Frequent PVCs, better  11. Admissions for resp failure 9/2016 and 11/2016.   12. Hematuria with dysuria, resolved.   13.  H/o PNA and lung disease, seeing Dr. Smith     Plan:       See back in 3 months.     Atrial Fibrillation and Atrial Flutter  Assessment  • The patient has paroxysmal atrial fibrillation  • This is non-valvular in etiology  • The patient's CHADS2-VASc score is 4  • A [id number] score of 2 or more is considered a high risk for a thromboembolic event    Plan  • Attempt to maintain sinus rhythm  • Continue warfarin for antithrombotic therapy, bleeding issues discussed  • Continue beta blocker for rhythm control    Coronary Artery Disease  Assessment  • The patient has no  angina    Subjective - Objective  • There is a history of previous coronary artery bypass graft  • Current antiplatelet therapy includes aspirin 81 mg      Heart Failure  Assessment  • NYHA class III-A - There is limitation of physical activity. The patient is comfortable at rest, but ordinary activity causes fatigue, palpitations or shortness of breath.  • Beta blocker prescribed  • Diuretics prescribed  • Left ventricular function is normal by qualitative assessment    Plan  • The heart failure care plan was discussed with the patient today including: continuing the current program    Subjective/Objective    • Physical exam findings negative for rales, peripheral edema, elevated JVP and ascites.

## 2017-05-02 PROBLEM — K63.5 COLON POLYPS: Status: ACTIVE | Noted: 2017-01-01

## 2017-05-02 PROBLEM — K22.70 BARRETT'S ESOPHAGUS WITHOUT DYSPLASIA: Status: ACTIVE | Noted: 2017-01-01

## 2017-06-01 PROBLEM — E53.8 B12 DEFICIENCY: Status: ACTIVE | Noted: 2017-01-01

## 2017-06-01 NOTE — PROGRESS NOTES
Subjective   Aakash Sanchez Jr. is a 82 y.o. male.     History of Present Illness   83 yo male with pmhx diet controlled dm, HTN, HL, IhD and PAF on coumadin.  He is checking his coumadin, followed by dr. Mabry, well controlled at present.        New rash started 2months ago, on L leg and now on his belly. The belly rash is residual from shingles, does have some itching but no pain.    No recent sun exposure.   The following portions of the patient's history were reviewed and updated as appropriate: allergies, current medications, past family history, past medical history, past social history, past surgical history and problem list.    Review of Systems   Constitutional: Negative.    HENT: Negative.    Eyes: Negative.    Respiratory: Negative.    Cardiovascular: Negative.  Negative for chest pain and palpitations.   Gastrointestinal: Negative.    Endocrine: Negative.    Genitourinary: Negative.    Musculoskeletal: Negative.    Skin: Positive for rash.   Neurological: Negative.    All other systems reviewed and are negative.      Objective   Physical Exam   Constitutional: He is oriented to person, place, and time. He appears well-developed and well-nourished.   HENT:   Head: Normocephalic and atraumatic.   Right Ear: External ear normal.   Left Ear: External ear normal.   Nose: Nose normal.   Mouth/Throat: Oropharynx is clear and moist. No oropharyngeal exudate.   Eyes: Conjunctivae and EOM are normal. Pupils are equal, round, and reactive to light.   Neck: Normal range of motion. Neck supple. No JVD present. No tracheal deviation present. No thyromegaly present.   Cardiovascular: Normal rate, regular rhythm, normal heart sounds and intact distal pulses.    No murmur heard.  Pulmonary/Chest: Effort normal and breath sounds normal.   Abdominal: Soft.   Musculoskeletal: Normal range of motion. He exhibits no edema.   Neurological: He is alert and oriented to person, place, and time. He has normal reflexes.   Skin:  Skin is warm, dry and intact. Rash noted.   l entire abdomen mild excoriation  R leg with well circumscribed erythematous quarter size lesion, some peeling anterior L leg, all excoriated   Psychiatric: He has a normal mood and affect. His behavior is normal.   Nursing note and vitals reviewed.      Assessment/Plan   Aakash was seen today for rash.    Diagnoses and all orders for this visit:    B12 deficiency    Type 2 diabetes mellitus with complication, without long-term current use of insulin    Eczema, unspecified type  -     methylPREDNISolone acetate (DEPO-medrol) injection 80 mg; Inject 1 mL into the shoulder, thigh, or buttocks 1 (One) Time.  -     clobetasol (TEMOVATE) 0.05 % cream; Apply  topically 2 (Two) Times a Day.    Atopic dermatitis, unspecified type    Rash on belly - start claritin 10 mg po daily  Shot today of depomedrol  Topicial clobetasol to all areas twice daily for 7 days  Call me if not better for biopsy    Consider stopping statin pending outcome of rash treatment.      B12 shot today.  Check again 4 months  INR with Dr. Ramirez.

## 2017-06-04 PROBLEM — I21.4 NSTEMI (NON-ST ELEVATED MYOCARDIAL INFARCTION) (HCC): Status: ACTIVE | Noted: 2017-01-01

## 2017-06-04 PROBLEM — I63.9 CEREBROVASCULAR ACCIDENT (CVA) (HCC): Status: ACTIVE | Noted: 2017-01-01

## 2017-06-06 NOTE — TELEPHONE ENCOUNTER
----- Message from Carol Laguerre sent at 6/6/2017 11:44 AM EDT -----  Regarding: HOME HEALTH STARTING  MARTINA Thompson @ [facility]  [phone]    Stroke, HA,     Nursing and PT: VERBAL APPROVAL GIVEN

## 2017-06-15 PROBLEM — I48.0 PAROXYSMAL ATRIAL FIBRILLATION (HCC): Status: ACTIVE | Noted: 2017-01-01

## 2017-06-15 PROBLEM — Z95.5 HISTORY OF CORONARY ARTERY STENT PLACEMENT: Status: ACTIVE | Noted: 2017-01-01

## 2017-06-15 NOTE — PROGRESS NOTES
Date of Office Visit: 06/15/2017  Encounter Provider: KANNAN Degroot  Place of Service: [facility] CARDIOLOGY  Patient Name: Aakash Sanchez Jr.  :1935    Chief Complaint   Patient presents with   • Coronary Artery Disease     1 week hospital follow up   :     HPI: Aakash Sanchez Jr. is a 82 y.o. male , new to me, who presents today for follow-up.  Old records have been obtained and reviewed by me.  He is a patient of Dr. Ramirez's with a past medical history significant for atrial fibrillation with RVR, coronary artery disease status post CABG in , hypertension, hyperlipidemia, aortic aneurysm status post repair, and a lung mass followed by Dr. Douglass.  He was last in our office to see Dr. Ramirez on 3/31/2017.  At that visit, he was doing well from a cardiac standpoint.  No changes were made to his medical regimen.  Then on 2017 he presented to the emergency room with a TIA and unstable angina.  He ruled in for a non-STEMI.  Neurology cleared him for cardiac catheterization, and he was found to have a critical lesion in the vein graft to his PDA.  He did have an echocardiogram which showed moderately decreased LV function with an estimated EF in disagreement with a calculated EF.  The estimated EF appeared to be about 35%.  He had no significant valvular abnormalities.  He was discharged home on 2017 in stable condition and is here today for follow-up.   Since he's been home he's been doing well.  He does get short of breath on exertion, however this is improving.  He had some left arm pain yesterday that was very mild.  Otherwise he really has no complaints.      Past Medical History:   Diagnosis Date   • AAA (abdominal aortic aneurysm)     s/p repair, Dr. Felix   • Acute myocardial infarction    • Arteriosclerotic coronary artery disease    • Arthritis    • Atrial fibrillation    • B12 deficiency 2017   • Cardiomyopathy    • Chronic systolic  (congestive) heart failure    • CKD (chronic kidney disease), stage III    • COPD (chronic obstructive pulmonary disease) 11/15/2016   • Coronary artery disease     MI 1990's, CABG x 2 V (LIMA-LAD, SVG-PDA), 1997   • Diabetes mellitus    • Dyspnea    • Fatigue    • Hyperglycemia 3/29/2017   • Hyperlipidemia    • Hypertension    • Imbalance 3/29/2017   • Ischemic cardiomyopathy     LVEF 40%   • Lung mass 04/18/2016    spiculated, being followed   • Lung nodule seen on imaging study    • Malignant neoplasm    • PAF (paroxysmal atrial fibrillation)    • Pleural effusion    • Premature ventricular contractions    • Prostate cancer    • PVC (premature ventricular contraction)    • Thoracic aortic ectasia    • Urinary tract infection with hematuria 1/18/2017       Past Surgical History:   Procedure Laterality Date   • APPENDECTOMY     • ARTERIAL ANEURYSM REPAIR     • BRONCHOSCOPY Right 10/5/2016    Procedure: BRONCHOSCOPY;  Surgeon: Jhony Quintanilla MD;  Location: [facility] ENDOSCOPY;  Service:    • BRONCHOSCOPY N/A 11/29/2016    Procedure: BRONCHOSCOPY WITH FLUORO w/biopsy AND BAL;  Surgeon: Ervin Stiles MD;  Location: [facility] ENDOSCOPY;  Service:    • CARDIAC CATHETERIZATION N/A 6/7/2017    Procedure: Left Heart Cath;  Surgeon: Vincent Middleton MD;  Location: [facility] CATH INVASIVE LOCATION;  Service:    • CARDIAC CATHETERIZATION N/A 6/7/2017    Procedure: Coronary angiography;  Surgeon: Vincent Middleton MD;  Location: [facility] CATH INVASIVE LOCATION;  Service:    • CARDIAC CATHETERIZATION N/A 6/7/2017    Procedure: Left ventriculography;  Surgeon: Vincent Middleton MD;  Location: [facility] CATH INVASIVE LOCATION;  Service:    • CARDIAC CATHETERIZATION N/A 6/7/2017    Procedure: Bypass graft study;  Surgeon: Vincent Middleton MD;  Location: [facility] CATH INVASIVE LOCATION;  Service:    • CARDIAC CATHETERIZATION N/A 6/7/2017    Procedure: Native mammary injection;  Surgeon: Vincent Middleton MD;  Location: [facility] INVASIVE LOCATION;   Service:    • CARDIAC CATHETERIZATION N/A 6/7/2017    Procedure: Stent NYA bypass graft;  Surgeon: Vincent Middleton MD;  Location:  [facility] CATH INVASIVE LOCATION;  Service:    • CARDIAC SURGERY     • CHOLECYSTECTOMY     • COLONOSCOPY N/A 12/16/2016    Procedure: COLONOSCOPY TO ASC COLON  WITH BIOPSY AND POLYPECTOMY;  Surgeon: Lane Brian MD;  Location: [facility] ENDOSCOPY;  Service:    • COLONOSCOPY N/A 3/1/2017    multiple polyps, lipoma in proximal transverse colon/hepatic flexure, repeat in one year   • CORONARY ARTERY BYPASS GRAFT     • ENDOSCOPY N/A 3/1/2017    Concepcion's, HH, gastric mucosal atrophy   • PROSTATE SURGERY         Social History     Social History   • Marital status:      Spouse name: N/A   • Number of children: N/A   • Years of education: N/A     Occupational History   • 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       Family History   Problem Relation Age of Onset   • Cancer Mother    • Cancer Father    • COPD Father    • Arthritis Sister    • Diabetes Sister    • Hyperlipidemia Sister    • Arthritis Brother    • Diabetes Brother    • Stroke Brother    • Lung cancer Other    • Stroke Other    • No Known Problems Maternal Grandmother    • No Known Problems Maternal Grandfather    • No Known Problems Paternal Grandmother    • No Known Problems Paternal Grandfather        Review of Systems   Constitution: Negative for chills, fever, malaise/fatigue, weight gain and weight loss.   HENT: Negative for ear pain, headaches, hearing loss, nosebleeds and sore throat.     Eyes: Negative for double vision, pain and visual disturbance.   Cardiovascular: Positive for dyspnea on exertion. Negative for chest pain, irregular heartbeat, leg swelling, near-syncope, orthopnea, palpitations, paroxysmal nocturnal dyspnea and syncope.   Respiratory: Negative for cough, shortness of breath, sleep disturbances due to breathing, snoring and wheezing.    Endocrine: Negative for cold intolerance, heat intolerance and polyuria.   Skin: Negative for itching and rash.   Musculoskeletal: Negative for joint pain, joint swelling and myalgias.   Gastrointestinal: Negative for abdominal pain, diarrhea, melena, nausea and vomiting.   Genitourinary: Negative for frequency, hematuria and hesitancy.   Neurological: Negative for excessive daytime sleepiness, light-headedness, numbness, paresthesias and seizures.   Psychiatric/Behavioral: Negative for altered mental status and depression.   Allergic/Immunologic: Negative.    All other systems reviewed and are negative.      No Known Allergies      Current Outpatient Prescriptions:   •  atorvastatin (LIPITOR) 40 MG tablet, Take 1 tablet by mouth Every Night., Disp: 30 tablet, Rfl: 6  •  carvedilol (COREG) 6.25 MG tablet, Take 1 tablet by mouth 2 (Two) Times a Day., Disp: 180 tablet, Rfl: 3  •  clopidogrel (PLAVIX) 75 MG tablet, Take 1 tablet by mouth Daily., Disp: 90 tablet, Rfl: 3  •  furosemide (LASIX) 40 MG tablet, Take 1 tablet by mouth Daily., Disp: 90 tablet, Rfl: 2  •  pantoprazole (PROTONIX) 40 MG EC tablet, Take 40 mg by mouth Daily., Disp: , Rfl:   •  potassium chloride (K-TAB) 20 MEQ tablet controlled-release ER tablet, Take 1 tablet by mouth Daily., Disp: 90 tablet, Rfl: 3  •  Probiotic Product (PROBIOTIC PO), Take 1 tablet by mouth Daily., Disp: , Rfl:   •  terazosin (HYTRIN) 5 MG capsule, Take 1 capsule by mouth 2 (Two) Times a Day., Disp: 30 capsule, Rfl: 6  •  warfarin (COUMADIN) 1 MG tablet, Take 1 mg by mouth Daily., Disp: , Rfl:   •  warfarin  "(COUMADIN) 3 MG tablet, TAKE ONE TABLET BY MOUTH DAILY, Disp: 90 tablet, Rfl: 0    Current Facility-Administered Medications:   •  cyanocobalamin injection 1,000 mcg, 1,000 mcg, Intramuscular, Q28 Days, Dimitrios Lindquist MD, 1,000 mcg at 06/01/17 1224     Objective:     Vitals:    06/15/17 1345 06/15/17 1358   BP: 128/76 138/78   BP Location: Right arm Left arm   Pulse: 75    SpO2: 100%    Weight: 192 lb (87.1 kg)    Height: 70\" (177.8 cm)      Body mass index is 27.55 kg/(m^2).    PHYSICAL EXAM:    Physical Exam   Constitutional: He is oriented to person, place, and time. He appears well-developed and well-nourished. No distress.   HENT:   Head: Normocephalic and atraumatic.   Eyes: Pupils are equal, round, and reactive to light.   Neck: No JVD present. No thyromegaly present.   Cardiovascular: Normal rate, regular rhythm, normal heart sounds and intact distal pulses.    No murmur heard.  Right radial cath site well healed without erythema or echymosis, palpable proximal and distal pulses, good capillary refill   Pulmonary/Chest: Effort normal and breath sounds normal. No respiratory distress.   Abdominal: Soft. Bowel sounds are normal. He exhibits no distension. There is no splenomegaly or hepatomegaly. There is no tenderness.   Musculoskeletal: Normal range of motion. He exhibits no edema.   Neurological: He is alert and oriented to person, place, and time.   Skin: Skin is warm and dry. He is not diaphoretic. No erythema.   Psychiatric: He has a normal mood and affect. His behavior is normal. Judgment normal.         ECG 12 Lead  Date/Time: 6/15/2017 2:29 PM  Performed by: ALVIN AMADOR.  Authorized by: ALVIN AMADOR.   Comparison: compared with previous ECG from 6/7/2017  Similar to previous ECG  Rhythm: sinus rhythm  Ectopy: PVCs  BPM: 75  Conduction: left bundle branch block  Clinical impression: abnormal ECG  Comments: Indication: Coronary artery disease, status post stent placement and CABG.            "   Assessment:       Diagnosis Plan   1. Coronary artery disease involving native coronary artery of native heart without angina pectoris  ECG 12 Lead    Ambulatory Referral to Cardiac Rehab   2. Ischemic cardiomyopathy     3. History of coronary artery stent placement  ECG 12 Lead    Ambulatory Referral to Cardiac Rehab   4. Paroxysmal atrial fibrillation       Orders Placed This Encounter   Procedures   • Ambulatory Referral to Cardiac Rehab     Referral Priority:   Routine     Referral Type:   Rehabilitation - Outpatient     Number of Visits Requested:   1   • ECG 12 Lead     This order was created via procedure documentation          Plan:       1.  Coronary Artery Disease  Assessment  • The patient has no angina  • There is a new diagnosis of stable angina in the past 12 months  • The patient is having symptoms consistent with unstable angina     Plan  • Lifestyle modifications discussed include adhering to a heart healthy diet, medication compliance and regular exercise    Subjective - Objective  • There is a history of past MI  • There is a history of previous coronary artery bypass graft  • There has been a previous stent procedure using NYA  • Current antiplatelet therapy includes clopidogrel 75 mg  • The patient qualifies for cardiac rehabilitation, and has been referred to cardiac rehab  • Overall he's doing well.  He is no longer having any angina.  He will start cardiac rehabilitation at [place] when he gets back from his family vacation the first week in July.  He is on a good medical regimen, continue current plan.    2.  Atrial Fibrillation and Atrial Flutter  Assessment  • The patient has paroxysmal atrial fibrillation  • This is non-valvular in etiology  • The patient's CHADS2-VASc score is 8  • A [id number] score of 2 or more is considered a high risk for a thromboembolic event  • Warfarin prescribed    Plan  • Attempt to maintain sinus rhythm  • Continue warfarin for antithrombotic therapy,  bleeding issues discussed  • Continue beta blocker for rhythm control  • Continue beta blocker for rate control    Subjective - Objective  • He is in sinus rhythm today and anticoagulated on warfarin.  Continue current medical regimen.    3.  Heart Failure  Assessment  • NYHA class II - There is slight limitation of physical activity. The patient is comfortable at rest, but physical activity results in fatigue, palpitations or shortness of breath.  • Beta blocker prescribed  • Diuretics prescribed  • The most recent ejection fraction is 35%  • Left ventricular function is moderately reduced by qualitative assessment    Plan  • The patient has received heart failure education on the following topics: dietary sodium restriction, medication instructions, physical activity and minimizing alcohol intake  • The heart failure care plan was discussed with the patient today including: continuing the current program    Subjective/Objective    • Physical exam findings negative for elevated JVP and hepatomegaly.  • Overall he's doing well.  He is on a beta blocker and a diuretic.  He cannot take an ACE or an ARB secondary to his renal failure.  Continue current medical regimen.    He will follow-up with Dr. Ramirez on 6/26/2017.  As always, it has been a pleasure to participate in your patient's care.      Sincerely,         Kathy Pitts PA-C

## 2017-06-21 PROBLEM — R21 RASH OF BODY: Status: ACTIVE | Noted: 2017-01-01

## 2017-06-21 NOTE — PROGRESS NOTES
Subjective   Aakash Sanchez Jr. is a 82 y.o. male.     History of Present Illness   83 yo male with recent hospitalization after having cva when subtherapeutic on his coumadin with aspirin,now on plavix.  He also had AMI with stent placed. Discharge summary reviewed. Since he went home he is feeling somewhat weak, but his appetite is returning. He is not having cp or palpitations. No esha dyspnea.  His bp is well controlled.        Here for rash prior to the hospitalization event. Got a little better with prednisone but unable to afford steroid creams we tried.     He saw improvement in really only one area.  He denies any pruritis now to that region.    The following portions of the patient's history were reviewed and updated as appropriate: allergies, current medications, past family history, past medical history, past social history, past surgical history and problem list.    Review of Systems   Constitutional: Negative for activity change, appetite change, fatigue, fever and unexpected weight change.   HENT: Negative.  Negative for congestion, ear pain, sinus pressure, sore throat, trouble swallowing and voice change.    Eyes: Negative.    Respiratory: Negative.    Cardiovascular: Negative.  Negative for chest pain, palpitations and leg swelling.        As in hpi another MI   Gastrointestinal: Negative for diarrhea, nausea and vomiting.   Endocrine: Negative.    Genitourinary: Negative for decreased urine volume and urgency.        No erectile problems  No nocturia   Skin: Positive for rash.   Allergic/Immunologic: Negative.    Neurological: Positive for dizziness and weakness. Negative for headaches.   Hematological: Negative.    Psychiatric/Behavioral: Negative.    All other systems reviewed and are negative.      Objective   Physical Exam   Constitutional: He appears well-developed and well-nourished.   HENT:   Head: Normocephalic and atraumatic.   Mouth/Throat: Oropharynx is clear and moist. No  oropharyngeal exudate.   Eyes: EOM are normal. Pupils are equal, round, and reactive to light.   Neck: No thyromegaly present.   Cardiovascular: Normal rate and regular rhythm.    Pulmonary/Chest: Effort normal. No respiratory distress.   Abdominal: Soft.   Skin:   Still flat scaley lesion in [place] medial R calf, more diffuse MP lesions R knee, much larger erythema L abdomen   Psychiatric: He has a normal mood and affect. His behavior is normal.   Vitals reviewed.    Hb 12    Assessment/Plan   Aakash was seen today for rash and coronary artery disease.    Diagnoses and all orders for this visit:    Essential hypertension  -     Comprehensive metabolic panel    Diastolic CHF, chronic  -     Comprehensive metabolic panel    History of coronary artery stent placement    Other emphysema    Type 2 diabetes mellitus with complication, without long-term current use of insulin  -     Hemoglobin A1c    Rash of body  -     nystatin-triamcinolone (MYCOLOG) 170708-2.1 UNIT/GM-% ointment; Apply  topically 2 (Two) Times a Day.    Long term current use of anticoagulant    PAU (acute kidney injury)  -     Protime-INR    CKD (chronic kidney disease), stage III  -     CBC w AUTO Differential    Imbalance    Stroke still in resolution phase, no residua  COntinue plavix and coumadin    On BBand statin per cards    Recent addition of terazosin, continue, had trouble urinating without it    Monitor K  Dm/hyperglycemia - check today, avoid bread, rice and noodles

## 2017-06-26 PROBLEM — I48.0 PAROXYSMAL ATRIAL FIBRILLATION (HCC): Status: RESOLVED | Noted: 2017-01-01 | Resolved: 2017-01-01

## 2017-06-26 NOTE — TELEPHONE ENCOUNTER
I S/W Mr. Sanchez he told me he has been having some pain in his arms today.  He has an appt with Dr. Ramirez this afternoon.  I told him to call 911 if it gets worse.  He might want to call Dr. Ramirez and see if she can get him in sooner.  He has not had any further S/S of stroke.  We discussed the signs that his stroke manifested itself with weakness of the lower extremities.  We reviewed his meds:  Terazosin 5mg 2 times a day, Coreg 6.25mg 2 times a day, Lipitor 40mg at night, Plavix 75mg daily and Coumadin 3 mg daily.  He told me he checks his INR at home daily and I suggested he do one now for Dr. Ramirez.  He has a F/U appt with Rosy CERVANTES on Sept 26th and he did receive a packet.  mRS 2.  ASHLEE Crowell RN

## 2017-06-26 NOTE — PROGRESS NOTES
Date of Office Visit: 2017  Encounter Provider: Carolyn Ramirez MD  Place of Service: [facility] CARDIOLOGY  Patient Name: Aakash Sanchez Jr.  :1935      Patient ID:  Aakash Sanchez Jr. is a 82 y.o. male is here for  followup for CAD, CMP, PAF.         History of Present Illness  I saw him when he was admitted to [facility] on  2014, for heart failure and atrial fibrillation. He came in with severe  short-windedness and was found to have heart failure, which was due to atrial fibrillation  with rapid ventricular response.      He is known to have a history of coronary artery disease and had coronary artery bypass  grafting done in , two vessel. He had prior to that a myocardial infarction in the  early . He is known to have hypertension and hyperlipidemia and a history of an  aortic aneurysm. Dr. Erna Felix did the repair for his aortic aneurysm. He has a  history of a spiculated lung mass for which he has been followed by Dr. Douglass.    Apparently, that was seen again on CT of his chest. There was also a fusiform ectasia of  the ascending thoracic aorta, and they recommended a follow-up study because of the  spiculated ground glass opacity in the right middle lobe, which was evaluated during the  hospitalization in 2014 at [facility]. He is noted to have chronic  kidney disease (also stage III), which I believe was diagnosed at that same time. When we  saw him, we basically recommended rate control and diuresis, which was successful during  the hospital. We also did an investigation to make sure that there was no reversible  cause for his heart failure. He had a stress study, which showed old inferior lateral  infarct with a mild amount of anteroseptal ischemia. That was done in 2014. He also  had an echocardiogram done showing an ejection fraction of 40-45% with mild ventricular  dilation, inferior wall  akinesis, posterior wall akinesis, moderate left atrial dilation,  and moderate mitral insufficiency. He was started on warfarin during the hospitalization  and has remained on it.        He was seen 08/2014 and at that time he was having increasing short-windedness and fatigue. We sent him for a cardiac catheterization which showed 30% to 40% calcified distal left main stenosis, chronic total occlusion of the proximal left anterior descending artery, chronic total occlusion of the proximal circumflex, and chronic total occlusion of the proximal right coronary artery. The LIMA to LAD was patent. The SVG to PDA was patent and there was collaterals filling of the distal RCA and OM-2. His cardiac output by RAINA was 4.72 liters per minute with cardiac index of 2.3 liters per minute per meter squared. There was a mild elevation of pulmonary capillary wedge pressure. He was treated medically and released.    He had a Holter recording showing atrial fibrillation with significant episodes of wide complex beats of wide complex tachycardia which I thought was maybe aberrant conduction done on 2014. His Holter was done at [facility].          At his visit with me on 04/18/2016, he was having a lot of short-windedness with activity and chest pressure at rest or with activity. He had laboratory values done in 04/2016 which showed a creatinine of 2.61. He followed up with Dr. Ramon for this and he said that was his baseline. Because of his difficulty with breathing, several tests were ordered. He had a thyroid study because of amiodarone which was normal. His liver panel was normal. He had a 2-D echocardiogram with Doppler done on 04/27/2016 which showed an ejection fraction of 40% with moderate concentric left ventricular hypertrophy, moderately reduced left ventricular systolic function, grade IA diastolic dysfunction and moderate left atrial enlargement. There was global hypokinesis noted. He was also supposed  to have a chest x-ray but I do not see the results of that here. I did see from 06/2016 that his parathyroid hormone level was intact.       He was in and out of the hospital multiple times since his last visit. He was hospitalized in 09/2016 and again in 11/2016, and then transferred to [facility] in 11/2016. In September he was diagnosed with respiratory failure which was hypoxic in nature and they felt like it was due to pneumonia. When he was hospitalized in November, it was acute on chronic systolic ischemic heart failure and acute hypoxic respiratory failure. He had an echocardiogram performed on 11/25/2016 which revealed an ejection fraction of 53% with grade I diastolic dysfunction, mild aortic valve calcification, mild tricuspid calcification. He did have volume overload but he also had acute kidney injury. He was on amiodarone during the November hospitalization and there was a question about whether the amiodarone was causing interstitial lung disease. So the amiodarone was discontinued and he had lung biopsies taken which showed acute with chronic inflammation of the tissue. His discharge summary from [facility] was for a discharge date of 11/30/2016. 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        He was hospitalized from 12/14/2016 to 12/17/2016 with mild colitis. Dr. Rizvi from gastroenterology did see him during that time.               He did have a stress nuclear perfusion study, which was done on 08/09/2016, and it showed inferior wall infarct but no evidence of ischemia.         He is having some intermittent left arm pain, which he thinks may be cardiac.  He has had no tachycardia, dizziness, or syncope.  His INR is high.  He is no longer using oxygen.  He has had no dizziness or falls.  He has no lower extremity edema.  He has had no difficulty breathing with the intermittent arm pain.  He has had no nausea or diaphoresis.   His INR is high here today so he is going to hold his Coumadin tonight and recheck it in the morning.            Past Medical History:   Diagnosis Date   • AAA (abdominal aortic aneurysm)     s/p repair, Dr. Felix   • Acute myocardial infarction    • Arteriosclerotic coronary artery disease    • Arthritis    • Atrial fibrillation    • B12 deficiency 6/1/2017   • Cardiomyopathy    • Chronic systolic (congestive) heart failure    • CKD (chronic kidney disease), stage III    • COPD (chronic obstructive pulmonary disease) 11/15/2016   • Coronary artery disease     MI 1990's, CABG x 2 V (LIMA-LAD, SVG-PDA), 1997   • Diabetes mellitus    • Dyspnea    • Fatigue    • Hyperglycemia 3/29/2017   • Hyperlipidemia    • Hypertension    • Imbalance 3/29/2017   • Ischemic cardiomyopathy     LVEF 40%   • Lung mass 04/18/2016    spiculated, being followed   • Lung nodule seen on imaging study    • Malignant neoplasm    • PAF (paroxysmal atrial fibrillation)    • Pleural effusion    • Premature ventricular contractions    • Prostate cancer    • PVC (premature ventricular contraction)    • Rash of body 6/21/2017   • Thoracic aortic ectasia    • Urinary tract infection with hematuria 1/18/2017         Past Surgical History:   Procedure Laterality Date   • APPENDECTOMY     • ARTERIAL ANEURYSM REPAIR     • BRONCHOSCOPY Right 10/5/2016    Procedure: BRONCHOSCOPY;  Surgeon: Jhony Quintanilla MD;  Location: [facility] ENDOSCOPY;  Service:    • BRONCHOSCOPY N/A 11/29/2016    Procedure: BRONCHOSCOPY WITH FLUORO w/biopsy AND BAL;  Surgeon: Ervin Stiles MD;  Location: [facility] ENDOSCOPY;  Service:    • CARDIAC CATHETERIZATION N/A 6/7/2017    Procedure: Left Heart Cath;  Surgeon: Vincent Middleton MD;  Location: [facility] CATH INVASIVE LOCATION;  Service:    • CARDIAC CATHETERIZATION N/A 6/7/2017    Procedure: Coronary angiography;  Surgeon: Vincent Middleton MD;  Location: [facility] CATH INVASIVE LOCATION;  Service:    • CARDIAC CATHETERIZATION N/A 6/7/2017     Procedure: Left ventriculography;  Surgeon: Vincent Middleton MD;  Location:  [facility] CATH INVASIVE LOCATION;  Service:    • CARDIAC CATHETERIZATION N/A 6/7/2017    Procedure: Bypass graft study;  Surgeon: Vincent Middleton MD;  Location:  [facility] CATH INVASIVE LOCATION;  Service:    • CARDIAC CATHETERIZATION N/A 6/7/2017    Procedure: Native mammary injection;  Surgeon: Vincent Middleton MD;  Location: [facility] CATH INVASIVE LOCATION;  Service:    • CARDIAC CATHETERIZATION N/A 6/7/2017    Procedure: Stent NYA bypass graft;  Surgeon: Vincent Middleton MD;  Location: [facility] CATH INVASIVE LOCATION;  Service:    • CARDIAC SURGERY     • CHOLECYSTECTOMY     • COLONOSCOPY N/A 12/16/2016    Procedure: COLONOSCOPY TO ASC COLON  WITH BIOPSY AND POLYPECTOMY;  Surgeon: Lane Brian MD;  Location: [facility] ENDOSCOPY;  Service:    • COLONOSCOPY N/A 3/1/2017    multiple polyps, lipoma in proximal transverse colon/hepatic flexure, repeat in one year   • CORONARY ARTERY BYPASS GRAFT     • ENDOSCOPY N/A 3/1/2017    [facility]ion's, HH, gastric mucosal atrophy   • PROSTATE SURGERY         Current Outpatient Prescriptions on File Prior to Visit   Medication Sig Dispense Refill   • atorvastatin (LIPITOR) 40 MG tablet Take 1 tablet by mouth Every Night. 30 tablet 6   • carvedilol (COREG) 6.25 MG tablet Take 1 tablet by mouth 2 (Two) Times a Day. 180 tablet 3   • clopidogrel (PLAVIX) 75 MG tablet Take 1 tablet by mouth Daily. 90 tablet 3   • furosemide (LASIX) 40 MG tablet Take 1 tablet by mouth Daily. 90 tablet 2   • nystatin-triamcinolone (MYCOLOG) 525166-8.1 UNIT/GM-% ointment Apply  topically 2 (Two) Times a Day. 60 g 0   • pantoprazole (PROTONIX) 40 MG EC tablet Take 40 mg by mouth Daily.     • potassium chloride (K-TAB) 20 MEQ tablet controlled-release ER tablet Take 1 tablet by mouth Daily. 90 tablet 3   • Probiotic Product (PROBIOTIC PO) Take 1 tablet by mouth Daily.     • terazosin (HYTRIN) 5 MG capsule Take 1 capsule by mouth 2 (Two) Times a Day.  30 capsule 6   • triamcinolone (KENALOG) 0.5 % cream Apply topically  Bid 15 g 0   • warfarin (COUMADIN) 1 MG tablet Take 1 mg by mouth Daily.     • warfarin (COUMADIN) 3 MG tablet TAKE ONE TABLET BY MOUTH DAILY 90 tablet 0     Current Facility-Administered Medications on File Prior to Visit   Medication Dose Route Frequency Provider Last Rate Last Dose   • cyanocobalamin injection 1,000 mcg  1,000 mcg Intramuscular Q28 Days Dimitrios Lindquist MD   1,000 mcg at 06/01/17 1224       Social History     Social History   • Marital status:      Spouse name: N/A   • Number of children: N/A   • Years of education: N/A     Occupational History   • 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           Review of Systems   Constitution: Negative.   HENT: Negative for congestion and headaches.    Eyes: Negative for vision loss in left eye and vision loss in right eye.   Respiratory: Negative.  Negative for cough, hemoptysis, shortness of breath, sleep disturbances due to breathing, snoring, sputum production and wheezing.    Endocrine: Negative.    Hematologic/Lymphatic: Negative.    Skin: Negative for poor wound healing and rash.   Musculoskeletal: Negative for falls, gout, muscle cramps and myalgias.   Gastrointestinal: Negative for abdominal pain, diarrhea, dysphagia, hematemesis, melena, nausea and vomiting.   Neurological: Negative for excessive daytime sleepiness, dizziness, light-headedness, loss of balance, seizures and vertigo.   Psychiatric/Behavioral: Negative for depression and  "substance abuse. The patient is not nervous/anxious.        Procedures    ECG 12 Lead  Date/Time: 6/26/2017 2:28 PM  Performed by: SAVANAH BERGER  Authorized by: SAVANAH BERGER   Comparison: compared with previous ECG   Similar to previous ECG  Rhythm: sinus rhythm  Ectopy: unifocal PVCs  Conduction: left bundle branch block  T depression: I and aVL  Other findings: PRWP  Clinical impression: abnormal ECG               Objective:      Vitals:    06/26/17 1421   BP: 130/66   BP Location: Right arm   Patient Position: Sitting   Pulse: 89   Weight: 192 lb (87.1 kg)   Height: 70\" (177.8 cm)     Body mass index is 27.55 kg/(m^2).    Physical Exam   Constitutional: He is oriented to person, place, and time. He appears well-developed and well-nourished. No distress.   HENT:   Head: Normocephalic and atraumatic.   Eyes: Conjunctivae are normal. No scleral icterus.   Neck: Neck supple. No JVD present. Carotid bruit is not present. No thyromegaly present.   Cardiovascular: Normal rate, regular rhythm, S1 normal, S2 normal, normal heart sounds and intact distal pulses.   No extrasystoles are present. PMI is not displaced.    No murmur heard.  Pulses:       Carotid pulses are 2+ on the right side, and 2+ on the left side.       Radial pulses are 2+ on the right side, and 2+ on the left side.        Dorsalis pedis pulses are 2+ on the right side, and 2+ on the left side.        Posterior tibial pulses are 2+ on the right side, and 2+ on the left side.   Pulmonary/Chest: Effort normal and breath sounds normal. No respiratory distress. He has no wheezes. He has no rhonchi. He has no rales. He exhibits no tenderness.   Abdominal: Soft. Bowel sounds are normal. He exhibits no distension, no abdominal bruit, no ascites and no mass. There is no tenderness.   Musculoskeletal: He exhibits no edema or deformity.   Lymphadenopathy:     He has no cervical adenopathy.   Neurological: He is alert and oriented to person, place, " and time. No cranial nerve deficit.   Skin: Skin is warm and dry. No rash noted. He is not diaphoretic. No cyanosis. No pallor. Nails show no clubbing.   Psychiatric: He has a normal mood and affect. Judgment normal.   Vitals reviewed.      Lab Review:       Assessment:      Diagnosis Plan   1. Coronary artery disease involving native coronary artery of native heart without angina pectoris     2. Diastolic CHF, chronic     3. History of coronary artery stent placement     4. Hyperlipidemia, unspecified hyperlipidemia type     5. Ischemic cardiomyopathy     6. PAF (paroxysmal atrial fibrillation)     7. Other emphysema     8. Paroxysmal atrial fibrillation     9. Type 2 diabetes mellitus with complication, without long-term current use of insulin          1. Acute diastolic heart failure requiring admission from 01/16/2014-01/20/2014 at [facility]. He was treated with diuretics and controlled heart rate. No CHF now.    2. Mild ischemic cardiomyopathy with prior inferior and posterior myocardial infarction  noted on an echocardiogram done in 01/2014.  Now LVEF is 53% 11/2016  3. Atrial fibrillation. He is now in NSR. He is on warfarin.    4. Spiculated lung mass in the right middle lobe. He is following with Dr. Douglass.    5. Chronic kidney disease, stage III. Follows with Dr. Ramon.  6. Hypertension. Well controlled.  7. History of coronary artery disease with myocardial infarction in the early 1990s and  coronary artery bypass grafting (two-vessel) in 1997. He had a mildly ischemic  stress nuclear study 1/2014 but was stable then. Cardiac catheterization done 8/2014 showed occluded    proximal native coronary arteries but patent LIMA to LAD and patent SVG to PDA with collaterals to the    circumflex system. Nonischemic stress nuclear 8/2016.  mild angina, start imdur 15mg daily.   8. Abdominal aortic aneurysm status post repair with Dr. Erna Felix.    10. Frequent PVCs, better  11. Admissions  for resp failure 9/2016 and 11/2016.   12. Hematuria with dysuria, resolved.   13. H/o PNA and lung disease, seeing Dr. Smith  Plan:       See back in 3 months.     Coronary Artery Disease  Assessment  • The patient has CCS class I - angina only during strenuous or prolonged physical activity    Subjective - Objective  • There is a history of previous coronary artery bypass graft  • Current antiplatelet therapy includes clopidogrel 75 mg      Atrial Fibrillation and Atrial Flutter  Assessment  • The patient has paroxysmal atrial fibrillation  • This is non-valvular in etiology  • The patient's CHADS2-VASc score is 4  • A [id number] score of 2 or more is considered a high risk for a thromboembolic event  • Warfarin prescribed    Plan  • Continue in atrial fibrillation with rate control  • Continue warfarin for antithrombotic therapy, bleeding issues discussed  • Continue beta blocker for rate control      Heart Failure  Assessment  • NYHA class II - There is slight limitation of physical activity. The patient is comfortable at rest, but physical activity results in fatigue, palpitations or shortness of breath.  • Beta blocker prescribed  • Diuretics prescribed  • Left ventricular function is normal by qualitative assessment    Plan  • The heart failure care plan was discussed with the patient today including: continuing the current program    Subjective/Objective    • Physical exam findings negative for rales, peripheral edema, elevated JVP and ascites.

## 2017-06-26 NOTE — TELEPHONE ENCOUNTER
----- Message from Alla Breaux sent at 6/26/2017  2:43 PM EDT -----  rx refill    terazpson 5mg  #60  Takes two daily  Would like a 90 day supply-with two refills  Got this filled through the hospital for the first time on 06/08/2017  Sees ravi  States he uses [facility]  Call back is [phone].      SENT TO [employer]TIM

## 2017-06-30 NOTE — PROGRESS NOTES
One hour initial assessment and educational session followed by a one hour exercise session. See post-session comments

## 2017-07-21 NOTE — TELEPHONE ENCOUNTER
Left message with results on patients voicemail.   ----- Message from Dimitrios Lindquist MD sent at 7/19/2017 10:18 AM EDT -----  Potassium ok.  He needs to include milk, yogurt for calcium. Renal function does show continued mild deegeneration. He ivanna need to see the renal doctors.  CMP in next week to 2 with appt. He should be getting repeat INR this week. Diabetes number is fine. Make sure he comes in for INR and labs tomorrow or Monday.

## 2017-08-07 NOTE — TELEPHONE ENCOUNTER
Pt called and states that the nurse told him to call the office due to why his taking 2 blood thinner. I explained to pt why his taking to blood thinner. AVELINO that when he got to his cardiac rehab he was on afib and they let him go back to sinus rhythm before they let him exercise.....Carol DOMINGUEZ

## 2017-08-21 NOTE — PROGRESS NOTES
Date of Office Visit: 2017  Encounter Provider: Carolyn Ramirez MD  Place of Service: [facility] CARDIOLOGY  Patient Name: Aakash Sanchez Jr.  :1935      Patient ID:  Aakash Sanchez Jr. is a 82 y.o. male is here for  followup for         History of Present Illness    I saw him when he was admitted to [facility] on  2014, for heart failure and atrial fibrillation. He came in with severe  short-windedness and was found to have heart failure, which was due to atrial fibrillation  with rapid ventricular response.      He is known to have a history of coronary artery disease and had coronary artery bypass  grafting done in , two vessel. He had prior to that a myocardial infarction in the  early . He is known to have hypertension and hyperlipidemia and a history of an  aortic aneurysm. Dr. Erna Felix did the repair for his aortic aneurysm. He has a  history of a spiculated lung mass for which he has been followed by Dr. Douglass.    Apparently, that was seen again on CT of his chest. There was also a fusiform ectasia of  the ascending thoracic aorta, and they recommended a follow-up study because of the  spiculated ground glass opacity in the right middle lobe, which was evaluated during the  hospitalization in 2014 at [facility]. He is noted to have chronic  kidney disease (also stage III), which I believe was diagnosed at that same time. When we  saw him, we basically recommended rate control and diuresis, which was successful during  the hospital. We also did an investigation to make sure that there was no reversible  cause for his heart failure. He had a stress study, which showed old inferior lateral  infarct with a mild amount of anteroseptal ischemia. That was done in 2014. He also  had an echocardiogram done showing an ejection fraction of 40-45% with mild ventricular  dilation, inferior wall akinesis, posterior  wall akinesis, moderate left atrial dilation,  and moderate mitral insufficiency. He was started on warfarin during the hospitalization  and has remained on it.        He was seen 08/2014 and at that time he was having increasing short-windedness and fatigue. We sent him for a cardiac catheterization which showed 30% to 40% calcified distal left main stenosis, chronic total occlusion of the proximal left anterior descending artery, chronic total occlusion of the proximal circumflex, and chronic total occlusion of the proximal right coronary artery. The LIMA to LAD was patent. The SVG to PDA was patent and there was collaterals filling of the distal RCA and OM-2. His cardiac output by RAINA was 4.72 liters per minute with cardiac index of 2.3 liters per minute per meter squared. There was a mild elevation of pulmonary capillary wedge pressure. He was treated medically and released.    He had a Holter recording showing atrial fibrillation with significant episodes of wide complex beats of wide complex tachycardia which I thought was maybe aberrant conduction done on 2014. His Holter was done at [facility].          At his visit with me on 04/18/2016, he was having a lot of short-windedness with activity and chest pressure at rest or with activity. He had laboratory values done in 04/2016 which showed a creatinine of 2.61. He followed up with Dr. Ramon for this and he said that was his baseline. Because of his difficulty with breathing, several tests were ordered. He had a thyroid study because of amiodarone which was normal. His liver panel was normal. He had a 2-D echocardiogram with Doppler done on 04/27/2016 which showed an ejection fraction of 40% with moderate concentric left ventricular hypertrophy, moderately reduced left ventricular systolic function, grade IA diastolic dysfunction and moderate left atrial enlargement. There was global hypokinesis noted. He was also supposed to have a chest x-ray  but I do not see the results of that here. I did see from 06/2016 that his parathyroid hormone level was intact.       He was in and out of the hospital multiple times since his last visit. He was hospitalized in 09/2016 and again in 11/2016, and then transferred to [facility] in 11/2016. In September he was diagnosed with respiratory failure which was hypoxic in nature and they felt like it was due to pneumonia. When he was hospitalized in November, it was acute on chronic systolic ischemic heart failure and acute hypoxic respiratory failure. He had an echocardiogram performed on 11/25/2016 which revealed an ejection fraction of 53% with grade I diastolic dysfunction, mild aortic valve calcification, mild tricuspid calcification. He did have volume overload but he also had acute kidney injury. He was on amiodarone during the November hospitalization and there was a question about whether the amiodarone was causing interstitial lung disease. So the amiodarone was discontinued and he had lung biopsies taken which showed acute with chronic inflammation of the tissue. His discharge summary from [facility] was for a discharge date of 11/30/2016. 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         He was hospitalized from 12/14/2016 to 12/17/2016 with mild colitis. Dr. Rizvi from gastroenterology did see him during that time.               He did have a stress nuclear perfusion study, which was done on 08/09/2016, and it showed inferior wall infarct but no evidence of ischemia.            At his last appointment, he was having what he felt was anginal pain in his left arm.  I started isosorbide and it has gotten better.  He's used 4 sublingual nitroglycerin since his last visit with me.  He doesn't use them after exercise.  He is short winded with activity but he is continuing to try to exercise.  He was very deconditioned when he started exercising.   He doesn't feels heart racing or skipping.  He's had no dizziness or syncope.  At rest he has no dyspnea.  He has not had orthopnea, PND or edema.       Past Medical History:   Diagnosis Date   • AAA (abdominal aortic aneurysm)     s/p repair, Dr. Felix   • Acute myocardial infarction    • Arteriosclerotic coronary artery disease    • Arthritis    • Atrial fibrillation    • B12 deficiency 6/1/2017   • Cardiomyopathy    • Chronic systolic (congestive) heart failure    • CKD (chronic kidney disease), stage III    • COPD (chronic obstructive pulmonary disease) 11/15/2016   • Coronary artery disease     MI 1990's, CABG x 2 V (LIMA-LAD, SVG-PDA), 1997   • Diabetes mellitus    • Dyspnea    • Fatigue    • Hyperglycemia 3/29/2017   • Hyperlipidemia    • Hypertension    • Imbalance 3/29/2017   • Ischemic cardiomyopathy     LVEF 40%   • Lung mass 04/18/2016    spiculated, being followed   • Lung nodule seen on imaging study    • Malignant neoplasm    • PAF (paroxysmal atrial fibrillation)    • Pleural effusion    • Premature ventricular contractions    • Prostate cancer    • PVC (premature ventricular contraction)    • Rash of body 6/21/2017   • Thoracic aortic ectasia    • Urinary tract infection with hematuria 1/18/2017         Past Surgical History:   Procedure Laterality Date   • APPENDECTOMY     • ARTERIAL ANEURYSM REPAIR     • BRONCHOSCOPY Right 10/5/2016    Procedure: BRONCHOSCOPY;  Surgeon: Jhony Quintanilla MD;  Location: [facility] ENDOSCOPY;  Service:    • BRONCHOSCOPY N/A 11/29/2016    Procedure: BRONCHOSCOPY WITH FLUORO w/biopsy AND BAL;  Surgeon: Ervin Stiles MD;  Location: [facility] ENDOSCOPY;  Service:    • CARDIAC CATHETERIZATION N/A 6/7/2017    Procedure: Left Heart Cath;  Surgeon: Vincent Middleton MD;  Location: [facility] CATH INVASIVE LOCATION;  Service:    • CARDIAC CATHETERIZATION N/A 6/7/2017    Procedure: Coronary angiography;  Surgeon: Vincent Middleton MD;  Location: [facility] CATH INVASIVE LOCATION;  Service:    •  CARDIAC CATHETERIZATION N/A 6/7/2017    Procedure: Left ventriculography;  Surgeon: Vincent Middleton MD;  Location:  [facility] CATH INVASIVE LOCATION;  Service:    • CARDIAC CATHETERIZATION N/A 6/7/2017    Procedure: Bypass graft study;  Surgeon: Vincent Middleton MD;  Location:  [facility] CATH INVASIVE LOCATION;  Service:    • CARDIAC CATHETERIZATION N/A 6/7/2017    Procedure: Native mammary injection;  Surgeon: Vincent Middleton MD;  Location: [facility] CATH INVASIVE LOCATION;  Service:    • CARDIAC CATHETERIZATION N/A 6/7/2017    Procedure: Stent NYA bypass graft;  Surgeon: Vincent Middleton MD;  Location:  [facility] CATH INVASIVE LOCATION;  Service:    • CARDIAC SURGERY     • CHOLECYSTECTOMY     • COLONOSCOPY N/A 12/16/2016    Procedure: COLONOSCOPY TO ASC COLON  WITH BIOPSY AND POLYPECTOMY;  Surgeon: Lane Brian MD;  Location: [facility] ENDOSCOPY;  Service:    • COLONOSCOPY N/A 3/1/2017    multiple polyps, lipoma in proximal transverse colon/hepatic flexure, repeat in one year   • CORONARY ARTERY BYPASS GRAFT     • ENDOSCOPY N/A 3/1/2017    Concepcion's, HH, gastric mucosal atrophy   • PROSTATE SURGERY         Current Outpatient Prescriptions on File Prior to Visit   Medication Sig Dispense Refill   • atorvastatin (LIPITOR) 40 MG tablet Take 1 tablet by mouth Every Night. 30 tablet 6   • carvedilol (COREG) 6.25 MG tablet Take 1 tablet by mouth 2 (Two) Times a Day. 180 tablet 3   • clopidogrel (PLAVIX) 75 MG tablet Take 1 tablet by mouth Daily. 90 tablet 3   • furosemide (LASIX) 40 MG tablet Take 1 tablet by mouth Daily. 90 tablet 2   • isosorbide mononitrate (IMDUR) 30 MG 24 hr tablet Take 0.5 tablets by mouth Daily. 45 tablet 3   • nitroglycerin (NITROSTAT) 0.4 MG SL tablet 1 under the tongue as needed for angina, may repeat q5mins for up three doses 35 tablet 0   • nystatin-triamcinolone (MYCOLOG) 507166-7.1 UNIT/GM-% ointment Apply  topically 2 (Two) Times a Day. 60 g 0   • pantoprazole (PROTONIX) 40 MG EC tablet Take 40 mg by  mouth Daily.     • potassium chloride (K-TAB) 20 MEQ tablet controlled-release ER tablet Take 1 tablet by mouth Daily. 90 tablet 3   • Probiotic Product (PROBIOTIC PO) Take 1 tablet by mouth Daily.     • terazosin (HYTRIN) 5 MG capsule Take 1 capsule by mouth 2 (Two) Times a Day. 180 capsule 2   • triamcinolone (KENALOG) 0.5 % cream Apply topically  Bid 15 g 0   • warfarin (COUMADIN) 1 MG tablet Take 1 mg by mouth Daily.     • warfarin (COUMADIN) 2 MG tablet TAKE 1 TABLET BY MOUTH DAILY  OR AS DIRECTED 90 tablet 0   • warfarin (COUMADIN) 3 MG tablet TAKE ONE TABLET BY MOUTH DAILY 90 tablet 0     Current Facility-Administered Medications on File Prior to Visit   Medication Dose Route Frequency Provider Last Rate Last Dose   • cyanocobalamin injection 1,000 mcg  1,000 mcg Intramuscular Q28 Days Dimitrios Lindquist MD   1,000 mcg at 06/01/17 1224       Social History     Social History   • Marital status:      Spouse name: N/A   • Number of children: N/A   • Years of education: N/A     Occupational History   • 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           Review of Systems   Constitution: Negative.   HENT: Negative for congestion and headaches.    Eyes: Negative for vision loss in left eye and vision loss in right eye.   Cardiovascular: Positive for dyspnea on exertion.   Respiratory: Negative.  Negative for cough, hemoptysis, shortness of breath, sleep disturbances due to breathing, snoring, sputum production and wheezing.    Endocrine: Negative.    Hematologic/Lymphatic:  "Negative.    Skin: Negative for poor wound healing and rash.   Musculoskeletal: Positive for joint pain. Negative for falls, gout, muscle cramps and myalgias.   Gastrointestinal: Negative for abdominal pain, diarrhea, dysphagia, hematemesis, melena, nausea and vomiting.   Neurological: Negative for excessive daytime sleepiness, dizziness, light-headedness, loss of balance, seizures and vertigo.   Psychiatric/Behavioral: Negative for depression and substance abuse. The patient is not nervous/anxious.        Procedures    ECG 12 Lead  Date/Time: 8/21/2017 12:29 PM  Performed by: SAVANAH BERGER  Authorized by: SAVANAH BERGER   Comparison: compared with previous ECG   Comparison to previous ECG: A fib now seen  Rhythm: atrial fibrillation  Conduction: left bundle branch block  Clinical impression: abnormal ECG               Objective:      Vitals:    08/21/17 1220   BP: 122/70   BP Location: Right arm   Patient Position: Sitting   Pulse: 97   Weight: 194 lb 6.4 oz (88.2 kg)   Height: 70\" (177.8 cm)     Body mass index is 27.89 kg/(m^2).    Physical Exam   Constitutional: He is oriented to person, place, and time. He appears well-developed and well-nourished. No distress.   HENT:   Head: Normocephalic and atraumatic.   Eyes: Conjunctivae are normal. No scleral icterus.   Neck: Neck supple. No JVD present. Carotid bruit is not present. No thyromegaly present.   Cardiovascular: Normal rate, S1 normal, S2 normal, normal heart sounds and intact distal pulses.  An irregularly irregular rhythm present.  No extrasystoles are present. PMI is not displaced.  Exam reveals no gallop.    No murmur heard.  Pulses:       Carotid pulses are 2+ on the right side, and 2+ on the left side.       Radial pulses are 2+ on the right side, and 2+ on the left side.        Dorsalis pedis pulses are 2+ on the right side, and 2+ on the left side.        Posterior tibial pulses are 2+ on the right side, and 2+ on the left side. "   Pulmonary/Chest: Effort normal and breath sounds normal. No respiratory distress. He has no wheezes. He has no rhonchi. He has no rales. He exhibits no tenderness.   Abdominal: Soft. Bowel sounds are normal. He exhibits no distension, no abdominal bruit and no mass. There is no tenderness.   Musculoskeletal: He exhibits no edema or deformity.   Lymphadenopathy:     He has no cervical adenopathy.   Neurological: He is alert and oriented to person, place, and time. No cranial nerve deficit.   Skin: Skin is warm and dry. No rash noted. He is not diaphoretic. No cyanosis. No pallor. Nails show no clubbing.   Psychiatric: He has a normal mood and affect. Judgment normal.   Vitals reviewed.      Lab Review:       Assessment:      Diagnosis Plan   1. Ischemic cardiomyopathy     2. Coronary artery disease involving native coronary artery of native heart without angina pectoris     3. Diastolic CHF, chronic     4. History of coronary artery stent placement     5. Essential hypertension     6. Hyperlipidemia, unspecified hyperlipidemia type     7. PAF (paroxysmal atrial fibrillation)     8. Other emphysema     9. Type 2 diabetes mellitus with complication, without long-term current use of insulin     10. CKD (chronic kidney disease), stage III           1. Acute diastolic heart failure requiring admission from 01/16/2014-01/20/2014 at [facility]. He was treated with diuretics and controlled heart rate. No CHF now.    2. Mild ischemic cardiomyopathy with prior inferior and posterior myocardial infarction  noted on an echocardiogram done in 01/2014.  Now LVEF is 53% 11/2016  3. Atrial fibrillation. He is on warfarin.    4. Spiculated lung mass in the right middle lobe. He is following with Dr. Douglass.    5. Chronic kidney disease, stage III. Follows with Dr. Ramon.  6. Hypertension. Well controlled.  7. History of coronary artery disease with myocardial infarction in the early 1990s and  coronary artery  bypass grafting (two-vessel) in 1997. He had a mildly ischemic  stress nuclear study 1/2014 but was stable then. Cardiac catheterization done 8/2014 showed occluded    proximal native coronary arteries but patent LIMA to LAD and patent SVG to PDA with collaterals to the    circumflex system. Nonischemic stress nuclear 8/2016.  has stable angina.   8. Abdominal aortic aneurysm status post repair with Dr. Erna Felix.    10. Frequent PVCs, better  11. Admissions for resp failure 9/2016 and 11/2016.   12. Hematuria with dysuria, resolved.   13. H/o PNA and lung disease, seeing Dr. Smith     Plan:       See back in 6 months, no changes. Overall doing well.

## 2017-08-25 PROBLEM — R06.09 DYSPNEA ON EXERTION: Status: ACTIVE | Noted: 2017-01-01

## 2017-08-25 PROBLEM — G47.00 INSOMNIA: Status: ACTIVE | Noted: 2017-01-01

## 2017-08-25 NOTE — PROGRESS NOTES
Date of Office Visit: [unfilled]  Encounter Provider: KANNAN Degroot  Place of Service: [facility] CARDIOLOGY  Patient Name: Aakash Sanchez Jr.  :1935    Chief Complaint   Patient presents with   • Shortness of Breath   :     HPI: Aakash Sanchez Jr. is a 82 y.o. male , new to me, who presents today for shortness of breath.  Old records have been obtained and reviewed by me.  He is a patient of Dr. Ramirez's with an extensive past cardiac history.  It includes heart failure, atrial fibrillation, coronary artery disease status post CABG in , and chronic kidney disease, and abdominal aortic aneurysm status post repair.  He has been in and out of the hospital many times over the past couple of years.  He was last in our office to see Dr. Ramirze on 2017.  She remarked that he was having anginal pain and in the past she started him on isosorbide which helped the pain.  At his visit last week, he was doing well and no changes were made to his medical regimen.  She recommended that he follow up with her in 6 months.  He then presented to his primary care physician today with complaints of fatigue and dyspnea on exertion.  Dr. Lindquist noticed that the patient's weight was stable, however the patient complained that he was having symptoms that felt like his prior episodes of congestive heart failure.  He also noted that his INR has been stable, and the last week it was 2.3.  Because of his shortness of breath, he was referred to our [facility].   His most recent echocardiogram was on 2017 and showed moderately decreased LV function with an EF of 35% and no significant valvular abnormalities.  He did have another cardiac catheterization and subsequent stent placement on 2017.  The stent was placed to a critical lesion in the vein graft to his PDA.  I first saw him in after that hospitalization.  Evidently last week when he saw Dr. Ramirez on 2017  it was at the request of the nurses in cardiac rehabilitation.  The patient had complaints of shortness of breath, however according to Dr. Ramirez's note from 8/21/2017 she felt he was stable.  Then over the weekend he got Chinese carry out for dinner on Saturday night.  He says it was a shrimp stirfry.  He did not add any salt onto his dinner, however it was already covered and soft when he ate it.  He also had sauerkraut.  Ever since then, he has been progressively short of breath.  He has orthopnea.  He can barely make it to the end of the driveway without having to stop because of shortness of breath.  His abdomen has become distended.  Interestingly, his weight has remained stable and he's only gained 2 pounds over the past week.  He states that he is no longer making adequate urine even know he is still taking his Lasix 40 mg daily.  He has not been able to sleep more than a couple of hours secondary to shortness of breath when lying down.      Past Medical History:   Diagnosis Date   • AAA (abdominal aortic aneurysm)     s/p repair, Dr. Felix   • Acute myocardial infarction    • Arteriosclerotic coronary artery disease    • Arthritis    • Atrial fibrillation    • B12 deficiency 6/1/2017   • Cardiomyopathy    • Chronic systolic (congestive) heart failure    • CKD (chronic kidney disease), stage III    • COPD (chronic obstructive pulmonary disease) 11/15/2016   • Coronary artery disease     MI 1990's, CABG x 2 V (LIMA-LAD, SVG-PDA), 1997   • Diabetes mellitus    • Dyspnea    • Dyspnea on exertion 8/25/2017   • Fatigue    • Hyperglycemia 3/29/2017   • Hyperlipidemia    • Hypertension    • Imbalance 3/29/2017   • Insomnia 8/25/2017   • Ischemic cardiomyopathy     LVEF 40%   • Lung mass 04/18/2016    spiculated, being followed   • Lung nodule seen on imaging study    • Malignant neoplasm    • PAF (paroxysmal atrial fibrillation)    • Pleural effusion    • Premature ventricular contractions    • Prostate cancer     • PVC (premature ventricular contraction)    • Rash of body 6/21/2017   • Thoracic aortic ectasia    • Urinary tract infection with hematuria 1/18/2017       Past Surgical History:   Procedure Laterality Date   • APPENDECTOMY     • ARTERIAL ANEURYSM REPAIR     • BRONCHOSCOPY Right 10/5/2016    Procedure: BRONCHOSCOPY;  Surgeon: Jhony Quintanilla MD;  Location: [facility] ENDOSCOPY;  Service:    • BRONCHOSCOPY N/A 11/29/2016    Procedure: BRONCHOSCOPY WITH FLUORO w/biopsy AND BAL;  Surgeon: Ervin Stiles MD;  Location: [facility] ENDOSCOPY;  Service:    • CARDIAC CATHETERIZATION N/A 6/7/2017    Procedure: Left Heart Cath;  Surgeon: Vincent Middleton MD;  Location: [facility] CATH INVASIVE LOCATION;  Service:    • CARDIAC CATHETERIZATION N/A 6/7/2017    Procedure: Coronary angiography;  Surgeon: Vincent Middleton MD;  Location: [facility] CATH INVASIVE LOCATION;  Service:    • CARDIAC CATHETERIZATION N/A 6/7/2017    Procedure: Left ventriculography;  Surgeon: Vincent Middleton MD;  Location: [facility] CATH INVASIVE LOCATION;  Service:    • CARDIAC CATHETERIZATION N/A 6/7/2017    Procedure: Bypass graft study;  Surgeon: Vincent Middleton MD;  Location: [facility] CATH INVASIVE LOCATION;  Service:    • CARDIAC CATHETERIZATION N/A 6/7/2017    Procedure: Native mammary injection;  Surgeon: Vincent Middleton MD;  Location: [facility] CATH INVASIVE LOCATION;  Service:    • CARDIAC CATHETERIZATION N/A 6/7/2017    Procedure: Stent NYA bypass graft;  Surgeon: Vincent Middleton MD;  Location: [facility] CATH INVASIVE LOCATION;  Service:    • CARDIAC SURGERY     • CHOLECYSTECTOMY     • COLONOSCOPY N/A 12/16/2016    Procedure: COLONOSCOPY TO ASC COLON  WITH BIOPSY AND POLYPECTOMY;  Surgeon: Lane Brian MD;  Location: [facility] ENDOSCOPY;  Service:    • COLONOSCOPY N/A 3/1/2017    multiple polyps, lipoma in proximal transverse colon/hepatic flexure, repeat in one year   • CORONARY ARTERY BYPASS GRAFT     • ENDOSCOPY N/A 3/1/2017    Concepcion's, HH, gastric mucosal atrophy    • PROSTATE SURGERY         Social History     Social History   • Marital status:      Spouse name: N/A   • Number of children: N/A   • Years of education: N/A     Occupational History   • 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       Family History   Problem Relation Age of Onset   • Cancer Mother    • Cancer Father    • COPD Father    • Arthritis Sister    • Diabetes Sister    • Hyperlipidemia Sister    • Arthritis Brother    • Diabetes Brother    • Stroke Brother    • Lung cancer Other    • Stroke Other    • No Known Problems Maternal Grandmother    • No Known Problems Maternal Grandfather    • No Known Problems Paternal Grandmother    • No Known Problems Paternal Grandfather        Review of Systems   Constitution: Negative for chills, fever, malaise/fatigue, weight gain and weight loss.   HENT: Negative for ear pain, headaches, hearing loss, nosebleeds and sore throat.    Eyes: Negative for double vision, pain and visual disturbance.   Cardiovascular: Positive for dyspnea on exertion and orthopnea. Negative for chest pain, irregular heartbeat, leg swelling, near-syncope, palpitations, paroxysmal nocturnal dyspnea and syncope.   Respiratory: Negative for cough, shortness of breath, sleep disturbances due to breathing, snoring and wheezing.    Endocrine: Negative for cold intolerance, heat intolerance and polyuria.   Skin: Negative for itching and rash.   Musculoskeletal: Negative for joint pain, joint swelling and myalgias.   Gastrointestinal: Positive for bloating.  Negative for abdominal pain, diarrhea, melena, nausea and vomiting.   Genitourinary: Negative for frequency, hematuria and hesitancy.   Neurological: Negative for excessive daytime sleepiness, light-headedness, numbness, paresthesias and seizures.   Psychiatric/Behavioral: Negative for altered mental status and depression.   Allergic/Immunologic: Negative.    All other systems reviewed and are negative.      No Known Allergies      Current Outpatient Prescriptions:   •  atorvastatin (LIPITOR) 40 MG tablet, Take 1 tablet by mouth Every Night., Disp: 30 tablet, Rfl: 6  •  carvedilol (COREG) 6.25 MG tablet, Take 1 tablet by mouth 2 (Two) Times a Day., Disp: 180 tablet, Rfl: 3  •  clopidogrel (PLAVIX) 75 MG tablet, Take 1 tablet by mouth Daily., Disp: 90 tablet, Rfl: 3  •  furosemide (LASIX) 40 MG tablet, Take 1 tablet by mouth Daily., Disp: 90 tablet, Rfl: 2  •  isosorbide mononitrate (IMDUR) 30 MG 24 hr tablet, Take 0.5 tablets by mouth Daily., Disp: 45 tablet, Rfl: 3  •  nitroglycerin (NITROSTAT) 0.4 MG SL tablet, 1 under the tongue as needed for angina, may repeat q5mins for up three doses, Disp: 35 tablet, Rfl: 0  •  nystatin-triamcinolone (MYCOLOG) 091546-0.1 UNIT/GM-% ointment, Apply  topically 2 (Two) Times a Day., Disp: 60 g, Rfl: 0  •  pantoprazole (PROTONIX) 40 MG EC tablet, Take 40 mg by mouth Daily., Disp: , Rfl:   •  potassium chloride (K-TAB) 20 MEQ tablet controlled-release ER tablet, Take 1 tablet by mouth Daily., Disp: 90 tablet, Rfl: 3  •  Probiotic Product (PROBIOTIC PO), Take 1 tablet by mouth Daily., Disp: , Rfl:   •  terazosin (HYTRIN) 5 MG capsule, Take 1 capsule by mouth 2 (Two) Times a Day., Disp: 180 capsule, Rfl: 2  •  triamcinolone (KENALOG) 0.5 % cream, Apply topically  Bid, Disp: 15 g, Rfl: 0  •  warfarin (COUMADIN) 1 MG tablet, Take 1 mg by mouth Daily., Disp: , Rfl:   •  warfarin (COUMADIN) 2 MG tablet, TAKE 1 TABLET BY MOUTH DAILY  OR AS DIRECTED, Disp: 90 tablet, Rfl: 0  •  warfarin  "(COUMADIN) 3 MG tablet, TAKE ONE TABLET BY MOUTH DAILY, Disp: 90 tablet, Rfl: 0    Current Facility-Administered Medications:   •  cyanocobalamin injection 1,000 mcg, 1,000 mcg, Intramuscular, Q28 Days, Dimitrios Lindquist MD, 1,000 mcg at 06/01/17 1224  •  nitroglycerin (NITROSTAT) SL tablet 0.4 mg, 0.4 mg, Sublingual, Q5 Min PRN, KANNAN Degroot  •  sodium chloride 0.9 % flush 10 mL, 10 mL, Intravenous, PRN, KANNAN Degroot     Objective:     Vitals:    08/25/17 1156 08/25/17 1159   BP: 122/86 126/76   BP Location: Left arm Right arm   Patient Position: Sitting Sitting   Pulse: 106    Resp: 22    SpO2: 97%    Weight: 192 lb (87.1 kg)    Height: 70\" (177.8 cm)      Body mass index is 27.55 kg/(m^2).    PHYSICAL EXAM:    Physical Exam   Constitutional: He is oriented to person, place, and time. He appears well-developed and well-nourished. No distress.   HENT:   Head: Normocephalic and atraumatic.   Eyes: Pupils are equal, round, and reactive to light.   Neck: No JVD present. No thyromegaly present.   Cardiovascular: Normal rate, regular rhythm, normal heart sounds and intact distal pulses.    No murmur heard.  Pulmonary/Chest: Effort normal. No respiratory distress. He has rales in the right lower field and the left lower field.   Abdominal: Soft. He exhibits distension and ascites. Bowel sounds are decreased. There is no splenomegaly or hepatomegaly. There is no tenderness.   Musculoskeletal: Normal range of motion. He exhibits no edema.   Neurological: He is alert and oriented to person, place, and time.   Skin: Skin is warm and dry. He is not diaphoretic. No erythema.   Psychiatric: He has a normal mood and affect. His behavior is normal. Judgment normal.         ECG 12 Lead  Date/Time: 8/25/2017 1:56 PM  Performed by: ALVIN AMADOR.  Authorized by: ALVIN AMADOR   Comparison: compared with previous ECG from 6/15/2017  Similar to previous ECG  Rhythm: atrial fibrillation  Ectopy: PVCs  BPM: 98  Clinical " impression: abnormal ECG  Comments: Indication: Coronary artery disease, atrial fibrillation, shortness of breath.              Assessment:      [unfilled]  Orders Placed This Encounter   Procedures   • Comprehensive Metabolic Panel   • Troponin T     Standing Status:   Future     Number of Occurrences:   1     Standing Expiration Date:   8/25/2018   • D-dimer     Standing Status:   Future     Number of Occurrences:   1     Standing Expiration Date:   8/25/2018   • proBNP     Standing Status:   Future     Number of Occurrences:   1     Standing Expiration Date:   8/25/2018   • Troponin T     Standing Status:   Standing     Number of Occurrences:   1   • D-dimer     Standing Status:   Standing     Number of Occurrences:   1   • proBNP     Standing Status:   Standing     Number of Occurrences:   1   • CBC Auto Differential   • NPO Diet     Standing Status:   Standing     Number of Occurrences:   1   • Cardiac Monitoring     Standing Status:   Standing     Number of Occurrences:   1   • Vital Signs - Once     Standing Status:   Standing     Number of Occurrences:   1   • Vital Signs - As Needed     Standing Status:   Standing     Number of Occurrences:   04298   • Bathroom Privileges With Assistance     Standing Status:   Standing     Number of Occurrences:   1   • Pulse Oximetry     Standing Status:   Standing     Number of Occurrences:   07517   • Oxygen Therapy- Nasal Cannula; Titrate for SPO2: 92%, equal to or greater than     1-4 lpm if patient symptomatic or if O2 sat is below 92%     Standing Status:   Standing     Number of Occurrences:   86820     Order Specific Question:   Device     Answer:   Nasal Cannula     Order Specific Question:   Titrate for SPO2     Answer:   92%     Order Specific Question:   Titrate for SPO2     Answer:   equal to or greater than   • ECG 12 Lead     This order was created via procedure documentation     Standing Status:   Standing     Number of Occurrences:   1   • Insert Peripheral IV      Standing Status:   Standing     Number of Occurrences:   1   • CBC & Differential     Order Specific Question:   Manual Differential     Answer:   No          Plan:       1.  Heart Failure  Assessment  • NYHA class III-B - There is significant limitation of physical activity. The patient is comfortable at rest, but minimal activity causes fatigue, palpitations or shortness of breath.  • Beta blocker prescribed  • Diuretics prescribed  • The most recent ejection fraction is 35%  • Left ventricular function is moderately reduced by qualitative assessment    Plan  • The patient has received heart failure education on the following topics: dietary sodium restriction    Subjective/Objective  • The patient reports orthopnea  • Physical exam findings positive for rales and ascites.   • Physical exam findings negative for peripheral edema, elevated JVP and hepatomegaly.  • I do believe this patient is having an acute episode of heart failure.  His proBNP is almost double what it was a few months ago.  His d-dimer is slightly elevated, his troponin is slightly elevated, and unfortunately his creatinine is now 2.3 which is almost double his baseline of 1.6.  I do not think that I can successfully diurese him in our [facility] in the setting of his kidney disease.  For this reason, and because of his significant symptoms, I'm choosing to admit him in the hospital for further evaluation and diuresis.  We will give him a telemetry bed and ask nephrology to see him.  I discussed the plan of care with the patient and with Dr. Loera, all parties agreed to proceed with hospital admission.      As always, it has been a pleasure to participate in your patient's care.      Sincerely,         Kathy Pitts PA-C

## 2017-08-25 NOTE — PROGRESS NOTES
Subjective   Aakash Sanchez Jr. is a 82 y.o. male.     History of Present Illness   81 yo male with acute onset of fatigue and dyspnea with walking down the driveway. Now having trouble sleeping. He cant nap either. He is here today just exhausted. Feels like his prior episodes of CHF, but weight is stable, up 2 pounds.  He is not having increased swelling.    He has PMHx Af, CAD, s/p CABG 1997 - last cath 2014.  Just saw Dr. Ramirez on 8/21/17.  Poor appetite lately. Prior CHF also did not have orthopnea, PND, edema. HE is on coumadin checking his INR himself, 2.3 last week. Very regular last few weeks.  The following portions of the patient's history were reviewed and updated as appropriate: allergies, current medications, past family history, past medical history, past social history, past surgical history and problem list.    Review of Systems   Constitutional: Positive for appetite change and fatigue. Negative for activity change, fever and unexpected weight change.   HENT: Negative for congestion, ear pain, sinus pressure, sore throat, trouble swallowing and voice change.    Respiratory: Positive for shortness of breath. Negative for cough.         Very winded   Cardiovascular: Positive for palpitations. Negative for chest pain and leg swelling.   Gastrointestinal: Negative for diarrhea, nausea and vomiting.   Endocrine: Negative.    Genitourinary: Negative for decreased urine volume and urgency.        No erectile problems  No nocturia   Musculoskeletal: Negative.    Allergic/Immunologic: Negative.    Neurological: Negative for dizziness and headaches.   Hematological: Negative.    Psychiatric/Behavioral: Positive for sleep disturbance.   All other systems reviewed and are negative.      Objective   Physical Exam   Constitutional: He is oriented to person, place, and time. He appears well-developed and well-nourished.   HENT:   Head: Normocephalic and atraumatic.   Right Ear: External ear normal.   Left  Ear: External ear normal.   Nose: Nose normal.   Mouth/Throat: No oropharyngeal exudate.   Eyes: Conjunctivae and EOM are normal. Pupils are equal, round, and reactive to light.   Neck: Normal range of motion. Neck supple. No thyromegaly present.   Cardiovascular: Normal rate, regular rhythm, normal heart sounds and intact distal pulses.    No murmur heard.  Pulmonary/Chest: Effort normal and breath sounds normal. No respiratory distress.   Abdominal: Soft. Bowel sounds are normal. He exhibits no distension.   Musculoskeletal: Normal range of motion.   Neurological: He is alert and oriented to person, place, and time.   Skin: Skin is warm and dry.   Psychiatric: He has a normal mood and affect. His behavior is normal.   Nursing note and vitals reviewed.    ECG 12 Lead  Date/Time: 8/25/2017 12:17 PM  Performed by: STEPHANIE MACK  Authorized by: STEPHANIE MACK   Comparison: not compared with previous ECG   Rate: normal  BPM: 90  Comments: Similar to prior EKG  PVCs  Afib            INR per patient 2.3 last week  01/2014. He also  had an echocardiogram done showing an ejection fraction of 40-45% with mild ventricular  dilation, inferior wall akinesis, posterior wall akinesis, moderate left atrial dilation,  and moderate mitral insufficiency    cardiac catheterization which showed 30% to 40% calcified distal left main stenosis, chronic total occlusion of the proximal left anterior descending artery, chronic total occlusion of the proximal circumflex, and chronic total occlusion of the proximal right coronary artery. The LIMA to LAD was patent. The SVG to PDA was patent and there was collaterals filling of the distal RCA and OM-2. His cardiac output by RAINA was 4.72 liters per minute with cardiac index of 2.3 liters per minute per meter squared. There was a mild elevation of pulmonary capillary wedge pressure.   Assessment/Plan      Aakash was seen today for insomnia and shortness of breath.    Diagnoses and all orders  for this visit:    Insomnia, unspecified type    PAF (paroxysmal atrial fibrillation)    B12 deficiency    CKD (chronic kidney disease), stage III    Essential hypertension    Hyperlipidemia, unspecified hyperlipidemia type    Dyspnea on exertion             patient sent to chest pain center, due to dyspnea which is new last 48 hours, high risk for cardiac events.  Spoke with their call center and copy ekg given to patient

## 2017-08-28 PROBLEM — I50.23 ACUTE ON CHRONIC SYSTOLIC (CONGESTIVE) HEART FAILURE (HCC): Status: ACTIVE | Noted: 2017-01-01

## 2017-08-28 NOTE — TELEPHONE ENCOUNTER
It was previously BID but I decreased to qday so #90 is more appropriate.  Will you call?    YUSUF

## 2017-08-28 NOTE — TELEPHONE ENCOUNTER
[facility] pharmacy called wanting to clarify an RX that was sent in earlier.  An RX for Terazosin 5mg take 1 cap at bedtime #180 was sent in.  [facility] wanted to verify that this is only a once daily RX.  If so, [facility] asked that we change the #.  Should this medication be taken once or twice daily?  Please advise.  Thais

## 2017-09-01 PROBLEM — I42.9 CARDIOMYOPATHY (HCC): Status: ACTIVE | Noted: 2017-01-01

## 2017-09-01 NOTE — PROGRESS NOTES
Date of Office Visit: 2017  Encounter Provider: KANNAN Degroot  Place of Service: [facility] CARDIOLOGY  Patient Name: Aakash Sanchez Jr.  :1935    Chief Complaint   Patient presents with   • Congestive Heart Failure     1 week hospital follow up   :     HPI: Aakash Sanchez Jr. is a 82 y.o. male who presents today for Follow-up.  Old records have been obtained and reviewed by me.  He is a patient of Dr. Ramirez's with a past medical history significant for atrial fibrillation with RVR, coronary artery disease status post CABG in , hypertension, hyperlipidemia, aortic aneurysm status post repair, and a lung mass followed by Dr. Douglass.  He was in our office to see Dr. Ramirez on 3/31/2017.  At that visit, he was doing well from a cardiac standpoint.  No changes were made to his medical regimen.  Then on 2017 he presented to the emergency room with a TIA and unstable angina.  He ruled in for a non-STEMI.  Neurology cleared him for cardiac catheterization, and he was found to have a critical lesion in the vein graft to his PDA.  He did have an echocardiogram which showed moderately decreased LV function with an estimated EF in disagreement with a calculated EF.  The estimated EF appeared to be about 35%.  He had no significant valvular abnormalities.   I first saw him for follow-up after that intervention on 6/15/2017 and he was doing well.  He has followed up with Dr. Ramirez a couple of times since then, and developed what he thought was anginal pain in his left arm.  This improved with the initiation of isosorbide.  Then on 2017 he called our office with complaints of increased shortness of breath for a few days and I saw him in [facility].  He was in an acute episode of heart failure with a creatinine elevated at 2.3, and I admitted him to the hospital for diuresis.  I felt that his flareup of heart failure was secondary to  dietary indiscretion, as he had shrimp stir-beck three days prior.  He also had rapid atrial fibrillation.  Over 3 days he was diuresed and his edema, shortness of breath, and orthopnea or improved.  His heart rate was well controlled at rest, but with increased to 110 bpm with walking.  There really was no room for rate control secondary to low blood pressure and renal dysfunction.  Also because of hypotension, his terazosin was decreased.  His renal function stabilized, and he was discharged home on 8/28/2017.  He is here today for follow-up.   Since he's been home he's been doing a little bit better.  He still has some shortness of breath.  His main complaint is that he is not able to urinate very well.  His weight has remained stable around 185-186.  He has been watching his salt intake.  He denies any palpitations, chest pain, edema, dizziness, or syncope.      Past Medical History:   Diagnosis Date   • AAA (abdominal aortic aneurysm)     s/p repair, Dr. Felix   • Acute myocardial infarction    • Arteriosclerotic coronary artery disease    • Arthritis    • Atrial fibrillation    • B12 deficiency 6/1/2017   • Cardiomyopathy    • Chronic systolic (congestive) heart failure    • CKD (chronic kidney disease), stage III    • COPD (chronic obstructive pulmonary disease) 11/15/2016   • Coronary artery disease     MI 1990's, CABG x 2 V (LIMA-LAD, SVG-PDA), 1997   • Diabetes mellitus    • Dyspnea    • Dyspnea on exertion 8/25/2017   • Fatigue    • Hyperglycemia 3/29/2017   • Hyperlipidemia    • Hypertension    • Imbalance 3/29/2017   • Insomnia 8/25/2017   • Ischemic cardiomyopathy     LVEF 40%   • Lung mass 04/18/2016    spiculated, being followed   • Lung nodule seen on imaging study    • Malignant neoplasm    • PAF (paroxysmal atrial fibrillation)    • Pleural effusion    • Premature ventricular contractions    • Prostate cancer    • PVC (premature ventricular contraction)    • Rash of body 6/21/2017   • Thoracic  aortic ectasia    • Urinary tract infection with hematuria 1/18/2017       Past Surgical History:   Procedure Laterality Date   • APPENDECTOMY     • ARTERIAL ANEURYSM REPAIR     • BRONCHOSCOPY Right 10/5/2016    Procedure: BRONCHOSCOPY;  Surgeon: Jhony Quintanilla MD;  Location[facility] END[facility];  Service:    • BRONCHOSCOPY N/A 11/29/2016    Procedure: BRONCHOSCOPY WITH FLUORO w/biopsy AND BAL;  Surgeon: Ervin Stiles MD;  Location: [facility] ENDOSCOPY;  Service:    • CARDIAC CATHETERIZATION N/A 6/7/2017    Procedure: Left Heart Cath;  Surgeon: Vincent Middleton MD;  Location: [facility] CATH [facility]ASIVE LOC[facility];  Service:    • CARDIAC CATHETERIZATION N/A 6/7/2017    Procedure: Coronary angiography;  Surgeon: Vincent Middleton MD;  Location[facility] C[facility];  Service:    • CARDIAC CATHETERIZATION N/A 6/7/2017    Procedure: Left ventriculography;  Surgeon: Vincent Middleton MD;  [facility] C[facility];  Service:    • CARDIAC CATHETERIZATION N/A 6/7/2017    Procedure: Bypass graft study;  Surgeon: Vincent Middleton MD;  Location[facility] C[facility];  Service:    • CARDIAC CATHETERIZATION N/A 6/7/2017    Procedure: Native mammary injection;  Surgeon: Vincent Middleton MD;  Location: [facility] CATH INVASIVE LOCATION;  Service:    • CARDIAC CATHETERIZATION N/A 6/7/2017    Procedure: Stent NYA bypass graft;  Surgeon: Vincent Middleton MD;  [facility] C[facility];  Service:    • CARDIAC SURGERY     • CHOLECYSTECTOMY     • COLONOSCOPY N/A 12/16/2016    Procedure: COLONOSCOPY TO ASC COLON  WITH BIOPSY AND POLYPECTOMY;  Surgeon: Lane Brian MD;  Location: [facility] ENDOSCOPY;  Service:    • COLONOSCOPY N/A 3/1/2017    multiple polyps, lipoma in proximal transverse colon/hepatic flexure, repeat in one year   • CORONARY ARTERY BYPASS GRAFT     • ENDOSCOPY N/A 3/1/2017    Concepcion's, HH, gastric mucosal atrophy   • PROSTATE SURGERY         Social History     Social History   • Marital status:       Spouse name: N/A   • Number of children: N/A   • Years of education: N/A     Occupational History   • 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       Family History   Problem Relation Age of Onset   • Cancer Mother    • Cancer Father    • COPD Father    • Arthritis Sister    • Diabetes Sister    • Hyperlipidemia Sister    • Arthritis Brother    • Diabetes Brother    • Stroke Brother    • Lung cancer Other    • Stroke Other    • No Known Problems Maternal Grandmother    • No Known Problems Maternal Grandfather    • No Known Problems Paternal Grandmother    • No Known Problems Paternal Grandfather        Review of Systems   Constitution: Positive for malaise/fatigue. Negative for chills, fever, weight gain and weight loss.   HENT: Negative for ear pain, headaches, hearing loss, nosebleeds and sore throat.    Eyes: Negative for double vision, pain and visual disturbance.   Cardiovascular: Positive for dyspnea on exertion. Negative for chest pain, irregular heartbeat, leg swelling, near-syncope, orthopnea, palpitations, paroxysmal nocturnal dyspnea and syncope.   Respiratory: Negative for cough, shortness of breath, sleep disturbances due to breathing, snoring and wheezing.    Endocrine: Negative for cold intolerance, heat intolerance and polyuria.   Skin: Negative for itching and rash.   Musculoskeletal: Negative for joint pain, joint swelling and myalgias.   Gastrointestinal: Negative for abdominal pain, diarrhea, melena, nausea and vomiting.    Genitourinary: Negative for frequency, hematuria and hesitancy.   Neurological: Negative for excessive daytime sleepiness, light-headedness, numbness, paresthesias and seizures.   Psychiatric/Behavioral: Negative for altered mental status and depression.   Allergic/Immunologic: Negative.    All other systems reviewed and are negative.      No Known Allergies      Current Outpatient Prescriptions:   •  atorvastatin (LIPITOR) 40 MG tablet, Take 1 tablet by mouth Every Night., Disp: 30 tablet, Rfl: 6  •  carvedilol (COREG) 6.25 MG tablet, Take 1 tablet by mouth 2 (Two) Times a Day., Disp: 180 tablet, Rfl: 3  •  clopidogrel (PLAVIX) 75 MG tablet, Take 1 tablet by mouth Daily., Disp: 90 tablet, Rfl: 3  •  furosemide (LASIX) 40 MG tablet, Take 2 tablets by mouth Daily., Disp: 90 tablet, Rfl: 2  •  isosorbide mononitrate (IMDUR) 30 MG 24 hr tablet, Take 0.5 tablets by mouth Daily., Disp: 45 tablet, Rfl: 3  •  nitroglycerin (NITROSTAT) 0.4 MG SL tablet, 1 under the tongue as needed for angina, may repeat q5mins for up three doses, Disp: 35 tablet, Rfl: 0  •  nystatin-triamcinolone (MYCOLOG) 455505-6.1 UNIT/GM-% ointment, Apply  topically 2 (Two) Times a Day., Disp: 60 g, Rfl: 0  •  pantoprazole (PROTONIX) 40 MG EC tablet, Take 40 mg by mouth Daily., Disp: , Rfl:   •  potassium chloride (K-TAB) 20 MEQ tablet controlled-release ER tablet, Take 1 tablet by mouth Daily., Disp: 90 tablet, Rfl: 3  •  Probiotic Product (PROBIOTIC PO), Take 1 tablet by mouth Daily., Disp: , Rfl:   •  terazosin (HYTRIN) 5 MG capsule, Take 1 capsule by mouth Every Night., Disp: 90 capsule, Rfl: 1  •  triamcinolone (KENALOG) 0.5 % cream, Apply topically  Bid, Disp: 15 g, Rfl: 0  •  warfarin (COUMADIN) 3 MG tablet, TAKE ONE TABLET BY MOUTH DAILY, Disp: 90 tablet, Rfl: 0    Current Facility-Administered Medications:   •  cyanocobalamin injection 1,000 mcg, 1,000 mcg, Intramuscular, Q28 Days, Dimitrios Lindquist MD, 1,000 mcg at 06/01/17 1227    "  Objective:     Vitals:    09/01/17 1234 09/01/17 1243   BP: 128/60 120/64   BP Location: Right arm Left arm   Pulse: 84    SpO2: 93%    Weight: 191 lb (86.6 kg)    Height: 70\" (177.8 cm)      Body mass index is 27.41 kg/(m^2).    PHYSICAL EXAM:    Physical Exam   Constitutional: He is oriented to person, place, and time. He appears well-developed and well-nourished. No distress.   HENT:   Head: Normocephalic and atraumatic.   Eyes: Pupils are equal, round, and reactive to light.   Neck: No JVD present. No thyromegaly present.   Cardiovascular: Normal rate, normal heart sounds and intact distal pulses.  An irregular rhythm present.   No murmur heard.  Pulmonary/Chest: Effort normal and breath sounds normal. No respiratory distress. He has no rales.   Abdominal: Soft. Bowel sounds are normal. He exhibits no distension. There is no splenomegaly or hepatomegaly. There is no tenderness.   Musculoskeletal: Normal range of motion. He exhibits no edema.   Neurological: He is alert and oriented to person, place, and time.   Skin: Skin is warm and dry. He is not diaphoretic. No erythema.   Psychiatric: He has a normal mood and affect. His behavior is normal. Judgment normal.         ECG 12 Lead  Date/Time: 9/1/2017 12:51 PM  Performed by: ALVIN AMADOR.  Authorized by: ALVIN AMADOR.   Comparison: compared with previous ECG from 6/26/2017  Rhythm: atrial fibrillation  Ectopy: PVCs  BPM: 84  Clinical impression: abnormal ECG  Comments: Indication: Paroxysmal atrial fibrillation.              Assessment:       Diagnosis Plan   1. PAF (paroxysmal atrial fibrillation)  ECG 12 Lead    Basic Metabolic Panel   2. Cardiomyopathy  Basic Metabolic Panel     Orders Placed This Encounter   Procedures   • Basic Metabolic Panel     Standing Status:   Future     Standing Expiration Date:   9/1/2018   • ECG 12 Lead     This order was created via procedure documentation          Plan:       1.  Atrial Fibrillation and Atrial " Flutter  Assessment  • The patient has paroxysmal atrial fibrillation  • This is non-valvular in etiology  • The patient's CHADS2-VASc score is 4  • A [id number] score of 2 or more is considered a high risk for a thromboembolic event  • Warfarin prescribed    Plan  • Continue in atrial fibrillation with rate control  • Continue warfarin for antithrombotic therapy, bleeding issues discussed  • Continue beta blocker for rhythm control  • Continue beta blocker for rate control    Subjective - Objective  • Overall I think he's doing fairly well, and a little bit better than the last time I saw him.  His rate is controlled and he still in atrial fibrillation with some PVCs.  He is anticoagulated on warfarin and is rate controlled with carvedilol.  Continue current medical regimen.    2.  Heart Failure  Assessment  • NYHA class II - There is slight limitation of physical activity. The patient is comfortable at rest, but physical activity results in fatigue, palpitations or shortness of breath.  • Left ventricular function is moderately reduced by qualitative assessment    Plan  • The patient has received heart failure education on the following topics: dietary sodium restriction and weight monitoring  • The heart failure care plan was discussed with the patient today including: continuing the current program    Subjective/Objective    • Physical exam findings negative for rales, peripheral edema, elevated JVP and hepatomegaly.  • Overall I think he's doing okay.  His weight has remained stable, and he is avoiding sodium.  I'm going to check a BMP today to assess his kidney function.  I've asked him to increase the Estela Anabell back to 10 mg daily which is what he was taking before.  His blood pressure today is in the 120s systolic, and I think that he can tolerate the increased.  Hopefully this will help with his symptoms of his enlarged prostate and inability to urinate.  I've also asked him to follow-up with both his  nephrologist and his urologist.    I'm going to have him come back and see Dr. Ramirez in [place] in 4 weeks.  As always, it has been a pleasure to participate in your patient's care.      Sincerely,         Kathy Pitts PA-C

## 2017-09-05 PROBLEM — I50.42 CHRONIC COMBINED SYSTOLIC AND DIASTOLIC CONGESTIVE HEART FAILURE (HCC): Status: ACTIVE | Noted: 2017-01-01

## 2017-09-05 PROBLEM — N40.1 BENIGN NON-NODULAR PROSTATIC HYPERPLASIA WITH LOWER URINARY TRACT SYMPTOMS: Status: ACTIVE | Noted: 2017-01-01

## 2017-09-05 PROBLEM — N40.0 PROSTATE HYPERTROPHY: Status: ACTIVE | Noted: 2017-01-01

## 2017-09-05 NOTE — PATIENT INSTRUCTIONS
Assessment/Plan   Aakash was seen today for follow-up.    Diagnoses and all orders for this visit:    Rash of body  -     MethylPREDNISolone (MEDROL, CRESENCIO,) 4 MG tablet; Take as directed on package instructions.    Prostate hypertrophy  -     finasteride (PROSCAR) 5 MG tablet; Take 1 tablet by mouth Daily.    ChF - clinically stable.  Renal function recheck in one month. Last count stable.  Will see renal later this month.        He thinks rash coincident with the alpha blocer, which was also just increased and rash got worse  Will do medrol course  startclaritin daily  Change his BPH med to finasteride 5 mg po daily for now

## 2017-09-05 NOTE — PROGRESS NOTES
Subjective   Aakash Sanchez Jr. is a 82 y.o. male.     History of Present Illness   81 yo male with pmhx CHF EF 35%. Patient of Dr. Loera. Recent hospitalization for CHF exacerbation triggered by Chinese food over several days and saurkraut.  He had a few weeks where he did not watch his salt intake.  He is also eatingn mostly frozen meals.  His daughter does help with food as well.     He also had PAU with creatinine 2.5-->2.2  He has a rash that has plagued him since June.  He has apt 9/28/17 with derm. We did a course of prednisone after his hospitalization which did help.  Had rash on R calf 4-5 month. He was diagnosed with shingles a few months prior to the trunk reaction.    The following portions of the patient's history were reviewed and updated as appropriate: allergies, current medications, past family history, past medical history, past social history, past surgical history and problem list.    Review of Systems   Constitutional: Negative for appetite change and fatigue.   HENT: Negative.    Respiratory: Negative for cough and shortness of breath.    Cardiovascular: Negative for chest pain, palpitations and leg swelling.        Lying flat with no PND   Genitourinary: Negative.    Musculoskeletal: Negative.    Skin: Positive for rash.        pruritis   Neurological: Negative.    Hematological: Negative.    Psychiatric/Behavioral: Negative.    All other systems reviewed and are negative.      Objective   Physical Exam   Constitutional: He is oriented to person, place, and time. He appears well-developed and well-nourished.   HENT:   Head: Normocephalic and atraumatic.   Cardiovascular: Normal rate and regular rhythm.    Murmur heard.  irreg   Neurological: He is alert and oriented to person, place, and time.   Skin: Rash noted.   2 cm flat erythema lesion R inner calf, stable with some peeling although peel is not circumferential.    More MP diffuse rash R knee and inner thigh    MP rash, entire L chest,  some blanching, excoriated with no induration   Psychiatric: He has a normal mood and affect. His behavior is normal.   Vitals reviewed.    9/1/17 - BMP reviewed.     Assessment/Plan   Aakash was seen today for follow-up.    Diagnoses and all orders for this visit:    Rash of body  -     MethylPREDNISolone (MEDROL, CRESENCIO,) 4 MG tablet; Take as directed on package instructions.    Prostate hypertrophy  -     finasteride (PROSCAR) 5 MG tablet; Take 1 tablet by mouth Daily.    ChF - clinically stable.  Renal function recheck in one month. Last count stable.  Will see renal later this month.        He thinks rash coincident with the alpha blocker, which was also just increased and rash got worse  Will do medrol course, and contact dermatology to see if an earlier appt is possible.   Start claritin daily  Change his BPH med to finasteride 5 mg po daily for now  His statin is a possible culprit as well.

## 2017-09-11 NOTE — TELEPHONE ENCOUNTER
Spoke with patient.  His voice is very weak, he sounds SOA, difficulty.  Advised to ER immediately for eval.  Advised may be dehydrated.  Patient voiced understanding and agrees.  Assures me he will proceed to ER.    ----- Message from Luli Whitley MA sent at 9/11/2017  3:20 PM EDT -----  Regarding: FW: PT FEELING SICK ? GET APPT       ----- Message -----     From: Carol Laguerre     Sent: 9/11/2017   2:35 PM       To: [facility]  Subject: PT FEELING SICK ? GET APPT                       MARTINA ALMEIDA - [facility] NURSE     When she was speaking with pt today he shared the following symptoms with her.  Feeling very weak, difficulty urinating; pressure when tries to urinate; diarrhea this morning and pt sounds very weak.  Pt asked her to phone about symptoms      thinks maybe pt needs to be seen today.    Pt has lost 6.8 # in last 3 days.  Is taking water pill.      [phone] - pt's number    I failed to get contact # for

## 2017-09-12 PROBLEM — I48.91 ATRIAL FIBRILLATION WITH RVR (HCC): Status: ACTIVE | Noted: 2017-01-01

## 2017-09-12 PROBLEM — A41.9 SEPSIS (HCC): Status: ACTIVE | Noted: 2017-01-01

## 2017-09-12 PROBLEM — J18.9 PNEUMONIA: Status: ACTIVE | Noted: 2017-01-01

## 2017-09-23 NOTE — PROGRESS NOTES
Received call from Dominga with [facility] with questions regarding discharge medications on pt.     Pt was newly admitted to Home health and there was confusion on med rec which dosing of warfarin as both 3 mg and 4 mg was listed.  Pt also states that he thought he should not be taking his plavix but could not give specifics to home health as to why.     EMR shows pt with hx of CAD CAB and stent and is not on ASA therapy.  Notes from cardiology inpt visits indicate that he was on both plavix and warfarin.  Warfarin was increased to 4 mg d/t subtherapeutic INR and he was receiving this dose at time of discharge so 4 mg should be the dosing.  HE is already scheduled for INR on Monday.     He is to be on Plavix unless there is some type of active bleeding issue which he was not having, so I informed RN that being off antiplatelet as prescribed while inpt and at discharge would put him at risk of stent closure.     He can call cardiology office on Monday to clarify further.      HELEN Sheikh

## 2017-09-26 NOTE — PROGRESS NOTES
DOS: 2017  NAME: Aakash Sanchez Jr.   : 1935  PCP: Dimitrios Lindquist MD    Chief Complaint   Patient presents with   • Cerebrovascular Accident        Neurological Problem and Interval History:  82 y.o. rhw male with renal dysfunction, stroke, atrial fib, HLD, HTN, ? DM and stroke. He presents today for his 3 month stroke and hospital FU.     He denies any new S/S of stroke. No H/As. He has a FC in that has bright red blood. The blood started last night, then resovled and this morning it was all blood, again. The nurse flushed it. Plan is remove it Thursday. He is on coumadin and plavix. He recently had a pacemaker placed and has been using the walker since. He is back home with his son helping him. He otherwise feels back to his baseline from a stroke point of view.    2017 he was out shopping and suddenly his left leg felt heavy.  That lasted about 10 minutes and then 15 minutes later to symptoms recurred but his left arm also felt that heavy.  When he was talking with his family his speech was slurred although he did not think it was.  In addition someone told him that his left side of face was drooping.  The symptoms resolved completely. His INR was elevated the week prior to his stroke and his coumadin was being held. At the time of his event his INR was >2 in the ER on admission but 1.71 a few hours later the same day as admission. He was also on a baby ASA at the time.     Review of Systems:        Review of Systems   Constitutional: Positive for appetite change and unexpected weight change. Negative for chills, fatigue and fever.   HENT: Negative for hearing loss, tinnitus, trouble swallowing and voice change.    Eyes: Negative for pain, redness and itching.   Respiratory: Negative for cough, shortness of breath and wheezing.    Cardiovascular: Positive for chest pain. Negative for palpitations and leg swelling.   Gastrointestinal: Positive for diarrhea. Negative for constipation, rectal pain  "and vomiting.   Endocrine: Negative for cold intolerance, heat intolerance and polydipsia.   Genitourinary: Positive for difficulty urinating, hematuria and urgency.   Musculoskeletal: Positive for gait problem (uses a walker). Negative for joint swelling, neck pain and neck stiffness.   Skin: Negative for rash and wound.   Allergic/Immunologic: Negative for environmental allergies and immunocompromised state.   Neurological: Positive for numbness. Negative for dizziness, tremors, seizures, syncope, speech difficulty, weakness, light-headedness and headaches.   Hematological: Negative for adenopathy. Bruises/bleeds easily.   Psychiatric/Behavioral: Negative for agitation, behavioral problems, confusion, decreased concentration, dysphoric mood, hallucinations, self-injury, sleep disturbance and suicidal ideas. The patient is not nervous/anxious and is not hyperactive.        \"The following portions of the patient's history were reviewed and updated as appropriate: allergies, current medications, past family history, past medical history, past social history, past surgical history and problem list.\"      Laboratory Results:             Lab Results   Component Value Date    HGBA1C 6.00 (H) 06/21/2017     Lab Results   Component Value Date    CHOL 112 06/08/2017    CHOL 142 06/05/2017     Lab Results   Component Value Date    HDL 40 06/08/2017    HDL 46 06/05/2017    HDL 61 (H) 04/12/2017     Lab Results   Component Value Date    LDLCALC 51 06/08/2017     Lab Results   Component Value Date    LDL 90 04/12/2017    LDL 89 08/26/2015    LDL 57 01/16/2014     Lab Results   Component Value Date    TRIG 105 06/08/2017    TRIG 149 06/05/2017    TRIG 191 (H) 04/12/2017     Lab Results   Component Value Date    RPR Non-Reactive 06/06/2017     Lab Results   Component Value Date    TSH 1.180 04/12/2017     Lab Results   Component Value Date    LKWPEZCN81 460 04/12/2017     Lab Results   Component Value Date    WBC 9.65 09/22/2017 "    HGB 13.4 (L) 09/22/2017    HCT 43.0 09/22/2017    MCV 83.0 09/22/2017     09/22/2017     Lab Results   Component Value Date    GLUCOSE 153 (H) 09/22/2017    BUN 27 (H) 09/22/2017    CREATININE 1.74 (H) 09/22/2017    EGFRIFNONA 38 (L) 09/22/2017    EGFRIFAFRI 41 (L) 06/21/2017    BCR 15.5 09/22/2017    K 3.6 09/22/2017    CO2 24.1 09/22/2017    CALCIUM 8.6 09/22/2017    PROTENTOTREF 6.6 06/21/2017    ALBUMIN 3.90 09/12/2017    LABIL2 1.2 09/12/2017    AST 12 09/12/2017    ALT 31 09/12/2017       Physical Examination:  mRS: 0  General Appearance:   Well developed, well nourished, well groomed, alert, and cooperative.  HEENT: Normocephalic.    Neck and Spine: Normal range of motion.  Normal alignment. No mass or tenderness. No bruits.  Cardiac: Slightly tachycardic . No murmurs.  Peripheral Vasculature: Radial and pedal pulses are equal and symmetric. No signs of distal embolization.  Extremities:    Non pitting LE edema bilaterally, ankles       Neurological examination:  Higher Integrative  Function: Oriented to time, place and person. Normal registration, attention span and concentration. Normal language including comprehension, spontaneous speech, repetition, naming and vocabulary. No neglect with normal visual-spatial function and construction. Normal fund of knowledge and higher integrative function.  CN III IV VI: Extraocular movements are full without nystagmus.   CN V: Normal facial sensation and strength of muscles of mastication.  CN VII: Facial movements are symmetric. No weakness.  CN VIII:   Auditory acuity is normal.  CN IX & X:   Symmetric palatal movement.  CN XI: Sternocleidomastoid and trapezius are normal.  No weakness.  CN XII:   The tongue is midline.  No atrophy or fasciculations.  Motor: Normal muscle strength, bulk and tone in upper and lower extremities.    Sensation: Normal to light touch, temperature, and proprioception in arms and legs.   Station and Gait: Cautious, uses a walker    Coordination: Finger to nose test shows no dysmetria.  Rapid alternating movements are normal.      Diagnoses / Discussion:  Mr. Sanchez is an 83yo with left cerebellar infarct with probable occlusion of the LVA. The etiology is cerebral atherosclerosis. He has been neurologically stable. He is on Plavix and Coumadin for stroke prevention. He needs continued aggressive risk factor control. He recently had a pacemaker placed and he may benefit from cardio rehab, he has felt weak since the procedure. He also has bright red blood in his catheter bag and is to see the urologist in two days. The home health nurse is aware and if this continues I recommend he call his urologist. At this time there is no need for further neurological testing, he has been event free. He and his son agree with the plan and will call with any questions or concerns.       Plan:   FU with urology and cardiology    Continue plavix for now, if needed he can go down to ASA and once blood in urine resolves resume plavix   Blood pressure control to <130/80   Goal LDL <70-recommend high dose statins-    Serum glucose < 140   Call 911 for stroke any stroke symptoms   Follow-up 9 months   Aakash was seen today for cerebrovascular accident.    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

## 2017-09-28 PROBLEM — Z95.0 CARDIAC RESYNCHRONIZATION THERAPY PACEMAKER (CRT-P) IN PLACE: Status: ACTIVE | Noted: 2017-01-01

## 2017-09-28 PROBLEM — I48.91 ATRIAL FIBRILLATION WITH RVR (HCC): Status: RESOLVED | Noted: 2017-01-01 | Resolved: 2017-01-01

## 2017-09-28 PROBLEM — I50.23 ACUTE ON CHRONIC SYSTOLIC (CONGESTIVE) HEART FAILURE (HCC): Status: RESOLVED | Noted: 2017-01-01 | Resolved: 2017-01-01

## 2017-09-28 PROBLEM — I21.4 NSTEMI (NON-ST ELEVATED MYOCARDIAL INFARCTION) (HCC): Status: RESOLVED | Noted: 2017-01-01 | Resolved: 2017-01-01

## 2017-09-28 NOTE — PROGRESS NOTES
Date of Office Visit: 2017  Encounter Provider: KANNAN Degroot  Place of Service: [facility] CARDIOLOGY  Patient Name: Aakash Sanchez Jr.  :1935    Chief Complaint   Patient presents with   • Congestive Heart Failure     1 week hospital follow up   :     HPI: Aakash Sanchez Jr. is a 82 y.o. male who presents today for Follow-up.  Old records have been obtained and reviewed by me.  He is a patient of Dr. Ramirez's who is well-known to me.  He has a history of atrial fibrillation with RVR, coronary artery disease status post CABG in  and then stent placement to a critical lesion in the vein graft to the PDA in 2017, aortic aneurysm status post repair, and ischemic cardiomyopathy with an ejection fraction estimated at 35% in 2017.  I saw him on 2017 in our [facility] where I found him to be in acute congestive heart failure secondary to dietary indiscretion.  He was also in rapid atrial fibrillation.  I admitted him to the hospital for diuresis, and then I saw him again in follow-up after that hospitalization on 2017.  He was doing well at that time with his only complaint being the inability to urinate.  His shortness of breath had improved, and his weight had remained stable.  I increased his terazosin back to 10 mg daily which was his previous dose.     Then on 2017 he presented to the emergency room with complaints of weakness, shortness of breath, and urinary issues.  He was diagnosed with pneumonia and started on appropriate antibiotic therapy.  During that hospitalization, he developed atrial fibrillation with RVR.  He also had significant urinary retention and was seen by urology.  He was also seen by Dr. Terrazas, and ultimately underwent placement of a CRT pacemaker.  It was suggested that he may possibly need an AV node ablation in the future.  He was eventually discharged home on 2017 in stable condition and  is here today for follow-up.   Since he's been home he's been doing fairly well.  He had his Whaley catheter removed today, and he is concerned that he has not yet urinated.  His main complaint is fatigue.  He's been weighing himself daily, and he is actually down to 179 pounds.  We did talk about dietary sodium, and he really has not been paying attention to that.  However, his son is living with him and try to help him out, and he has not eaten any restaurant food.  He really does not have much of an appetite.  He also complains of some swelling on the toes of both of his feet.      Past Medical History:   Diagnosis Date   • AAA (abdominal aortic aneurysm)     s/p repair, Dr. Felix   • Acute myocardial infarction    • Arteriosclerotic coronary artery disease    • Arthritis    • Atrial fibrillation    • Atrial fibrillation    • B12 deficiency 6/1/2017   • Cardiomyopathy    • Chicken pox    • Chronic combined systolic and diastolic congestive heart failure 9/5/2017   • Chronic systolic (congestive) heart failure    • CKD (chronic kidney disease), stage III    • COPD (chronic obstructive pulmonary disease) 11/15/2016   • Coronary artery disease     MI 1990's, CABG x 2 V (LIMA-LAD, SVG-PDA), 1997   • Dyspnea    • Dyspnea on exertion 8/25/2017   • Fatigue    • Heart attack    • Hyperglycemia 3/29/2017   • Hyperlipidemia    • Hypertension    • Imbalance 3/29/2017   • Insomnia 8/25/2017   • Ischemic cardiomyopathy     LVEF 40%   • Lung mass 04/18/2016    spiculated, being followed   • Lung nodule seen on imaging study    • Malignant neoplasm    • Measles    • Mumps    • PAF (paroxysmal atrial fibrillation)    • Pleural effusion    • Premature ventricular contractions    • Prostate cancer    • PVC (premature ventricular contraction)    • Rash of body 6/21/2017   • Stroke    • Thoracic aortic ectasia    • Urinary tract infection with hematuria 1/18/2017       Past Surgical History:   Procedure Laterality Date   •  APPENDECTOMY     • ARTERIAL ANEURYSM REPAIR     • BRONCHOSCOPY Right 10/5/2016    Procedure: BRONCHOSCOPY;  Surgeon: Jhony Quintanilla MD;  Location[facility] END[facility]COPY;  Service:    • BRONCHOSCOPY N/A 11/29/2016    Procedure: BRONCHOSCOPY WITH FLUORO w/biopsy AND BAL;  Surgeon: Ervin Stiles MD;  Location[facility] ENDOSCOPY;  Service:    • CARDIAC CATHETERIZATION N/A 6/7/2017    Procedure: Left Heart Cath;  Surgeon: Vincent Middleton MD;  Location: [facility] CATH INVASIVE LOCATION;  Service:    • CARDIAC CATHETERIZATION N/A 6/7/2017    Procedure: Coronary angiography;  Surgeon: Vincent Middleton MD;  Location: [facility] CATH INVASIVE LOCATION;  Service:    • CARDIAC CATHETERIZATION N/A 6/7/2017    Procedure: Left ventriculography;  Surgeon: Vincent Middleton MD;  Location: [facility] CATH INVASIVE LOCATION;  Service:    • CARDIAC CATHETERIZATION N/A 6/7/2017    Procedure: Bypass graft study;  Surgeon: Vincent Middleton MD;  Location: [facility] CATH INVASIVE LOCATION;  Service:    • CARDIAC CATHETERIZATION N/A 6/7/2017    Procedure: Native mammary injection;  Surgeon: Vincent Middleton MD;  Location: [facility] CATH INVASIVE LOCATION;  Service:    • CARDIAC CATHETERIZATION N/A 6/7/2017    Procedure: Stent NYA bypass graft;  Surgeon: Vincent Middleton MD;  Location: [facility] CATH INVASIVE LOCATION;  Service:    • CARDIAC DEFIBRILLATOR PLACEMENT     • CARDIAC ELECTROPHYSIOLOGY PROCEDURE N/A 9/19/2017    Procedure: ICD SC new  CRT-D  MEDTRONIC;  Surgeon: Fady Terrazas MD;  Location: [facility] CATH INVASIVE LOCATION;  Service:    • CARDIAC SURGERY     • CHOLECYSTECTOMY     • COLONOSCOPY N/A 12/16/2016    Procedure: COLONOSCOPY TO ASC COLON  WITH BIOPSY AND POLYPECTOMY;  Surgeon: Lane Brian MD;  Location: [facility] ENDOSCOPY;  Service:    • COLONOSCOPY N/A 3/1/2017    multiple polyps, lipoma in proximal transverse colon/hepatic flexure, repeat in one year   • CORONARY ARTERY BYPASS GRAFT     • ENDOSCOPY N/A 3/1/2017    Concepcion's, HH, gastric mucosal atrophy    • INSERT / REPLACE / REMOVE PACEMAKER     • PROSTATE SURGERY         Social History     Social History   • Marital status:      Spouse name: N/A   • Number of children: N/A   • Years of education: N/A     Occupational History   • controller [employer]     Social History Main Topics   • Smoking status: Former Smoker     Packs/day: 1.00     Years: 25.00     Types: Cigarettes   • Smokeless tobacco: Former User     Quit date: 9/22/1985      Comment: caffeine use   • Alcohol use No   • Drug use: No   • Sexual activity: Defer      Comment:      Other Topics Concern   • Not on file     Social History Narrative        3 children     Sonlives with him.       Family History   Problem Relation Age of Onset   • Cancer Mother    • Cancer Father    • COPD Father    • Arthritis Sister    • Diabetes Sister    • Hyperlipidemia Sister    • Arthritis Brother    • Diabetes Brother    • Stroke Brother    • Lung cancer Other    • Stroke Other    • No Known Problems Maternal Grandmother    • No Known Problems Maternal Grandfather    • No Known Problems Paternal Grandmother    • No Known Problems Paternal Grandfather        Review of Systems   Constitution: Positive for decreased appetite and malaise/fatigue. Negative for chills, fever, weight gain and weight loss.   HENT: Negative for ear pain, hearing loss, nosebleeds and sore throat.    Eyes: Negative for double vision, pain and visual disturbance.   Cardiovascular: Negative for chest pain, dyspnea on exertion, irregular heartbeat, leg swelling, near-syncope, orthopnea, palpitations, paroxysmal nocturnal dyspnea and syncope.   Respiratory: Negative for cough, shortness of breath, sleep disturbances due to breathing, snoring and wheezing.    Endocrine: Negative for cold intolerance, heat intolerance and polyuria.   Skin: Negative for itching and rash.   Musculoskeletal: Positive for joint swelling. Negative for joint pain and myalgias.   Gastrointestinal:  Negative for abdominal pain, diarrhea, melena, nausea and vomiting.   Genitourinary: Negative for frequency, hematuria and hesitancy.   Neurological: Negative for excessive daytime sleepiness, headaches, light-headedness, numbness, paresthesias and seizures.   Psychiatric/Behavioral: Negative for altered mental status and depression.   Allergic/Immunologic: Negative.    All other systems reviewed and are negative.      No Known Allergies      Current Outpatient Prescriptions:   •  atorvastatin (LIPITOR) 40 MG tablet, Take 1 tablet by mouth Every Night., Disp: 30 tablet, Rfl: 6  •  clopidogrel (PLAVIX) 75 MG tablet, Take 1 tablet by mouth Daily., Disp: 90 tablet, Rfl: 3  •  digoxin (LANOXIN) 125 MCG tablet, Take 1 tablet by mouth Every Other Day., Disp: 15 tablet, Rfl: 1  •  finasteride (PROSCAR) 5 MG tablet, Take 1 tablet by mouth Daily., Disp: 90 tablet, Rfl: 1  •  furosemide (LASIX) 40 MG tablet, Take 2 tablets by mouth Daily., Disp: 90 tablet, Rfl: 2  •  isosorbide mononitrate (IMDUR) 30 MG 24 hr tablet, Take 0.5 tablets by mouth Daily., Disp: 45 tablet, Rfl: 3  •  metoprolol tartrate (LOPRESSOR) 100 MG tablet, Take 1 tablet by mouth Every 12 (Twelve) Hours., Disp: 60 tablet, Rfl: 1  •  nitroglycerin (NITROSTAT) 0.4 MG SL tablet, 1 under the tongue as needed for angina, may repeat q5mins for up three doses, Disp: 35 tablet, Rfl: 0  •  pantoprazole (PROTONIX) 40 MG EC tablet, Take 40 mg by mouth Daily., Disp: , Rfl:   •  potassium chloride (K-TAB) 20 MEQ tablet controlled-release ER tablet, Take 1 tablet by mouth Daily., Disp: 90 tablet, Rfl: 3  •  Probiotic Product (PROBIOTIC PO), Take 1 tablet by mouth Daily., Disp: , Rfl:   •  warfarin (COUMADIN) 3 MG tablet, TAKE ONE TABLET BY MOUTH DAILY, Disp: 90 tablet, Rfl: 0  •  warfarin (COUMADIN) 4 MG tablet, Take 1 tablet by mouth Daily. (Patient taking differently: Take 2 mg by mouth Daily.), Disp: 30 tablet, Rfl: 1     Objective:     Vitals:    09/28/17 1055 09/28/17  "1113   BP: 116/68 118/72   BP Location: Right arm Left arm   Pulse: 83    SpO2: 96%    Weight: 184 lb (83.5 kg)    Height: 70\" (177.8 cm)      Body mass index is 26.4 kg/(m^2).    PHYSICAL EXAM:    Physical Exam   Constitutional: He is oriented to person, place, and time. He appears well-developed and well-nourished. No distress.   HENT:   Head: Normocephalic and atraumatic.   Eyes: Pupils are equal, round, and reactive to light.   Neck: No JVD present. No thyromegaly present.   Cardiovascular: Normal rate, regular rhythm, normal heart sounds and intact distal pulses.    No murmur heard.  Pulmonary/Chest: Effort normal and breath sounds normal. No respiratory distress. He has no rales.   Abdominal: Soft. Bowel sounds are normal. He exhibits no distension and no ascites. There is no splenomegaly or hepatomegaly. There is no tenderness.   Musculoskeletal: Normal range of motion. He exhibits no edema.   Erythema and edema of bilateral toes.  Good capillary refill.   Neurological: He is alert and oriented to person, place, and time.   Skin: Skin is warm and dry. He is not diaphoretic. No erythema.   Left subclavian pacemaker incision well healed without erythema or edema.   Psychiatric: He has a normal mood and affect. His behavior is normal. Judgment normal.         ECG 12 Lead  Date/Time: 9/28/2017 11:24 AM  Performed by: ALVIN AMADOR.  Authorized by: ALVIN AMADOR.   Comparison: compared with previous ECG from 9/22/2017  Similar to previous ECG  Rhythm: atrial fibrillation and paced  BPM: 83  Clinical impression: abnormal ECG  Comments: Indication: Paroxysmal atrial fibrillation.              Assessment:       Diagnosis Plan   1. Ischemic cardiomyopathy     2. PAF (paroxysmal atrial fibrillation)  ECG 12 Lead   3. 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       Orders Placed This Encounter   Procedures   • ECG 12 Lead     This order was created via procedure documentation          Plan:     "   1.  Heart Failure  Assessment  • NYHA class II - There is slight limitation of physical activity. The patient is comfortable at rest, but physical activity results in fatigue, palpitations or shortness of breath.  • The patient was newly diagnosed with heart failure within the past 12 months  • Beta blocker prescribed  • Diuretics prescribed  • The most recent ejection fraction is 35%  • Left ventricular function is moderately reduced by qualitative assessment    Plan  • The patient has received heart failure education on the following topics: dietary sodium restriction, symptom management, weight monitoring and minimizing alcohol intake  • The heart failure care plan was discussed with the patient today including: continuing the current program    Subjective/Objective    • Physical exam findings negative for rales, peripheral edema, elevated JVP, hepatomegaly and ascites.  • Overall he is stable from a heart failure standpoint.  He is steadily losing weight.  I do not think that he is dry.  He does have some swelling on his toes, however this looks to be more consistent with gout than anything else.  He has no other lower extremity edema.  He did have his Whaley catheter removed today, and hopefully he will be able to urinate.    2.  Atrial Fibrillation and Atrial Flutter  Assessment  • The patient has persistent atrial fibrillation  • This is non-valvular in etiology  • The patient's CHADS2-VASc score is 7  • A [id number] score of 2 or more is considered a high risk for a thromboembolic event  • Warfarin prescribed    Plan  • Continue in atrial fibrillation with rate control  • Continue warfarin for antithrombotic therapy, bleeding issues discussed  • Continue beta blocker for rate control    Subjective - Objective  • He appears to be in atrial fibrillation today, however he is ventricularly paced.  It has been difficult to control his heart rate, but he is now on 100 mg of metoprolol twice a day.  I certainly  think that this could be the cause of his fatigue, as well as his multiple other medical issues.  There is consideration in the future for an AV node ablation, at which time we would be able to cut back on the metoprolol and hopefully improve his fatigue.  He does have an appointment to see Marely Alexander in 3-4 weeks.  Further discussion can be had at that time.    3.  Status post CRT pacemaker implantation.  Overall he's doing well.  His pacemaker incision site is well-healed.  He will get his pacemaker interrogated today.  Continue current medical regimen.    As always, it has been a pleasure to participate in your patient's care.      Sincerely,         Kathy Pitts PA-C

## 2017-09-29 NOTE — TELEPHONE ENCOUNTER
SWP, he feel due to he did not see the curb and tripped-he is a recent CRT device implant 9/19-he is okay and just really hit his right hand but want to be sure his device is okay, he says site is fine and he has not received his remote monitor yet-can you call him this afternoon or Monday and have him come in for a quick check to be sure his device is okay. I told him someone would call but may be Monday morning.

## 2017-10-01 PROBLEM — R50.9 FEVER AND CHILLS: Status: ACTIVE | Noted: 2017-01-01

## 2017-10-01 PROBLEM — I50.22 CHRONIC SYSTOLIC (CONGESTIVE) HEART FAILURE (HCC): Status: ACTIVE | Noted: 2017-01-01

## 2017-10-01 PROBLEM — R00.0 WIDE-COMPLEX TACHYCARDIA: Status: ACTIVE | Noted: 2017-01-01

## 2017-10-01 PROBLEM — N18.30 CKD (CHRONIC KIDNEY DISEASE), STAGE III (HCC): Status: ACTIVE | Noted: 2017-01-01

## 2017-10-01 PROBLEM — G93.41 METABOLIC ENCEPHALOPATHY: Status: ACTIVE | Noted: 2017-01-01

## 2017-10-01 PROBLEM — R93.89 ABNORMAL CXR: Status: ACTIVE | Noted: 2017-01-01

## 2017-10-01 PROBLEM — R93.89 ABNORMAL CXR: Status: RESOLVED | Noted: 2017-01-01 | Resolved: 2017-01-01

## 2017-10-01 PROBLEM — Z79.01 ANTICOAGULATED ON COUMADIN: Status: ACTIVE | Noted: 2017-01-01

## 2017-10-01 PROBLEM — E87.20 ACIDOSIS: Status: ACTIVE | Noted: 2017-01-01

## 2017-10-01 PROBLEM — I48.91 ATRIAL FIBRILLATION (HCC): Status: ACTIVE | Noted: 2017-01-01

## 2017-10-01 PROBLEM — I25.10 ARTERIOSCLEROTIC CORONARY ARTERY DISEASE: Status: ACTIVE | Noted: 2017-01-01

## 2017-10-01 PROBLEM — N17.9 AKI (ACUTE KIDNEY INJURY) (HCC): Status: ACTIVE | Noted: 2017-01-01

## 2017-10-01 PROBLEM — N39.0 UTI (URINARY TRACT INFECTION): Status: ACTIVE | Noted: 2017-01-01

## 2017-10-01 NOTE — PROGRESS NOTES
"Pharmacy to Dose Vancomycin & Zosyn IV    Day 1 ADDENDUM  Consult for Dr. Pate  Treating: Sepsis  Goal trough: 15-20 mcg/mL      IV Anti-Infectives     Ordered     Dose/Rate Route Frequency Start Stop    10/01/17 0449  piperacillin-tazobactam (ZOSYN) 3.375 g in iso-osmotic dextrose 50 ml (premix)     Ordering Provider:  Jeramie Pate MD    3.375 g  over 4 Hours Intravenous Every 12 Hours 10/01/17 1800      10/01/17 0449  piperacillin-tazobactam (ZOSYN) 3.375 g in iso-osmotic dextrose 50 ml (premix)     Ordering Provider:  Jeramie Pate MD    3.375 g  over 30 Minutes Intravenous Once 10/01/17 0600 10/01/17 0616    10/01/17 0052  vancomycin 1750 mg/500 mL 0.9% NS IVPB ([facility])     Ordering Provider:  Qamar Thrasher MD    20 mg/kg × 81.6 kg Intravenous Once 10/01/17 0054 10/01/17 0140      Relevant clinical data and objective history reviewed:  82 y.o. male 70\" (177.8 cm) 180 lb (81.6 kg)  Body mass index is 25.83 kg/(m^2).    Results from last 7 days  Lab Units 10/01/17  0603 09/30/17  2252   CREATININE mg/dL 1.81* 2.28*     Estimated Creatinine Clearance: 37.6 mL/min (by C-G formula based on Cr of 1.81).    Lab Results   Component Value Date    WBC 8.06 10/01/2017    WBC 8.98 09/30/2017     Temp:  [98.1 °F (36.7 °C)-100.5 °F (38.1 °C)] 99 °F (37.2 °C)  Heart Rate:  [85-99] 99  Resp:  [15-22] 18  BP: ()/(57-84) 136/83    Intake/Output Summary (Last 24 hours) at 10/01/17 1021  Last data filed at 10/01/17 0857   Gross per 24 hour   Intake             2950 ml   Output              700 ml   Net             2250 ml     IV Meds:  sodium chloride 50 mL/hr Last Rate: 50 mL/hr (10/01/17 0546)   Baseline cultures/labs/radiology:  9/30 Lactic acid: 1.7  9/30 PCT: 0.12  6/30 UA: 6-12 WBC/ 2+ Leuk esterase  9/30 Urine cx: In progress  10/1 Blood cx x2: In progress  10/1 RVP: None detected    Assessment/Plan:   PMH: AAA; AMI; Arteriosclerotic CAD; Arthritis; Afib; B12 deficiency (6/1/2017); Cardiomyopathy; " Chicken pox; Chronic combined systolic and diastolic congestive heart failure (9/5/2017); CKD stage III; COPD (11/15/2016); Dyspnea; Hyperlipidemia; Hypertension; Imbalance (3/29/2017); Insomnia (8/25/2017); Ischemic cardiomyopathy; Lung mass (04/18/2016); Malignant neoplasm; Measles; Mumps; Pleural effusion; Premature ventricular contractions; Prostate cancer; Rash of body (6/21/2017); Stroke; Thoracic aortic ectasia; and UTI with hematuria (1/18/2017).     Will check vancomycin random level 12 hrs after loading dose today at 1400.    PK parameters:  Param: 0.029 hr-1,  T1/2: 23.9 hr, Vd: 57.1 L  (0.7 L/kg)    Vancomycin Dosing History:  10/01 0140 Vancomycin 1750 mg IV x1 (21.4 mg/Kg)    Thank you for your consult,    Cadence Ortiz RPH  10/01/17 10:21 AM    Day 1 ADDENDUM-II  Dx: Sepsis    Vancomycin Dosing History:  10/01 0140 Vancomycin 1750 mg IV x1 (21.4 mg/Kg)    Lab Results   Component Value Date    VANCO RANDOM 16.30 mcg/mL 10/01/2017@1451     Plan:    Give Vancomycin 750 mg IV x1 now & follow w random level in AM.    Cadence Ortiz RPh  Clinical Staff Pharmacist

## 2017-10-01 NOTE — PLAN OF CARE
Problem: Patient Care Overview (Adult)  Goal: Plan of Care Review  Outcome: Ongoing (interventions implemented as appropriate)    10/01/17 1653   Coping/Psychosocial Response Interventions   Plan Of Care Reviewed With patient   Outcome Evaluation   Outcome Summary/Follow up Plan VSS. Tmax 99.4 today. Body aches improved post percocet but chills returned. Confusion comes and goes but never fully oriented.  Urology consulted for oliver, ID consulted for infection/abx recommendations, and CV consulted to look at pacer. K replaced and amio started. Turning every 2 hours to help pink bottom and CT chest done. Continue to monitor.    Patient Care Overview   Progress improving       Goal: Adult Individualization and Mutuality  Outcome: Ongoing (interventions implemented as appropriate)  Goal: Discharge Needs Assessment  Outcome: Ongoing (interventions implemented as appropriate)    Problem: Infection, Risk/Actual (Adult)  Goal: Identify Related Risk Factors and Signs and Symptoms  Outcome: Outcome(s) achieved Date Met:  10/01/17  Goal: Infection Prevention/Resolution  Outcome: Ongoing (interventions implemented as appropriate)    Problem: Fall Risk (Adult)  Goal: Identify Related Risk Factors and Signs and Symptoms  Outcome: Outcome(s) achieved Date Met:  10/01/17  Goal: Absence of Falls  Outcome: Ongoing (interventions implemented as appropriate)    Problem: Urine Elimination, Impaired (Adult)  Goal: Identify Related Risk Factors and Signs and Symptoms  Outcome: Outcome(s) achieved Date Met:  10/01/17  Goal: Effective Urinary Elimination  Outcome: Ongoing (interventions implemented as appropriate)  Goal: Effective Containment of Urine  Outcome: Ongoing (interventions implemented as appropriate)  Goal: Reduced Incontinence Episodes  Outcome: Ongoing (interventions implemented as appropriate)

## 2017-10-01 NOTE — PLAN OF CARE
"Problem: Patient Care Overview (Adult)  Goal: Plan of Care Review  Outcome: Ongoing (interventions implemented as appropriate)    10/01/17 0700   Coping/Psychosocial Response Interventions   Plan Of Care Reviewed With patient   Outcome Evaluation   Outcome Summary/Follow up Plan VSS. Pt in isolation for hx of VRE. Pt has no c/o pain. Pt was A&Ox4 at around 4am. Pt has oliver in for acute retention. Pt reported feelings of \"chills\" throughout night.        Goal: Adult Individualization and Mutuality  Outcome: Ongoing (interventions implemented as appropriate)  Goal: Discharge Needs Assessment  Outcome: Ongoing (interventions implemented as appropriate)    Problem: Infection, Risk/Actual (Adult)  Goal: Identify Related Risk Factors and Signs and Symptoms  Outcome: Ongoing (interventions implemented as appropriate)  Goal: Infection Prevention/Resolution  Outcome: Ongoing (interventions implemented as appropriate)      "

## 2017-10-01 NOTE — NURSING NOTE
Patient was discharged last hospital stay with oliver catheter and plans to follow up outpatient.  Per patient's son it was removed on Thursday by the urologist and replaced by home health on Friday.  Home health initially removed 600mL of urine with placement.  S/w Dr Mendoza who consulted urology to find out their recommendations.

## 2017-10-01 NOTE — H&P
"HISTORY AND PHYSICAL   [facility]        Patient Identification:  Name: Aakash Sanchez Jr.  Age: 82 y.o.  Sex: male  :  1935  MRN: [id number]                     Primary Care Physician: Dimitrios Lindquist MD    Chief Complaint:  Talking out of my head.     History of Present Illness:   82-year-old gentleman.  On my attempt to get history he states he was brought in by his son because \"I was talking out of my head\".  At present he denies feeling poorly although there was a different history given in the emergency room which included myalgias and chills.  It was noted in the emergency room that the family had stated that he was getting confused.  At present he states he is feeling fine and ready to go home.  He is very vague as to the events that led to the admission however he does remember what his creatinine level was on discharge earlier this month.  He has had a number of admissions recently for various issues including CHF, non-ST elevated MI, pneumonia...  He was just discharged from this facility a little over week ago after being treated for pneumonia.  That admission was complicated by atrial fibrillation with rapid ventricular response and urinary retention.  Since then he has received a permanent pacemaker and now has an indwelling Whaley catheter.  He does note that an attempt to discontinue the Whaley catheter was undertaken but was not successful and it was replaced.        Past Medical History:  Past Medical History:   Diagnosis Date   • AAA (abdominal aortic aneurysm)     s/p repair, Dr. Felix   • Acute myocardial infarction    • Arteriosclerotic coronary artery disease    • Arthritis    • Atrial fibrillation    • Atrial fibrillation    • B12 deficiency 2017   • Cardiomyopathy    • Chicken pox    • Chronic combined systolic and diastolic congestive heart failure 2017   • Chronic systolic (congestive) heart failure    • CKD (chronic kidney disease), stage III    • COPD " (chronic obstructive pulmonary disease) 11/15/2016   • Coronary artery disease     MI 1990's, CABG x 2 V (LIMA-LAD, SVG-PDA), 1997   • Dyspnea    • Dyspnea on exertion 8/25/2017   • Fatigue    • Heart attack    • Hyperglycemia 3/29/2017   • Hyperlipidemia    • Hypertension    • Imbalance 3/29/2017   • Insomnia 8/25/2017   • Ischemic cardiomyopathy     LVEF 40%   • Lung mass 04/18/2016    spiculated, being followed   • Lung nodule seen on imaging study    • Malignant neoplasm    • Measles    • Mumps    • PAF (paroxysmal atrial fibrillation)    • Pleural effusion    • Premature ventricular contractions    • Prostate cancer    • PVC (premature ventricular contraction)    • Rash of body 6/21/2017   • Stroke    • Thoracic aortic ectasia    • Urinary tract infection with hematuria 1/18/2017     Past Surgical History:  Past Surgical History:   Procedure Laterality Date   • APPENDECTOMY     • ARTERIAL ANEURYSM REPAIR     • BRONCHOSCOPY Right 10/5/2016    Procedure: BRONCHOSCOPY;  Surgeon: Jhony Quintanilla MD;  Location: [facility] ENDOSCOPY;  Service:    • BRONCHOSCOPY N/A 11/29/2016    Procedure: BRONCHOSCOPY WITH FLUORO w/biopsy AND BAL;  Surgeon: Ervin Stiles MD;  Location: [facility] ENDOSCOPY;  Service:    • CARDIAC CATHETERIZATION N/A 6/7/2017    Procedure: Left Heart Cath;  Surgeon: Vincent Middleton MD;  Location: [facility] CATH INVASIVE LOCATION;  Service:    • CARDIAC CATHETERIZATION N/A 6/7/2017    Procedure: Coronary angiography;  Surgeon: Vincent Middleton MD;  Location: [facility] CATH INVASIVE LOCATION;  Service:    • CARDIAC CATHETERIZATION N/A 6/7/2017    Procedure: Left ventriculography;  Surgeon: Vincent Middleton MD;  Location: [facility] CATH INVASIVE LOCATION;  Service:    • CARDIAC CATHETERIZATION N/A 6/7/2017    Procedure: Bypass graft study;  Surgeon: Vincent Middleton MD;  Location: [facility] CATH INVASIVE LOCATION;  Service:    • CARDIAC CATHETERIZATION N/A 6/7/2017    Procedure: Native mammary injection;  Surgeon: Vincent  MD Kane;  Location: [facility] CATH INVASIVE LOCATION;  Service:    • CARDIAC CATHETERIZATION N/A 6/7/2017    Procedure: Stent NYA bypass graft;  Surgeon: Vincent Middleton MD;  Location: [facility] CATH INVASIVE LOCATION;  Service:    • CARDIAC DEFIBRILLATOR PLACEMENT     • CARDIAC ELECTROPHYSIOLOGY PROCEDURE N/A 9/19/2017    Procedure: ICD SC new  CRT-D  MEDTRONIC;  Surgeon: Fady Terrazas MD;  Location: [facility] CATH INVASIVE LOCATION;  Service:    • CARDIAC SURGERY     • CHOLECYSTECTOMY     • COLONOSCOPY N/A 12/16/2016    Procedure: COLONOSCOPY TO ASC COLON  WITH BIOPSY AND POLYPECTOMY;  Surgeon: Lane Brian MD;  Location: [facility] ENDOSCOPY;  Service:    • COLONOSCOPY N/A 3/1/2017    multiple polyps, lipoma in proximal transverse colon/hepatic flexure, repeat in one year   • CORONARY ARTERY BYPASS GRAFT     • ENDOSCOPY N/A 3/1/2017    Concepcion's, HH, gastric mucosal atrophy   • INSERT / REPLACE / REMOVE PACEMAKER     • PROSTATE SURGERY        Home Meds:  Prescriptions Prior to Admission   Medication Sig Dispense Refill Last Dose   • finasteride (PROSCAR) 5 MG tablet Take 1 tablet by mouth Daily. 90 tablet 1 Taking   • furosemide (LASIX) 40 MG tablet Take 2 tablets by mouth Daily. 90 tablet 2 Taking   • isosorbide mononitrate (IMDUR) 30 MG 24 hr tablet Take 0.5 tablets by mouth Daily. 45 tablet 3 Taking   • metoprolol tartrate (LOPRESSOR) 100 MG tablet Take 1 tablet by mouth Every 12 (Twelve) Hours. 60 tablet 1 Taking   • pantoprazole (PROTONIX) 40 MG EC tablet Take 40 mg by mouth Daily.   Taking   • potassium chloride (K-TAB) 20 MEQ tablet controlled-release ER tablet Take 1 tablet by mouth Daily. 90 tablet 3 Taking   • Probiotic Product (PROBIOTIC PO) Take 1 tablet by mouth Daily.   Taking   • warfarin (COUMADIN) 4 MG tablet Take 1 tablet by mouth Daily. (Patient taking differently: Take 2 mg by mouth Daily.) 30 tablet 1 Taking   • atorvastatin (LIPITOR) 40 MG tablet Take 1 tablet by mouth Every Night. 30 tablet 6  Unknown at Unknown time   • clopidogrel (PLAVIX) 75 MG tablet Take 1 tablet by mouth Daily. 90 tablet 3 Unknown at Unknown time   • digoxin (LANOXIN) 125 MCG tablet Take 1 tablet by mouth Every Other Day. 15 tablet 1 Unknown at Unknown time   • nitroglycerin (NITROSTAT) 0.4 MG SL tablet 1 under the tongue as needed for angina, may repeat q5mins for up three doses 35 tablet 0 Taking   • oxyCODONE-acetaminophen (PERCOCET) 5-325 MG per tablet Take 1 tablet by mouth Every 6 (Six) Hours As Needed.   Unknown at Unknown time   • warfarin (COUMADIN) 3 MG tablet TAKE ONE TABLET BY MOUTH DAILY 90 tablet 0 Taking       Allergies:  No Known Allergies  Immunizations:  Immunization History   Administered Date(s) Administered   • Flu Vaccine Quad PF >36MO 10/14/2016   • Influenza Quad Vaccine (Inpatient) 2017     Social History:   Social History     Social History Narrative        3 children     Sonlives with him.     Social History   Substance Use Topics   • Smoking status: Former Smoker     Packs/day: 1.00     Years: 25.00     Types: Cigarettes   • Smokeless tobacco: Former User     Quit date: 1985      Comment: caffeine use   • Alcohol use No     Family History:  Family History   Problem Relation Age of Onset   • Cancer Mother    • Cancer Father    • COPD Father    • Arthritis Sister    • Diabetes Sister    • Hyperlipidemia Sister    • Arthritis Brother    • Diabetes Brother    • Stroke Brother    • Lung cancer Other    • Stroke Other    • No Known Problems Maternal Grandmother    • No Known Problems Maternal Grandfather    • No Known Problems Paternal Grandmother    • No Known Problems Paternal Grandfather         Review of Systems  Review of Systems   Unable to perform ROS: Mental status change       Objective:  tMax 24 hrs: Temp (24hrs), Av °F (37.2 °C), Min:98.1 °F (36.7 °C), Max:100.5 °F (38.1 °C)    Vitals Ranges:   Temp:  [98.1 °F (36.7 °C)-100.5 °F (38.1 °C)] 99 °F (37.2 °C)  Heart Rate:  [85-99]  85  Resp:  [15-22] 16  BP: ()/(57-84) 123/84      Exam:  Physical Exam   Constitutional: He appears well-developed and well-nourished. No distress.   HENT:   Head: Normocephalic and atraumatic.   Right Ear: External ear normal.   Left Ear: External ear normal.   Nose: Nose normal.   Mouth/Throat: Oropharynx is clear and moist.   Eyes: Conjunctivae and EOM are normal. Right eye exhibits no discharge. Left eye exhibits no discharge. No scleral icterus.   Neck: Neck supple. No JVD present. No tracheal deviation present. No thyromegaly present.   Cardiovascular: Normal rate, regular rhythm and intact distal pulses.  Exam reveals no gallop and no friction rub.    Murmur (2/6 systolic precordial) heard.  Pulmonary/Chest: Effort normal and breath sounds normal. No stridor. No respiratory distress. He exhibits no tenderness.   Abdominal: Soft. Bowel sounds are normal. He exhibits no distension and no mass. There is no tenderness. There is no rebound and no guarding.   Genitourinary:   Genitourinary Comments: Whaley catheter is in place with clear yellow urine present.   Musculoskeletal: He exhibits no edema or deformity.   Neurological: He is alert.   Oriented to person and place.  Conversant but very vague in answering specific questions.   Skin: Skin is warm and dry. He is not diaphoretic.   Skin is very warm to touch.  The incision site for the pacer appears clean.   Psychiatric:   Please see neurologic exam.       Data Review:  All labs and radiology reviewed.    Assessment:  Principal Problem:    Fever and chills:  Please see below.  Active Problems:    UTI (urinary tract infection) with indwelling Whaley catheter.    PAU (acute kidney injury):  Hold Lasix and very gently hydrate overnight.  We'll bring his nephrologist back in on the case.    Chronic systolic (congestive) heart failure:  Monitor fluid status very closely.    Atrial fibrillation    Abnormal CXR:  We'll proceed with a CT scan of the chest.     Anticoagulated on Coumadin:  Monitor INR is very closely while on antibiotics.    CKD (chronic kidney disease), stage III    Arteriosclerotic coronary artery disease      Plan:  Please see above.  The source of the fevers uncertain.  No bacteria seen on urinalysis however is otherwise consistent with UTI.  He is also recently had an intravascular procedure although the incision site appears clean.  Viral panel is negative but it certainly is not comprehensive.  Pending culture results will continue with broad-spectrum antibiotics in form of flank and Zosyn.  For full details please see orders.    Jeramie Pate MD  10/1/2017  4:14 AM    EMR Dragon/Transcription disclaimer:   Much of this encounter note is an electronic transcription/translation of spoken language to printed text. The electronic translation of spoken language may permit erroneous, or at times, nonsensical words or phrases to be inadvertently transcribed; Although I have reviewed the note for such errors, some may still exist.

## 2017-10-01 NOTE — CONSULTS
Referring Provider: Dr. Jerry Pate  Reason for Consultation: PAU on CKD3    Subjective     Chief complaint   Chief Complaint   Patient presents with   • Altered Mental Status       History of present illness:  81 yo WM with CKD3 (baseline SCr around 1.6-.18 mg/dL) followed by Dr. Ervin Ramon of our group admitted to hospital yesterday for further eval of confusion.  Full PMH is outlined below.  Asked to see d/t PAU, with adm SCr yesterday 2.3, with level today 1.8.  ROS not possible, as pt is confused, disoriented, and unable to recall events of recent past.  For what it is worth, he denies any SOB, CP, F, or urinary c/o.  Has urinary retention with in-dwelling f/c for now; there is concern that tubing has not been changed regularly since its placement.  This is pt's third admission since August, with most recent adm late Sept notable for CV and PPM placement.  Since arrival here, IVF underway and empiric abx given, with concerns for UTI and pna.    Past Medical History:   Diagnosis Date   • AAA (abdominal aortic aneurysm)     s/p repair, Dr. Felix   • Acute myocardial infarction    • Arteriosclerotic coronary artery disease    • Arthritis    • Atrial fibrillation    • Atrial fibrillation    • B12 deficiency 6/1/2017   • Cardiomyopathy    • Chicken pox    • Chronic combined systolic and diastolic congestive heart failure 9/5/2017   • Chronic systolic (congestive) heart failure    • CKD (chronic kidney disease), stage III    • COPD (chronic obstructive pulmonary disease) 11/15/2016   • Coronary artery disease     MI 1990's, CABG x 2 V (LIMA-LAD, SVG-PDA), 1997   • Dyspnea    • Dyspnea on exertion 8/25/2017   • Fatigue    • Heart attack    • Hyperglycemia 3/29/2017   • Hyperlipidemia    • Hypertension    • Imbalance 3/29/2017   • Insomnia 8/25/2017   • Ischemic cardiomyopathy     LVEF 40%   • Lung mass 04/18/2016    spiculated, being followed   • Lung nodule seen on imaging study    • Malignant neoplasm    •  Measles    • Mumps    • PAF (paroxysmal atrial fibrillation)    • Pleural effusion    • Premature ventricular contractions    • Prostate cancer    • PVC (premature ventricular contraction)    • Rash of body 6/21/2017   • Stroke    • Thoracic aortic ectasia    • Urinary tract infection with hematuria 1/18/2017     Past Surgical History:   Procedure Laterality Date   • APPENDECTOMY     • ARTERIAL ANEURYSM REPAIR     • BRONCHOSCOPY Right 10/5/2016    Procedure: BRONCHOSCOPY;  Surgeon: Jhony Quintanilla MD;  Location: [facility] ENDOSCOPY;  Service:    • BRONCHOSCOPY N/A 11/29/2016    Procedure: BRONCHOSCOPY WITH FLUORO w/biopsy AND BAL;  Surgeon: Ervin Stiles MD;  Location: [facility] ENDOSCOPY;  Service:    • CARDIAC CATHETERIZATION N/A 6/7/2017    Procedure: Left Heart Cath;  Surgeon: Vincent Middleton MD;  Location: [facility] CATH INVASIVE LOCATION;  Service:    • CARDIAC CATHETERIZATION N/A 6/7/2017    Procedure: Coronary angiography;  Surgeon: Vincent Middleton MD;  Location: [facility] CATH INVASIVE LOCATION;  Service:    • CARDIAC CATHETERIZATION N/A 6/7/2017    Procedure: Left ventriculography;  Surgeon: Vincent Middleton MD;  Location: [facility] CATH INVASIVE LOCATION;  Service:    • CARDIAC CATHETERIZATION N/A 6/7/2017    Procedure: Bypass graft study;  Surgeon: Vincent Middleton MD;  Location: [facility] CATH INVASIVE LOCATION;  Service:    • CARDIAC CATHETERIZATION N/A 6/7/2017    Procedure: Native mammary injection;  Surgeon: Vincent Middleton MD;  Location: [facility] CATH INVASIVE LOCATION;  Service:    • CARDIAC CATHETERIZATION N/A 6/7/2017    Procedure: Stent NYA bypass graft;  Surgeon: Vincent Middleton MD;  Location: [facility] CATH INVASIVE LOCATION;  Service:    • CARDIAC DEFIBRILLATOR PLACEMENT     • CARDIAC ELECTROPHYSIOLOGY PROCEDURE N/A 9/19/2017    Procedure: ICD SC new  CRT-D  MEDTRONIC;  Surgeon: Fady Terrazas MD;  Location: [facility] CATH INVASIVE LOCATION;  Service:    • CARDIAC SURGERY     • CHOLECYSTECTOMY     • COLONOSCOPY N/A  12/16/2016    Procedure: COLONOSCOPY TO ASC COLON  WITH BIOPSY AND POLYPECTOMY;  Surgeon: Lane Brian MD;  Location: [facility] ENDOSCOPY;  Service:    • COLONOSCOPY N/A 3/1/2017    multiple polyps, lipoma in proximal transverse colon/hepatic flexure, repeat in one year   • CORONARY ARTERY BYPASS GRAFT     • ENDOSCOPY N/A 3/1/2017    Concepcion's, HH, gastric mucosal atrophy   • INSERT / REPLACE / REMOVE PACEMAKER     • PROSTATE SURGERY       Family History   Problem Relation Age of Onset   • Cancer Mother    • Cancer Father    • COPD Father    • Arthritis Sister    • Diabetes Sister    • Hyperlipidemia Sister    • Arthritis Brother    • Diabetes Brother    • Stroke Brother    • Lung cancer Other    • Stroke Other    • No Known Problems Maternal Grandmother    • No Known Problems Maternal Grandfather    • No Known Problems Paternal Grandmother    • No Known Problems Paternal Grandfather      Social History   Substance Use Topics   • Smoking status: Former Smoker     Packs/day: 1.00     Years: 25.00     Types: Cigarettes   • Smokeless tobacco: Former User     Quit date: 9/22/1985      Comment: caffeine use   • Alcohol use No     Prescriptions Prior to Admission   Medication Sig Dispense Refill Last Dose   • finasteride (PROSCAR) 5 MG tablet Take 1 tablet by mouth Daily. 90 tablet 1 Taking   • furosemide (LASIX) 40 MG tablet Take 2 tablets by mouth Daily. 90 tablet 2 Taking   • isosorbide mononitrate (IMDUR) 30 MG 24 hr tablet Take 0.5 tablets by mouth Daily. 45 tablet 3 Taking   • metoprolol tartrate (LOPRESSOR) 100 MG tablet Take 1 tablet by mouth Every 12 (Twelve) Hours. 60 tablet 1 Taking   • pantoprazole (PROTONIX) 40 MG EC tablet Take 40 mg by mouth Daily.   Taking   • potassium chloride (K-TAB) 20 MEQ tablet controlled-release ER tablet Take 1 tablet by mouth Daily. 90 tablet 3 Taking   • Probiotic Product (PROBIOTIC PO) Take 1 tablet by mouth Daily.   Taking   • warfarin (COUMADIN) 4 MG tablet Take 1 tablet by  "mouth Daily. (Patient taking differently: Take 2 mg by mouth Daily.) 30 tablet 1 Taking   • atorvastatin (LIPITOR) 40 MG tablet Take 1 tablet by mouth Every Night. 30 tablet 6 Unknown at Unknown time   • clopidogrel (PLAVIX) 75 MG tablet Take 1 tablet by mouth Daily. 90 tablet 3 Unknown at Unknown time   • digoxin (LANOXIN) 125 MCG tablet Take 1 tablet by mouth Every Other Day. 15 tablet 1 Unknown at Unknown time   • nitroglycerin (NITROSTAT) 0.4 MG SL tablet 1 under the tongue as needed for angina, may repeat q5mins for up three doses 35 tablet 0 Taking   • oxyCODONE-acetaminophen (PERCOCET) 5-325 MG per tablet Take 1 tablet by mouth Every 6 (Six) Hours As Needed.   Unknown at Unknown time   • warfarin (COUMADIN) 3 MG tablet TAKE ONE TABLET BY MOUTH DAILY 90 tablet 0 Taking     Allergies:  Review of patient's allergies indicates no known allergies.    Review of Systems  14-point ROS attempted but not possible, as pt is lethargic and too confused to give any reliable information.  For what it is worth, tho, he indicates no SOB or CP.  Appetite poor, but no N or V.  He feels tired.  No urinary or bowel problems.    Objective     Vital Signs  Temp:  [98.1 °F (36.7 °C)-100.5 °F (38.1 °C)] 99 °F (37.2 °C)  Heart Rate:  [85-99] 99  Resp:  [15-22] 18  BP: ()/(57-84) 136/83    Flowsheet Rows         First Filed Value    Admission Height  70\" (177.8 cm) Documented at 09/30/2017 2211    Admission Weight  180 lb (81.6 kg) Documented at 09/30/2017 2211           I/O this shift:  In: 620 [P.O.:620]  Out: 700 [Urine:700]  I/O last 3 completed shifts:  In: 2450 [IV Piggyback:2450]  Out: -     Intake/Output Summary (Last 24 hours) at 10/01/17 1307  Last data filed at 10/01/17 1217   Gross per 24 hour   Intake             3070 ml   Output              700 ml   Net             2370 ml       Physical Exam:  NAD; pleasant; not oriented at all; looks stated age  Dry MM; no scleral icterus  No JVD; no carotid bruits  Coarse bilat; " not labored  RRR, no rub  Soft, NT, ND, BS+  No significant edema  No clubbing  No asterixis  Moves all extremities   Skin turgor seems fine; no tenting.  No rash    Results Review:    Results from last 7 days  Lab Units 10/01/17  0603 09/30/17  2252   SODIUM mmol/L 141 141   POTASSIUM mmol/L 3.5 4.1   CHLORIDE mmol/L 105 100   CO2 mmol/L 19.9* 23.8   BUN mg/dL 17 18   CREATININE mg/dL 1.81* 2.28*   CALCIUM mg/dL 7.9* 8.9   BILIRUBIN mg/dL  --  1.0   ALK PHOS U/L  --  73   ALT (SGPT) U/L  --  12   AST (SGOT) U/L  --  16   GLUCOSE mg/dL 134* 130*       Estimated Creatinine Clearance: 37.6 mL/min (by C-G formula based on Cr of 1.81).      Results from last 7 days  Lab Units 10/01/17  0603   MAGNESIUM mg/dL 1.9         Results from last 7 days  Lab Units 10/01/17  0603 09/30/17  2252   WBC 10*3/mm3 8.06 8.98   HEMOGLOBIN g/dL 11.6* 12.7*   PLATELETS 10*3/mm3 202 221         Results from last 7 days  Lab Units 10/01/17  0603 09/30/17  2252   INR  2.53* 2.58*       Active Medications    amiodarone 300 mg Oral Q12H   atorvastatin 40 mg Oral Nightly   clopidogrel 75 mg Oral Daily   digoxin 125 mcg Oral Every Other Day   isosorbide mononitrate 15 mg Oral Daily   lactobacillus acidophilus 1 capsule Oral Daily   metoprolol tartrate 100 mg Oral Q12H   pantoprazole 40 mg Oral Daily   piperacillin-tazobactam 3.375 g Intravenous Q8H   Vancomycin Pharmacy Intermittent Dosing  Does not apply Daily   warfarin 2 mg Oral Daily       Pharmacy to dose vancomycin     Pharmacy to Dose Zosyn     sodium chloride 50 mL/hr Last Rate: 50 mL/hr (10/01/17 0546)       Assessment/Plan   Assessment  1.  PAU on CKD3, likely prerenal from poor PO on loop diuretic, modest hypotension.  That SCr has improved on IVF also suggests prerenal picture.  Urine studies are under-whelming for UTI thus far. Central vol prob low.  Lytes compensated fairly well, with low-normal K and slightly low bicarb  2.  TME: maybe from narcotics  3.  Recent CV and PPM few  weeks ago  4.  Anemia   5.  Urinary retention, with in-dweling f/c for now      Principal Problem:    UTI (urinary tract infection)  Active Problems:    PAF (paroxysmal atrial fibrillation)    COPD (chronic obstructive pulmonary disease)    Type 2 diabetes mellitus with complication    Abdominal aortic aneurysm (AAA) without rupture    Long term current use of anticoagulant    Chronic combined systolic and diastolic congestive heart failure    Abnormal CXR    CKD (chronic kidney disease), stage III    PAU (acute kidney injury)    Arteriosclerotic coronary artery disease    Metabolic encephalopathy    Acidosis      Plan  1.  IVF for now  2.  F/u cultures  3.  Oral KCl 40 meq x 1  4.  Surveillance labs      Eliecer Brower MD  10/01/17  1:07 PM

## 2017-10-01 NOTE — CONSULTS
CONSULT NOTE    Infectious Diseases - Carmen Villa MD  [facility]       Patient Identification:  Name: Aakash Sanchez Jr.  Age: 82 y.o.  Sex: male  :  1935  MRN: [id number]             Date of Consultation:  10/1/2017        Primary Care Physician: Dimitrios Lindquist MD                               Requesting Physician: Dr. Jamel Mendoza  Reason for Consultation: febrile illness and associated confusion in the context of recent multiple hospitalization and permanent pacemaker placement    Impression: 82-year-old male with complicated past medical history as noted below has been in and out of the hospital since last year for various reasons including 3 hospitalization: This one since 2017.  This time he presented to the hospital after being discharged on 2017.  During this hospitalization he was treated for urinary retention pneumonia and atrial fibrillation with rapid ventricular response  permanent pacemaker placement .  Patient is back in the hospital with mild chest confusion low-grade temperature with no localizing symptoms or signs.  He has associated renal insufficiency and abnormal urinalysis and known lung mass for which he follows with thoracic surgery service.  He also had hospitalization in which he was noted to have diarrhea and VRE-positive stool culture.  This presentation in this context is concerning for:  1-sepsis in the context of recent hospitalization including urinary retention and placement of Whaley catheter and likely disease process to consider include:   -Urinary tract infection   -Evolving C. difficile colitis   -Endovascular infection such as endocarditis or infected pacemaker   -Intra-abdominal infection  2-vasculitis syndrome such as Wegener's given the recent lung involvement renal failure and abnormal urinalysis with formed elements such as hematuria and pleocytosis  3-chronic kidney disease  4-history of VRE colonization  5-incomplete  database      Recommendations/Discussions: At this injury likely with initial antibiotic choice consisting of vancomycin and Zosyn until the cultures are finalized.  Would like to check ANCA panel and CT scan of abdomen and pelvis without contrast.  Continue aggressive supportive care and further management as his condition evolves.        History of Present Illness:   82-year-old male with complicated past medical history as noted below has been in and out of the hospital since last year for various reasons including 3 hospitalization: This one since August 2017.  This time he presented to the hospital after being discharged on 9/27/2017.  During this hospitalization he was treated for urinary retention pneumonia and atrial fibrillation with rapid ventricular response  permanent pacemaker placement .  Patient is back in the hospital with mild chest confusion low-grade temperature with no localizing symptoms or signs.  He has associated renal insufficiency and abnormal urinalysis and known lung mass for which he follows with thoracic surgery service.  He also had hospitalization in which he was noted to have diarrhea and VRE-positive stool culture.      Past Medical History:  Past Medical History:   Diagnosis Date   • AAA (abdominal aortic aneurysm)     s/p repair, Dr. Felix   • Acute myocardial infarction    • Arteriosclerotic coronary artery disease    • Arthritis    • Atrial fibrillation    • Atrial fibrillation    • B12 deficiency 6/1/2017   • Cardiomyopathy    • Chicken pox    • Chronic combined systolic and diastolic congestive heart failure 9/5/2017   • Chronic systolic (congestive) heart failure    • CKD (chronic kidney disease), stage III    • COPD (chronic obstructive pulmonary disease) 11/15/2016   • Coronary artery disease     MI 1990's, CABG x 2 V (LIMA-LAD, SVG-PDA), 1997   • Dyspnea    • Dyspnea on exertion 8/25/2017   • Fatigue    • Heart attack    • Hyperglycemia 3/29/2017   • Hyperlipidemia    •  Hypertension    • Imbalance 3/29/2017   • Insomnia 8/25/2017   • Ischemic cardiomyopathy     LVEF 40%   • Lung mass 04/18/2016    spiculated, being followed   • Lung nodule seen on imaging study    • Malignant neoplasm    • Measles    • Mumps    • PAF (paroxysmal atrial fibrillation)    • Pleural effusion    • Premature ventricular contractions    • Prostate cancer    • PVC (premature ventricular contraction)    • Rash of body 6/21/2017   • Stroke    • Thoracic aortic ectasia    • Urinary tract infection with hematuria 1/18/2017     Past Surgical History:  Past Surgical History:   Procedure Laterality Date   • APPENDECTOMY     • ARTERIAL ANEURYSM REPAIR     • BRONCHOSCOPY Right 10/5/2016    Procedure: BRONCHOSCOPY;  Surgeon: Jhony Quintanilla MD;  Location: [facility] ENDOSCOPY;  Service:    • BRONCHOSCOPY N/A 11/29/2016    Procedure: BRONCHOSCOPY WITH FLUORO w/biopsy AND BAL;  Surgeon: Ervin Stiles MD;  Location: [facility] ENDOSCOPY;  Service:    • CARDIAC CATHETERIZATION N/A 6/7/2017    Procedure: Left Heart Cath;  Surgeon: Vincent Middleton MD;  Location: [facility] CATH INVASIVE LOCATION;  Service:    • CARDIAC CATHETERIZATION N/A 6/7/2017    Procedure: Coronary angiography;  Surgeon: Vincent Middleton MD;  Location: [facility] CATH INVASIVE LOCATION;  Service:    • CARDIAC CATHETERIZATION N/A 6/7/2017    Procedure: Left ventriculography;  Surgeon: Vincent Middleton MD;  Location: [facility] CATH INVASIVE LOCATION;  Service:    • CARDIAC CATHETERIZATION N/A 6/7/2017    Procedure: Bypass graft study;  Surgeon: Vincent Middleton MD;  Location: [facility] CATH INVASIVE LOCATION;  Service:    • CARDIAC CATHETERIZATION N/A 6/7/2017    Procedure: Native mammary injection;  Surgeon: Vincent Middleton MD;  Location: [facility] CATH INVASIVE LOCATION;  Service:    • CARDIAC CATHETERIZATION N/A 6/7/2017    Procedure: Stent NYA bypass graft;  Surgeon: Vincent Middleton MD;  Location: [facility] CATH INVASIVE LOCATION;  Service:    • CARDIAC DEFIBRILLATOR PLACEMENT      • CARDIAC ELECTROPHYSIOLOGY PROCEDURE N/A 9/19/2017    Procedure: ICD SC new  CRT-D  [employer];  Surgeon: Fady Terrazas MD;  Location: [facility] CATH INVASIVE LOCATION;  Service:    • CARDIAC SURGERY     • CHOLECYSTECTOMY     • COLONOSCOPY N/A 12/16/2016    Procedure: COLONOSCOPY TO ASC COLON  WITH BIOPSY AND POLYPECTOMY;  Surgeon: Lane Brian MD;  Location: [facility] ENDOSCOPY;  Service:    • COLONOSCOPY N/A 3/1/2017    multiple polyps, lipoma in proximal transverse colon/hepatic flexure, repeat in one year   • CORONARY ARTERY BYPASS GRAFT     • ENDOSCOPY N/A 3/1/2017    Concepcion's, HH, gastric mucosal atrophy   • INSERT / REPLACE / REMOVE PACEMAKER     • PROSTATE SURGERY        Home Meds:  Prescriptions Prior to Admission   Medication Sig Dispense Refill Last Dose   • finasteride (PROSCAR) 5 MG tablet Take 1 tablet by mouth Daily. 90 tablet 1 Taking   • furosemide (LASIX) 40 MG tablet Take 2 tablets by mouth Daily. 90 tablet 2 Taking   • isosorbide mononitrate (IMDUR) 30 MG 24 hr tablet Take 0.5 tablets by mouth Daily. 45 tablet 3 Taking   • metoprolol tartrate (LOPRESSOR) 100 MG tablet Take 1 tablet by mouth Every 12 (Twelve) Hours. 60 tablet 1 Taking   • pantoprazole (PROTONIX) 40 MG EC tablet Take 40 mg by mouth Daily.   Taking   • potassium chloride (K-TAB) 20 MEQ tablet controlled-release ER tablet Take 1 tablet by mouth Daily. 90 tablet 3 Taking   • Probiotic Product (PROBIOTIC PO) Take 1 tablet by mouth Daily.   Taking   • warfarin (COUMADIN) 4 MG tablet Take 1 tablet by mouth Daily. (Patient taking differently: Take 2 mg by mouth Daily.) 30 tablet 1 Taking   • atorvastatin (LIPITOR) 40 MG tablet Take 1 tablet by mouth Every Night. 30 tablet 6 Unknown at Unknown time   • clopidogrel (PLAVIX) 75 MG tablet Take 1 tablet by mouth Daily. 90 tablet 3 Unknown at Unknown time   • digoxin (LANOXIN) 125 MCG tablet Take 1 tablet by mouth Every Other Day. 15 tablet 1 Unknown at Unknown time   • nitroglycerin  (NITROSTAT) 0.4 MG SL tablet 1 under the tongue as needed for angina, may repeat q5mins for up three doses 35 tablet 0 Taking   • oxyCODONE-acetaminophen (PERCOCET) 5-325 MG per tablet Take 1 tablet by mouth Every 6 (Six) Hours As Needed.   Unknown at Unknown time   • warfarin (COUMADIN) 3 MG tablet TAKE ONE TABLET BY MOUTH DAILY 90 tablet 0 Taking     Current Meds:     Current Facility-Administered Medications:   •  acetaminophen (TYLENOL) tablet 650 mg, 650 mg, Oral, Q4H PRN, Jeramie Pate MD  •  amiodarone (PACERONE) tablet 300 mg, 300 mg, Oral, Q12H, Lenin Langston MD, 300 mg at 10/01/17 1456  •  atorvastatin (LIPITOR) tablet 40 mg, 40 mg, Oral, Nightly, Jeramie Pate MD  •  clopidogrel (PLAVIX) tablet 75 mg, 75 mg, Oral, Daily, Jeramie Pate MD, 75 mg at 10/01/17 0841  •  digoxin (LANOXIN) tablet 125 mcg, 125 mcg, Oral, Every Other Day, Jeramie Pate MD, 125 mcg at 10/01/17 0841  •  isosorbide mononitrate (IMDUR) 24 hr tablet 15 mg, 15 mg, Oral, Daily, Jeramie Pate MD, 15 mg at 10/01/17 0841  •  lactobacillus acidophilus (RISAQUAD) capsule 1 capsule, 1 capsule, Oral, Daily, Jeramie Pate MD, 1 capsule at 10/01/17 0841  •  metoprolol tartrate (LOPRESSOR) tablet 100 mg, 100 mg, Oral, Q12H, Jeramie Pate MD, 100 mg at 10/01/17 0841  •  nitroglycerin (NITROSTAT) SL tablet 0.4 mg, 0.4 mg, Sublingual, Q5 Min PRN, Jeramie Pate MD  •  ondansetron (ZOFRAN) tablet 4 mg, 4 mg, Oral, Q6H PRN **OR** ondansetron ODT (ZOFRAN-ODT) disintegrating tablet 4 mg, 4 mg, Oral, Q6H PRN **OR** ondansetron (ZOFRAN) injection 4 mg, 4 mg, Intravenous, Q6H PRN, Jeramie Pate MD  •  oxyCODONE-acetaminophen (PERCOCET) 5-325 MG per tablet 1 tablet, 1 tablet, Oral, Q6H PRN, Jeramie Pate MD, 1 tablet at 10/01/17 1146  •  pantoprazole (PROTONIX) EC tablet 40 mg, 40 mg, Oral, Daily, Jeramie Pate MD, 40 mg at 10/01/17 0842  •  Pharmacy to dose vancomycin, , Does not apply,  "Continuous PRN, Jeramie Pate MD  •  Pharmacy to Dose Zosyn, , Does not apply, Continuous PRN, Jeramie Pate MD  •  piperacillin-tazobactam (ZOSYN) 3.375 g in iso-osmotic dextrose 50 ml (premix), 3.375 g, Intravenous, Q8H, Jeramie Pate MD, Stopped at 10/01/17 1903  •  sodium chloride 0.9 % flush 1-10 mL, 1-10 mL, Intravenous, PRN, Jeramie Pate MD  •  sodium chloride 0.9 % infusion, 50 mL/hr, Intravenous, Continuous, Jeramie Pate MD, Last Rate: 50 mL/hr at 10/01/17 1501, 50 mL/hr at 10/01/17 1501  •  vancomycin 750 mg/250 mL 0.9% NS add-vantage, 750 mg, Intravenous, Once, Jeramie Ptae MD, 750 mg at 10/01/17 1904  •  Vancomycin Pharmacy Intermittent Dosing, , Does not apply, Daily, Jeramie Pate MD  •  warfarin (COUMADIN) tablet 2 mg, 2 mg, Oral, Daily, Jeramie Pate MD, 2 mg at 10/01/17 1713  Allergies:  No Known Allergies  Social History:   Social History   Substance Use Topics   • Smoking status: Former Smoker     Packs/day: 1.00     Years: 25.00     Types: Cigarettes   • Smokeless tobacco: Former User     Quit date: 9/22/1985      Comment: caffeine use   • Alcohol use No      Family History:  Family History   Problem Relation Age of Onset   • Cancer Mother    • Cancer Father    • COPD Father    • Arthritis Sister    • Diabetes Sister    • Hyperlipidemia Sister    • Arthritis Brother    • Diabetes Brother    • Stroke Brother    • Lung cancer Other    • Stroke Other    • No Known Problems Maternal Grandmother    • No Known Problems Maternal Grandfather    • No Known Problems Paternal Grandmother    • No Known Problems Paternal Grandfather           Review of Systems  See history of present illness and past medical history.Limited by confusion      Vitals:   /71 (BP Location: Left arm, Patient Position: Lying)  Pulse 84  Temp 100.2 °F (37.9 °C) (Oral)   Resp 18  Ht 70\" (177.8 cm)  Wt 180 lb (81.6 kg)  SpO2 98%  BMI 25.83 kg/m2  I/O:   Intake/Output " Summary (Last 24 hours) at 10/01/17 1942  Last data filed at 10/01/17 1903   Gross per 24 hour   Intake           3791.5 ml   Output             1275 ml   Net           2516.5 ml     Exam:  General Appearance:    Ill-appearing male who appears to be obviously shaking because of feeling very cold.     Head:    Normocephalic, without obvious abnormality, atraumatic   Eyes:    PERRL, conjunctiva/corneas clear, EOM's intact, both eyes   Ears:    Normal external ear canals, both ears   Nose:   Nares normal, septum midline, mucosa normal, no drainage    or sinus tenderness   Throat:   Lips, tongue, gums normal; oral mucosa pink and moist   Neck:   Supple, symmetrical, trachea midline, no adenopathy;     thyroid:  no enlargement/tenderness/nodules; no carotid    bruit or JVD   Back:     Symmetric, no curvature, ROM normal, no CVA tenderness   Lungs:     Clear to auscultation bilaterally, respirations unlabored   Chest Wall:    No tenderness or deformityPermanent pacemaker incision site clean     Heart:    Regular rate and rhythm, S1 and S2 normal, no murmur, rub   or gallop   Abdomen:     Soft,Distended and mildly tender no rebound, bowel sounds active all four quadrants,     no masses, no hepatomegaly, no splenomegaly   Extremities:   Extremities normal, atraumatic, no cyanosis or edema   Pulses:   Pulses palpable in all extremities; symmetric all extremities   Skin:   Skin color normal, Skin is warm and dry,  no rashes or palpable lesions   Neurologic:   CNII-XII intact, motor strength grossly intact, sensation grossly intact to light touch, no focal deficits noted       Data Review:    I reviewed the patient's new clinical results.    Results from last 7 days  Lab Units 10/01/17  0603 09/30/17  2252   WBC 10*3/mm3 8.06 8.98   HEMOGLOBIN g/dL 11.6* 12.7*   PLATELETS 10*3/mm3 202 221       Results from last 7 days  Lab Units 10/01/17  0603 09/30/17  2252   SODIUM mmol/L 141 141   POTASSIUM mmol/L 3.5 4.1   CHLORIDE mmol/L  105 100   CO2 mmol/L 19.9* 23.8   BUN mg/dL 17 18   CREATININE mg/dL 1.81* 2.28*   CALCIUM mg/dL 7.9* 8.9   GLUCOSE mg/dL 134* 130*     Culture   Date Value Ref Range Status   11/29/2016 25,000 CFU/mL Normal Respiratory Nicki  Final     Comment:          ]  Microbiology Results (last 10 days)     Procedure Component Value - Date/Time    Respiratory Panel, PCR - Swab, Nasopharynx [[id number]]  (Normal) Collected:  10/01/17 0213    Lab Status:  Final result Specimen:  Swab from Nasopharynx Updated:  10/01/17 0332     ADENOVIRUS, PCR Not Detected     Coronavirus 229E Not Detected     Coronavirus HKU1 Not Detected     Coronavirus NL63 Not Detected     Coronavirus OC43 Not Detected     Human Metapneumovirus Not Detected     Human Rhinovirus/Enterovirus Not Detected     Influenza B PCR Not Detected     Parainfluenza Virus 1 Not Detected     Parainfluenza Virus 2 Not Detected     Parainfluenza Virus 3 Not Detected     Parainfluenza Virus 4 Not Detected     Bordetella pertussis pcr Not Detected     Influenza 2009 H1N1 by PCR Not Detected     Chlamydophila pneumoniae PCR Not Detected     Mycoplasma pneumo by PCR Not Detected     Influenza A PCR Not Detected     Influenza A H3 Not Detected     Influenza A H1 Not Detected     RSV, PCR Not Detected    Blood Culture - Blood, [[id number]]  (Normal) Collected:  10/01/17 0134    Lab Status:  Preliminary result Specimen:  Blood from Arm, Left Updated:  10/01/17 1401     Blood Culture No growth at less than 24 hours    Blood Culture - Blood, [[id number]]  (Normal) Collected:  10/01/17 0100    Lab Status:  Preliminary result Specimen:  Blood from Arm, Left Updated:  10/01/17 1401     Blood Culture No growth at less than 24 hours    Urine Culture - Urine, Urine, Clean Catch [[id number]]  (Normal) Collected:  09/30/17 8593    Lab Status:  Preliminary result Specimen:  Urine from Urine, Clean Catch Updated:  10/01/17 0755     Urine Culture Culture in progress      Imaging studies  reviewed    Assessment:  Active Hospital Problems (** Indicates Principal Problem)    Diagnosis Date Noted   • **UTI (urinary tract infection) [N39.0] 10/01/2017   • Abnormal CXR [R93.8] 10/01/2017   • CKD (chronic kidney disease), stage III [N18.3] 10/01/2017   • PAU (acute kidney injury) [N17.9] 10/01/2017   • Arteriosclerotic coronary artery disease [I25.10] 10/01/2017   • Metabolic encephalopathy [G93.41] 10/01/2017   • Acidosis [E87.2] 10/01/2017   • Wide-complex tachycardia [I47.2] 10/01/2017   • Chronic combined systolic and diastolic congestive heart failure [I50.42] 09/05/2017   • Long term current use of anticoagulant [Z79.01] 12/15/2016   • Abdominal aortic aneurysm (AAA) without rupture [I71.4] 12/15/2016   • Type 2 diabetes mellitus with complication [E11.8] 11/23/2016   • COPD (chronic obstructive pulmonary disease) [J44.9] 11/15/2016   • PAF (paroxysmal atrial fibrillation) [I48.0]       Resolved Hospital Problems    Diagnosis Date Noted Date Resolved   No resolved problems to display.         Plan:  See above  Carmen Byrd MD   10/1/2017  7:42 PM    Much of this encounter note is an electronic transcription/translation of spoken language to printed text. The electronic translation of spoken language may permit erroneous, or at times, nonsensical words or phrases to be inadvertently transcribed; Although I have reviewed the note for such errors, some may still exist

## 2017-10-01 NOTE — PROGRESS NOTES
Name: Aakash Sanchez Jr. ADMIT: 2017   : 1935  PCP: Dimitrios Lindquist MD    MRN: [id number] LOS: 0 days   AGE/SEX: 82 y.o. male  ROOM: [facility]     Subjective   Subjective:  Symptoms:  He reports diarrhea (though he doesn't seem sure).  No shortness of breath, cough or chest pain.  (Unchanged).    Diet:  Adequate intake.  No nausea.    Activity level: Impaired due to weakness.    Pain:  He complains of pain that is mild.  Pain is partially controlled and requiring pain medication.        Objective   Vital Signs  Temp:  [98.1 °F (36.7 °C)-100.5 °F (38.1 °C)] 99 °F (37.2 °C)  Heart Rate:  [85-99] 99  Resp:  [15-22] 18  BP: ()/(57-84) 136/83  SpO2:  [96 %-100 %] 97 %  on   ;   O2 Device: room air  Body mass index is 25.83 kg/(m^2).    Physical Exam   Constitutional: He appears well-developed. He is cooperative. He has a sickly appearance. No distress.   Neck: No JVD present. No tracheal deviation present. No thyromegaly present.   Cardiovascular: Normal rate and regular rhythm.    No murmur heard.  Pulmonary/Chest: Effort normal and breath sounds normal. No respiratory distress.   Abdominal: Soft. Normal appearance and bowel sounds are normal. He exhibits no distension. There is no tenderness.   Neurological: He is alert. He is disoriented (To date.).   Skin: Skin is warm and dry. No rash noted.   Psychiatric: He has a normal mood and affect. His behavior is normal.   Nursing note and vitals reviewed.      Results Review:       I reviewed the patient's new clinical results.    Results from last 7 days  Lab Units 10/01/17  0603 17  2252   WBC 10*3/mm3 8.06 8.98   HEMOGLOBIN g/dL 11.6* 12.7*   PLATELETS 10*3/mm3 202 221     Results from last 7 days  Lab Units 10/01/17  0603 17  2252   SODIUM mmol/L 141 141   POTASSIUM mmol/L 3.5 4.1   CHLORIDE mmol/L 105 100   CO2 mmol/L 19.9* 23.8   BUN mg/dL 17 18   CREATININE mg/dL 1.81* 2.28*   GLUCOSE mg/dL 134* 130*   ALBUMIN g/dL  --  3.80    BILIRUBIN mg/dL  --  1.0   ALK PHOS U/L  --  73   AST (SGOT) U/L  --  16   ALT (SGPT) U/L  --  12   Estimated Creatinine Clearance: 37.6 mL/min (by C-G formula based on Cr of 1.81).  Results from last 7 days  Lab Units 10/01/17  0603 09/30/17  2252   CALCIUM mg/dL 7.9* 8.9   ALBUMIN g/dL  --  3.80     Results from last 7 days  Lab Units 10/01/17  0603 09/30/17  2252   PROCALCITONIN ng/mL 0.12  --    LACTATE mmol/L  --  1.7     Results from last 7 days  Lab Units 09/30/17  2253   URINECX  Culture in progress     Microbiology Results (last 10 days)     Procedure Component Value - Date/Time    Respiratory Panel, PCR - Swab, Nasopharynx [[id number]]  (Normal) Collected:  10/01/17 0213    Lab Status:  Final result Specimen:  Swab from Nasopharynx Updated:  10/01/17 0332     ADENOVIRUS, PCR Not Detected     Coronavirus 229E Not Detected     Coronavirus HKU1 Not Detected     Coronavirus NL63 Not Detected     Coronavirus OC43 Not Detected     Human Metapneumovirus Not Detected     Human Rhinovirus/Enterovirus Not Detected     Influenza B PCR Not Detected     Parainfluenza Virus 1 Not Detected     Parainfluenza Virus 2 Not Detected     Parainfluenza Virus 3 Not Detected     Parainfluenza Virus 4 Not Detected     Bordetella pertussis pcr Not Detected     Influenza 2009 H1N1 by PCR Not Detected     Chlamydophila pneumoniae PCR Not Detected     Mycoplasma pneumo by PCR Not Detected     Influenza A PCR Not Detected     Influenza A H3 Not Detected     Influenza A H1 Not Detected     RSV, PCR Not Detected    Urine Culture - Urine, Urine, Clean Catch [[id number]]  (Normal) Collected:  09/30/17 2253    Lab Status:  Preliminary result Specimen:  Urine from Urine, Clean Catch Updated:  10/01/17 075     Urine Culture Culture in progress              atorvastatin 40 mg Oral Nightly   clopidogrel 75 mg Oral Daily   digoxin 125 mcg Oral Every Other Day   isosorbide mononitrate 15 mg Oral Daily   lactobacillus acidophilus 1 capsule Oral  Daily   metoprolol tartrate 100 mg Oral Q12H   pantoprazole 40 mg Oral Daily   piperacillin-tazobactam 3.375 g Intravenous Q12H   warfarin 2 mg Oral Daily       Pharmacy to dose vancomycin     Pharmacy to Dose Zosyn     sodium chloride 50 mL/hr Last Rate: 50 mL/hr (10/01/17 0546)   Diet Regular; Cardiac      Assessment/Plan   Assessment:     Active Hospital Problems (** Indicates Principal Problem)    Diagnosis Date Noted   • **UTI (urinary tract infection) [N39.0] 10/01/2017   • Abnormal CXR [R93.8] 10/01/2017   • CKD (chronic kidney disease), stage III [N18.3] 10/01/2017   • PAU (acute kidney injury) [N17.9] 10/01/2017   • Arteriosclerotic coronary artery disease [I25.10] 10/01/2017   • Metabolic encephalopathy [G93.41] 10/01/2017   • Acidosis [E87.2] 10/01/2017   • Chronic combined systolic and diastolic congestive heart failure [I50.42] 09/05/2017   • Long term current use of anticoagulant [Z79.01] 12/15/2016   • Abdominal aortic aneurysm (AAA) without rupture [I71.4] 12/15/2016   • Type 2 diabetes mellitus with complication [E11.8] 11/23/2016   • COPD (chronic obstructive pulmonary disease) [J44.9] 11/15/2016   • PAF (paroxysmal atrial fibrillation) [I48.0]       Resolved Hospital Problems    Diagnosis Date Noted Date Resolved   No resolved problems to display.       Plan:   · Admitted to monitored unit  · On VANCOMYCIN IV and ZOSYN but honestly at this point don't know exactly what I'm treating.  Previously seen by Dr. Byrd of infectious disease.  Will ask him to assist.  Urine is not impressive.  · Lactic acid elevated.  Hemodynamically stable.  · Continue IVFs @ 50 cc/hr  · Blood cultures x2 done in ED negative thus far.  VRP negative.  · Cardiology has started him on amiodarone for wide-complex tachycardia.  Dr. Terrazas to evaluate.  · Nephrology to see regarding renal failure.  Need to monitor closely given the use of Zosyn and vancomycin.    · Will check stool studies.  · Urology to evaluate regarding  ongoing urinary retention.        Jamel Mendoza MD  [facility]  10/01/17  9:43 AM

## 2017-10-01 NOTE — NURSING NOTE
S/w Rafael Bernardo with [employer]. He stated pacer will occasionally appear to hit on QRS or we will not see the spike and that it is normal for his pacemaker.  He will be here tomorrow to interrogate the pacer.

## 2017-10-01 NOTE — ED NOTES
Brought in by EMS for AMS, son went to check on pt and found him not acting like himself. Pt oriented to all but time.     Hx recent pacemaker placement and a cva two months ago w no residual effects. +oliver cath upon arrival      Noelle Mcclure RN  09/30/17 7868

## 2017-10-01 NOTE — ED PROVIDER NOTES
EMERGENCY DEPARTMENT ENCOUNTER    CHIEF COMPLAINT  Chief Complaint: generalized myalgias  History given by: Family  History limited by: AMS  Room Number: 464/1  PMD: Dimitrios Lindquist MD      HPI:  Pt is a 82 y.o. male who presents complaining of generalized myalgias onset several days ago. Family states that the pt was discharged one week ago with pneumonia, and since discharge has been normal until the last few days. Family states the pt c/o of chills, confusion, fever, and hematuria due to catheter placement. Family states the pt did not have a fever PTA, and the pt denies SOA and CP. The pt's family states that he had a pacemaker placed 2 weeks ago for rapid afib. Per ER nurse, the pt had difficulty urinating since discharge from the hospital and afterwords had a catheter placed.    Duration:  Several days  Onset: gradual  Timing: constant  Quality: generalized myalgia  Intensity/Severity: moderate  Progression: unchanged  Associated Symptoms: chills, confusion, hematuria  Aggravating Factors: none  Alleviating Factors: none  Previous Episodes: None stated  Treatment before arrival: Pt was discharged from the hospital one week ago secondary to pneumonia    PAST MEDICAL HISTORY  Active Ambulatory Problems     Diagnosis Date Noted   • Arthritis 04/18/2016   • Hyperlipidemia 04/18/2016   • Hypertension 04/18/2016   • Hypokalemia 04/18/2016   • Lung mass 04/18/2016   • Ventricular premature beats 04/18/2016   • Coronary artery disease    • Ischemic cardiomyopathy    • PAF (paroxysmal atrial fibrillation)    • COPD (chronic obstructive pulmonary disease) 11/15/2016   • VRE (vancomycin-resistant Enterococci) infection 11/23/2016   • Type 2 diabetes mellitus with complication 11/23/2016   • Diarrhea 12/14/2016   • Abdominal aortic aneurysm (AAA) without rupture 12/15/2016   • Long term current use of anticoagulant 12/15/2016   • Urinary tract infection with hematuria 01/18/2017   • CKD (chronic kidney disease), stage  III 01/18/2017   • Hypotension 02/17/2017   • Hyperglycemia 03/29/2017   • Imbalance 03/29/2017   • Concepcion's esophagus without dysplasia 05/02/2017   • Colon polyps 05/02/2017   • B12 deficiency 06/01/2017   • Cerebrovascular accident (CVA) 06/04/2017   • History of coronary artery stent placement 06/15/2017   • Rash of body 06/21/2017   • Insomnia 08/25/2017   • Cardiomyopathy 09/01/2017   • Benign non-nodular prostatic hyperplasia with lower urinary tract symptoms 09/05/2017   • Prostate hypertrophy 09/05/2017   • Chronic combined systolic and diastolic congestive heart failure 09/05/2017   • Pneumonia 09/12/2017   • Sepsis 09/12/2017   • 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 09/28/2017     Resolved Ambulatory Problems     Diagnosis Date Noted   • Acute respiratory failure with hypoxia 09/27/2016   • Respiratory failure 09/28/2016   • Chronic systolic heart failure 11/15/2016   • PAU (acute kidney injury) 11/23/2016   • NSTEMI (non-ST elevated myocardial infarction) 06/04/2017   • Paroxysmal atrial fibrillation 06/15/2017   • Acute on chronic systolic (congestive) heart failure 08/28/2017   • Atrial fibrillation with RVR 09/12/2017     Past Medical History:   Diagnosis Date   • AAA (abdominal aortic aneurysm)    • Acute myocardial infarction    • Arteriosclerotic coronary artery disease    • Arthritis    • Atrial fibrillation    • Atrial fibrillation    • B12 deficiency 6/1/2017   • Cardiomyopathy    • Chicken pox    • Chronic combined systolic and diastolic congestive heart failure 9/5/2017   • Chronic systolic (congestive) heart failure    • CKD (chronic kidney disease), stage III    • COPD (chronic obstructive pulmonary disease) 11/15/2016   • Coronary artery disease    • Dyspnea    • Dyspnea on exertion 8/25/2017   • Fatigue    • Heart attack    • Hyperglycemia 3/29/2017   • Hyperlipidemia    • Hypertension    • Imbalance 3/29/2017   • Insomnia 8/25/2017   • Ischemic cardiomyopathy    •  Lung mass 04/18/2016   • Lung nodule seen on imaging study    • Malignant neoplasm    • Measles    • Mumps    • PAF (paroxysmal atrial fibrillation)    • Pleural effusion    • Premature ventricular contractions    • Prostate cancer    • PVC (premature ventricular contraction)    • Rash of body 6/21/2017   • Stroke    • Thoracic aortic ectasia    • Urinary tract infection with hematuria 1/18/2017       PAST SURGICAL HISTORY  Past Surgical History:   Procedure Laterality Date   • APPENDECTOMY     • ARTERIAL ANEURYSM REPAIR     • BRONCHOSCOPY Right 10/5/2016    Procedure: BRONCHOSCOPY;  Surgeon: Jhony Quintanilla MD;  Location: [facility] ENDOSCOPY;  Service:    • BRONCHOSCOPY N/A 11/29/2016    Procedure: BRONCHOSCOPY WITH FLUORO w/biopsy AND BAL;  Surgeon: Ervin Stiles MD;  Location: [facility] ENDOSCOPY;  Service:    • CARDIAC CATHETERIZATION N/A 6/7/2017    Procedure: Left Heart Cath;  Surgeon: Vincent Middleton MD;  Location: [facility] CATH INVASIVE LOCATION;  Service:    • CARDIAC CATHETERIZATION N/A 6/7/2017    Procedure: Coronary angiography;  Surgeon: Vincent Middleton MD;  Location: [facility] CATH INVASIVE LOCATION;  Service:    • CARDIAC CATHETERIZATION N/A 6/7/2017    Procedure: Left ventriculography;  Surgeon: Vincent Middleton MD;  Location: [facility] CATH INVASIVE LOCATION;  Service:    • CARDIAC CATHETERIZATION N/A 6/7/2017    Procedure: Bypass graft study;  Surgeon: Vincent Middleton MD;  Location: [facility] CATH INVASIVE LOCATION;  Service:    • CARDIAC CATHETERIZATION N/A 6/7/2017    Procedure: Native mammary injection;  Surgeon: Vincent Middleton MD;  Location: [facility] CATH INVASIVE LOCATION;  Service:    • CARDIAC CATHETERIZATION N/A 6/7/2017    Procedure: Stent NYA bypass graft;  Surgeon: Vincent Middleton MD;  Location: [facility] CATH INVASIVE LOCATION;  Service:    • CARDIAC DEFIBRILLATOR PLACEMENT     • CARDIAC ELECTROPHYSIOLOGY PROCEDURE N/A 9/19/2017    Procedure: ICD SC new  CRT-D  MEDTRONIC;  Surgeon: Fady Terrazas MD;   Location: [facility] CATH INVASIVE LOCATION;  Service:    • CARDIAC SURGERY     • CHOLECYSTECTOMY     • COLONOSCOPY N/A 12/16/2016    Procedure: COLONOSCOPY TO ASC COLON  WITH BIOPSY AND POLYPECTOMY;  Surgeon: Lane Brian MD;  Location: [facility] ENDOSCOPY;  Service:    • COLONOSCOPY N/A 3/1/2017    multiple polyps, lipoma in proximal transverse colon/hepatic flexure, repeat in one year   • CORONARY ARTERY BYPASS GRAFT     • ENDOSCOPY N/A 3/1/2017    Concepcion's, HH, gastric mucosal atrophy   • INSERT / REPLACE / REMOVE PACEMAKER     • PROSTATE SURGERY         FAMILY HISTORY  Family History   Problem Relation Age of Onset   • Cancer Mother    • Cancer Father    • COPD Father    • Arthritis Sister    • Diabetes Sister    • Hyperlipidemia Sister    • Arthritis Brother    • Diabetes Brother    • Stroke Brother    • Lung cancer Other    • Stroke Other    • No Known Problems Maternal Grandmother    • No Known Problems Maternal Grandfather    • No Known Problems Paternal Grandmother    • No Known Problems Paternal Grandfather        SOCIAL HISTORY  Social History     Social History   • Marital status:      Spouse name: N/A   • Number of children: N/A   • Years of education: N/A     Occupational History   • controller [employer]     Social History Main Topics   • Smoking status: Former Smoker     Packs/day: 1.00     Years: 25.00     Types: Cigarettes   • Smokeless tobacco: Former User     Quit date: 9/22/1985      Comment: caffeine use   • Alcohol use No   • Drug use: No   • Sexual activity: Defer      Comment:      Other Topics Concern   • Not on file     Social History Narrative        3 children     Sonlives with him.       ALLERGIES  Review of patient's allergies indicates no known allergies.    REVIEW OF SYSTEMS  Review of Systems   Unable to perform ROS: Mental status change   Constitutional: Positive for chills and fever (101.5).   Respiratory: Negative for shortness of breath.    Cardiovascular:  Negative for chest pain.   Genitourinary: Positive for hematuria.   Musculoskeletal: Positive for myalgias (generalized).   Psychiatric/Behavioral: Positive for confusion.       PHYSICAL EXAM  ED Triage Vitals   Temp Heart Rate Resp BP SpO2   09/30/17 2203 09/30/17 2203 09/30/17 2203 09/30/17 2203 09/30/17 2203   98.1 °F (36.7 °C) 98 16 124/68 98 %      Temp src Heart Rate Source Patient Position BP Location FiO2 (%)   09/30/17 2203 -- -- -- --   Oral           Physical Exam   Constitutional: He is oriented to person, place, and time and well-developed, well-nourished, and in no distress.   HENT:   Head: Normocephalic and atraumatic.   Eyes: EOM are normal. Pupils are equal, round, and reactive to light.   Neck: Normal range of motion. Neck supple.   Cardiovascular: Regular rhythm and normal heart sounds.  Tachycardia present.    Pulmonary/Chest: Effort normal and breath sounds normal. No respiratory distress.   Abdominal: Soft. There is no tenderness. There is no rebound and no guarding.   Musculoskeletal: Normal range of motion. He exhibits no edema.   Neurological: He is alert and oriented to person, place, and time. He has normal sensation and normal strength.   Skin: Skin is warm and dry. No rash noted.   Skin warm to the touch   Psychiatric: Mood and affect normal.   Nursing note and vitals reviewed.      LAB RESULTS  Lab Results (last 24 hours)     Procedure Component Value Units Date/Time    CBC & Differential [[id number]] Collected:  09/30/17 2252    Specimen:  Blood Updated:  09/30/17 2313    Narrative:       The following orders were created for panel order CBC & Differential.  Procedure                               Abnormality         Status                     ---------                               -----------         ------                     CBC Auto Differential[[id number]]        Abnormal            Final result                 Please view results for these tests on the individual orders.     Comprehensive Metabolic Panel [[id number]]  (Abnormal) Collected:  09/30/17 2252    Specimen:  Blood Updated:  09/30/17 2332     Glucose 130 (H) mg/dL      BUN 18 mg/dL      Creatinine 2.28 (H) mg/dL      Sodium 141 mmol/L      Potassium 4.1 mmol/L      Chloride 100 mmol/L      CO2 23.8 mmol/L      Calcium 8.9 mg/dL      Total Protein 7.1 g/dL      Albumin 3.80 g/dL      ALT (SGPT) 12 U/L      AST (SGOT) 16 U/L      Alkaline Phosphatase 73 U/L      Total Bilirubin 1.0 mg/dL      eGFR Non African Amer 28 (L) mL/min/1.73      Globulin 3.3 gm/dL      A/G Ratio 1.2 g/dL      BUN/Creatinine Ratio 7.9     Anion Gap 17.2 mmol/L     Narrative:       The MDRD GFR formula is only valid for adults with stable renal function between ages 18 and 70.    Lactic Acid, Plasma [[id number]]  (Normal) Collected:  09/30/17 2252    Specimen:  Blood Updated:  09/30/17 2324     Lactate 1.7 mmol/L     Protime-INR [[id number]]  (Abnormal) Collected:  09/30/17 2252    Specimen:  Blood Updated:  09/30/17 2324     Protime 26.9 (H) Seconds      INR 2.58 (H)    Digoxin Level [[id number]]  (Normal) Collected:  09/30/17 2252    Specimen:  Blood Updated:  09/30/17 2333     Digoxin 0.80 ng/mL     CBC Auto Differential [[id number]]  (Abnormal) Collected:  09/30/17 2252    Specimen:  Blood Updated:  09/30/17 2313     WBC 8.98 10*3/mm3      RBC 4.95 10*6/mm3      Hemoglobin 12.7 (L) g/dL      Hematocrit 40.3 (L) %      MCV 81.4 fL      MCH 25.7 (L) pg      MCHC 31.5 (L) g/dL      RDW 15.9 (H) %      RDW-SD 47.2 fl      MPV 10.8 fL      Platelets 221 10*3/mm3      Neutrophil % 63.4 %      Lymphocyte % 19.5 (L) %      Monocyte % 13.5 (H) %      Eosinophil % 2.7 %      Basophil % 0.6 %      Immature Grans % 0.3 %      Neutrophils, Absolute 5.70 10*3/mm3      Lymphocytes, Absolute 1.75 10*3/mm3      Monocytes, Absolute 1.21 (H) 10*3/mm3      Eosinophils, Absolute 0.24 10*3/mm3      Basophils, Absolute 0.05 10*3/mm3      Immature Grans, Absolute 0.03 10*3/mm3      Urinalysis With / Culture If Indicated - Urine, Clean Catch [[id number]]  (Abnormal) Collected:  09/30/17 2253    Specimen:  Urine from Urine, Clean Catch Updated:  09/30/17 2320     Color, UA Yellow     Appearance, UA Clear     pH, UA 7.5     Specific Gravity, UA 1.011     Glucose, UA Negative     Ketones, UA Negative     Bilirubin, UA Negative     Blood, UA Large (3+) (A)     Protein, UA 30 mg/dL (1+) (A)     Leuk Esterase, UA Moderate (2+) (A)     Nitrite, UA Negative     Urobilinogen, UA 0.2 E.U./dL    Urinalysis, Microscopic Only - Urine, Clean Catch [[id number]]  (Abnormal) Collected:  09/30/17 2253    Specimen:  Urine from Urine, Clean Catch Updated:  09/30/17 2339     RBC, UA 31-50 (A) /HPF      WBC, UA 6-12 (A) /HPF      Bacteria, UA None Seen /HPF      Squamous Epithelial Cells, UA 0-2 /HPF      Yeast, UA  /HPF      Small/1+ Budding Yeast w/Hyphae     Hyaline Casts, UA 0-2 /LPF      Methodology Manual Light Microscopy    Urine Culture - Urine, Urine, Clean Catch [[id number]] Collected:  09/30/17 2253    Specimen:  Urine from Urine, Clean Catch Updated:  09/30/17 2313    Urine Drug Screen - Urine, Clean Catch [[id number]]  (Abnormal) Collected:  09/30/17 2253    Specimen:  Urine from Urine, Clean Catch Updated:  10/01/17 0433     Amphet/Methamphet, Screen Negative     Barbiturates Screen, Urine Negative     Benzodiazepine Screen, Urine Negative     Cocaine Screen, Urine Negative     Opiate Screen Negative     THC, Screen, Urine Negative     Methadone Screen, Urine Negative     Oxycodone Screen, Urine Positive (A)    Narrative:       Negative Thresholds For Drugs Screened:     Amphetamines               500 ng/ml   Barbiturates               200 ng/ml   Benzodiazepines            100 ng/ml   Cocaine                    300 ng/ml   Methadone                  300 ng/ml   Opiates                    300 ng/ml   Oxycodone                  100 ng/ml   THC                        50 ng/ml    The Normal Value for all  drugs tested is negative. This report includes final unconfirmed screening results to be used for medical treatment purposes only. Unconfirmed results must not be used for non-medical purposes such as employment or legal testing. Clinical consideration should be applied to any drug of abuse test, particulary when unconfirmed results are used.    Blood Culture - Blood, [[id number]] Collected:  10/01/17 0100    Specimen:  Blood from Arm, Left Updated:  10/01/17 0105    Blood Culture - Blood, [[id number]] Collected:  10/01/17 0134    Specimen:  Blood from Arm, Left Updated:  10/01/17 0137    Respiratory Panel, PCR - Swab, Nasopharynx [[id number]]  (Normal) Collected:  10/01/17 0213    Specimen:  Swab from Nasopharynx Updated:  10/01/17 0332     ADENOVIRUS, PCR Not Detected     Coronavirus 229E Not Detected     Coronavirus HKU1 Not Detected     Coronavirus NL63 Not Detected     Coronavirus OC43 Not Detected     Human Metapneumovirus Not Detected     Human Rhinovirus/Enterovirus Not Detected     Influenza B PCR Not Detected     Parainfluenza Virus 1 Not Detected     Parainfluenza Virus 2 Not Detected     Parainfluenza Virus 3 Not Detected     Parainfluenza Virus 4 Not Detected     Bordetella pertussis pcr Not Detected     Influenza 2009 H1N1 by PCR Not Detected     Chlamydophila pneumoniae PCR Not Detected     Mycoplasma pneumo by PCR Not Detected     Influenza A PCR Not Detected     Influenza A H3 Not Detected     Influenza A H1 Not Detected     RSV, PCR Not Detected          I ordered the above labs and reviewed the results    RADIOLOGY  XR Chest 1 View   Preliminary Result   No acute findings. Abnormal 4.7 cm opacity along the left heart border   may be further evaluated by chest CT scan.          CT Chest Without Contrast    (Results Pending)        I ordered the above noted radiological studies. Interpreted by radiologist. Reviewed by me in PACS.       PROCEDURES  Procedures      PROGRESS AND CONSULTS  ED Course      2329 - CXR ordered for further evaluation. IVF ordered for possible sepsis. Tylenol ordered for fever.     0022 - Rechecked pt. Pt states he is not feeling better. Informed pt and family that he is going to be admitted. Informed the pt and family of his urinalysis which showed infection in his urine as well as his unremarkable CXR which showed no signs of pneumonia.     0052 - Consult placed with A.    0109 - Consulted with Dr. Pate ([facility]) who agrees to admit the pt, and recommends acquiring a viral panel on the pt, which I will do. Respiratory panel ordered.     MEDICAL DECISION MAKING  Results were reviewed/discussed with the patient and they were also made aware of online access. Pt also made aware that some labs, such as cultures, will not be resulted during ER visit and follow up with PMD is necessary.     MDM  Number of Diagnoses or Management Options     Amount and/or Complexity of Data Reviewed  Clinical lab tests: ordered and reviewed (Protime - 26.9  INR - 2.58  Blood, UA - Large (3+)  WBC, UA - 6-12  WBC - 8.98  Hemoglobin - 12.7)  Tests in the radiology section of CPT®: ordered and reviewed (CXR - no acute findings)  Decide to obtain previous medical records or to obtain history from someone other than the patient: yes  Obtain history from someone other than the patient: yes (Family)  Review and summarize past medical records: yes (Pt was admitted from 9/12 to 9/22 secondary to pneumonia and afib with rapid response.)  Discuss the patient with other providers: yes (Dr. Pate ([facility]))           DIAGNOSIS  Final diagnoses:   Fever and chills   Altered mental status, unspecified altered mental status type       DISPOSITION  ADMISSION    Discussed treatment plan and reason for admission with pt/family and admitting physician.  Pt/family voiced understanding of the plan for admission for further testing/treatment as needed.         Latest Documented Vital Signs:  As of 5:55 AM  BP- 123/84 HR- 85  Temp- 99 °F (37.2 °C) (Oral) O2 sat- 96%    --  Documentation assistance provided by darian Bianchi for Dr. Thrasher.  Information recorded by the scribe was done at my direction and has been verified and validated by me.         Isacc Bianchi  10/01/17 0303       Qamar Thrasher MD  10/01/17 0589

## 2017-10-01 NOTE — CONSULTS
Patient Name: Aakash Sanchez Jr.  Age/Sex: 82 y.o. male  : 1935  MRN: [id number]    Date of Admission: 2017  Date of Encounter Visit: 10/01/17  Encounter Provider: Lenin Langston MD  Place of Service: [facility] CARDIOLOGY      Referring Provider: Jeramie Pate MD  Patient Care Team:  Dimitrios Lindquist MD as PCP - General (Internal Medicine)    Subjective:   Consulted for: Arrhythmia   Chief Complaint: Weakness, confusion       History of Present Illness:  Aakash Sanchez Jr. is a 82 y.o. male  patient of Dr. Ramirez with a past medical history of MI/CAD, s/p 2V CABG , PCI, ICM, HTN, HLD, abdominal aortic aneurysm s/p repair, CKD, afib (warfarin), DM, lung disease and heart failure. He presented to ER on  with complaints of weakness, some intermittent CP prior to admission but has known chronic angina. He had a recent hospitalization from  to  for acute systolic heart failure due to dietary indiscretions and rapid afib. This admission he has been seen by urology for urinary retention and is being treated for PNA/Sepsis.      Most recent echo was in 2017 that showed and estimated EF 35%, moderate concentric hypertrophy, severely increased LA volume, mod. To severely dilated RA, mild MR/TR, mild to moderate thickening of AV. (See report below). In  he was in SR but was in has had several recent admission with heart failure and afib w/RVR.  The patient was seen during his most recent admission by Dr. Fady Terrazas.  Medications were adjusted and a a permanent biventricular ICD was implanted.  He is now readmitted with weakness and confusion and a probable urinary tract infection.  We've been asked to see the patient with respect to his device function since there is evident pacing spikes on the QRS but there is also evidence of wide complex tachycardia that appears to be ventricular tachycardia.  The patient himself does not have any  cardiac symptoms.    Previous testing:       Past Medical History:  Past Medical History:   Diagnosis Date   • AAA (abdominal aortic aneurysm)     s/p repair, Dr. Felix   • Acute myocardial infarction    • Arteriosclerotic coronary artery disease    • Arthritis    • Atrial fibrillation    • Atrial fibrillation    • B12 deficiency 6/1/2017   • Cardiomyopathy    • Chicken pox    • Chronic combined systolic and diastolic congestive heart failure 9/5/2017   • Chronic systolic (congestive) heart failure    • CKD (chronic kidney disease), stage III    • COPD (chronic obstructive pulmonary disease) 11/15/2016   • Coronary artery disease     MI 1990's, CABG x 2 V (LIMA-LAD, SVG-PDA), 1997   • Dyspnea    • Dyspnea on exertion 8/25/2017   • Fatigue    • Heart attack    • Hyperglycemia 3/29/2017   • Hyperlipidemia    • Hypertension    • Imbalance 3/29/2017   • Insomnia 8/25/2017   • Ischemic cardiomyopathy     LVEF 40%   • Lung mass 04/18/2016    spiculated, being followed   • Lung nodule seen on imaging study    • Malignant neoplasm    • Measles    • Mumps    • PAF (paroxysmal atrial fibrillation)    • Pleural effusion    • Premature ventricular contractions    • Prostate cancer    • PVC (premature ventricular contraction)    • Rash of body 6/21/2017   • Stroke    • Thoracic aortic ectasia    • Urinary tract infection with hematuria 1/18/2017       Past Surgical History:   Procedure Laterality Date   • APPENDECTOMY     • ARTERIAL ANEURYSM REPAIR     • BRONCHOSCOPY Right 10/5/2016    Procedure: BRONCHOSCOPY;  Surgeon: Jhony Quintanilla MD;  Location: [facility] ENDOSCOPY;  Service:    • BRONCHOSCOPY N/A 11/29/2016    Procedure: BRONCHOSCOPY WITH FLUORO w/biopsy AND BAL;  Surgeon: Ervin Stiles MD;  Location: [facility] ENDOSCOPY;  Service:    • CARDIAC CATHETERIZATION N/A 6/7/2017    Procedure: Left Heart Cath;  Surgeon: Vincent Middleton MD;  Location: [facility] CATH INVASIVE LOCATION;  Service:    • CARDIAC CATHETERIZATION N/A 6/7/2017     Procedure: Coronary angiography;  Surgeon: Vincent Middlteon MD;  Location: [facility] CATH INVASIVE LOCATION;  Service:    • CARDIAC CATHETERIZATION N/A 6/7/2017    Procedure: Left ventriculography;  Surgeon: Vincent Middleton MD;  Location: [facility] CATH INVASIVE LOCATION;  Service:    • CARDIAC CATHETERIZATION N/A 6/7/2017    Procedure: Bypass graft study;  Surgeon: Vincent Middleton MD;  Location: [facility] CATH INVASIVE LOCATION;  Service:    • CARDIAC CATHETERIZATION N/A 6/7/2017    Procedure: Native mammary injection;  Surgeon: Vincent Middleton MD;  Location: [facility] CATH INVASIVE LOCATION;  Service:    • CARDIAC CATHETERIZATION N/A 6/7/2017    Procedure: Stent NYA bypass graft;  Surgeon: Vincent Middleton MD;  Location: [facility] CATH INVASIVE LOCATION;  Service:    • CARDIAC DEFIBRILLATOR PLACEMENT     • CARDIAC ELECTROPHYSIOLOGY PROCEDURE N/A 9/19/2017    Procedure: ICD SC new  CRT-D  MEDTRONIC;  Surgeon: Fady Terrazas MD;  Location: [facility] CATH INVASIVE LOCATION;  Service:    • CARDIAC SURGERY     • CHOLECYSTECTOMY     • COLONOSCOPY N/A 12/16/2016    Procedure: COLONOSCOPY TO ASC COLON  WITH BIOPSY AND POLYPECTOMY;  Surgeon: Lane Brian MD;  Location: [facility] ENDOSCOPY;  Service:    • COLONOSCOPY N/A 3/1/2017    multiple polyps, lipoma in proximal transverse colon/hepatic flexure, repeat in one year   • CORONARY ARTERY BYPASS GRAFT     • ENDOSCOPY N/A 3/1/2017    Concepcion's, HH, gastric mucosal atrophy   • INSERT / REPLACE / REMOVE PACEMAKER     • PROSTATE SURGERY         Home Medications:   Prescriptions Prior to Admission   Medication Sig Dispense Refill Last Dose   • finasteride (PROSCAR) 5 MG tablet Take 1 tablet by mouth Daily. 90 tablet 1 Taking   • furosemide (LASIX) 40 MG tablet Take 2 tablets by mouth Daily. 90 tablet 2 Taking   • isosorbide mononitrate (IMDUR) 30 MG 24 hr tablet Take 0.5 tablets by mouth Daily. 45 tablet 3 Taking   • metoprolol tartrate (LOPRESSOR) 100 MG tablet Take 1 tablet by mouth Every 12  (Twelve) Hours. 60 tablet 1 Taking   • pantoprazole (PROTONIX) 40 MG EC tablet Take 40 mg by mouth Daily.   Taking   • potassium chloride (K-TAB) 20 MEQ tablet controlled-release ER tablet Take 1 tablet by mouth Daily. 90 tablet 3 Taking   • Probiotic Product (PROBIOTIC PO) Take 1 tablet by mouth Daily.   Taking   • warfarin (COUMADIN) 4 MG tablet Take 1 tablet by mouth Daily. (Patient taking differently: Take 2 mg by mouth Daily.) 30 tablet 1 Taking   • atorvastatin (LIPITOR) 40 MG tablet Take 1 tablet by mouth Every Night. 30 tablet 6 Unknown at Unknown time   • clopidogrel (PLAVIX) 75 MG tablet Take 1 tablet by mouth Daily. 90 tablet 3 Unknown at Unknown time   • digoxin (LANOXIN) 125 MCG tablet Take 1 tablet by mouth Every Other Day. 15 tablet 1 Unknown at Unknown time   • nitroglycerin (NITROSTAT) 0.4 MG SL tablet 1 under the tongue as needed for angina, may repeat q5mins for up three doses 35 tablet 0 Taking   • oxyCODONE-acetaminophen (PERCOCET) 5-325 MG per tablet Take 1 tablet by mouth Every 6 (Six) Hours As Needed.   Unknown at Unknown time   • warfarin (COUMADIN) 3 MG tablet TAKE ONE TABLET BY MOUTH DAILY 90 tablet 0 Taking       Allergies:  No Known Allergies    Past Social History:  Social History     Social History   • Marital status:      Spouse name: N/A   • Number of children: N/A   • Years of education: N/A     Occupational History   • controller [employer]     Social History Main Topics   • Smoking status: Former Smoker     Packs/day: 1.00     Years: 25.00     Types: Cigarettes   • Smokeless tobacco: Former User     Quit date: 9/22/1985      Comment: caffeine use   • Alcohol use No   • Drug use: No   • Sexual activity: Defer      Comment:      Other Topics Concern   • Not on file     Social History Narrative        3 children     Sonlives with him.        Past Family History:  Family History   Problem Relation Age of Onset   • Cancer Mother    • Cancer Father    • COPD  Father    • Arthritis Sister    • Diabetes Sister    • Hyperlipidemia Sister    • Arthritis Brother    • Diabetes Brother    • Stroke Brother    • Lung cancer Other    • Stroke Other    • No Known Problems Maternal Grandmother    • No Known Problems Maternal Grandfather    • No Known Problems Paternal Grandmother    • No Known Problems Paternal Grandfather        Review of Systems: The patient is not able to give an adequate review of systems.  He really doesn't have any knowledge about why he is here other than his son made him calm.  He can answer very specific questions about his health.  Objective:     Objective:  Temp:  [98.1 °F (36.7 °C)-100.5 °F (38.1 °C)] 99 °F (37.2 °C)  Heart Rate:  [85-99] 99  Resp:  [15-22] 18  BP: ()/(57-84) 136/83    Intake/Output Summary (Last 24 hours) at 10/01/17 1239  Last data filed at 10/01/17 1217   Gross per 24 hour   Intake             3070 ml   Output              700 ml   Net             2370 ml     Body mass index is 25.83 kg/(m^2).  Last 3 weights    09/30/17  2211   Weight: 180 lb (81.6 kg)           Physical Exam:   Physical Exam   Constitutional: He is oriented to person, place, and time. He appears well-developed and well-nourished. He has a sickly appearance.   HENT:   Head: Normocephalic.   Eyes: Pupils are equal, round, and reactive to light.   Neck: Normal range of motion. No JVD present. Carotid bruit is not present. No thyromegaly present.   Cardiovascular: Normal rate, regular rhythm, S1 normal, S2 normal, normal heart sounds and intact distal pulses.  Exam reveals no gallop and no friction rub.    No murmur heard.  Pulmonary/Chest: Effort normal and breath sounds normal.   Abdominal: Soft. Bowel sounds are normal.   Musculoskeletal: He exhibits no edema.   Neurological: He is alert and oriented to person, place, and time.   Skin: Skin is warm, dry and intact. No erythema.   Psychiatric: He has a normal mood and affect.   Vitals reviewed.        Lab  Review:       Results from last 7 days  Lab Units 10/01/17  0603 09/30/17  2252   SODIUM mmol/L 141 141   POTASSIUM mmol/L 3.5 4.1   CHLORIDE mmol/L 105 100   CO2 mmol/L 19.9* 23.8   BUN mg/dL 17 18   CREATININE mg/dL 1.81* 2.28*   GLUCOSE mg/dL 134* 130*   CALCIUM mg/dL 7.9* 8.9             Results from last 7 days  Lab Units 10/01/17  0603   WBC 10*3/mm3 8.06   HEMOGLOBIN g/dL 11.6*   HEMATOCRIT % 38.1*   PLATELETS 10*3/mm3 202       Results from last 7 days  Lab Units 10/01/17  0603 09/30/17  2252   INR  2.53* 2.58*           Results from last 7 days  Lab Units 10/01/17  0603   MAGNESIUM mg/dL 1.9               Results from last 7 days  Lab Units 10/01/17  0603   DIGOXIN LVL ng/mL 0.70           Imaging:    Imaging Results (most recent)     Procedure Component Value Units Date/Time    XR Chest 1 View [[id number]] Collected:  09/30/17 2354     Updated:  09/30/17 2354    Narrative:       X-RAY CHEST 1 VIEW.     HISTORY: Fever.     COMPARISON: 09/19/2017.     FINDINGS:  Cardiomegaly, lung volumes are low. 4.7 cm opacity overlies the left  heart border, is a new finding but was also seen on the study of  09/09/2017, not seen on the study of 02/17/2016.     There is no consolidation or effusion.              Impression:       No acute findings. Abnormal 4.7 cm opacity along the left heart border  may be further evaluated by chest CT scan.             EKG:         Baseline:     I personally viewed and interpreted the patient's EKG/Telemetry data.    Assessment:   Assessment/Plan       Principal Problem:    UTI (urinary tract infection)  Active Problems:    PAF (paroxysmal atrial fibrillation)    COPD (chronic obstructive pulmonary disease)    Type 2 diabetes mellitus with complication    Abdominal aortic aneurysm (AAA) without rupture    Long term current use of anticoagulant    Chronic combined systolic and diastolic congestive heart failure    Abnormal CXR    CKD (chronic kidney disease), stage III    PAU (acute  kidney injury)    Arteriosclerotic coronary artery disease    Metabolic encephalopathy    Acidosis  Wide complex tachycardia.      I have looked at the patient's rhythm strips and these appear to be fusion beats.  Because of his wide complex tachycardia going to add amiodarone therapy.  We will check his device out in the morning.  We'll also have Dr. Terrazas see him in the morning as well.     Thank you for allowing me to participate in the care of Aakash Sanchez . Feel free to contact me directly with any further questions or concerns.    Lenin Langston MD  [facility]  10/01/17  12:39 PM

## 2017-10-01 NOTE — NURSING NOTE
S/w Dr Ledezma about patient and oliver.  He stated to keep it in and Dr Bernstein will see tomorrow.

## 2017-10-02 PROBLEM — R91.8 MASS OF UPPER LOBE OF LEFT LUNG: Status: ACTIVE | Noted: 2017-01-01

## 2017-10-02 NOTE — PROGRESS NOTES
LOS: 1 day   Patient Care Team:  Dimitrios Lindquist MD as PCP - General (Internal Medicine)    Chief Complaint: NSVT       Interval History:  He seems confused this morning.  Currently moaning in bed.  Wouldn't get up to eat breakfast.      Objective   Vital Signs  Temp:  [98.2 °F (36.8 °C)-100.9 °F (38.3 °C)] 99.1 °F (37.3 °C)  Heart Rate:  [84-94] 94  Resp:  [16-18] 16  BP: ()/(58-71) 144/58    Intake/Output Summary (Last 24 hours) at 10/02/17 0927  Last data filed at 10/02/17 0442   Gross per 24 hour   Intake            841.5 ml   Output              975 ml   Net           -133.5 ml           Physical Exam   Constitutional: He has a sickly appearance.   HENT:   Head: Normocephalic.   Nose: Nose normal.   Eyes: Conjunctivae are normal.   Cardiovascular: Normal rate, normal heart sounds and intact distal pulses.  An irregular rhythm present.   Pulmonary/Chest: Effort normal and breath sounds normal.   Abdominal: Soft. He exhibits no distension.   Musculoskeletal: He exhibits no edema.   Skin: Skin is warm and dry. No erythema.   Vitals reviewed.      Results Review:        Results from last 7 days  Lab Units 10/02/17  0516 10/01/17  0603 09/30/17  2252   SODIUM mmol/L 137 141 141   POTASSIUM mmol/L 3.6 3.5 4.1   CHLORIDE mmol/L 103 105 100   CO2 mmol/L 18.3* 19.9* 23.8   BUN mg/dL 16 17 18   CREATININE mg/dL 2.08* 1.81* 2.28*   GLUCOSE mg/dL 122* 134* 130*   CALCIUM mg/dL 7.8* 7.9* 8.9           Results from last 7 days  Lab Units 10/02/17  0516 10/01/17  0603 09/30/17  2252   WBC 10*3/mm3 7.33 8.06 8.98   HEMOGLOBIN g/dL 11.1* 11.6* 12.7*   HEMATOCRIT % 35.9* 38.1* 40.3*   PLATELETS 10*3/mm3 172 202 221       Results from last 7 days  Lab Units 10/02/17  0516 10/01/17  0603 09/30/17  2252   INR  2.58* 2.53* 2.58*           Results from last 7 days  Lab Units 10/01/17  0603   MAGNESIUM mg/dL 1.9           I reviewed the patient's new clinical results.  I personally viewed and interpreted the patient's  EKG/Telemetry data        Medication Review:     amiodarone 300 mg Oral Q12H   atorvastatin 40 mg Oral Nightly   clopidogrel 75 mg Oral Daily   digoxin 125 mcg Oral Every Other Day   isosorbide mononitrate 15 mg Oral Daily   lactobacillus acidophilus 1 capsule Oral Daily   metoprolol tartrate 100 mg Oral Q12H   pantoprazole 40 mg Oral Daily   piperacillin-tazobactam 3.375 g Intravenous Q8H   vancomycin 750 mg Intravenous Once   Vancomycin Pharmacy Intermittent Dosing  Does not apply Daily   warfarin 2 mg Oral Daily         Pharmacy to dose vancomycin     Pharmacy to Dose Zosyn     sodium chloride 50 mL/hr Last Rate: 50 mL/hr (10/01/17 1501)       Assessment/Plan     1.  Possible pacemaker undersensing vs fusion beats -- Dr. Terrazas to evaluate today    2.  NSVT -- known history of severe CAD and ischemic cardiomyopathy (EF 30-35%), so some NSVT is to be expected, but he's having even more in the setting of acute illness.  He really has frequent salvos of this.  He's on a good dose of metoprolol, and oral amiodarone was added yesterday.    3.  PAF -- currently not an issue.  He's on warfarin.    4.  CAD with chronic angina -- no issue overnight. He's on clopidogrel but not aspirin as he's anticoagulated.    5.  ICM / chronic systolic CHF -- lying flat, no oxygen requirement.  Resume oral diuretic when appropriate.    Will continue to follow.      Carl Loera MD  10/02/17  9:27 AM

## 2017-10-02 NOTE — CONSULTS
[facility]  Fito Ledezma MD    Patient Care Team:  Dimitrios Lindquist MD as PCP - General (Internal Medicine)  Covering for Dr. Wan  Chief complaint this 82-year-old ill-appearing male has had several hospitalizations recently.  In September he was treated for pneumonia, atrial fibrillation, urinary retention.  He's been readmitted to the hospital was some confusion, renal insufficiency.    He relates that recently he has had difficulty voiding.  This is necessitating catheter placement.  He has been off and on alpha blockers evidently.    He has a history of prostate cancer and radioactive seed implantation.  His PSA has been relatively stable.    Presently he is on antibiotics including Zosyn and vancomycin for a urinary tract infection.  Urine culture however shows yeast.    Subjective .     History of present illness:  See above    Review of Systems  All systems were reviewed and were negative except for those week.  Feels achy all over.  He has no appetite.  He has some chest discomfort.    History  Past Medical History:   Diagnosis Date   • AAA (abdominal aortic aneurysm)     s/p repair, Dr. Felix   • Acute myocardial infarction    • Arteriosclerotic coronary artery disease    • Arthritis    • Atrial fibrillation    • Atrial fibrillation    • B12 deficiency 6/1/2017   • Cardiomyopathy    • Chicken pox    • Chronic combined systolic and diastolic congestive heart failure 9/5/2017   • Chronic systolic (congestive) heart failure    • CKD (chronic kidney disease), stage III    • COPD (chronic obstructive pulmonary disease) 11/15/2016   • Coronary artery disease     MI 1990's, CABG x 2 V (LIMA-LAD, SVG-PDA), 1997   • Dyspnea    • Dyspnea on exertion 8/25/2017   • Fatigue    • Heart attack    • Hyperglycemia 3/29/2017   • Hyperlipidemia    • Hypertension    • Imbalance 3/29/2017   • Insomnia 8/25/2017   • Ischemic cardiomyopathy     LVEF 40%   • Lung mass 04/18/2016    spiculated, being followed   • Lung  nodule seen on imaging study    • Malignant neoplasm    • Measles    • Mumps    • PAF (paroxysmal atrial fibrillation)    • Pleural effusion    • Premature ventricular contractions    • Prostate cancer    • PVC (premature ventricular contraction)    • Rash of body 6/21/2017   • Stroke    • Thoracic aortic ectasia    • Urinary tract infection with hematuria 1/18/2017   , Past Surgical History:   Procedure Laterality Date   • APPENDECTOMY     • ARTERIAL ANEURYSM REPAIR     • BRONCHOSCOPY Right 10/5/2016    Procedure: BRONCHOSCOPY;  Surgeon: Jhony Quintanilla MD;  Location: [facility] ENDOSCOPY;  Service:    • BRONCHOSCOPY N/A 11/29/2016    Procedure: BRONCHOSCOPY WITH FLUORO w/biopsy AND BAL;  Surgeon: Ervin Stiles MD;  [facility];  Service:    • CARDIAC CATHETERIZATION N/A 6/7/2017    Procedure: Left Heart Cath;  Surgeon: Vincent Middleton MD;  Location: [facility] CATH INVASIVE LOCATION;  Service:    • CARDIAC CATHETERIZATION N/A 6/7/2017    Procedure: Coronary angiography;  Surgeon: Vincent Middleton MD;  Location: [facility] CATH INVASIVE LOCATION;  Service:    • CARDIAC CATHETERIZATION N/A 6/7/2017    Procedure: Left ventriculography;  Surgeon: Vincent Middleton MD;  Location: [facility] CATH INVASIVE LOCATION;  Service:    • CARDIAC CATHETERIZATION N/A 6/7/2017    Procedure: Bypass graft study;  Surgeon: Vincent Middleton MD;  Location: [facility] CATH INVASIVE LOCATION;  Service:    • CARDIAC CATHETERIZATION N/A 6/7/2017    Procedure: Native mammary injection;  Surgeon: Vincent Middleton MD;  Location: [facility] CATH INVASIVE LOCATION;  Service:    • CARDIAC CATHETERIZATION N/A 6/7/2017    Procedure: Stent NYA bypass graft;  Surgeon: Vincent Middleton MD;  Location: [facility] CATH INVASIVE LOCATION;  Service:    • CARDIAC DEFIBRILLATOR PLACEMENT     • CARDIAC ELECTROPHYSIOLOGY PROCEDURE N/A 9/19/2017    Procedure: ICD SC new  CRT-D  [employer]TR[employer];  Surgeon: Fady Terrazas MD;  Location: [facility] CATH INVASIVE LOCATION;  Service:    • CARDIAC  SURGERY     • CHOLECYSTECTOMY     • COLONOSCOPY N/A 12/16/2016    Procedure: COLONOSCOPY TO ASC COLON  WITH BIOPSY AND POLYPECTOMY;  Surgeon: Lane Brian MD;  Location: [facility] ENDOSCOPY;  Service:    • COLONOSCOPY N/A 3/1/2017    multiple polyps, lipoma in proximal transverse colon/hepatic flexure, repeat in one year   • CORONARY ARTERY BYPASS GRAFT     • ENDOSCOPY N/A 3/1/2017    Concepcion's, HH, gastric mucosal atrophy   • INSERT / REPLACE / REMOVE PACEMAKER     • PROSTATE SURGERY     , Family History   Problem Relation Age of Onset   • Cancer Mother    • Cancer Father    • COPD Father    • Arthritis Sister    • Diabetes Sister    • Hyperlipidemia Sister    • Arthritis Brother    • Diabetes Brother    • Stroke Brother    • Lung cancer Other    • Stroke Other    • No Known Problems Maternal Grandmother    • No Known Problems Maternal Grandfather    • No Known Problems Paternal Grandmother    • No Known Problems Paternal Grandfather    , Social History   Substance Use Topics   • Smoking status: Former Smoker     Packs/day: 1.00     Years: 25.00     Types: Cigarettes   • Smokeless tobacco: Former User     Quit date: 9/22/1985      Comment: caffeine use   • Alcohol use No   , Prescriptions Prior to Admission   Medication Sig Dispense Refill Last Dose   • finasteride (PROSCAR) 5 MG tablet Take 1 tablet by mouth Daily. 90 tablet 1 Taking   • furosemide (LASIX) 40 MG tablet Take 2 tablets by mouth Daily. 90 tablet 2 Taking   • isosorbide mononitrate (IMDUR) 30 MG 24 hr tablet Take 0.5 tablets by mouth Daily. 45 tablet 3 Taking   • metoprolol tartrate (LOPRESSOR) 100 MG tablet Take 1 tablet by mouth Every 12 (Twelve) Hours. 60 tablet 1 Taking   • pantoprazole (PROTONIX) 40 MG EC tablet Take 40 mg by mouth Daily.   Taking   • potassium chloride (K-TAB) 20 MEQ tablet controlled-release ER tablet Take 1 tablet by mouth Daily. 90 tablet 3 Taking   • Probiotic Product (PROBIOTIC PO) Take 1 tablet by mouth Daily.   Taking   •  warfarin (COUMADIN) 4 MG tablet Take 1 tablet by mouth Daily. (Patient taking differently: Take 2 mg by mouth Daily.) 30 tablet 1 Taking   • atorvastatin (LIPITOR) 40 MG tablet Take 1 tablet by mouth Every Night. 30 tablet 6 Unknown at Unknown time   • clopidogrel (PLAVIX) 75 MG tablet Take 1 tablet by mouth Daily. 90 tablet 3 Unknown at Unknown time   • digoxin (LANOXIN) 125 MCG tablet Take 1 tablet by mouth Every Other Day. 15 tablet 1 Unknown at Unknown time   • nitroglycerin (NITROSTAT) 0.4 MG SL tablet 1 under the tongue as needed for angina, may repeat q5mins for up three doses 35 tablet 0 Taking   • oxyCODONE-acetaminophen (PERCOCET) 5-325 MG per tablet Take 1 tablet by mouth Every 6 (Six) Hours As Needed.   Unknown at Unknown time   • warfarin (COUMADIN) 3 MG tablet TAKE ONE TABLET BY MOUTH DAILY 90 tablet 0 Taking   , Scheduled Meds:    amiodarone 300 mg Oral Q12H   atorvastatin 40 mg Oral Nightly   clopidogrel 75 mg Oral Daily   digoxin 125 mcg Oral Every Other Day   isosorbide mononitrate 15 mg Oral Daily   lactobacillus acidophilus 1 capsule Oral Daily   metoprolol tartrate 100 mg Oral Q12H   pantoprazole 40 mg Oral Daily   piperacillin-tazobactam 3.375 g Intravenous Q8H   vancomycin 750 mg Intravenous Once   Vancomycin Pharmacy Intermittent Dosing  Does not apply Daily   , Continuous Infusions:    Pharmacy to dose vancomycin     Pharmacy to Dose Zosyn     sodium chloride 50 mL/hr Last Rate: 50 mL/hr (10/01/17 1501)   , PRN Meds:  •  acetaminophen  •  nitroglycerin  •  ondansetron **OR** ondansetron ODT **OR** ondansetron  •  oxyCODONE-acetaminophen  •  Pharmacy to dose vancomycin  •  Pharmacy to Dose Zosyn  •  sodium chloride, Allergies:  Review of patient's allergies indicates no known allergies. and     Objective     Vital Signs   Temp:  [98.2 °F (36.8 °C)-100.9 °F (38.3 °C)] 98.4 °F (36.9 °C)  Heart Rate:  [84-94] 94  Resp:  [16-18] 16  BP: ()/(58-71) 144/58    Physical Exam:     General  Appearance:    Alert, cooperative,Appears pale.  Says he does not feel well.     Head:    Normocephalic, without obvious abnormality, atraumatic   Eyes:            Lids and lashes normal, conjunctivae and sclerae normal, no   icterus, no pallor, corneas clear, PERRLA   Ears:    Ears appear intact with no abnormalities noted   Throat:   No oral lesions, no thrush, oral mucosa moist   Neck:   No adenopathy, supple, trachea midline,    Back:     No kyphosis present, no scoliosis present, no skin lesions,      erythema or scars, no tenderness to percussion or                   palpation,   range of motion normal   Lungs:     Clear to auscultation,respirations regular, even and                  unlabored    Heart:    Regular rhythm and normal rate, normal S1 and S2, no            murmur, no gallop, no rub, no click   Chest Wall:    No abnormalities observed   Abdomen:     Normal bowel sounds, no masses, no organomegaly, soft        non-tender, non-distended, no guarding, no rebound                tenderness   Genital/Rectal:     Catheter in place.  No penile or scrotal swelling.  Output fairly clear.  Some sediment.     Extremities:   Moves all extremities well, no edema, no cyanosis, no             redness       Skin:   No bleeding, bruising or rash       Neurologic:   Cranial nerves 2 - 12 grossly intact, sensation intact,       Results Review:   I reviewed the patient's new clinical results.  Discussed with With the patient.  He apparently has some yeast growing in his urine.      Assessment/Plan     Principal Problem:    UTI (urinary tract infection)  Active Problems:    PAF (paroxysmal atrial fibrillation)    COPD (chronic obstructive pulmonary disease)    Type 2 diabetes mellitus with complication    Abdominal aortic aneurysm (AAA) without rupture    Long term current use of anticoagulant    Chronic combined systolic and diastolic congestive heart failure    Abnormal CXR    CKD (chronic kidney disease), stage III     PAU (acute kidney injury)    Arteriosclerotic coronary artery disease    Metabolic encephalopathy    Acidosis    Wide-complex tachycardia    Mass of upper lobe of left lung      Yeast UTI    I discussed the patients findings and my recommendations with patient and He very well may need antifungal medication.  I will leave this up to Dr. Boyle.  He is on amiodarone which may conflict with flu, as all.I have ordered a new culture to be obtained sterilely from this tube.     Fito Ledezma MD  10/02/17  12:14 PM    Time: 40 including review of records, x-rays, examining patient, discussing plan

## 2017-10-02 NOTE — PROGRESS NOTES
"Pharmacokinetic Consult - Vancomycin Dosing (Follow-up Note)    Aakash Sanchez JrSvetlana is on day 2 pharmacy to dose vancomycin for sepsis per Dr. Pate' request. Goal trough: 15-20 mcg/mL.     Relevant clinical data and objective history reviewed:  82 y.o. male 70\" (177.8 cm) 183 lb 5 oz (83.2 kg)  Patient presents to the hospital with low-grade fever and abnormal U/A after being treated for atrial fibrillation with a pacemaker implant (discharge date 9/27). Dr. Byrd from ID has been consulted. Possible etiologies to consider include UTI, endovascular infection, intra-abdominal infection, and evolving C. Diff colitis per ID's note.     Creatinine   Date Value Ref Range Status   10/02/2017 2.08 (H) 0.76 - 1.27 mg/dL Final   10/01/2017 1.81 (H) 0.76 - 1.27 mg/dL Final   09/30/2017 2.28 (H) 0.76 - 1.27 mg/dL Final     BUN   Date Value Ref Range Status   10/02/2017 16 8 - 23 mg/dL Final     Estimated Creatinine Clearance: 33.3 mL/min (by C-G formula based on Cr of 2.08).    Lab Results   Component Value Date    WBC 7.33 10/02/2017     Temp Readings from Last 3 Encounters:   10/02/17 99.1 °F (37.3 °C) (Oral)     Baseline culture/source/susceptibility:   10/1: Blood cultures x 2-NGTD  10/1: RSV-negative    Vancomycin Dosing History  10/1 @ 0140: Vancomycin 1750 mg (20 mg/kg) IV once  10/1 @ 1451: Random vancomycin level-16.3 mcg/mL  10/1 @ 1904: Vancomycin 750 mg IV once    Assessment/Plan    1. Patient received a vancomycin dose of 750 mg IV once last night around 1900. 10-hr random level came back therapeutic at 16.4-will re-dose vancomycin today based on this level.    2. Will give a vancomycin dose of 750 mg IV once today around 1200, and then schedule a 24-hr random level for 1130 tomorrow morning.     3. Will monitor serum creatinine every 24 hours since patient is on both vancomycin and Zosyn at this time. SCr was up a little today at 2.08-will continue to monitor closely.     3. Pharmacy will continue to follow " daily while on vancomycin and adjust as needed.     Maribeth Lai, Pharm.D., BCPS

## 2017-10-02 NOTE — PROGRESS NOTES
"  Infectious Diseases Progress Note    Carmen Byrd MD     [facility]  Los: 1 day  Patient Identification:  Name: Aakash Sanchez Jr.  Age: 82 y.o.  Sex: male  :  1935  MRN: [id number]         Primary Care Physician: Dimitrios Lindquist MD            Subjective: Feels about the same is not much interested in conversation but upon persuasion claims that nothing new is happening.  Interval History: See consultation note.     Objective:    Scheduled Meds:  amiodarone 300 mg Oral Q12H   atorvastatin 40 mg Oral Nightly   clopidogrel 75 mg Oral Daily   digoxin 125 mcg Oral Every Other Day   isosorbide mononitrate 15 mg Oral Daily   lactobacillus acidophilus 1 capsule Oral Daily   metoprolol tartrate 100 mg Oral Q12H   pantoprazole 40 mg Oral Daily   piperacillin-tazobactam 3.375 g Intravenous Q8H   vancomycin 750 mg Intravenous Once   Vancomycin Pharmacy Intermittent Dosing  Does not apply Daily   warfarin 2 mg Oral Daily     Continuous Infusions:  Pharmacy to dose vancomycin     Pharmacy to Dose Zosyn     sodium chloride 50 mL/hr Last Rate: 50 mL/hr (10/01/17 1501)       Vital signs in last 24 hours:  Temp:  [98.2 °F (36.8 °C)-100.9 °F (38.3 °C)] 99.1 °F (37.3 °C)  Heart Rate:  [84-94] 94  Resp:  [16-18] 16  BP: ()/(58-71) 144/58    Intake/Output:    Intake/Output Summary (Last 24 hours) at 10/02/17 0954  Last data filed at 10/02/17 0442   Gross per 24 hour   Intake            841.5 ml   Output              975 ml   Net           -133.5 ml       Exam:  /58 (BP Location: Right arm, Patient Position: Lying)  Pulse 94  Temp 99.1 °F (37.3 °C) (Oral)   Resp 16  Ht 70\" (177.8 cm)  Wt 183 lb 5 oz (83.2 kg)  SpO2 96%  BMI 26.3 kg/m2    General Appearance:    Alert, cooperative, no distress, AAOx3                          Head:    Normocephalic, without obvious abnormality, atraumatic                           Eyes:    PERRL, conjunctiva/corneas clear, EOM's intact, both eyes                  "        Throat:   Lips, tongue, gums normal; oral mucosa pink and moist                           Neck:   Supple, symmetrical, trachea midline, no JVD                         Lungs:    Clear to auscultation bilaterally, respirations unlabored                 Chest Wall:    Pacemaker site incision is clean                          Heart:    Irregularly irregular, S1 and S2 normal                  Abdomen:     Soft, non-tender, bowel sounds active, no masses, no                                                        organomegaly                  Extremities:   Extremities normal, atraumatic, no cyanosis or edema                        Pulses:   Pulses palpable in all extremities                            Skin:   Skin is warm and dry,  no rashes or palpable lesions                  Data Review:    I reviewed the patient's new clinical results.    Results from last 7 days  Lab Units 10/02/17  0516 10/01/17  0603 09/30/17  2252   WBC 10*3/mm3 7.33 8.06 8.98   HEMOGLOBIN g/dL 11.1* 11.6* 12.7*   PLATELETS 10*3/mm3 172 202 221       Results from last 7 days  Lab Units 10/02/17  0516 10/01/17  0603 09/30/17  2252   SODIUM mmol/L 137 141 141   POTASSIUM mmol/L 3.6 3.5 4.1   CHLORIDE mmol/L 103 105 100   CO2 mmol/L 18.3* 19.9* 23.8   BUN mg/dL 16 17 18   CREATININE mg/dL 2.08* 1.81* 2.28*   CALCIUM mg/dL 7.8* 7.9* 8.9   GLUCOSE mg/dL 122* 134* 130*         Assessment:  Principal Problem:    UTI (urinary tract infection)  Active Problems:    PAF (paroxysmal atrial fibrillation)    COPD (chronic obstructive pulmonary disease)    Type 2 diabetes mellitus with complication    Abdominal aortic aneurysm (AAA) without rupture    Long term current use of anticoagulant    Chronic combined systolic and diastolic congestive heart failure    Abnormal CXR    CKD (chronic kidney disease), stage III    PAU (acute kidney injury)    Arteriosclerotic coronary artery disease    Metabolic encephalopathy    Acidosis    Wide-complex  tachycardia      Plan:  Continue present regimen for now.  Follow-up on MRI of the pain, ANCA panel, CT scan of the abdomen and pelvis and blood cultures.    Carmen Byrd MD  10/2/2017  9:54 AM    Much of this encounter note is an electronic transcription/translation of spoken language to printed text. The electronic translation of spoken language may permit erroneous, or at times, nonsensical words or phrases to be inadvertently transcribed; Although I have reviewed the note for such errors, some may still exist

## 2017-10-02 NOTE — PLAN OF CARE
Problem: Patient Care Overview (Adult)  Goal: Plan of Care Review  Outcome: Ongoing (interventions implemented as appropriate)    10/02/17 0534   Coping/Psychosocial Response Interventions   Plan Of Care Reviewed With patient   Outcome Evaluation   Outcome Summary/Follow up Plan Pt alert and oriented to self only. He is occasionally alert to place. He still c/o body aches, medication was given. FC still in place. [employer]tr[employer] to interogate pacemaker today. Will continue to monitor.   Patient Care Overview   Progress no change       Goal: Adult Individualization and Mutuality  Outcome: Ongoing (interventions implemented as appropriate)  Goal: Discharge Needs Assessment  Outcome: Ongoing (interventions implemented as appropriate)    Problem: Infection, Risk/Actual (Adult)  Goal: Infection Prevention/Resolution  Outcome: Ongoing (interventions implemented as appropriate)    Problem: Fall Risk (Adult)  Goal: Absence of Falls  Outcome: Ongoing (interventions implemented as appropriate)    Problem: Urine Elimination, Impaired (Adult)  Goal: Effective Urinary Elimination  Outcome: Ongoing (interventions implemented as appropriate)  Goal: Effective Containment of Urine  Outcome: Ongoing (interventions implemented as appropriate)  Goal: Reduced Incontinence Episodes  Outcome: Ongoing (interventions implemented as appropriate)

## 2017-10-02 NOTE — PROGRESS NOTES
Name: Aakash Sanchez Jr. ADMIT: 2017   : 1935  PCP: Dimitrios Lindquist MD    MRN: [id number] LOS: 1 days   AGE/SEX: 82 y.o. male  ROOM: [facility]     Subjective   Subjective:  Symptoms:  No shortness of breath, cough, chest pain or diarrhea.  (Unchanged).    Diet:  Poor intake.  No nausea.    Activity level: Impaired due to weakness.    Pain:  He complains of pain that is mild.  Pain is partially controlled and requiring pain medication.    Still seems sickly.  States he feels bad.  Disoriented.    Objective   Vital Signs  Temp:  [98.2 °F (36.8 °C)-100.9 °F (38.3 °C)] 99.1 °F (37.3 °C)  Heart Rate:  [84-94] 94  Resp:  [16-18] 16  BP: ()/(58-71) 144/58  SpO2:  [96 %-98 %] 96 %  on   ;   O2 Device: room air  Body mass index is 26.3 kg/(m^2).    Physical Exam   Constitutional: He appears well-developed. He is cooperative. He has a sickly appearance. No distress.   Neck: No JVD present. No tracheal deviation present. No thyromegaly present.   Cardiovascular: Normal rate and regular rhythm.    No murmur heard.  Pulmonary/Chest: Effort normal and breath sounds normal. No respiratory distress.   Abdominal: Soft. Normal appearance and bowel sounds are normal. He exhibits no distension. There is no tenderness.   Neurological: He is alert. He is disoriented.   Skin: Skin is warm and dry. No rash noted.   Psychiatric: He has a normal mood and affect. His behavior is normal.   Nursing note and vitals reviewed.      Results Review:       I reviewed the patient's new clinical results.    Results from last 7 days  Lab Units 10/02/17  0516 10/01/17  0603 17  2252   WBC 10*3/mm3 7.33 8.06 8.98   HEMOGLOBIN g/dL 11.1* 11.6* 12.7*   PLATELETS 10*3/mm3 172 202 221       Results from last 7 days  Lab Units 10/02/17  0516 10/01/17  0603 17  2252   SODIUM mmol/L 137 141 141   POTASSIUM mmol/L 3.6 3.5 4.1   CHLORIDE mmol/L 103 105 100   CO2 mmol/L 18.3* 19.9* 23.8   BUN mg/dL 16 17 18   CREATININE mg/dL 2.08*  1.81* 2.28*   GLUCOSE mg/dL 122* 134* 130*   ALBUMIN g/dL  --   --  3.80   BILIRUBIN mg/dL  --   --  1.0   ALK PHOS U/L  --   --  73   AST (SGOT) U/L  --   --  16   ALT (SGPT) U/L  --   --  12   Estimated Creatinine Clearance: 33.3 mL/min (by C-G formula based on Cr of 2.08).    Results from last 7 days  Lab Units 10/02/17  0516 10/01/17  0603 09/30/17  2252   CALCIUM mg/dL 7.8* 7.9* 8.9   ALBUMIN g/dL  --   --  3.80   MAGNESIUM mg/dL  --  1.9  --        Results from last 7 days  Lab Units 10/01/17  0603 09/30/17  2252   PROCALCITONIN ng/mL 0.12  --    LACTATE mmol/L  --  1.7       Results from last 7 days  Lab Units 10/02/17  0516 10/01/17  0603 09/30/17  2252   PROTIME Seconds 26.9* 26.4* 26.9*   INR  2.58* 2.53* 2.58*       Results from last 7 days  Lab Units 10/01/17  0134 10/01/17  0100 09/30/17  2253   BLOODCX  No growth at 24 hours No growth at 24 hours  --    URINECX   --   --  Culture in progress   VRP negative      amiodarone 300 mg Oral Q12H   atorvastatin 40 mg Oral Nightly   clopidogrel 75 mg Oral Daily   digoxin 125 mcg Oral Every Other Day   isosorbide mononitrate 15 mg Oral Daily   lactobacillus acidophilus 1 capsule Oral Daily   metoprolol tartrate 100 mg Oral Q12H   pantoprazole 40 mg Oral Daily   piperacillin-tazobactam 3.375 g Intravenous Q8H   vancomycin 750 mg Intravenous Once   Vancomycin Pharmacy Intermittent Dosing  Does not apply Daily   warfarin 2 mg Oral Daily       Pharmacy to dose vancomycin     Pharmacy to Dose Zosyn     sodium chloride 50 mL/hr Last Rate: 50 mL/hr (10/01/17 1501)   Diet Regular; Cardiac      Assessment/Plan   Assessment:     Active Hospital Problems (** Indicates Principal Problem)    Diagnosis Date Noted   • **UTI (urinary tract infection) [N39.0] 10/01/2017   • Abnormal CXR [R93.8] 10/01/2017   • CKD (chronic kidney disease), stage III [N18.3] 10/01/2017   • PAU (acute kidney injury) [N17.9] 10/01/2017   • Arteriosclerotic coronary artery disease [I25.10] 10/01/2017    • Metabolic encephalopathy [G93.41] 10/01/2017   • Acidosis [E87.2] 10/01/2017   • Wide-complex tachycardia [I47.2] 10/01/2017   • Chronic combined systolic and diastolic congestive heart failure [I50.42] 09/05/2017   • Long term current use of anticoagulant [Z79.01] 12/15/2016   • Abdominal aortic aneurysm (AAA) without rupture [I71.4] 12/15/2016   • Type 2 diabetes mellitus with complication [E11.8] 11/23/2016   • COPD (chronic obstructive pulmonary disease) [J44.9] 11/15/2016   • PAF (paroxysmal atrial fibrillation) [I48.0]       Resolved Hospital Problems    Diagnosis Date Noted Date Resolved   No resolved problems to display.       Plan:   · Admitted to monitored unit  · On VANCOMYCIN IV and ZOSYN being followed by Dr. Byrd of infectious disease.    · Continue IVFs @ 50 cc/hr  · Blood cultures x2 done in ED negative thus far.  VRP negative.  · Cardiology has started him on amiodarone for wide-complex tachycardia.  Dr. Terrazas to evaluate.  · Nephrology following regarding renal failure.  Need to monitor closely given the use of Zosyn and vancomycin.    · Stool studies pending.  · Urology to evaluate regarding ongoing urinary retention.  · Will ask pulmonology to see regarding lung mass.  May have been present back in January.  Could represent neoplasm.  Will check MRI brain to rule out metastatic deposits. We will hold Coumadin for possible biopsy.        Jamel Mendoza MD  [facility]  10/02/17  9:49 AM

## 2017-10-02 NOTE — CONSULTS
Electrophysiology Hospital Consult            Patient Name: Aakash Sanchez Jr.  Age/Sex: 82 y.o. male  : 1935  MRN: [id number]    Date of Admission: 2017  Date of Encounter Visit: 10/02/17  Encounter Provider: HELEN Bustamante  Referring Provider: Jeramie Pate MD  Place of Service: [facility] CARDIOLOGY  Patient Care Team:  Dimitrios Lindquist MD as PCP - General (Internal Medicine)      Subjective:   EP Consultation for: Check CRT device function    Chief Complaint: Weakness & confusion    History of Present Illness:  Aakash Sanchez Jr. is a 82 y.o. male patient of Dr. Ramirez with a past medical history of MI/CAD, s/p 2V CABG , PCI, ICM, HTN, HLD, abdominal aortic aneurysm s/p repair, CKD, afib (warfarin), DM, lung disease and heart failure. He had a recent hospitalization from  to  for acute systolic heart failure due to dietary indiscretions and rapid afib. He was evaluated by Dr. Terrazas and was felt to be a candidate for CRT-D given that he had an EF <35%, LBBB and recurrent heart failure despite GDMT. He underwent CRT-D ([employer]tr[employer]) placement on 17. He had some problems with urinary retention and was discharged home with oliver catheter on . He was seen by Kathy Pitts PA-C on 17 for follow up. His oliver had been removed that day and his weight had been stable. His afib has been difficult to rate control so there was thought that he may eventually need an AV node ablation but that was to be determined when he followed up with us. He called me on Friday stating that he had fallen. He had missed a step. He said his device site looked fine and he only hit his wrist but he was going to be contacted by pacemaker center today to come in to double check his incision and device. However his son brought him to the ER on Saturday with weakness and confusion. Cardiology was consulted due to concern over pacemaker not functioning correctly.  Device was checked this morning by pacer rep and testing and function within normal limits. He did have a CT of the chest that shows a left upper lobe lung mass that is concerning for neoplasm. Pulmonary consulted.       Past Medical History:  Past Medical History:   Diagnosis Date   • AAA (abdominal aortic aneurysm)     s/p repair, Dr. Felix   • Acute myocardial infarction    • Arteriosclerotic coronary artery disease    • Arthritis    • Atrial fibrillation    • Atrial fibrillation    • B12 deficiency 6/1/2017   • Cardiomyopathy    • Chicken pox    • Chronic combined systolic and diastolic congestive heart failure 9/5/2017   • Chronic systolic (congestive) heart failure    • CKD (chronic kidney disease), stage III    • COPD (chronic obstructive pulmonary disease) 11/15/2016   • Coronary artery disease     MI 1990's, CABG x 2 V (LIMA-LAD, SVG-PDA), 1997   • Dyspnea    • Dyspnea on exertion 8/25/2017   • Fatigue    • Heart attack    • Hyperglycemia 3/29/2017   • Hyperlipidemia    • Hypertension    • Imbalance 3/29/2017   • Insomnia 8/25/2017   • Ischemic cardiomyopathy     LVEF 40%   • Lung mass 04/18/2016    spiculated, being followed   • Lung nodule seen on imaging study    • Malignant neoplasm    • Measles    • Mumps    • PAF (paroxysmal atrial fibrillation)    • Pleural effusion    • Premature ventricular contractions    • Prostate cancer    • PVC (premature ventricular contraction)    • Rash of body 6/21/2017   • Stroke    • Thoracic aortic ectasia    • Urinary tract infection with hematuria 1/18/2017       Past Surgical History:   Procedure Laterality Date   • APPENDECTOMY     • ARTERIAL ANEURYSM REPAIR     • BRONCHOSCOPY Right 10/5/2016    Procedure: BRONCHOSCOPY;  Surgeon: Jhony Quintanilla MD;  Location: [facility] ENDOSCOPY;  Service:    • BRONCHOSCOPY N/A 11/29/2016    Procedure: BRONCHOSCOPY WITH FLUORO w/biopsy AND BAL;  Surgeon: Ervin Stiles MD;  Location: [facility] ENDOSCOPY;  Service:    • CARDIAC  CATHETERIZATION N/A 6/7/2017    Procedure: Left Heart Cath;  Surgeon: Vincent Middleton MD;  Location: [facility] CATH INVASIVE LOCATION;  Service:    • CARDIAC CATHETERIZATION N/A 6/7/2017    Procedure: Coronary angiography;  Surgeon: Vincent Middleton MD;  Location: [facility] CATH INVASIVE LOCATION;  Service:    • CARDIAC CATHETERIZATION N/A 6/7/2017    Procedure: Left ventriculography;  Surgeon: Vincent Middleton MD;  Location: [facility] CATH INVASIVE LOCATION;  Service:    • CARDIAC CATHETERIZATION N/A 6/7/2017    Procedure: Bypass graft study;  Surgeon: Vincent Middleton MD;  Location: [facility] CATH INVASIVE LOCATION;  Service:    • CARDIAC CATHETERIZATION N/A 6/7/2017    Procedure: Native mammary injection;  Surgeon: Vincent Middleton MD;  Location: [facility] CATH INVASIVE LOCATION;  Service:    • CARDIAC CATHETERIZATION N/A 6/7/2017    Procedure: Stent NYA bypass graft;  Surgeon: Vincent Middleton MD;  Location: [facility] CATH INVASIVE LOCATION;  Service:    • CARDIAC DEFIBRILLATOR PLACEMENT     • CARDIAC ELECTROPHYSIOLOGY PROCEDURE N/A 9/19/2017    Procedure: ICD SC new  CRT-D  [employer];  Surgeon: Fady Terrazas MD;  Location: [facility] CATH INVASIVE LOCATION;  Service:    • CARDIAC SURGERY     • CHOLECYSTECTOMY     • COLONOSCOPY N/A 12/16/2016    Procedure: COLONOSCOPY TO ASC COLON  WITH BIOPSY AND POLYPECTOMY;  Surgeon: Lane Brian MD;  Location: [facility] ENDOSCOPY;  Service:    • COLONOSCOPY N/A 3/1/2017    multiple polyps, lipoma in proximal transverse colon/hepatic flexure, repeat in one year   • CORONARY ARTERY BYPASS GRAFT     • ENDOSCOPY N/A 3/1/2017    Concepcion's, HH, gastric mucosal atrophy   • INSERT / REPLACE / REMOVE PACEMAKER     • PROSTATE SURGERY         Home Medications:   Prescriptions Prior to Admission   Medication Sig Dispense Refill Last Dose   • finasteride (PROSCAR) 5 MG tablet Take 1 tablet by mouth Daily. 90 tablet 1 Taking   • furosemide (LASIX) 40 MG tablet Take 2 tablets by mouth Daily. 90 tablet 2 Taking   •  isosorbide mononitrate (IMDUR) 30 MG 24 hr tablet Take 0.5 tablets by mouth Daily. 45 tablet 3 Taking   • metoprolol tartrate (LOPRESSOR) 100 MG tablet Take 1 tablet by mouth Every 12 (Twelve) Hours. 60 tablet 1 Taking   • pantoprazole (PROTONIX) 40 MG EC tablet Take 40 mg by mouth Daily.   Taking   • potassium chloride (K-TAB) 20 MEQ tablet controlled-release ER tablet Take 1 tablet by mouth Daily. 90 tablet 3 Taking   • Probiotic Product (PROBIOTIC PO) Take 1 tablet by mouth Daily.   Taking   • warfarin (COUMADIN) 4 MG tablet Take 1 tablet by mouth Daily. (Patient taking differently: Take 2 mg by mouth Daily.) 30 tablet 1 Taking   • atorvastatin (LIPITOR) 40 MG tablet Take 1 tablet by mouth Every Night. 30 tablet 6 Unknown at Unknown time   • clopidogrel (PLAVIX) 75 MG tablet Take 1 tablet by mouth Daily. 90 tablet 3 Unknown at Unknown time   • digoxin (LANOXIN) 125 MCG tablet Take 1 tablet by mouth Every Other Day. 15 tablet 1 Unknown at Unknown time   • nitroglycerin (NITROSTAT) 0.4 MG SL tablet 1 under the tongue as needed for angina, may repeat q5mins for up three doses 35 tablet 0 Taking   • oxyCODONE-acetaminophen (PERCOCET) 5-325 MG per tablet Take 1 tablet by mouth Every 6 (Six) Hours As Needed.   Unknown at Unknown time   • warfarin (COUMADIN) 3 MG tablet TAKE ONE TABLET BY MOUTH DAILY 90 tablet 0 Taking       Allergies:  No Known Allergies    Past Social History:  Social History     Social History   • Marital status:      Spouse name: N/A   • Number of children: N/A   • Years of education: N/A     Occupational History   • controller [employer]     Social History Main Topics   • Smoking status: Former Smoker     Packs/day: 1.00     Years: 25.00     Types: Cigarettes   • Smokeless tobacco: Former User     Quit date: 9/22/1985      Comment: caffeine use   • Alcohol use No   • Drug use: No   • Sexual activity: Defer      Comment:      Other Topics Concern   • Not on file     Social History  Narrative        3 children     Sonlives with him.       Past Family History:  Family History   Problem Relation Age of Onset   • Cancer Mother    • Cancer Father    • COPD Father    • Arthritis Sister    • Diabetes Sister    • Hyperlipidemia Sister    • Arthritis Brother    • Diabetes Brother    • Stroke Brother    • Lung cancer Other    • Stroke Other    • No Known Problems Maternal Grandmother    • No Known Problems Maternal Grandfather    • No Known Problems Paternal Grandmother    • No Known Problems Paternal Grandfather        Review of Systems: All systems reviewed. Pertinent positives identified in HPI. All other systems are negative.     14 point ROS was performed and is negative except as outlined in HPI.     Objective:     Objective:  Vital Signs (last 24 hours)       10/01 0700  -  10/02 0659 10/02 0700  -  10/02 1451   Most Recent    Temp (°F) 98.3 -  (!)100.9    98.2 -  99.4     99.4 (37.4)    Heart Rate 84 -  99    85 -  94     85    Resp 16 -  18      16     16    BP 98/62 -  136/83    135/72 -  144/58     135/72    SpO2 (%) 96 -  98    96 -  97     97        Temp:  [98.2 °F (36.8 °C)-100.9 °F (38.3 °C)] 99.4 °F (37.4 °C)  Heart Rate:  [84-94] 85  Resp:  [16-18] 16  BP: ()/(58-72) 135/72  Body mass index is 26.3 kg/(m^2).        Physical Exam:   Physical Exam   Constitutional: No distress.   HENT:   Head: Normocephalic and atraumatic.   Eyes: Conjunctivae are normal. No scleral icterus.   Neck: Neck supple.   Cardiovascular: Normal rate.    Paced   Pulmonary/Chest: No respiratory distress.   Musculoskeletal: He exhibits no edema.   Neurological:   Confused, inappropriate statements but cooperative   Skin: Skin is warm and dry. He is not diaphoretic.   But complaining of chills and being cold   Vitals reviewed.       Labs:   Lab Review:       Results from last 7 days  Lab Units 10/02/17  0516 10/01/17  0603 09/30/17  2252   SODIUM mmol/L 137 141 141   POTASSIUM mmol/L 3.6 3.5 4.1    CHLORIDE mmol/L 103 105 100   CO2 mmol/L 18.3* 19.9* 23.8   BUN mg/dL 16 17 18   CREATININE mg/dL 2.08* 1.81* 2.28*   GLUCOSE mg/dL 122* 134* 130*   CALCIUM mg/dL 7.8* 7.9* 8.9   AST (SGOT) U/L  --   --  16   ALT (SGPT) U/L  --   --  12           Results from last 7 days  Lab Units 10/02/17  0516   WBC 10*3/mm3 7.33   HEMOGLOBIN g/dL 11.1*   HEMATOCRIT % 35.9*   PLATELETS 10*3/mm3 172       Results from last 7 days  Lab Units 10/02/17  0516 10/01/17  0603 09/30/17  2252   INR  2.58* 2.53* 2.58*           Results from last 7 days  Lab Units 10/01/17  0603   MAGNESIUM mg/dL 1.9               Results from last 7 days  Lab Units 10/01/17  0603   DIGOXIN LVL ng/mL 0.70               Imaging:    10/1/17 CT chest:    IMPRESSION:  1. 3.8 cm solid-appearing lung mass anterior-inferior left upper lobe  suspicious for neoplasm. Tissue sampling recommended.  2. 0.6 cm right upper lobe pulmonary nodule. 1.9 cm right middle lobe  groundglass nodule.  3. Small left pleural effusion.  4. Cardiomegaly. Mild splenomegaly.       Radiation dose reduction techniques were utilized, including automated  exposure control and exposure modulation based on body size.      This report was finalized on 10/1/2017 2:46 PM by Dr. Jose De La Torre MD.        EKG: Paced, underlying afib          I personally viewed and interpreted the patient's EKG/Telemetry data.    Assessment:     Principal Problem:    UTI (urinary tract infection)  Active Problems:    PAF (paroxysmal atrial fibrillation)    COPD (chronic obstructive pulmonary disease)    Type 2 diabetes mellitus with complication    Abdominal aortic aneurysm (AAA) without rupture    Long term current use of anticoagulant    Chronic combined systolic and diastolic congestive heart failure    Abnormal CXR    CKD (chronic kidney disease), stage III    PAU (acute kidney injury)    Arteriosclerotic coronary artery disease    Metabolic encephalopathy    Acidosis    Wide-complex tachycardia    Mass  of upper lobe of left lung        Plan:     Dr. Terrazas and I saw Mr. Sanchez. He remains confused, ? etiology. His device is functioning within normal limits and he is biv pacing a good percentage of the time. There is concern that he may have lung cancer, pulmonary has been consulted. There is also concern that if he indeed has lung CA then he could have mets to the brain given his state of confusion. An MRI was ordered but he can not have that due to his implanted CRT device. He has not had a CT of the head, will discuss with Dr. Mendoza.     Thank you for allowing me to participate in the care of Aakash Sanchez . Feel free to contact me directly with any further questions or concerns.    HELEN Bustamante  [facility]  10/02/17  2:51 PM

## 2017-10-02 NOTE — PROGRESS NOTES
NEPHROLOGY PROGRESS NOTE    PATIENT IDENTIFICATION:   Name:  Aakash Sanchez Jr.      MRN:  [id number]     82 y.o.  male             Reason for visit: PAU on CKD3    SUBJECTIVE:  Confused but cooperative; unintelligible speech at times; denies pain; no SOB lying flat on RA    OBJECTIVE:  Vitals:    10/02/17 0836 10/02/17 0900 10/02/17 1250 10/02/17 1300   BP:    135/72   BP Location:    Left arm   Patient Position:    Lying   Pulse:    85   Resp:    16   Temp: 99.1 °F (37.3 °C) 98.4 °F (36.9 °C) 98.4 °F (36.9 °C) 99.4 °F (37.4 °C)   TempSrc: Oral Oral  Oral   SpO2:    97%   Weight:       Height:               Body mass index is 26.3 kg/(m^2).    Intake/Output Summary (Last 24 hours) at 10/02/17 1640  Last data filed at 10/02/17 1250   Gross per 24 hour   Intake            51.37 ml   Output              875 ml   Net          -823.63 ml     Wt Readings from Last 1 Encounters:   10/02/17 0549 183 lb 5 oz (83.2 kg)   09/30/17 2211 180 lb (81.6 kg)     Wt Readings from Last 3 Encounters:   10/02/17 183 lb 5 oz (83.2 kg)   09/28/17 184 lb (83.5 kg)   09/26/17 181 lb (82.1 kg)         Exam:  NAD but confused; pleasant; not oriented at all; looks stated age  Dry MM; no scleral icterus  No JVD; no carotid bruits  Coarse bilat with few rhonchi; not labored  RRR, no rub  Soft, NT, ND, BS+  No significant edema  No clubbing  No asterixis  Moves all extremities   Skin turgor seems fine; no tenting.  No rash    Scheduled meds:    amiodarone 300 mg Oral Q12H   atorvastatin 40 mg Oral Nightly   clopidogrel 75 mg Oral Daily   digoxin 125 mcg Oral Every Other Day   isosorbide mononitrate 15 mg Oral Daily   lactobacillus acidophilus 1 capsule Oral Daily   metoprolol tartrate 100 mg Oral Q12H   pantoprazole 40 mg Oral Daily   piperacillin-tazobactam 3.375 g Intravenous Q8H   Vancomycin Pharmacy Intermittent Dosing  Does not apply Daily     IV meds:                        Pharmacy to dose vancomycin     Pharmacy to Dose Zosyn      sodium chloride 50 mL/hr Last Rate: 50 mL/hr (10/01/17 1501)       Data Review:      Results from last 7 days  Lab Units 10/02/17  0516 10/01/17  0603 09/30/17  2252   SODIUM mmol/L 137 141 141   POTASSIUM mmol/L 3.6 3.5 4.1   CHLORIDE mmol/L 103 105 100   CO2 mmol/L 18.3* 19.9* 23.8   BUN mg/dL 16 17 18   CREATININE mg/dL 2.08* 1.81* 2.28*   CALCIUM mg/dL 7.8* 7.9* 8.9   BILIRUBIN mg/dL  --   --  1.0   ALK PHOS U/L  --   --  73   ALT (SGPT) U/L  --   --  12   AST (SGOT) U/L  --   --  16   GLUCOSE mg/dL 122* 134* 130*     Estimated Creatinine Clearance: 33.3 mL/min (by C-G formula based on Cr of 2.08).        Results from last 7 days  Lab Units 10/01/17  0603   MAGNESIUM mg/dL 1.9         Results from last 7 days  Lab Units 10/02/17  0516 10/01/17  0603 09/30/17  2252   WBC 10*3/mm3 7.33 8.06 8.98   HEMOGLOBIN g/dL 11.1* 11.6* 12.7*   PLATELETS 10*3/mm3 172 202 221         Results from last 7 days  Lab Units 10/02/17  0516 10/01/17  0603 09/30/17  2252   INR  2.58* 2.53* 2.58*             ASSESSMENT:   Principal Problem:    UTI (urinary tract infection)  Active Problems:    PAF (paroxysmal atrial fibrillation)    COPD (chronic obstructive pulmonary disease)    Type 2 diabetes mellitus with complication    Abdominal aortic aneurysm (AAA) without rupture    Long term current use of anticoagulant    Chronic combined systolic and diastolic congestive heart failure    Abnormal CXR    CKD (chronic kidney disease), stage III    PAU (acute kidney injury)    Arteriosclerotic coronary artery disease    Metabolic encephalopathy    Acidosis    Wide-complex tachycardia    Mass of upper lobe of left lung    1.  PAU on CKD3, non-oliguric, unchanged:  likely prerenal from poor PO on loop diuretic, modest hypotension.   Central vol prob low.  Lytes compensated fairly well, with low-normal K and slightly low bicarb  2.  TME  3.  Recent CV and PPM few weeks ago  4.  Anemia   5.  Urinary retention, with in-dwelling f/c for now  6.   Left lung mass worrisome for neoplasm      PLAN:  1.  IVF for now while PO intake poor  2.  Surveillance labs    Eliecer Brower MD  10/2/2017    4:40 PM

## 2017-10-02 NOTE — CONSULTS
Group: [facility]         CONSULT NOTE    Patient Identification:  Aakash Sanchez Jr.  82 y.o.  male  1935  [id number]            Requesting physician: Dr. Mendoza    Reason for Consultation:  Abnormal CAT scan    CC: Abnormal CAT scan    History of Present Illness:  Patient is altered at this point and is unable to produce any useful information. most of the history obtained by chart review and talking to the nursing staff.    Mr. Sanchez is a pleasant 82 y.o. WM  who sees Dr. 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 Terrazas and  underwent CRT-D ([employer]) placement on 9/19/17.  Patient was reportedly discharged with the indwelling catheter because of urinary retention.  Patient returns to the hospital due to altered mental status thought to be initially from urinary tract infection.  Patient is not able to provide any history for me at this point.  He does not appear to be in obvious respiratory distress.  Does not have any cough.  Says that he did have abdominal distention for quite some time which is not new for him.  He clearly falls asleep during the interview.  His family is essentially his son who is not immediately available for interview.    Review of Systems   Unable to perform ROS: Mental status change       Past Medical History:  Past Medical History:   Diagnosis Date   • AAA (abdominal aortic aneurysm)     s/p repair, Dr. Felix   • Acute myocardial infarction    • Arteriosclerotic coronary artery disease    • Arthritis    • Atrial fibrillation    • Atrial fibrillation    • B12 deficiency 6/1/2017   • Cardiomyopathy    • Chicken pox    • Chronic  combined systolic and diastolic congestive heart failure 9/5/2017   • Chronic systolic (congestive) heart failure    • CKD (chronic kidney disease), stage III    • COPD (chronic obstructive pulmonary disease) 11/15/2016   • Coronary artery disease     MI 1990's, CABG x 2 V (LIMA-LAD, SVG-PDA), 1997   • Dyspnea    • Dyspnea on exertion 8/25/2017   • Fatigue    • Heart attack    • Hyperglycemia 3/29/2017   • Hyperlipidemia    • Hypertension    • Imbalance 3/29/2017   • Insomnia 8/25/2017   • Ischemic cardiomyopathy     LVEF 40%   • Lung mass 04/18/2016    spiculated, being followed   • Lung nodule seen on imaging study    • Malignant neoplasm    • Measles    • Mumps    • PAF (paroxysmal atrial fibrillation)    • Pleural effusion    • Premature ventricular contractions    • Prostate cancer    • PVC (premature ventricular contraction)    • Rash of body 6/21/2017   • Stroke    • Thoracic aortic ectasia    • Urinary tract infection with hematuria 1/18/2017       Past Surgical History:  Past Surgical History:   Procedure Laterality Date   • APPENDECTOMY     • ARTERIAL ANEURYSM REPAIR     • BRONCHOSCOPY Right 10/5/2016    Procedure: BRONCHOSCOPY;  Surgeon: Jhony Quintanilla MD;  Location: [facility] ENDOSCOPY;  Service:    • BRONCHOSCOPY N/A 11/29/2016    Procedure: BRONCHOSCOPY WITH FLUORO w/biopsy AND BAL;  Surgeon: Ervin Stiles MD;  Location: [facility] ENDOSCOPY;  Service:    • CARDIAC CATHETERIZATION N/A 6/7/2017    Procedure: Left Heart Cath;  Surgeon: Vincent Middleton MD;  Location: [facility] CATH INVASIVE LOCATION;  Service:    • CARDIAC CATHETERIZATION N/A 6/7/2017    Procedure: Coronary angiography;  Surgeon: Vincent Middleton MD;  Location: [facility] INVASIVE LOCATION;  Service:    • CARDIAC CATHETERIZATION N/A 6/7/2017    Procedure: Left ventriculography;  Surgeon: Vincent Middleton MD;  Location: [facility] CATH INVASIVE LOCATION;  Service:    • CARDIAC CATHETERIZATION N/A 6/7/2017    Procedure: Bypass graft study;  Surgeon:  Vincent Middleton MD;  Location: [facility] CATH INVASIVE LOCATION;  Service:    • CARDIAC CATHETERIZATION N/A 6/7/2017    Procedure: Native mammary injection;  Surgeon: Vincent Middleton MD;  Location: [facility] CATH INVASIVE LOCATION;  Service:    • CARDIAC CATHETERIZATION N/A 6/7/2017    Procedure: Stent NYA bypass graft;  Surgeon: Vincent Middleton MD;  Location: [facility] CATH INVASIVE LOCATION;  Service:    • CARDIAC DEFIBRILLATOR PLACEMENT     • CARDIAC ELECTROPHYSIOLOGY PROCEDURE N/A 9/19/2017    Procedure: ICD SC new  CRT-D  [employer];  Surgeon: Fady Terrazas MD;  Location: [facility] CATH INVASIVE LOCATION;  Service:    • CARDIAC SURGERY     • CHOLECYSTECTOMY     • COLONOSCOPY N/A 12/16/2016    Procedure: COLONOSCOPY TO ASC COLON  WITH BIOPSY AND POLYPECTOMY;  Surgeon: Lane Brian MD;  Location: [facility] ENDOSCOPY;  Service:    • COLONOSCOPY N/A 3/1/2017    multiple polyps, lipoma in proximal transverse colon/hepatic flexure, repeat in one year   • CORONARY ARTERY BYPASS GRAFT     • ENDOSCOPY N/A 3/1/2017    Concepcion's, HH, gastric mucosal atrophy   • INSERT / REPLACE / REMOVE PACEMAKER     • PROSTATE SURGERY          Home Meds:  Prescriptions Prior to Admission   Medication Sig Dispense Refill Last Dose   • finasteride (PROSCAR) 5 MG tablet Take 1 tablet by mouth Daily. 90 tablet 1 Taking   • furosemide (LASIX) 40 MG tablet Take 2 tablets by mouth Daily. 90 tablet 2 Taking   • isosorbide mononitrate (IMDUR) 30 MG 24 hr tablet Take 0.5 tablets by mouth Daily. 45 tablet 3 Taking   • metoprolol tartrate (LOPRESSOR) 100 MG tablet Take 1 tablet by mouth Every 12 (Twelve) Hours. 60 tablet 1 Taking   • pantoprazole (PROTONIX) 40 MG EC tablet Take 40 mg by mouth Daily.   Taking   • potassium chloride (K-TAB) 20 MEQ tablet controlled-release ER tablet Take 1 tablet by mouth Daily. 90 tablet 3 Taking   • Probiotic Product (PROBIOTIC PO) Take 1 tablet by mouth Daily.   Taking   • warfarin (COUMADIN) 4 MG tablet Take 1 tablet by mouth  Daily. (Patient taking differently: Take 2 mg by mouth Daily.) 30 tablet 1 Taking   • atorvastatin (LIPITOR) 40 MG tablet Take 1 tablet by mouth Every Night. 30 tablet 6 Unknown at Unknown time   • clopidogrel (PLAVIX) 75 MG tablet Take 1 tablet by mouth Daily. 90 tablet 3 Unknown at Unknown time   • digoxin (LANOXIN) 125 MCG tablet Take 1 tablet by mouth Every Other Day. 15 tablet 1 Unknown at Unknown time   • nitroglycerin (NITROSTAT) 0.4 MG SL tablet 1 under the tongue as needed for angina, may repeat q5mins for up three doses 35 tablet 0 Taking   • oxyCODONE-acetaminophen (PERCOCET) 5-325 MG per tablet Take 1 tablet by mouth Every 6 (Six) Hours As Needed.   Unknown at Unknown time   • warfarin (COUMADIN) 3 MG tablet TAKE ONE TABLET BY MOUTH DAILY 90 tablet 0 Taking       Allergies:  No Known Allergies    Social History:   Social History     Social History   • Marital status:      Spouse name: N/A   • Number of children: N/A   • Years of education: N/A     Occupational History   • controller [employer]     Social History Main Topics   • Smoking status: Former Smoker     Packs/day: 1.00     Years: 25.00     Types: Cigarettes   • Smokeless tobacco: Former User     Quit date: 9/22/1985      Comment: caffeine use   • Alcohol use No   • Drug use: No   • Sexual activity: Defer      Comment:      Other Topics Concern   • Not on file     Social History Narrative        3 children     Sonlives with him.       Family History:  Family History   Problem Relation Age of Onset   • Cancer Mother    • Cancer Father    • COPD Father    • Arthritis Sister    • Diabetes Sister    • Hyperlipidemia Sister    • Arthritis Brother    • Diabetes Brother    • Stroke Brother    • Lung cancer Other    • Stroke Other    • No Known Problems Maternal Grandmother    • No Known Problems Maternal Grandfather    • No Known Problems Paternal Grandmother    • No Known Problems Paternal Grandfather        Physical Exam:  BP  "135/72 (BP Location: Left arm, Patient Position: Lying)  Pulse 85  Temp 99.4 °F (37.4 °C) (Oral)   Resp 16  Ht 70\" (177.8 cm)  Wt 183 lb 5 oz (83.2 kg)  SpO2 97%  BMI 26.3 kg/m2 Body mass index is 26.3 kg/(m^2). 97% 183 lb 5 oz (83.2 kg)  Physical Exam   Constitutional: He appears well-developed and well-nourished. No distress.   Obese white male in no acute distress   HENT:   Head: Normocephalic and atraumatic.   Mouth/Throat: No oropharyngeal exudate.   Eyes: EOM are normal. Pupils are equal, round, and reactive to light. Right eye exhibits no discharge. Left eye exhibits no discharge. No scleral icterus.   Neck: Normal range of motion. Neck supple. No JVD present. No tracheal deviation present. No thyromegaly present.   Cardiovascular: Normal rate and normal heart sounds.  Exam reveals no gallop and no friction rub.    No murmur heard.  Pulmonary/Chest: Effort normal and breath sounds normal. No respiratory distress. He has no wheezes. He has no rales. He exhibits no tenderness.   Abdominal: Soft. Bowel sounds are normal. He exhibits distension. He exhibits no mass. There is no tenderness. No hernia.   Musculoskeletal: Normal range of motion. He exhibits no edema, tenderness or deformity.   Lymphadenopathy:     He has no cervical adenopathy.   Neurological:   Drowsy but answers questions but falls asleep.  He is able to move all extremities.  No gross focal neurological deficits noted   Skin: Skin is warm and dry. No rash noted. He is not diaphoretic. No erythema.       Lab Review:   LABS:  Lab Results   Component Value Date    CALCIUM 7.8 (L) 10/02/2017    PHOS 3.4 08/26/2017     Results from last 7 days  Lab Units 10/02/17  0516 10/01/17  0603 09/30/17  2252   MAGNESIUM mg/dL  --  1.9  --    SODIUM mmol/L 137 141 141   POTASSIUM mmol/L 3.6 3.5 4.1   CHLORIDE mmol/L 103 105 100   CO2 mmol/L 18.3* 19.9* 23.8   BUN mg/dL 16 17 18   CREATININE mg/dL 2.08* 1.81* 2.28*   GLUCOSE mg/dL 122* 134* 130*   CALCIUM " mg/dL 7.8* 7.9* 8.9   WBC 10*3/mm3 7.33 8.06 8.98   HEMOGLOBIN g/dL 11.1* 11.6* 12.7*   PLATELETS 10*3/mm3 172 202 221   ALT (SGPT) U/L  --   --  12   AST (SGOT) U/L  --   --  16   PROCALCITONIN ng/mL  --  0.12  --      Lab Results   Component Value Date    CKTOTAL 178 10/09/2016    CKMB 2.59 10/01/2016    TROPONINT 0.030 09/14/2017           Results from last 7 days  Lab Units 10/01/17  0134 10/01/17  0100 09/30/17  2253   BLOODCX  No growth at 24 hours No growth at 24 hours  --    URINECX   --   --    25,000 CFU/mL Yeast, Not Candida albicans*       Results from last 7 days  Lab Units 10/01/17  0603 09/30/17  2252   PROCALCITONIN ng/mL 0.12  --    LACTATE mmol/L  --  1.7         Results from last 7 days  Lab Units 10/02/17  0516 10/01/17  0603 09/30/17  2252   INR  2.58* 2.53* 2.58*     Lab Results   Component Value Date    TSH 1.180 04/12/2017     Estimated Creatinine Clearance: 33.3 mL/min (by C-G formula based on Cr of 2.08).    Results from last 7 days  Lab Units 09/30/17  2253   NITRITE UA  Negative   WBC UA /HPF 6-12*   BACTERIA UA /HPF None Seen   SQUAM EPITHEL UA /HPF 0-2   URINECX    25,000 CFU/mL Yeast, Not Candida albicans*        Imaging: I personally visualized the images of scans/x-rays performed within last 3 days.                  IMPRESSION:  1. 3.8 cm solid-appearing lung mass anterior-inferior left upper lobe  suspicious for neoplasm. Tissue sampling recommended.  2. 0.6 cm right upper lobe pulmonary nodule. 1.9 cm right middle lobe  groundglass nodule.  3. Small left pleural effusion.  4. Cardiomegaly. Mild splenomegaly.     ECHO 6/5/17  Interpretation Summary   · Left ventricular systolic function is moderately decreased. Calculated EF = 26.9%. Estimated EF was in disagreement with the calculated EF. Estimated EF = 35%. The left ventricular cavity is borderline dilated. Left ventricular wall thickness is consistent with moderate concentric hypertrophy. Left ventricular diastolic dysfunction  is noted (grade I) consistent with impaired relaxation.  · The following segments are akinetic: basal inferolateral. The following segments are hypokinetic: basal inferior, mid inferior, mid inferolateral and apical lateral. The following segments are normal: basal anterior, basal anteroseptal, basal inferoseptal, mid anterior, mid anteroseptal, mid inferoseptal, apical anterior, apical septal, apical inferior and apex. Other segments could not be evaluated.  · Left atrial volume is severely increased. Saline test results are negative.  · Right atrial cavity size is moderate-to-severely dilated.  · There is mild-to-moderate thickening of the aortic valve.  · Mild mitral annular calcification is present. Mild mitral valve regurgitation is present.  · Mild tricuspid valve regurgitation is present. Estimated right ventricular systolic pressure from tricuspid regurgitation is normal (<35 mmHg).           Assessment:  3.8cm JT lung mass  6 mm right upper lobe lung nodule  1.9 cm right middle lobe groundglass nodule  Toxic metabolic encephalopathy  History of recurrent pneumonia  History of AAA status post stent  CAD s/p PCI/ 2V CABG(1997)  Now on DAPT   Chronic systolic and diastolic heart failure s/p recent CRT-D (9/17)   CKD   Afib on AC   COPD not in exac     Recommendations:  Incidental left upper lobe lung mass noted on the CAT scan today are concerning for primary lung cancer given the history of smoking.  While the patient has history of prostate cancer I suspect this could be a primary lung cancer.  His right middle lobe lung nodule has been stable since 2014.  No clear evidence of this nodule noted on the CAT scan 01/17 but he had significant abnormality in that lung at that point making it difficult to delineate this mass from the surrounding infiltrates.    In any case this nodule needs to be biopsied.  I did not see any significant lymphadenopathy.  We could offer the patient navigation bronchoscopy if we do  not find any other source of sampling.  CT abdomen pelvis does not show any evidence of metastatic disease.  MRI of the brain cannot be done because of the devices being not compatible.  CT head would be an acceptable ALTERNATIVE at this point.  Remote concerns for brain metastasis which could explain his altered mental status.  Patient is on long-term anticoagulation will which will need to be held/bridged based on cards recs.  He is also on your antiplatelet therapy which is to be held for at least 5 days.    I will update the son once more information is available.  Will follow along with you.      Tahir Simon MD  10/2/2017  6:07 PM    EMR Dragon/Transcription disclaimer:     Much of this encounter note is an electronic transcription/translation of spoken language to printed text. The electronic translation of spoken language may permit erroneous, or at times, nonsensical words or phrases to be inadvertently transcribed; Although I have reviewed the note for such errors, some may still exist.

## 2017-10-02 NOTE — PLAN OF CARE
Problem: Patient Care Overview (Adult)  Goal: Plan of Care Review  Outcome: Ongoing (interventions implemented as appropriate)    10/02/17 5063   Coping/Psychosocial Response Interventions   Plan Of Care Reviewed With patient   Outcome Evaluation   Outcome Summary/Follow up Plan patient alert to self only, Ct abd/pelvis done, pulm consult/EP consult/neuro consult placed, UA sent first cx showed yeast, aitvan IV ordered for agitation and restlessness, vss, will continue to monitor    Patient Care Overview   Progress no change       Goal: Adult Individualization and Mutuality  Outcome: Ongoing (interventions implemented as appropriate)  Goal: Discharge Needs Assessment  Outcome: Ongoing (interventions implemented as appropriate)    Problem: Infection, Risk/Actual (Adult)  Goal: Infection Prevention/Resolution  Outcome: Ongoing (interventions implemented as appropriate)    Problem: Fall Risk (Adult)  Goal: Absence of Falls  Outcome: Ongoing (interventions implemented as appropriate)    Problem: Urine Elimination, Impaired (Adult)  Goal: Effective Urinary Elimination  Outcome: Ongoing (interventions implemented as appropriate)  Goal: Effective Containment of Urine  Outcome: Ongoing (interventions implemented as appropriate)  Goal: Reduced Incontinence Episodes  Outcome: Ongoing (interventions implemented as appropriate)

## 2017-10-03 NOTE — PROGRESS NOTES
"  Infectious Diseases Progress Note    Carmen Byrd MD     [facility]  [place]: 2 days  Patient Identification:  Name: Aakash Sanchez Jr.  Age: 82 y.o.  Sex: male  :  1935  MRN: [id number]         Primary Care Physician: Dimitrios Lindquist MD            Subjective: feels about the same no specific complaints now has diarrhea  Interval History: See consultation note    Objective:    Scheduled Meds:  amiodarone 200 mg Oral Q12H   atorvastatin 40 mg Oral Nightly   clopidogrel 75 mg Oral Daily   digoxin 125 mcg Oral Every Other Day   fluconazole 200 mg Intravenous Daily   isosorbide mononitrate 15 mg Oral Daily   lactobacillus acidophilus 1 capsule Oral Daily   metoprolol tartrate 100 mg Oral Q12H   pantoprazole 40 mg Oral Daily   vancomycin 750 mg Intravenous Once     Continuous Infusions:  Pharmacy to dose vancomycin    Pharmacy to Dose Zosyn        Vital signs in last 24 hours:  Temp:  [97.9 °F (36.6 °C)-101.8 °F (38.8 °C)] 98.7 °F (37.1 °C)  Heart Rate:  [] 83  Resp:  [16] 16  BP: (104-140)/(58-81) 104/58    Intake/Output:    Intake/Output Summary (Last 24 hours) at 10/03/17 1518  Last data filed at 10/03/17 1300   Gross per 24 hour   Intake             1697 ml   Output             1000 ml   Net              697 ml       Exam:  /58  Pulse 83  Temp 98.7 °F (37.1 °C) (Oral)   Resp 16  Ht 70\" (177.8 cm)  Wt 187 lb 14.4 oz (85.2 kg)  SpO2 95%  BMI 26.96 kg/m2    General Appearance:    Does not appear toxic                          Head:    Normocephalic, without obvious abnormality, atraumatic                           Eyes:    PERRL, conjunctiva/corneas clear, EOM's intact, both eyes                         Throat:   Lips, tongue, gums normal; oral mucosa pink and moist                           Neck:   Supple, symmetrical, trachea midline, no JVD                         Lungs:    Clear to auscultation bilaterally, respirations unlabored                 Chest Wall:    No tenderness " or deformity                          Heart:    Regular rate and rhythm, S1 and S2 normal, no murmur,no  Rub                                      or gallop                  Abdomen:     Soft, no significant, bowel sounds active, no masses, no                                                        organomegaly                  Extremities:   Extremities normal, atraumatic, no cyanosis or edema                        Pulses:   Pulses palpable in all extremities                            Skin:   Skin is warm and dry,  no rashes or palpable lesions                    Data Review:    I reviewed the patient's new clinical results.    Results from last 7 days  Lab Units 10/03/17  0342 10/02/17  0516 10/01/17  0603 09/30/17  2252   WBC 10*3/mm3 6.27 7.33 8.06 8.98   HEMOGLOBIN g/dL 10.9* 11.1* 11.6* 12.7*   PLATELETS 10*3/mm3 189 172 202 221       Results from last 7 days  Lab Units 10/03/17  0342 10/02/17  0516 10/01/17  0603 09/30/17  2252   SODIUM mmol/L 138 137 141 141   POTASSIUM mmol/L 3.4* 3.6 3.5 4.1   CHLORIDE mmol/L 103 103 105 100   CO2 mmol/L 19.7* 18.3* 19.9* 23.8   BUN mg/dL 15 16 17 18   CREATININE mg/dL 2.14* 2.08* 1.81* 2.28*   CALCIUM mg/dL 8.1* 7.8* 7.9* 8.9   GLUCOSE mg/dL 144* 122* 134* 130*         Assessment:  Principal Problem:    UTI (urinary tract infection)  Active Problems:    PAF (paroxysmal atrial fibrillation)    COPD (chronic obstructive pulmonary disease)    Type 2 diabetes mellitus with complication    Abdominal aortic aneurysm (AAA) without rupture    Long term current use of anticoagulant    Chronic combined systolic and diastolic congestive heart failure    Abnormal CXR    CKD (chronic kidney disease), stage III    PAU (acute kidney injury)    Arteriosclerotic coronary artery disease    Metabolic encephalopathy    Acidosis    Wide-complex tachycardia    Mass of upper lobe of left lung      Plan:  No obvious definite infectious syndrome is identified except for abnormal urinalysis and a  yeast in the urine that is noted.  His white blood cell count is normal and now he has diarrhea.  I think best thing to do is to stop vancomycin and Zosyn and treat his urinary tract infection with Diflucan will ask the microbiology to completely identified this non-albicans noreen.  Follow-up on Anka panel, C. difficile toxin assay and continue with aspiration precautions.  Carmen Byrd MD  10/3/2017  3:18 PM    Much of this encounter note is an electronic transcription/translation of spoken language to printed text. The electronic translation of spoken language may permit erroneous, or at times, nonsensical words or phrases to be inadvertently transcribed; Although I have reviewed the note for such errors, some may still exist

## 2017-10-03 NOTE — PROGRESS NOTES
NEPHROLOGY PROGRESS NOTE    PATIENT IDENTIFICATION:   Name:  Aakash Sanchez Jr.      MRN:  [id number]     82 y.o.  male             Reason for visit: PAU on CKD3    SUBJECTIVE:  Awake, alert, and oriented now; eating fine; denies SOB    OBJECTIVE:  Vitals:    10/03/17 0100 10/03/17 0255 10/03/17 0617 10/03/17 0700   BP: 130/81   112/58   BP Location: Left arm   Left arm   Patient Position: Lying   Lying   Pulse: 100   84   Resp: 16   16   Temp: 99.1 °F (37.3 °C) (!) 101.8 °F (38.8 °C)  97.9 °F (36.6 °C)   TempSrc: Oral Oral  Oral   SpO2: 94%   100%   Weight:   187 lb 14.4 oz (85.2 kg)    Height:               Body mass index is 26.96 kg/(m^2).    Intake/Output Summary (Last 24 hours) at 10/03/17 1128  Last data filed at 10/03/17 0617   Gross per 24 hour   Intake              480 ml   Output              950 ml   Net             -470 ml     Wt Readings from Last 1 Encounters:   10/03/17 0617 187 lb 14.4 oz (85.2 kg)   10/02/17 0549 183 lb 5 oz (83.2 kg)   09/30/17 2211 180 lb (81.6 kg)     Wt Readings from Last 3 Encounters:   10/03/17 187 lb 14.4 oz (85.2 kg)   09/28/17 184 lb (83.5 kg)   09/26/17 181 lb (82.1 kg)         Exam:  NAD and oriented; pleasant; appropriate; looks stated age  No scleral icterus  MMM  No JVD; no carotid bruits  Coarse but mostly clear; not labored  RRR, no rub  Soft, NT, +D, BS+  No significant edema  No clubbing  No asterixis  Moves all extremities   Skin turgor seems fine; no tenting.  No rash    Scheduled meds:      amiodarone 200 mg Oral Q12H   atorvastatin 40 mg Oral Nightly   clopidogrel 75 mg Oral Daily   digoxin 125 mcg Oral Every Other Day   isosorbide mononitrate 15 mg Oral Daily   lactobacillus acidophilus 1 capsule Oral Daily   metoprolol tartrate 100 mg Oral Q12H   pantoprazole 40 mg Oral Daily   piperacillin-tazobactam 3.375 g Intravenous Q8H   Vancomycin Pharmacy Intermittent Dosing  Does not apply Daily     IV meds:                          Pharmacy to dose vancomycin      Pharmacy to Dose Zosyn     sodium chloride 50 mL/hr Last Rate: 50 mL/hr (10/02/17 1942)       Data Review:      Results from last 7 days  Lab Units 10/03/17  0342 10/02/17  0516 10/01/17  0603 09/30/17  2252   SODIUM mmol/L 138 137 141 141   POTASSIUM mmol/L 3.4* 3.6 3.5 4.1   CHLORIDE mmol/L 103 103 105 100   CO2 mmol/L 19.7* 18.3* 19.9* 23.8   BUN mg/dL 15 16 17 18   CREATININE mg/dL 2.14* 2.08* 1.81* 2.28*   CALCIUM mg/dL 8.1* 7.8* 7.9* 8.9   BILIRUBIN mg/dL  --   --   --  1.0   ALK PHOS U/L  --   --   --  73   ALT (SGPT) U/L  --   --   --  12   AST (SGOT) U/L  --   --   --  16   GLUCOSE mg/dL 144* 122* 134* 130*     Estimated Creatinine Clearance: 33.2 mL/min (by C-G formula based on Cr of 2.14).        Results from last 7 days  Lab Units 10/03/17  0342 10/01/17  0603   MAGNESIUM mg/dL 1.9 1.9   PHOSPHORUS mg/dL 2.1*  --          Results from last 7 days  Lab Units 10/03/17  0342 10/02/17  0516 10/01/17  0603 09/30/17  2252   WBC 10*3/mm3 6.27 7.33 8.06 8.98   HEMOGLOBIN g/dL 10.9* 11.1* 11.6* 12.7*   PLATELETS 10*3/mm3 189 172 202 221         Results from last 7 days  Lab Units 10/03/17  0342 10/02/17  0516 10/01/17  0603 09/30/17  2252   INR  2.95* 2.58* 2.53* 2.58*             ASSESSMENT:   Principal Problem:    UTI (urinary tract infection)  Active Problems:    PAF (paroxysmal atrial fibrillation)    COPD (chronic obstructive pulmonary disease)    Type 2 diabetes mellitus with complication    Abdominal aortic aneurysm (AAA) without rupture    Long term current use of anticoagulant    Chronic combined systolic and diastolic congestive heart failure    Abnormal CXR    CKD (chronic kidney disease), stage III    PAU (acute kidney injury)    Arteriosclerotic coronary artery disease    Metabolic encephalopathy    Acidosis    Wide-complex tachycardia    Mass of upper lobe of left lung    1.  PAU on CKD3, non-oliguric, unchanged:  likely prerenal from poor PO on loop diuretic, modest hypotension.   Central vol  prob low.  Lytes compensated other than low K and low phos, prob for malnutrition for past few days  2.  TME, resolved  3.  Recent CV and PPM few weeks ago  4.  Anemia   5.  Urinary retention, with in-dwelling f/c for now  6.  Left lung mass worrisome for neoplasm      PLAN:  1.  Taper IVF with improving PO intake  2.  K phos iv and one dose of oral KCl  3.  Surveillance labs    Eliecer Brower MD  10/3/2017    11:28 AM

## 2017-10-03 NOTE — PROGRESS NOTES
[facility]  Fito Ledezma MD     LOS: 2 days   Patient Care Team:  Dimitrios Lindquist MD as PCP - General (Internal Medicine)        Subjective     Interval History:     Patient Complaints: Doesn't feel real well today.  Achy.  Tolerating catheter. complains of some diarrhea and abdominal bloating.  History taken from: patient chart    Review of Systems:   All systems were reviewed and were negative except for generalized aching.  No chest pain.  No shortness of air.    Objective     Vital Signs  Temp:  [97.9 °F (36.6 °C)-101.8 °F (38.8 °C)] 101.8 °F (38.8 °C)  Heart Rate:  [] 83  Resp:  [16] 16  BP: (104-140)/(58-81) 104/58    Physical Exam:     General Appearance:    Alert, cooperative, in no acute distress   Head:    Normocephalic, without obvious abnormality, atraumatic   Eyes:            Lids and lashes normal, conjunctivae and sclerae normal, no   icterus, no pallor, corneas clear, PERRLA   Ears:    Ears appear intact with no abnormalities noted   Throat:   No oral lesions, no thrush, oral mucosa moist   Neck:   No adenopathy, supple, trachea midline,    Back:     No kyphosis present, no scoliosis present, no skin lesions,      erythema or scars, no tenderness to percussion or                   palpation,   range of motion normal   Lungs:     Clear to auscultation,respirations regular, even and                  unlabored    Heart:    Regular rhythm and normal rate, normal S1 and S2, no            murmur, no gallop, no rub, no click   Chest Wall:    No abnormalities observed   Abdomen:     Hyperactive bowel sounds, no masses, no organomegaly, soft        non-tender, non-distended, no guarding, no rebound                tenderness   Genital/Rectal:     Catheter in place.  Some sediment present.     Extremities:   Moves all extremities well, no edema, no cyanosis, no             redness       Skin:   No bleeding, bruising or rash       Neurologic:   Cranial nerves 2 - 12 grossly intact, sensation  intact,        Results Review:     I reviewed the patient's new clinical results.  Discussed with The patient.  Yeast found in urine. I reviewed his CT scan personally again.  He has some atrophy of his kidneys.  Left small complex cyst possibly.    Recent Results (from the past 24 hour(s))   Protime-INR    Collection Time: 10/03/17  3:42 AM   Result Value Ref Range    Protime 29.8 (H) 11.7 - 14.2 Seconds    INR 2.95 (H) 0.90 - 1.10   CBC (No Diff)    Collection Time: 10/03/17  3:42 AM   Result Value Ref Range    WBC 6.27 4.50 - 10.70 10*3/mm3    RBC 4.34 (L) 4.60 - 6.00 10*6/mm3    Hemoglobin 10.9 (L) 13.7 - 17.6 g/dL    Hematocrit 35.0 (L) 40.4 - 52.2 %    MCV 80.6 79.8 - 96.2 fL    MCH 25.1 (L) 27.0 - 32.7 pg    MCHC 31.1 (L) 32.6 - 36.4 g/dL    RDW 16.1 (H) 11.5 - 14.5 %    RDW-SD 47.3 37.0 - 54.0 fl    MPV 10.5 6.0 - 12.0 fL    Platelets 189 140 - 500 10*3/mm3   Renal Function Panel    Collection Time: 10/03/17  3:42 AM   Result Value Ref Range    Glucose 144 (H) 65 - 99 mg/dL    BUN 15 8 - 23 mg/dL    Creatinine 2.14 (H) 0.76 - 1.27 mg/dL    Sodium 138 136 - 145 mmol/L    Potassium 3.4 (L) 3.5 - 5.2 mmol/L    Chloride 103 98 - 107 mmol/L    CO2 19.7 (L) 22.0 - 29.0 mmol/L    Calcium 8.1 (L) 8.6 - 10.5 mg/dL    Albumin 2.90 (L) 3.50 - 5.20 g/dL    Phosphorus 2.1 (L) 2.5 - 4.5 mg/dL    Anion Gap 15.3 mmol/L    BUN/Creatinine Ratio 7.0 7.0 - 25.0    eGFR Non African Amer 30 (L) >60 mL/min/1.73   Magnesium    Collection Time: 10/03/17  3:42 AM   Result Value Ref Range    Magnesium 1.9 1.6 - 2.4 mg/dL   Vancomycin, Random    Collection Time: 10/03/17 11:34 AM   Result Value Ref Range    Vancomycin Random 14.30 5.00 - 40.00 mcg/mL       Medication Review:   Current Facility-Administered Medications:   •  acetaminophen (TYLENOL) tablet 650 mg, 650 mg, Oral, Q4H PRN, Jeramie Pate MD, 650 mg at 10/03/17 0254  •  amiodarone (PACERONE) tablet 200 mg, 200 mg, Oral, Q12H, Carolyn Ramirez MD  •  atorvastatin  (LIPITOR) tablet 40 mg, 40 mg, Oral, Nightly, Jeramie Pate MD, 40 mg at 10/02/17 2136  •  clopidogrel (PLAVIX) tablet 75 mg, 75 mg, Oral, Daily, Jeramie Pate MD, 75 mg at 10/03/17 1214  •  digoxin (LANOXIN) tablet 125 mcg, 125 mcg, Oral, Every Other Day, Jeramie Pate MD, 125 mcg at 10/03/17 1214  •  fluconazole (DIFLUCAN) IVPB 200 mg, 200 mg, Intravenous, Daily, Carmen Byrd MD  •  isosorbide mononitrate (IMDUR) 24 hr tablet 15 mg, 15 mg, Oral, Daily, Jeramie Pate MD, 15 mg at 10/03/17 1214  •  lactobacillus acidophilus (RISAQUAD) capsule 1 capsule, 1 capsule, Oral, Daily, Jeramie Pate MD, 1 capsule at 10/03/17 1214  •  LORazepam (ATIVAN) injection 0.25 mg, 0.25 mg, Intravenous, Q8H PRN, Jamel Mendoza MD, 0.25 mg at 10/03/17 0025  •  metoprolol tartrate (LOPRESSOR) tablet 100 mg, 100 mg, Oral, Q12H, Jeramie Pate MD, 100 mg at 10/03/17 1214  •  nitroglycerin (NITROSTAT) SL tablet 0.4 mg, 0.4 mg, Sublingual, Q5 Min PRN, Jeramie Pate MD  •  ondansetron (ZOFRAN) tablet 4 mg, 4 mg, Oral, Q6H PRN **OR** ondansetron ODT (ZOFRAN-ODT) disintegrating tablet 4 mg, 4 mg, Oral, Q6H PRN **OR** ondansetron (ZOFRAN) injection 4 mg, 4 mg, Intravenous, Q6H PRN, Jeramie Pate MD, 4 mg at 10/03/17 1708  •  oxyCODONE-acetaminophen (PERCOCET) 5-325 MG per tablet 1 tablet, 1 tablet, Oral, Q6H PRN, Jeramie Pate MD, 1 tablet at 10/02/17 0826  •  pantoprazole (PROTONIX) EC tablet 40 mg, 40 mg, Oral, Daily, Jeramie Pate MD, 40 mg at 10/03/17 1214  •  sodium chloride 0.9 % flush 1-10 mL, 1-10 mL, Intravenous, PRN, Jeramie Pate MD    Assessment/Plan     Principal Problem:    UTI (urinary tract infection)  Active Problems:    PAF (paroxysmal atrial fibrillation)    COPD (chronic obstructive pulmonary disease)    Type 2 diabetes mellitus with complication    Abdominal aortic aneurysm (AAA) without rupture    Long term current use of anticoagulant    Chronic combined  systolic and diastolic congestive heart failure    Abnormal CXR    CKD (chronic kidney disease), stage III    PAU (acute kidney injury)    Arteriosclerotic coronary artery disease    Metabolic encephalopathy    Acidosis    Wide-complex tachycardia    Mass of upper lobe of left lung      Yeast UTI    Plan for disposition:On Diflucan now.  Does complain of diarrhea as well.    Fito Ledezma MD  10/03/17  6:21 PM      Time: 25

## 2017-10-03 NOTE — PLAN OF CARE
Problem: Patient Care Overview (Adult)  Goal: Plan of Care Review  Outcome: Ongoing (interventions implemented as appropriate)    10/03/17 4300   Coping/Psychosocial Response Interventions   Plan Of Care Reviewed With patient;family   Outcome Evaluation   Outcome Summary/Follow up Plan Pt still spiking temperatures, MDs unsure of infection source, getting blood cultures currently as patient has spiked a temp again this evening. Pt alert and oriented throughout the day, intermittently complaining of nausea, states he doesnt feel well. Vitals have been stable aside from fever. Urine output adequate but on the low end, will continue to monitor.   Patient Care Overview   Progress no change       Goal: Adult Individualization and Mutuality  Outcome: Ongoing (interventions implemented as appropriate)  Goal: Discharge Needs Assessment  Outcome: Ongoing (interventions implemented as appropriate)    Problem: Infection, Risk/Actual (Adult)  Goal: Infection Prevention/Resolution  Outcome: Ongoing (interventions implemented as appropriate)    Problem: Fall Risk (Adult)  Goal: Absence of Falls  Outcome: Ongoing (interventions implemented as appropriate)    Problem: Urine Elimination, Impaired (Adult)  Goal: Effective Urinary Elimination  Outcome: Ongoing (interventions implemented as appropriate)  Goal: Effective Containment of Urine  Outcome: Ongoing (interventions implemented as appropriate)  Goal: Reduced Incontinence Episodes  Outcome: Ongoing (interventions implemented as appropriate)

## 2017-10-03 NOTE — PROGRESS NOTES
Tahir Simon MD                         [phone]      Patient ID:    Name:  Aakash Sanchez Jr.    MRN:  [id number]    1935   82 y.o.  male            CC/Reason for visit: Lung nodule f/u    Subjective: Pt seen and examined in the am. No acute overnight events. Much more awake and interactive. Son says that he is at baseline. Appears tired but AAO X 3.    LOS: 2    ROS: Review of Systems   Denies any cough,fevers, chills, SOA, CP.    Objective     Vital Signs past 24hrs  BP range: BP: (104-140)/(58-81) 104/58  Pulse range: Heart Rate:  [] 83  Resp rate range: Resp:  [16] 16  Temp range: Temp (24hrs), Av.8 °F (37.7 °C), Min:97.9 °F (36.6 °C), Max:101.8 °F (38.8 °C)    O2 Device: room air     187 lb 14.4 oz (85.2 kg); Body mass index is 26.96 kg/(m^2).    Intake/Output Summary (Last 24 hours) at 10/03/17 1839  Last data filed at 10/03/17 1300   Gross per 24 hour   Intake             1877 ml   Output             1000 ml   Net              877 ml       Exam:  GEN:  Obese WM in no acute distress. No focal deficits. AAO X 3.  EYES:   EOM-i, anicteric sclera bilat  ENT:    External ears/nose normal, OP clear  NECK:  Supple, midline trachea, No cervical LApathy  LUNGS: Bilateral breath sounds heard, No adventitious sounds  CV:  Regular rate and rhythm, No murmur  ABD:  Non tender, Obese, no palpable liver or masses  EXT:  No cyanosis or clubbing, no peripheral/sacral edema    Scheduled meds:    amiodarone 200 mg Oral Q12H   atorvastatin 40 mg Oral Nightly   clopidogrel 75 mg Oral Daily   digoxin 125 mcg Oral Every Other Day   fluconazole 200 mg Intravenous Daily   isosorbide mononitrate 15 mg Oral Daily   lactobacillus acidophilus 1 capsule Oral Daily   metoprolol tartrate 100 mg Oral Q12H   pantoprazole 40 mg Oral Daily     IV meds:                           Data Review:        Results from last 7 days  Lab Units 10/03/17  0342 10/02/17  0516 10/01/17  0603  09/30/17  2252   SODIUM mmol/L 138 137 141 141   POTASSIUM mmol/L 3.4* 3.6 3.5 4.1   CHLORIDE mmol/L 103 103 105 100   CO2 mmol/L 19.7* 18.3* 19.9* 23.8   BUN mg/dL 15 16 17 18   CREATININE mg/dL 2.14* 2.08* 1.81* 2.28*   CALCIUM mg/dL 8.1* 7.8* 7.9* 8.9   BILIRUBIN mg/dL  --   --   --  1.0   ALK PHOS U/L  --   --   --  73   ALT (SGPT) U/L  --   --   --  12   AST (SGOT) U/L  --   --   --  16   GLUCOSE mg/dL 144* 122* 134* 130*   WBC 10*3/mm3 6.27 7.33 8.06 8.98   HEMOGLOBIN g/dL 10.9* 11.1* 11.6* 12.7*   PLATELETS 10*3/mm3 189 172 202 221   INR  2.95* 2.58* 2.53* 2.58*   PROCALCITONIN ng/mL  --   --  0.12  --        Lab Results   Component Value Date    CALCIUM 8.1 (L) 10/03/2017    PHOS 2.1 (L) 10/03/2017         Results from last 7 days  Lab Units 10/02/17  1319 10/01/17  0134 10/01/17  0100 09/30/17  2253   BLOODCX   --  No growth at 2 days No growth at 2 days  --    URINECX    50,000 CFU/mL Yeast, Not Candida albicans*  --   --    25,000 CFU/mL Yeast, Not Candida albicans*                       Estimated Creatinine Clearance: 33.2 mL/min (by C-G formula based on Cr of 2.14).    Results Review:    I have reviewed the laboratory and imaging data since the last note by [facility] physician.                    IMPRESSION:  1. 3.8 cm solid-appearing lung mass anterior-inferior left upper lobe  suspicious for neoplasm. Tissue sampling recommended.  2. 0.6 cm right upper lobe pulmonary nodule. 1.9 cm right middle lobe  groundglass nodule.  3. Small left pleural effusion.  4. Cardiomegaly. Mild splenomegaly.      ECHO 6/5/17  Interpretation Summary   · Left ventricular systolic function is moderately decreased. Calculated EF = 26.9%. Estimated EF was in disagreement with the calculated EF. Estimated EF = 35%. The left ventricular cavity is borderline dilated. Left ventricular wall thickness is consistent with moderate concentric hypertrophy. Left ventricular diastolic dysfunction is noted (grade I) consistent with impaired  relaxation.  · The following segments are akinetic: basal inferolateral. The following segments are hypokinetic: basal inferior, mid inferior, mid inferolateral and apical lateral. The following segments are normal: basal anterior, basal anteroseptal, basal inferoseptal, mid anterior, mid anteroseptal, mid inferoseptal, apical anterior, apical septal, apical inferior and apex. Other segments could not be evaluated.  · Left atrial volume is severely increased. Saline test results are negative.  · Right atrial cavity size is moderate-to-severely dilated.  · There is mild-to-moderate thickening of the aortic valve.  · Mild mitral annular calcification is present. Mild mitral valve regurgitation is present.  · Mild tricuspid valve regurgitation is present. Estimated right ventricular systolic pressure from tricuspid regurgitation is normal (<35 mmHg).             Assessment:  3.8cm JT lung mass  6 mm right upper lobe lung nodule  1.9 cm right middle lobe groundglass nodule  Toxic metabolic encephalopathy - resolved ? sepsis  History of recurrent pneumonia   H/o ? BOOP with prolonged steroid taper  History of AAA status post stent  CAD s/p PCI/ 2V CABG(1997)  Now on DAPT   Chronic systolic and diastolic heart failure s/p recent CRT-D (9/17)   CKD   Afib on AC   COPD not in exac     Recommendations:  Incidental left upper lobe lung mass noted on the CAT scan today are concerning for primary lung cancer given the history of smoking.  While the patient has history of prostate cancer I suspect this could be a primary lung cancer.  His right middle lobe lung nodule has been stable since 2014.  No clear evidence of this nodule noted on the CAT scan 01/17 but he had significant abnormality in that lung at that point making it difficult to delineate this mass from the surrounding infiltrates.     In any case this nodule needs to be biopsied.  I did not see any significant lymphadenopathy.  We could offer the patient navigation  bronchoscopy if we do not find any other source of sampling.  CT abdomen pelvis does not show any evidence of metastatic disease.  MRI of the brain cannot be done because of the devices being not compatible.  CT head is neg. TME resolved now. ?r/t UTI.      Patient is on long-term anticoagulation will which will need to be held/bridged based on cards recs.  He is also on your Plavix which needs to be held for at least 5 days.     I spoke to the son in detail and answered all questions.  Will follow along with you.    Tahir Simon MD  10/3/2017

## 2017-10-03 NOTE — PLAN OF CARE
Problem: Patient Care Overview (Adult)  Goal: Plan of Care Review  Outcome: Ongoing (interventions implemented as appropriate)    10/03/17 0655   Coping/Psychosocial Response Interventions   Plan Of Care Reviewed With patient   Outcome Evaluation   Outcome Summary/Follow up Plan pt was alert to self only and restless, 1 dose of ativan given per order, bacame alert and oriented around 5am, c/o dizziness; had a fever and gave 1 dose of tylenol, temp went down;clear and orange urine, stat lock changed; continue to monitor   Patient Care Overview   Progress no change       Goal: Adult Individualization and Mutuality  Outcome: Ongoing (interventions implemented as appropriate)  Goal: Discharge Needs Assessment  Outcome: Ongoing (interventions implemented as appropriate)    Problem: Infection, Risk/Actual (Adult)  Goal: Infection Prevention/Resolution  Outcome: Ongoing (interventions implemented as appropriate)    Problem: Fall Risk (Adult)  Goal: Absence of Falls  Outcome: Ongoing (interventions implemented as appropriate)    Problem: Urine Elimination, Impaired (Adult)  Goal: Effective Urinary Elimination  Outcome: Ongoing (interventions implemented as appropriate)  Goal: Effective Containment of Urine  Outcome: Ongoing (interventions implemented as appropriate)  Goal: Reduced Incontinence Episodes  Outcome: Ongoing (interventions implemented as appropriate)

## 2017-10-03 NOTE — PROGRESS NOTES
LOS: 2 days   Patient Care Team:  Dimitrios Lindquist MD as PCP - General (Internal Medicine)    Chief Complaint:     F/u arrhythmia    Interval History:     He is Not confused today.  He says he is very thirsty and his abdomen feels very full with a lot of pressure.  He is mildly short winded.  She has no chest pain.  He's had no coughing.  He did have some diarrhea earlier.    Objective   Vital Signs  Temp:  [97.9 °F (36.6 °C)-101.8 °F (38.8 °C)] 97.9 °F (36.6 °C)  Heart Rate:  [] 84  Resp:  [16] 16  BP: (112-140)/(58-81) 112/58    Intake/Output Summary (Last 24 hours) at 10/03/17 1031  Last data filed at 10/03/17 0617   Gross per 24 hour   Intake              480 ml   Output              950 ml   Net             -470 ml       Comfortable NAD  Neck supple, no JVD or thyromegaly appreciated  S1/S2 irregular, no m/r/g  Lungs CTA B, normal effort  Abdomen distended and tender (+) BS, no HSM appreciated  Extremities warm, no clubbing, cyanosis, or edema  No visible or palpable skin lesions  A/Ox4, mood and affect appropriate    Results Review:        Results from last 7 days  Lab Units 10/03/17  0342 10/02/17  0516 10/01/17  0603   SODIUM mmol/L 138 137 141   POTASSIUM mmol/L 3.4* 3.6 3.5   CHLORIDE mmol/L 103 103 105   CO2 mmol/L 19.7* 18.3* 19.9*   BUN mg/dL 15 16 17   CREATININE mg/dL 2.14* 2.08* 1.81*   GLUCOSE mg/dL 144* 122* 134*   CALCIUM mg/dL 8.1* 7.8* 7.9*           Results from last 7 days  Lab Units 10/03/17  0342 10/02/17  0516 10/01/17  0603   WBC 10*3/mm3 6.27 7.33 8.06   HEMOGLOBIN g/dL 10.9* 11.1* 11.6*   HEMATOCRIT % 35.0* 35.9* 38.1*   PLATELETS 10*3/mm3 189 172 202       Results from last 7 days  Lab Units 10/03/17  0342 10/02/17  0516 10/01/17  0603   INR  2.95* 2.58* 2.53*           Results from last 7 days  Lab Units 10/03/17  0342   MAGNESIUM mg/dL 1.9           I reviewed the patient's new clinical results.  I personally viewed and interpreted the patient's EKG/Telemetry data         Medication Review:     amiodarone 300 mg Oral Q12H   atorvastatin 40 mg Oral Nightly   clopidogrel 75 mg Oral Daily   digoxin 125 mcg Oral Every Other Day   isosorbide mononitrate 15 mg Oral Daily   lactobacillus acidophilus 1 capsule Oral Daily   metoprolol tartrate 100 mg Oral Q12H   pantoprazole 40 mg Oral Daily   piperacillin-tazobactam 3.375 g Intravenous Q8H   Vancomycin Pharmacy Intermittent Dosing  Does not apply Daily         Pharmacy to dose vancomycin     Pharmacy to Dose Zosyn     sodium chloride 50 mL/hr Last Rate: 50 mL/hr (10/02/17 1942)       Assessment/Plan     Principal Problem:    UTI (urinary tract infection)  Active Problems:    PAF (paroxysmal atrial fibrillation)    COPD (chronic obstructive pulmonary disease)    Type 2 diabetes mellitus with complication    Abdominal aortic aneurysm (AAA) without rupture    Long term current use of anticoagulant    Chronic combined systolic and diastolic congestive heart failure    Abnormal CXR    CKD (chronic kidney disease), stage III    PAU (acute kidney injury)    Arteriosclerotic coronary artery disease    Metabolic encephalopathy    Acidosis    Wide-complex tachycardia    Mass of upper lobe of left lung       1.  yeast UTI, on treatment  2.  NSVT -- known history of severe CAD and ischemic cardiomyopathy (EF 30-35%), so some NSVT is to be expected, but he's having even more in the setting of acute illness.  He really has frequent salvos of this.  He's on a good dose of metoprolol, and oral amiodarone was added yesterday.Pacemaker normal function per Dr. Terrazas.  He is also have bursts of a fib with aberrancy.   3.  PAF -- currently not an issue.  He's on warfarin with therapeutic inr.   4.  CAD with chronic angina -- no issue overnight. He's on clopidogrel but not aspirin as he's anticoagulated.  5.  ICM / chronic systolic CHF -- lying flat, no oxygen requirement.  Resume oral diuretic when appropriate.  6. Lung mass, head ct negative for met  disease.  Pulmonary would like to biopsy as concern for cancer.   7. Abdominal pain with abdominal fullness.  Spoke with nurse Nriu about this.  She will call medicine service.  CT abd and pelvis was unremarkable yesterday.     Warfarin on hold for possible biopsy, check inr in am.     Carolyn Ramirez MD  10/03/17  10:31 AM

## 2017-10-03 NOTE — TREATMENT PLAN
He is more alert and responsive  He has fever     He has normal vital signs other than fever   He is warm now  His site is perfect -- not one bit of erythema or edema or tenderness. None.    His wbc count is normal     His procal - is normal     His blood cultures are no growth    Why does he have fever>     Is it bacteremia from the device -- it's possible but I doubt it. Because it would be hard to believe he has lead infection in the acute phase without any signs of local infection. Lead infections can occur without pocket infection, but these are not usually in the acute phase.     I would do more blood cultures and I search harder for signs of infection.     ? C diff or CNS infection

## 2017-10-03 NOTE — PROGRESS NOTES
"Pharmacy to dose Vancomycin and Zosyn    Day 3  Consult for Dr Pate  Treating: sepsis    Current Vancomycin dose pulse based on levels  Current Zosyn dose 3.375gm Q8h    Lab Results   Component Value Date    VANCORANDOM 14.30 10/03/2017     IV Anti-Infectives     Ordered     Dose/Rate Route Frequency Start Stop    10/02/17 0900  vancomycin 750 mg/250 mL 0.9% NS add-vantage     Ordering Provider:  Jeramie Pate MD    750 mg  over 75 Minutes Intravenous Once 10/02/17 1200 10/02/17 1307    10/01/17 1539  vancomycin 750 mg/250 mL 0.9% NS add-vantage     Ordering Provider:  Jeramie Pate MD    750 mg  over 60 Minutes Intravenous Once 10/01/17 1700 10/01/17 2004    10/01/17 1039  piperacillin-tazobactam (ZOSYN) 3.375 g in iso-osmotic dextrose 50 ml (premix)     Ordering Provider:  Jeramie Pate MD    3.375 g  over 4 Hours Intravenous Every 8 Hours 10/01/17 1400      10/01/17 1037  Vancomycin Pharmacy Intermittent Dosing     Ordering Provider:  Jeramie Pate MD     Does not apply Daily 10/01/17 1115      10/01/17 0449  piperacillin-tazobactam (ZOSYN) 3.375 g in iso-osmotic dextrose 50 ml (premix)     Ordering Provider:  Jeramie Pate MD    3.375 g  over 30 Minutes Intravenous Once 10/01/17 0600 10/01/17 0616    10/01/17 0443  Pharmacy to dose vancomycin     Ordering Provider:  Jeramie aPte MD     Does not apply Continuous PRN 10/01/17 0442      10/01/17 0052  vancomycin 1750 mg/500 mL 0.9% NS IVPB ([facility])     Ordering Provider:  Qamar Thrasher MD    20 mg/kg × 81.6 kg Intravenous Once 10/01/17 0054 10/01/17 0140           Relevant clinical data and objective history reviewed:  82 y.o. male 70\" (177.8 cm) 187 lb 14.4 oz (85.2 kg)    Lab Results   Component Value Date    CREATININE 2.14 (H) 10/03/2017    CREATININE 2.08 (H) 10/02/2017    CREATININE 1.81 (H) 10/01/2017    CREATININE 1.70 (H) 06/04/2017    CREATININE 0.70 06/04/2017     Estimated Creatinine Clearance: 33.2 mL/min (by " C-G formula based on Cr of 2.14).    Lab Results   Component Value Date    WBC 6.27 10/03/2017    WBC 7.33 10/02/2017    WBC 8.06 10/01/2017     Temp Readings from Last 3 Encounters:   10/03/17 97.9 °F (36.6 °C) (Oral)   09/22/17 96.4 °F (35.8 °C) (Oral)   09/05/17 97.6 °F (36.4 °C)       Baseline cultures:    9/30 urine cx 25K yeast  10/1 blood cx pending  10/2 resp panel neg  10/2 urine cx 50K yeast    Vancomycin Dosing History  10/1 @ 1904 750mg X one  10/2 @ 516 random level = 16.4          @ 1152 750mg X one  10/3 @ 11:34 random level = 14.3    PLAN: Cr continues to rise, will continue pulse dosing for now.  Will give 750mg X one again today and recheck random level in am.  Will continue to monitor renal function closely.    Yamilet RowanD, BCPS  10/03/17 9:28 AM

## 2017-10-03 NOTE — PROGRESS NOTES
Discharge Planning Assessment  [facility]     Patient Name: Aakash Sanchez Jr.  MRN: [id number]  Today's Date: 10/3/2017    Admit Date: 9/30/2017          Discharge Needs Assessment       10/03/17 1041    Living Environment    Lives With child(darrion), adult    Living Arrangements house    Type of Financial/Environmental Concern none    Living Environment    Provides Primary Care For no one    Primary Care Provided By spouse/significant other;child(darrion) (specify)    Quality Of Family Relationships supportive    Able to Return to Prior Living Arrangements yes    Discharge Needs Assessment    Concerns To Be Addressed discharge planning concerns    Readmission Within The Last 30 Days no previous admission in last 30 days    Outpatient/Agency/Support Group Needs homecare agency (specify level of care)   current with [facility]    Anticipated Changes Related to Illness none    Equipment Currently Used at Home cane, straight;commode;grab bar    Equipment Needed After Discharge none            Discharge Plan       10/03/17 1044    Case Management/Social Work Plan    Plan Home with [facility]    Patient/Family In Agreement With Plan yes    Additional Comments IMM verified. Met at bedside with pt and his daughter Norma [phone]. Pt lives at home with his wife. Pts daughter and son  Joseph [phone] help out as needed. Per Norma pt has a wc, cane, sh chair, rolling walker. He is current with [facility]. Rayne called. He has been to rehab at [facility] in the past but does not want to go to rehab again. His PCP is Dimitrios Lindquist and pharmacy is [facility] in [place]. They deny any problems affording or obtaining.Plan  is home with [facility]. CCP will follow....[facility]        Discharge Placement     No information found                Demographic Summary       10/03/17 1039    Referral Information    Admission Type inpatient    Arrived From admitted as an inpatient;home or self-care    Referral Source  admission list    Reason For Consult discharge planning    Record Reviewed clinical discipline documentation;history and physical;medical record    Primary Care Physician Information    Name Dimitrios Motley            Functional Status       10/03/17 1040    Functional Status Current    Ambulation 2-->assistive person    Transferring 2-->assistive person    Toileting 2-->assistive person    Bathing 2-->assistive person    Dressing 2-->assistive person    Eating 2-->assistive person    Communication 0-->understands/communicates without difficulty    Functional Status Prior    Ambulation 1-->assistive equipment    Transferring 1-->assistive equipment    Toileting 1-->assistive equipment    Bathing 1-->assistive equipment    Dressing 0-->independent    Eating 0-->independent    Communication 0-->understands/communicates without difficulty    Swallowing 0-->swallows foods/liquids without difficulty            Psychosocial     None            Abuse/Neglect     None            Legal     None            Substance Abuse     None            Patient Forms     None          Dariela Crabtree RN

## 2017-10-03 NOTE — PROGRESS NOTES
Name: Aakash Sanchez Jr. ADMIT: 2017   : 1935  PCP: Dimitrios Lindquist MD    MRN: [id number] LOS: 2 days   AGE/SEX: 82 y.o. male  ROOM: [facility]     Subjective   Subjective:  Symptoms:  No shortness of breath, cough, chest pain or diarrhea.  (Unchanged).    Diet:  Poor intake.  No nausea.    Activity level: Impaired due to weakness.    Pain:  He complains of pain that is mild.  Pain is partially controlled and requiring pain medication.    Still seems sickly.  He is oriented ×3 today.    Objective   Vital Signs  Temp:  [97.9 °F (36.6 °C)-101.8 °F (38.8 °C)] 99.6 °F (37.6 °C)  Heart Rate:  [] 85  Resp:  [16] 16  BP: (104-140)/(58-81) 104/58  SpO2:  [94 %-100 %] 95 %  on   ;   O2 Device: room air  Body mass index is 26.96 kg/(m^2).    Physical Exam   Constitutional: He is oriented to person, place, and time. He appears well-developed. He is cooperative. He has a sickly appearance. No distress.   Neck: No JVD present. No tracheal deviation present. No thyromegaly present.   Cardiovascular: Normal rate and regular rhythm.    No murmur heard.  Pulmonary/Chest: Effort normal and breath sounds normal. No respiratory distress.   Abdominal: Soft. Normal appearance and bowel sounds are normal. He exhibits no distension. There is no tenderness.   Neurological: He is alert and oriented to person, place, and time.   Skin: Skin is warm. No rash noted. He is diaphoretic.   Psychiatric: He has a normal mood and affect. His behavior is normal.   Nursing note and vitals reviewed.      Results Review:       I reviewed the patient's new clinical results.    Results from last 7 days  Lab Units 10/03/17  0342 10/02/17  0516 10/01/17  0603 17  2252   WBC 10*3/mm3 6.27 7.33 8.06 8.98   HEMOGLOBIN g/dL 10.9* 11.1* 11.6* 12.7*   PLATELETS 10*3/mm3 189 172 202 221       Results from last 7 days  Lab Units 10/03/17  0342 10/02/17  0516 10/01/17  0603 17  8642   SODIUM mmol/L 138 137 141 141   POTASSIUM mmol/L 3.4*  3.6 3.5 4.1   CHLORIDE mmol/L 103 103 105 100   CO2 mmol/L 19.7* 18.3* 19.9* 23.8   BUN mg/dL 15 16 17 18   CREATININE mg/dL 2.14* 2.08* 1.81* 2.28*   GLUCOSE mg/dL 144* 122* 134* 130*   ALBUMIN g/dL 2.90*  --   --  3.80   BILIRUBIN mg/dL  --   --   --  1.0   ALK PHOS U/L  --   --   --  73   AST (SGOT) U/L  --   --   --  16   ALT (SGPT) U/L  --   --   --  12   Estimated Creatinine Clearance: 33.2 mL/min (by C-G formula based on Cr of 2.14).    Results from last 7 days  Lab Units 10/03/17  0342 10/02/17  0516 10/01/17  0603 09/30/17  2252   CALCIUM mg/dL 8.1* 7.8* 7.9* 8.9   ALBUMIN g/dL 2.90*  --   --  3.80   MAGNESIUM mg/dL 1.9  --  1.9  --    PHOSPHORUS mg/dL 2.1*  --   --   --        Results from last 7 days  Lab Units 10/01/17  0603 09/30/17  2252   PROCALCITONIN ng/mL 0.12  --    LACTATE mmol/L  --  1.7       Results from last 7 days  Lab Units 10/03/17  0342 10/02/17  0516 10/01/17  0603 09/30/17  2252   PROTIME Seconds 29.8* 26.9* 26.4* 26.9*   INR  2.95* 2.58* 2.53* 2.58*       Results from last 7 days  Lab Units 10/02/17  1319 10/01/17  0134 10/01/17  0100 09/30/17  2253   BLOODCX   --  No growth at 2 days No growth at 2 days  --    URINECX    50,000 CFU/mL Yeast, Not Candida albicans*  --   --    25,000 CFU/mL Yeast, Not Candida albicans*   VRP negative      CRANIAL CT SCAN WITHOUT CONTRAST  10/2/2017  No acute intracranial abnormality.        CT SCAN ABDOMEN AND PELVIS WITHOUT CONTRAST  10/02/17  1. There is a tiny left pleural effusion which is new since the study of  08/01/2017. Again noted are several scattered areas of somewhat focal  groundglass opacity, particularly at the right base which are unchanged  and likely related to inflammatory or infectious change.  2. There is a very small pericardial effusion which is unchanged. The  liver, spleen, pancreas, and both adrenal glands are unremarkable  without intravenous contrast.  3. The kidneys are normal in size with some changes of cortical  atrophy.  There is a small intermediate density mass at the lower margin of the  left kidney likely representing a somewhat complex cyst and can be seen  more clearly as a cyst on the enhanced CT scan dating back to 2013.  There is a very tiny cortical cyst in the right kidney also unchanged.  4. There is an abdominal aortic aneurysm treated with a stent graft.  There is no retroperitoneal mass. The large and small bowel loops are  normal in caliber and show no inflammatory change. In the pelvis, there  are isotope seeds within the prostate for treatment of prostate cancer  and these appear unchanged. A Whaley catheter is present within the  urinary bladder which is somewhat decompressed and again noted is some  bladder wall thickening likely related to an element of outlet  obstruction as also noted on CT scans dating back to 2013.    CT CHEST WITHOUT CONTRAST 10/01/17  1. 3.8 cm solid-appearing lung mass anterior-inferior left upper lobe  suspicious for neoplasm. Tissue sampling recommended.  2. 0.6 cm right upper lobe pulmonary nodule. 1.9 cm right middle lobe  groundglass nodule.  3. Small left pleural effusion.  4. Cardiomegaly. Mild splenomegaly.       amiodarone 200 mg Oral Q12H   amiodarone 200 mg Oral Once   atorvastatin 40 mg Oral Nightly   clopidogrel 75 mg Oral Daily   digoxin 125 mcg Oral Every Other Day   isosorbide mononitrate 15 mg Oral Daily   lactobacillus acidophilus 1 capsule Oral Daily   metoprolol tartrate 100 mg Oral Q12H   pantoprazole 40 mg Oral Daily   piperacillin-tazobactam 3.375 g Intravenous Q8H   potassium chloride 40 mEq Oral Once   potassium phosphate 10 mmol Intravenous Once   vancomycin 750 mg Intravenous Once   Vancomycin Pharmacy Intermittent Dosing  Does not apply Daily       Pharmacy to dose vancomycin    Pharmacy to Dose Zosyn    Diet Regular; Cardiac      Assessment/Plan   Assessment:     Active Hospital Problems (** Indicates Principal Problem)    Diagnosis Date Noted   •  **UTI (urinary tract infection) [N39.0] 10/01/2017   • Mass of upper lobe of left lung [R91.8] 10/02/2017   • Abnormal CXR [R93.8] 10/01/2017   • CKD (chronic kidney disease), stage III [N18.3] 10/01/2017   • PAU (acute kidney injury) [N17.9] 10/01/2017   • Arteriosclerotic coronary artery disease [I25.10] 10/01/2017   • Metabolic encephalopathy [G93.41] 10/01/2017   • Acidosis [E87.2] 10/01/2017   • Wide-complex tachycardia [I47.2] 10/01/2017   • Chronic combined systolic and diastolic congestive heart failure [I50.42] 09/05/2017   • Long term current use of anticoagulant [Z79.01] 12/15/2016   • Abdominal aortic aneurysm (AAA) without rupture [I71.4] 12/15/2016   • Type 2 diabetes mellitus with complication [E11.8] 11/23/2016   • COPD (chronic obstructive pulmonary disease) [J44.9] 11/15/2016   • PAF (paroxysmal atrial fibrillation) [I48.0]       Resolved Hospital Problems    Diagnosis Date Noted Date Resolved   No resolved problems to display.       Plan:   · Admitted to monitored unit  · On VANCOMYCIN IV and ZOSYN being followed by Dr. Byrd of infectious disease.  ? Diflucan for urine.  · Off IVFs per renal.  · Blood cultures x2 done in ED negative at 2 days.  VRP negative. Will recheck BCx given persistent fevers.  · Cardiology has started him on amiodarone for wide-complex tachycardia.  Discussed with Dr. Terrazas.  No obvious signs of infection related to the implanted CRT device.  No obvious bloodstream infection.  · Nephrology following regarding renal failure.  Need to monitor closely given the use of Zosyn and vancomycin.    · Stool studies pending. Had large loose stool today.  Reorder C. difficile toxin.  · Pulmonology seeing regarding lung mass.  Could represent neoplasm.  HCT negative for mets. Continue to hold Coumadin for possible biopsy.  · Neurology to see.        Jamel Mendoza MD  [facility]  10/03/17  2:19 PM

## 2017-10-03 NOTE — CONSULTS
Patient Identification:  NAME:  Aakash Sanchez Jr.  Age:  82 y.o.   Sex:  male   :  1935   MRN:  [id number]       Chief complaint: He does not have one, reason for consult fever of unknown origin    History of present illness:  This patient is an 82-year-old right-handed white male with history of cardiomyopathy A. fib chronic kidney disease he is on Coumadin he has at least several day history of fever and chills he is had some associated symptoms of confusion and lethargy but according to all notes he is more alert and responsive today he is being treated with antibiotic therapy.  He still has a fever off and on but at this moment is currently chilling with a temperature of 98.7.  He denies any headache or any neck stiffness or any real change in his vision that is he has no focal neurologic complaints at all at this time.  At no [place] location is not pertinent duration is been at least several days context the patient who has abnormal lung mass possibly consistent with lung cancer versus a nodule.  That has not been fully worked up yet as best I can tell.  Again he denies headache at this time      Past medical history:  Past Medical History:   Diagnosis Date   • AAA (abdominal aortic aneurysm)     s/p repair, Dr. Felix   • Acute myocardial infarction    • Arteriosclerotic coronary artery disease    • Arthritis    • Atrial fibrillation    • Atrial fibrillation    • B12 deficiency 2017   • Cardiomyopathy    • Chicken pox    • Chronic combined systolic and diastolic congestive heart failure 2017   • Chronic systolic (congestive) heart failure    • CKD (chronic kidney disease), stage III    • COPD (chronic obstructive pulmonary disease) 11/15/2016   • Coronary artery disease     MI , CABG x 2 V (LIMA-LAD, SVG-PDA),    • Dyspnea    • Dyspnea on exertion 2017   • Fatigue    • Heart attack    • Hyperglycemia 3/29/2017   • Hyperlipidemia    • Hypertension    • Imbalance 3/29/2017   •  Insomnia 8/25/2017   • Ischemic cardiomyopathy     LVEF 40%   • Lung mass 04/18/2016    spiculated, being followed   • Lung nodule seen on imaging study    • Malignant neoplasm    • Measles    • Mumps    • PAF (paroxysmal atrial fibrillation)    • Pleural effusion    • Premature ventricular contractions    • Prostate cancer    • PVC (premature ventricular contraction)    • Rash of body 6/21/2017   • Stroke    • Thoracic aortic ectasia    • Urinary tract infection with hematuria 1/18/2017       Past surgical history:  Past Surgical History:   Procedure Laterality Date   • APPENDECTOMY     • ARTERIAL ANEURYSM REPAIR     • BRONCHOSCOPY Right 10/5/2016    Procedure: BRONCHOSCOPY;  Surgeon: Jhony Quintanilla MD;  Location: [facility] ENDOSCOPY;  Service:    • BRONCHOSCOPY N/A 11/29/2016    Procedure: BRONCHOSCOPY WITH FLUORO w/biopsy AND BAL;  Surgeon: Ervin Stiles MD;  Location: [facility] ENDOSCOPY;  Service:    • CARDIAC CATHETERIZATION N/A 6/7/2017    Procedure: Left Heart Cath;  Surgeon: Vincent Middleton MD;  Location: [facility] CATH INVASIVE LOCATION;  Service:    • CARDIAC CATHETERIZATION N/A 6/7/2017    Procedure: Coronary angiography;  Surgeon: Vincent Middleton MD;  Location: [facility] CATH INVASIVE LOCATION;  Service:    • CARDIAC CATHETERIZATION N/A 6/7/2017    Procedure: Left ventriculography;  Surgeon: Vincent Middleton MD;  Location: [facility] CATH INVASIVE LOCATION;  Service:    • CARDIAC CATHETERIZATION N/A 6/7/2017    Procedure: Bypass graft study;  Surgeon: Vincent Middleton MD;  Location: [facility] CATH INVASIVE LOCATION;  Service:    • CARDIAC CATHETERIZATION N/A 6/7/2017    Procedure: Native mammary injection;  Surgeon: Vincent Middleton MD;  Location: [facility] CATH INVASIVE LOCATION;  Service:    • CARDIAC CATHETERIZATION N/A 6/7/2017    Procedure: Stent NYA bypass graft;  Surgeon: Vincent Middleton MD;  Location: [facility] CATH INVASIVE LOCATION;  Service:    • CARDIAC DEFIBRILLATOR PLACEMENT     • CARDIAC ELECTROPHYSIOLOGY PROCEDURE  N/A 9/19/2017    Procedure: ICD SC new  CRT-D  MEDTRONIC;  Surgeon: Fady Terrazas MD;  Location: [facility] CATH INVASIVE LOCATION;  Service:    • CARDIAC SURGERY     • CHOLECYSTECTOMY     • COLONOSCOPY N/A 12/16/2016    Procedure: COLONOSCOPY TO ASC COLON  WITH BIOPSY AND POLYPECTOMY;  Surgeon: Lane Brian MD;  Location: [facility] ENDOSCOPY;  Service:    • COLONOSCOPY N/A 3/1/2017    multiple polyps, lipoma in proximal transverse colon/hepatic flexure, repeat in one year   • CORONARY ARTERY BYPASS GRAFT     • ENDOSCOPY N/A 3/1/2017    Concepcion's, HH, gastric mucosal atrophy   • INSERT / REPLACE / REMOVE PACEMAKER     • PROSTATE SURGERY         Allergies:  Review of patient's allergies indicates no known allergies.    Home medications:  Prescriptions Prior to Admission   Medication Sig Dispense Refill Last Dose   • finasteride (PROSCAR) 5 MG tablet Take 1 tablet by mouth Daily. 90 tablet 1 Taking   • furosemide (LASIX) 40 MG tablet Take 2 tablets by mouth Daily. 90 tablet 2 Taking   • isosorbide mononitrate (IMDUR) 30 MG 24 hr tablet Take 0.5 tablets by mouth Daily. 45 tablet 3 Taking   • metoprolol tartrate (LOPRESSOR) 100 MG tablet Take 1 tablet by mouth Every 12 (Twelve) Hours. 60 tablet 1 Taking   • pantoprazole (PROTONIX) 40 MG EC tablet Take 40 mg by mouth Daily.   Taking   • potassium chloride (K-TAB) 20 MEQ tablet controlled-release ER tablet Take 1 tablet by mouth Daily. 90 tablet 3 Taking   • Probiotic Product (PROBIOTIC PO) Take 1 tablet by mouth Daily.   Taking   • warfarin (COUMADIN) 4 MG tablet Take 1 tablet by mouth Daily. (Patient taking differently: Take 2 mg by mouth Daily.) 30 tablet 1 Taking   • atorvastatin (LIPITOR) 40 MG tablet Take 1 tablet by mouth Every Night. 30 tablet 6 Unknown at Unknown time   • clopidogrel (PLAVIX) 75 MG tablet Take 1 tablet by mouth Daily. 90 tablet 3 Unknown at Unknown time   • digoxin (LANOXIN) 125 MCG tablet Take 1 tablet by mouth Every Other Day. 15 tablet 1 Unknown  at Unknown time   • nitroglycerin (NITROSTAT) 0.4 MG SL tablet 1 under the tongue as needed for angina, may repeat q5mins for up three doses 35 tablet 0 Taking   • oxyCODONE-acetaminophen (PERCOCET) 5-325 MG per tablet Take 1 tablet by mouth Every 6 (Six) Hours As Needed.   Unknown at Unknown time   • warfarin (COUMADIN) 3 MG tablet TAKE ONE TABLET BY MOUTH DAILY 90 tablet 0 Taking        Hospital medications:    amiodarone 200 mg Oral Q12H   atorvastatin 40 mg Oral Nightly   clopidogrel 75 mg Oral Daily   digoxin 125 mcg Oral Every Other Day   isosorbide mononitrate 15 mg Oral Daily   lactobacillus acidophilus 1 capsule Oral Daily   metoprolol tartrate 100 mg Oral Q12H   pantoprazole 40 mg Oral Daily   piperacillin-tazobactam 3.375 g Intravenous Q8H   vancomycin 750 mg Intravenous Once   Vancomycin Pharmacy Intermittent Dosing  Does not apply Daily       Pharmacy to dose vancomycin    Pharmacy to Dose Zosyn      •  acetaminophen  •  LORazepam  •  nitroglycerin  •  ondansetron **OR** ondansetron ODT **OR** ondansetron  •  oxyCODONE-acetaminophen  •  Pharmacy to dose vancomycin  •  Pharmacy to Dose Zosyn  •  sodium chloride    Family history:  Family History   Problem Relation Age of Onset   • Cancer Mother    • Cancer Father    • COPD Father    • Arthritis Sister    • Diabetes Sister    • Hyperlipidemia Sister    • Arthritis Brother    • Diabetes Brother    • Stroke Brother    • Lung cancer Other    • Stroke Other    • No Known Problems Maternal Grandmother    • No Known Problems Maternal Grandfather    • No Known Problems Paternal Grandmother    • No Known Problems Paternal Grandfather        Social history:  Social History   Substance Use Topics   • Smoking status: Former Smoker     Packs/day: 1.00     Years: 25.00     Types: Cigarettes   • Smokeless tobacco: Former User     Quit date: 9/22/1985      Comment: caffeine use   • Alcohol use No       Review of systems:    He denies any headache any photophobia or any  neck stiffness he has no hair eyes ears nose throat skin bone joint  lymphatic hematologic or oncologic complaints no chest pain abdominal pain bowel bladder incontinence fever chills or rash    Objective:  Vitals Ranges:   Temp:  [97.9 °F (36.6 °C)-101.8 °F (38.8 °C)] 98.7 °F (37.1 °C)  Heart Rate:  [] 83  Resp:  [16] 16  BP: (104-140)/(58-81) 104/58      Physical Exam:  Awake alert he is showing right nail and textures 98.6 he does not have a temperature at this moment he denies headache and is awake alert and well oriented fund of knowledge fair attention span and concentration good recent and remote memory fair language function normal very young looking for his 82 years of age of awake alert and is oriented ×3 fund of knowledge good no clubbing cyanosis or edema visual acuity normal at 3 feet pupils to constricting to 1 one half normal disc retinas visual fields he couldn't follow extraocular movements full without nystagmus nasolabial folds palate tongue symmetrical his neck is very supple without pain upon flexion no temporal artery tenderness.  That's all the cranial nerves he could perform from a motor he's curled up in bed chilling but he moves all 4 extremities well no pronator drift atrophy fasciculations rigidity or bradykinesia reflexes 1+ symmetrical essentially absent toes downgoing bilaterally sensation normal light touch face both arms and both legs coordination normal in the upper extremity station and gait was not tested as he was uncomfortable and chilling heart regular without murmur neck is very supple without pain upon flexion and no bruits no clubbing cyanosis or edema    Results review:   I reviewed the patient's new clinical results.    Data review:  Lab Results (last 24 hours)     Procedure Component Value Units Date/Time    Blood Culture - Blood, [[id number]]  (Normal) Collected:  10/01/17 0100    Specimen:  Blood from Arm, Left Updated:  10/03/17 0116     Blood Culture No growth  at 2 days    Blood Culture - Blood, [[id number]]  (Normal) Collected:  10/01/17 0134    Specimen:  Blood from Arm, Left Updated:  10/03/17 0146     Blood Culture No growth at 2 days    CBC (No Diff) [[id number]]  (Abnormal) Collected:  10/03/17 0342    Specimen:  Blood Updated:  10/03/17 0422     WBC 6.27 10*3/mm3      RBC 4.34 (L) 10*6/mm3      Hemoglobin 10.9 (L) g/dL      Hematocrit 35.0 (L) %      MCV 80.6 fL      MCH 25.1 (L) pg      MCHC 31.1 (L) g/dL      RDW 16.1 (H) %      RDW-SD 47.3 fl      MPV 10.5 fL      Platelets 189 10*3/mm3     Protime-INR [[id number]]  (Abnormal) Collected:  10/03/17 0342    Specimen:  Blood Updated:  10/03/17 0430     Protime 29.8 (H) Seconds      INR 2.95 (H)    Renal Function Panel [[id number]]  (Abnormal) Collected:  10/03/17 0342    Specimen:  Blood Updated:  10/03/17 0445     Glucose 144 (H) mg/dL      BUN 15 mg/dL      Creatinine 2.14 (H) mg/dL      Sodium 138 mmol/L      Potassium 3.4 (L) mmol/L      Chloride 103 mmol/L      CO2 19.7 (L) mmol/L      Calcium 8.1 (L) mg/dL      Albumin 2.90 (L) g/dL      Phosphorus 2.1 (L) mg/dL      Anion Gap 15.3 mmol/L      BUN/Creatinine Ratio 7.0     eGFR Non African Amer 30 (L) mL/min/1.73     Narrative:       The MDRD GFR formula is only valid for adults with stable renal function between ages 18 and 70.    Magnesium [[id number]]  (Normal) Collected:  10/03/17 0342    Specimen:  Blood Updated:  10/03/17 0445     Magnesium 1.9 mg/dL     Vancomycin, Random [[id number]]  (Normal) Collected:  10/03/17 1134    Specimen:  Blood Updated:  10/03/17 1212     Vancomycin Random 14.30 mcg/mL     Urine Culture - Urine, Urine, Catheter [[id number]]  (Abnormal) Collected:  10/02/17 1319    Specimen:  Urine from Urine, Catheter Updated:  10/03/17 1249     Urine Culture --      50,000 CFU/mL Yeast, Not Candida albicans (A)           Imaging:  Imaging Results (last 24 hours)     Procedure Component Value Units Date/Time    CT Abdomen Pelvis Without Contrast  [[id number]] Collected:  10/02/17 1422     Updated:  10/02/17 1708    Narrative:       CT SCAN OF THE ABDOMEN AND PELVIS WITHOUT CONTRAST     HISTORY: Abdominal pain and fever and chills.     FINDINGS:  The CT scan was performed through the abdomen and pelvis  without contrast and demonstrates the followin. There is a tiny left pleural effusion which is new since the study of  2017. Again noted are several scattered areas of somewhat focal  groundglass opacity, particularly at the right base which are unchanged  and likely related to inflammatory or infectious change.  2. There is a very small pericardial effusion which is unchanged. The  liver, spleen, pancreas, and both adrenal glands are unremarkable  without intravenous contrast.  3. The kidneys are normal in size with some changes of cortical atrophy.  There is a small intermediate density mass at the lower margin of the  left kidney likely representing a somewhat complex cyst and can be seen  more clearly as a cyst on the enhanced CT scan dating back to .  There is a very tiny cortical cyst in the right kidney also unchanged.  4. There is an abdominal aortic aneurysm treated with a stent graft.  There is no retroperitoneal mass. The large and small bowel loops are  normal in caliber and show no inflammatory change. In the pelvis, there  are isotope seeds within the prostate for treatment of prostate cancer  and these appear unchanged. A Whaley catheter is present within the  urinary bladder which is somewhat decompressed and again noted is some  bladder wall thickening likely related to an element of outlet  obstruction as also noted on CT scans dating back to .     Radiation dose reduction techniques were utilized, including automated  exposure control and exposure modulation based on body size.     This report was finalized on 10/2/2017 5:05 PM by Dr. Isacc Baugh MD.       CT Head Without Contrast [[id number]] Collected:  10/02/17 2214      Updated:  10/02/17 2256    Narrative:       CRANIAL CT SCAN WITHOUT CONTRAST     CLINICAL HISTORY: AMS with lung mass . r/o mets; R50.9-Fever,  unspecified; R41.82-Altered mental status, unspecified     COMPARISON: 06/04/2017.     TECHNIQUE: Radiation dose reduction techniques were utilized, including  automated exposure control and exposure modulation based on body size.  Multiple axial images of the head were obtained without contrast.      FINDINGS:  There are no abnormal areas of increased density or mass  effect. There is diffuse atrophy. There are mild scattered areas of  decreased density in the white matter likely related to chronic ischemic  gliotic changes.   Ventricles, sulci, and cisterns appear normal. Bone  window images are unremarkable.     If there is high clinical suspicion of intracranial metastatic disease,  a contrasted study or preferably MRI examination would be far superior  to noncontrast CT technique.          Impression:       1. No acute intracranial abnormality.            This study was performed with techniques to keep radiation doses as low  as reasonably achievable (ALARA). Individualized dose reduction  techniques using automated exposure control or adjustment of mA and/or  kV according to the patient size were employed.      This report was finalized on 10/2/2017 10:53 PM by Ousmane Wayne MD.                Assessment and Plan:     Principal Problem:    UTI (urinary tract infection)  Active Problems:    PAF (paroxysmal atrial fibrillation)    COPD (chronic obstructive pulmonary disease)    Type 2 diabetes mellitus with complication    Abdominal aortic aneurysm (AAA) without rupture    Long term current use of anticoagulant    Chronic combined systolic and diastolic congestive heart failure    Abnormal CXR    CKD (chronic kidney disease), stage III    PAU (acute kidney injury)    Arteriosclerotic coronary artery disease    Metabolic encephalopathy    Acidosis    Wide-complex  tachycardia    Mass of upper lobe of left lung    This patient has had a metabolic encephalopathy that has improved dramatically as he is more awake and alert today.  Nonetheless he still has a fever with an unknown etiology.  The fact that he has no headache and no neck stiffness seems to go against a central nervous system source of fever.  Also he is on Coumadin and it is not going to be easy to simply send him for a lumbar puncture.  Therefore for now no lumbar puncture I will reassess him tomorrow and further recommendations will be made.  At any moment we can always reverse the Coumadin and go for the lumbar puncture but as of today I'm not quite ready for the LP.       Fady Driscoll MD  10/03/17  2:52 PM

## 2017-10-04 NOTE — PLAN OF CARE
Problem: Patient Care Overview (Adult)  Goal: Plan of Care Review  Outcome: Ongoing (interventions implemented as appropriate)    10/04/17 0512   Coping/Psychosocial Response Interventions   Plan Of Care Reviewed With patient   Outcome Evaluation   Outcome Summary/Follow up Plan Pt rested well this shift. Oriented x4. Still need stool sample for cdiff culture. VSS, will continue to monitor.    Patient Care Overview   Progress no change       Goal: Adult Individualization and Mutuality  Outcome: Ongoing (interventions implemented as appropriate)  Goal: Discharge Needs Assessment  Outcome: Ongoing (interventions implemented as appropriate)    Problem: Infection, Risk/Actual (Adult)  Goal: Infection Prevention/Resolution  Outcome: Ongoing (interventions implemented as appropriate)    Problem: Fall Risk (Adult)  Goal: Absence of Falls  Outcome: Ongoing (interventions implemented as appropriate)    Problem: Urine Elimination, Impaired (Adult)  Goal: Effective Urinary Elimination  Outcome: Ongoing (interventions implemented as appropriate)  Goal: Effective Containment of Urine  Outcome: Ongoing (interventions implemented as appropriate)  Goal: Reduced Incontinence Episodes  Outcome: Ongoing (interventions implemented as appropriate)    Problem: Pressure Ulcer Risk (Jayme Scale) (Adult,Obstetrics,Pediatric)  Goal: Identify Related Risk Factors and Signs and Symptoms  Outcome: Outcome(s) achieved Date Met:  10/04/17  Goal: Skin Integrity  Outcome: Ongoing (interventions implemented as appropriate)

## 2017-10-04 NOTE — PLAN OF CARE
Problem: Patient Care Overview (Adult)  Goal: Plan of Care Review  Outcome: Ongoing (interventions implemented as appropriate)    10/04/17 0396   Coping/Psychosocial Response Interventions   Plan Of Care Reviewed With patient   Outcome Evaluation   Outcome Summary/Follow up Plan plan for bronch to biopsy lung mass soon, AMS improving patient was oriented today, still need stool for cdiff cx, up to chair today, PT consult, vss, will continue to monitor    Patient Care Overview   Progress no change       Goal: Adult Individualization and Mutuality  Outcome: Ongoing (interventions implemented as appropriate)  Goal: Discharge Needs Assessment  Outcome: Ongoing (interventions implemented as appropriate)    Problem: Infection, Risk/Actual (Adult)  Goal: Infection Prevention/Resolution  Outcome: Ongoing (interventions implemented as appropriate)    Problem: Fall Risk (Adult)  Goal: Absence of Falls  Outcome: Ongoing (interventions implemented as appropriate)    Problem: Urine Elimination, Impaired (Adult)  Goal: Effective Urinary Elimination  Outcome: Ongoing (interventions implemented as appropriate)  Goal: Effective Containment of Urine  Outcome: Ongoing (interventions implemented as appropriate)  Goal: Reduced Incontinence Episodes  Outcome: Ongoing (interventions implemented as appropriate)    Problem: Pressure Ulcer Risk (Jayme Scale) (Adult,Obstetrics,Pediatric)  Goal: Skin Integrity  Outcome: Ongoing (interventions implemented as appropriate)

## 2017-10-04 NOTE — PROGRESS NOTES
LOS: 3 days   Patient Care Team:  Dimitrios Lindquist MD as PCP - General (Internal Medicine)    Chief Complaint:   f/u a fib and pacer    Interval History:   Fever yesterday afternoon still.  He has no chest pain or breathing difficulty.  His abdomen still feels tight.  He's had no diarrhea since yesterday.  He does feel weak and rather groggy at times. He doesn't feels heart racing or skipping.  He has no nausea.  He has no dizziness.    Objective   Vital Signs  Temp:  [98.1 °F (36.7 °C)-101.8 °F (38.8 °C)] 98.1 °F (36.7 °C)  Heart Rate:  [83-89] 89  Resp:  [16-18] 18  BP: (104-107)/(56-91) 104/91    Intake/Output Summary (Last 24 hours) at 10/04/17 0805  Last data filed at 10/04/17 0600   Gross per 24 hour   Intake             1877 ml   Output              750 ml   Net             1127 ml       Comfortable NAD  Neck supple, no JVD or thyromegaly appreciated  S1/S2 RRR, no m/r/g  Lungs CTA B, normal effort  Abdomen S/NT/distended (+) BS, no HSM appreciated  Extremities warm, no clubbing, cyanosis, or edema  No visible or palpable skin lesions  A/Ox4, mood and affect appropriate    Results Review:        Results from last 7 days  Lab Units 10/04/17  0404 10/03/17  0342 10/02/17  0516   SODIUM mmol/L 138 138 137   POTASSIUM mmol/L 4.0 3.4* 3.6   CHLORIDE mmol/L 106 103 103   CO2 mmol/L 20.9* 19.7* 18.3*   BUN mg/dL 15 15 16   CREATININE mg/dL 2.12* 2.14* 2.08*   GLUCOSE mg/dL 148* 144* 122*   CALCIUM mg/dL 8.3* 8.1* 7.8*           Results from last 7 days  Lab Units 10/04/17  0404 10/03/17  0342 10/02/17  0516   WBC 10*3/mm3 5.53 6.27 7.33   HEMOGLOBIN g/dL 10.7* 10.9* 11.1*   HEMATOCRIT % 35.2* 35.0* 35.9*   PLATELETS 10*3/mm3 182 189 172       Results from last 7 days  Lab Units 10/04/17  0404 10/03/17  0342 10/02/17  0516   INR  3.28* 2.95* 2.58*           Results from last 7 days  Lab Units 10/04/17  0404   MAGNESIUM mg/dL 1.9           I reviewed the patient's new clinical results.  I personally viewed and  interpreted the patient's EKG/Telemetry data        Medication Review:     amiodarone 200 mg Oral Q12H   atorvastatin 40 mg Oral Nightly   clopidogrel 75 mg Oral Daily   digoxin 125 mcg Oral Every Other Day   fluconazole 200 mg Intravenous Daily   isosorbide mononitrate 15 mg Oral Daily   lactobacillus acidophilus 1 capsule Oral Daily   metoprolol tartrate 100 mg Oral Q12H   pantoprazole 40 mg Oral Daily            Assessment/Plan     Principal Problem:    UTI (urinary tract infection)  Active Problems:    PAF (paroxysmal atrial fibrillation)    COPD (chronic obstructive pulmonary disease)    Type 2 diabetes mellitus with complication    Abdominal aortic aneurysm (AAA) without rupture    Long term current use of anticoagulant    Chronic combined systolic and diastolic congestive heart failure    Abnormal CXR    CKD (chronic kidney disease), stage III    PAU (acute kidney injury)    Arteriosclerotic coronary artery disease    Metabolic encephalopathy    Acidosis    Wide-complex tachycardia    Mass of upper lobe of left lung    1.  candida UTI, on diflucan  2.  NSVT -- known history of severe CAD and ischemic cardiomyopathy (EF 30-35%), so some NSVT is to be expected, but he's having even more in the setting of acute illness.  He really has frequent salvos of this.  He's on a good dose of metoprolol, and oral amiodarone was added yesterday. Biv/AICD with normal function per Dr. Terrazas.  He is also have bursts of a fib with aberrancy.   3.  PAF -- currently not an issue.  He's been on warfarin with therapeutic inr. Held now.   4.  CAD with chronic angina -- no issue overnight. He's on clopidogrel but not aspirin as he's anticoagulated.  5.  ICM / chronic systolic CHF -- lying flat, no oxygen requirement.  Resume oral diuretic when appropriate.  6. Lung mass, head ct negative for met disease.  Pulmonary would like to biopsy as concern for cancer.   7. Abdominal pain with abdominal fullness. CT abd and pelvis was  unremarkable yesterday. c diff pending.   8. Fever - blood culture negative for 24 hours and 3 days, candida in urine.  No elevated WBC.       Is plan for biopsy? Stopped plavix today, INR is high.      Carolyn Ramirez MD  10/04/17  8:05 AM

## 2017-10-04 NOTE — PROGRESS NOTES
NEPHROLOGY PROGRESS NOTE    PATIENT IDENTIFICATION:   Name:  Aakash Sanchez Jr.      MRN:  [id number]     82 y.o.  male             Reason for visit: PAU on CKD3    SUBJECTIVE:  Shaking chills today; not eating; denies SOB on RA    OBJECTIVE:  Vitals:    10/04/17 0832 10/04/17 1256 10/04/17 1305 10/04/17 1415   BP: 124/57  128/70 130/75   BP Location:   Right arm Right arm   Patient Position:   Lying Sitting   Pulse: 104      Resp:   16 16   Temp:  98.4 °F (36.9 °C) 98.3 °F (36.8 °C) 100.2 °F (37.9 °C)   TempSrc:  Oral Oral Oral   SpO2:   95% 97%   Weight:       Height:               Body mass index is 27.74 kg/(m^2).    Intake/Output Summary (Last 24 hours) at 10/04/17 1740  Last data filed at 10/04/17 1305   Gross per 24 hour   Intake              360 ml   Output              400 ml   Net              -40 ml     Wt Readings from Last 1 Encounters:   10/04/17 0600 193 lb 4.8 oz (87.7 kg)   10/03/17 0617 187 lb 14.4 oz (85.2 kg)   10/02/17 0549 183 lb 5 oz (83.2 kg)   09/30/17 2211 180 lb (81.6 kg)     Wt Readings from Last 3 Encounters:   10/04/17 193 lb 4.8 oz (87.7 kg)   09/28/17 184 lb (83.5 kg)   09/26/17 181 lb (82.1 kg)         Exam:  NAD but shaking chills; remains oriented; pleasant; appropriate; looks stated age  No scleral icterus  Dry MM  No JVD; no carotid bruits  Coarse but mostly clear; not labored  RR, tachy, no rub  Soft, NT, +D, BS+  No significant edema  No clubbing  No asterixis  Moves all extremities   Skin turgor seems fine; no tenting.  No rash    Scheduled meds:      amiodarone 200 mg Oral Q12H   atorvastatin 40 mg Oral Nightly   digoxin 125 mcg Oral Every Other Day   fluconazole 200 mg Intravenous Daily   isosorbide mononitrate 15 mg Oral Daily   lactobacillus acidophilus 1 capsule Oral Daily   metoprolol tartrate 100 mg Oral Q12H   pantoprazole 40 mg Oral Daily     IV meds:                             Data Review:      Results from last 7 days  Lab Units 10/04/17  0404 10/03/17  0342  10/02/17  0516  09/30/17  2252   SODIUM mmol/L 138 138 137  < > 141   POTASSIUM mmol/L 4.0 3.4* 3.6  < > 4.1   CHLORIDE mmol/L 106 103 103  < > 100   CO2 mmol/L 20.9* 19.7* 18.3*  < > 23.8   BUN mg/dL 15 15 16  < > 18   CREATININE mg/dL 2.12* 2.14* 2.08*  < > 2.28*   CALCIUM mg/dL 8.3* 8.1* 7.8*  < > 8.9   BILIRUBIN mg/dL  --   --   --   --  1.0   ALK PHOS U/L  --   --   --   --  73   ALT (SGPT) U/L  --   --   --   --  12   AST (SGOT) U/L  --   --   --   --  16   GLUCOSE mg/dL 148* 144* 122*  < > 130*   < > = values in this interval not displayed.  Estimated Creatinine Clearance: 31 mL/min (by C-G formula based on Cr of 2.12).        Results from last 7 days  Lab Units 10/04/17  0404 10/03/17  0342 10/01/17  0603   MAGNESIUM mg/dL 1.9 1.9 1.9   PHOSPHORUS mg/dL 2.5 2.1*  --          Results from last 7 days  Lab Units 10/04/17  0404 10/03/17  0342 10/02/17  0516 10/01/17  0603 09/30/17  2252   WBC 10*3/mm3 5.53 6.27 7.33 8.06 8.98   HEMOGLOBIN g/dL 10.7* 10.9* 11.1* 11.6* 12.7*   PLATELETS 10*3/mm3 182 189 172 202 221         Results from last 7 days  Lab Units 10/04/17  0404 10/03/17  0342 10/02/17  0516 10/01/17  0603 09/30/17  2252   INR  3.28* 2.95* 2.58* 2.53* 2.58*             ASSESSMENT:   Principal Problem:    UTI (urinary tract infection)  Active Problems:    PAF (paroxysmal atrial fibrillation)    COPD (chronic obstructive pulmonary disease)    Type 2 diabetes mellitus with complication    Abdominal aortic aneurysm (AAA) without rupture    Long term current use of anticoagulant    Chronic combined systolic and diastolic congestive heart failure    Abnormal CXR    CKD (chronic kidney disease), stage III    PAU (acute kidney injury)    Arteriosclerotic coronary artery disease    Metabolic encephalopathy    Acidosis    Wide-complex tachycardia    Mass of upper lobe of left lung    1.  PAU on CKD3, non-oliguric, unchanged:  likely prerenal from poor PO on loop diuretic, modest hypotension.   Central vol prob  still bit low.  Lytes compensated   2.  TME, resolved  3.  Recent CV and PPM few weeks ago  4.  Fever and chils  5.  Urinary retention, with in-dwelling f/c for now  6.  Left lung mass worrisome for neoplasm      PLAN:  1.  Resume ivf at low rate with decline in PO intake  2.  Surveillance labs    Eliecer Brower MD  10/4/2017    5:40 PM

## 2017-10-04 NOTE — PROGRESS NOTES
Patient Identification:  NAME:  Aakash Sanchez Jr.  Age:  82 y.o.   Sex:  male   :  1935   MRN:  [id number]       Chief complaint: Fever of unknown origin    History of present illness:  He still denies any headache photophobia and there is no neck stiffness he still has some fevers and chills, his INR today is 3.28  Past medical history:  Past Medical History:   Diagnosis Date   • AAA (abdominal aortic aneurysm)     s/p repair, Dr. Felix   • Acute myocardial infarction    • Arteriosclerotic coronary artery disease    • Arthritis    • Atrial fibrillation    • Atrial fibrillation    • B12 deficiency 2017   • Cardiomyopathy    • Chicken pox    • Chronic combined systolic and diastolic congestive heart failure 2017   • Chronic systolic (congestive) heart failure    • CKD (chronic kidney disease), stage III    • COPD (chronic obstructive pulmonary disease) 11/15/2016   • Coronary artery disease     MI , CABG x 2 V (LIMA-LAD, SVG-PDA),    • Dyspnea    • Dyspnea on exertion 2017   • Fatigue    • Heart attack    • Hyperglycemia 3/29/2017   • Hyperlipidemia    • Hypertension    • Imbalance 3/29/2017   • Insomnia 2017   • Ischemic cardiomyopathy     LVEF 40%   • Lung mass 2016    spiculated, being followed   • Lung nodule seen on imaging study    • Malignant neoplasm    • Measles    • Mumps    • PAF (paroxysmal atrial fibrillation)    • Pleural effusion    • Premature ventricular contractions    • Prostate cancer    • PVC (premature ventricular contraction)    • Rash of body 2017   • Stroke    • Thoracic aortic ectasia    • Urinary tract infection with hematuria 2017       Allergies:  Review of patient's allergies indicates no known allergies.    Home medications:  Prescriptions Prior to Admission   Medication Sig Dispense Refill Last Dose   • finasteride (PROSCAR) 5 MG tablet Take 1 tablet by mouth Daily. 90 tablet 1 Taking   • furosemide (LASIX) 40 MG tablet Take 2  tablets by mouth Daily. 90 tablet 2 Taking   • isosorbide mononitrate (IMDUR) 30 MG 24 hr tablet Take 0.5 tablets by mouth Daily. 45 tablet 3 Taking   • metoprolol tartrate (LOPRESSOR) 100 MG tablet Take 1 tablet by mouth Every 12 (Twelve) Hours. 60 tablet 1 Taking   • pantoprazole (PROTONIX) 40 MG EC tablet Take 40 mg by mouth Daily.   Taking   • potassium chloride (K-TAB) 20 MEQ tablet controlled-release ER tablet Take 1 tablet by mouth Daily. 90 tablet 3 Taking   • Probiotic Product (PROBIOTIC PO) Take 1 tablet by mouth Daily.   Taking   • warfarin (COUMADIN) 4 MG tablet Take 1 tablet by mouth Daily. (Patient taking differently: Take 2 mg by mouth Daily.) 30 tablet 1 Taking   • atorvastatin (LIPITOR) 40 MG tablet Take 1 tablet by mouth Every Night. 30 tablet 6 Unknown at Unknown time   • clopidogrel (PLAVIX) 75 MG tablet Take 1 tablet by mouth Daily. 90 tablet 3 Unknown at Unknown time   • digoxin (LANOXIN) 125 MCG tablet Take 1 tablet by mouth Every Other Day. 15 tablet 1 Unknown at Unknown time   • nitroglycerin (NITROSTAT) 0.4 MG SL tablet 1 under the tongue as needed for angina, may repeat q5mins for up three doses 35 tablet 0 Taking   • oxyCODONE-acetaminophen (PERCOCET) 5-325 MG per tablet Take 1 tablet by mouth Every 6 (Six) Hours As Needed.   Unknown at Unknown time   • warfarin (COUMADIN) 3 MG tablet TAKE ONE TABLET BY MOUTH DAILY 90 tablet 0 Taking        Hospital medications:    amiodarone 200 mg Oral Q12H   atorvastatin 40 mg Oral Nightly   digoxin 125 mcg Oral Every Other Day   fluconazole 200 mg Intravenous Daily   isosorbide mononitrate 15 mg Oral Daily   lactobacillus acidophilus 1 capsule Oral Daily   metoprolol tartrate 100 mg Oral Q12H   pantoprazole 40 mg Oral Daily        •  acetaminophen  •  LORazepam  •  nitroglycerin  •  ondansetron **OR** ondansetron ODT **OR** ondansetron  •  oxyCODONE-acetaminophen  •  sodium chloride      Objective:  Vitals Ranges:   Temp:  [98.1 °F (36.7 °C)-101.8 °F  (38.8 °C)] 98.3 °F (36.8 °C)  Heart Rate:  [] 104  Resp:  [16-18] 16  BP: (104-128)/(56-91) 128/70      Physical Exam:  Patient is awake moderately alert he is able to converse with me he denies headache or photophobia pupils to constricting to 1 one half normal cranial nerves II through VII neck is very supple moves all 4 extremities well reflexes trace throughout symmetrical toes downgoing bilaterally    Results review:   I reviewed the patient's new clinical results.    Data review:  Lab Results (last 24 hours)     Procedure Component Value Units Date/Time    Blood Culture - Blood, [[id number]]  (Normal) Collected:  10/01/17 0100    Specimen:  Blood from Arm, Left Updated:  10/04/17 0117     Blood Culture No growth at 3 days    Blood Culture - Blood, [[id number]]  (Normal) Collected:  10/01/17 0134    Specimen:  Blood from Arm, Left Updated:  10/04/17 0146     Blood Culture No growth at 3 days    Lactic Acid, Plasma [[id number]]  (Normal) Collected:  10/04/17 0404    Specimen:  Blood Updated:  10/04/17 0425     Lactate 0.8 mmol/L     CBC (No Diff) [[id number]]  (Abnormal) Collected:  10/04/17 0404    Specimen:  Blood Updated:  10/04/17 0425     WBC 5.53 10*3/mm3      RBC 4.33 (L) 10*6/mm3      Hemoglobin 10.7 (L) g/dL      Hematocrit 35.2 (L) %      MCV 81.3 fL      MCH 24.7 (L) pg      MCHC 30.4 (L) g/dL      RDW 16.3 (H) %      RDW-SD 48.9 fl      MPV 10.8 fL      Platelets 182 10*3/mm3     Protime-INR [[id number]]  (Abnormal) Collected:  10/04/17 0404    Specimen:  Blood Updated:  10/04/17 0431     Protime 32.4 (H) Seconds      INR 3.28 (H)    Magnesium [[id number]]  (Normal) Collected:  10/04/17 0404    Specimen:  Blood Updated:  10/04/17 0442     Magnesium 1.9 mg/dL     Renal Function Panel [[id number]]  (Abnormal) Collected:  10/04/17 0404    Specimen:  Blood Updated:  10/04/17 0443     Glucose 148 (H) mg/dL      BUN 15 mg/dL      Creatinine 2.12 (H) mg/dL      Sodium 138 mmol/L      Potassium 4.0 mmol/L      " Chloride 106 mmol/L      CO2 20.9 (L) mmol/L      Calcium 8.3 (L) mg/dL      Albumin 2.90 (L) g/dL      Phosphorus 2.5 mg/dL      Anion Gap 11.1 mmol/L      BUN/Creatinine Ratio 7.1     eGFR Non African Amer 30 (L) mL/min/1.73     Narrative:       The MDRD GFR formula is only valid for adults with stable renal function between ages 18 and 70.    Vancomycin, Random [[id number]]  (Normal) Collected:  10/04/17 0404    Specimen:  Blood Updated:  10/04/17 0448     Vancomycin Random 11.30 mcg/mL     Procalcitonin [[id number]]  (Normal) Collected:  10/04/17 0404    Specimen:  Blood Updated:  10/04/17 0453     Procalcitonin 0.23 ng/mL     Narrative:       As a Marker for Sepsis (Non-Neonates):   1. <0.5 ng/mL represents a low risk of severe sepsis and/or septic shock.  1. >2 ng/mL represents a high risk of severe sepsis and/or septic shock.    As a Marker for Lower Respiratory Tract Infections that require antibiotic therapy:  PCT on Admission     Antibiotic Therapy             6-12 Hrs later  > 0.5                Strongly Recommended            >0.25 - <0.5         Recommended  0.1 - 0.25           Discouraged                   Remeasure/reassess PCT  <0.1                 Strongly Discouraged          Remeasure/reassess PCT      As 28 day mortality risk marker: \"Change in Procalcitonin Result\" (> 80 % or <=80 %) if Day 0 (or Day 1) and Day 4 values are available. Refer to http://www.[facility]-pct-calculator.com/   Change in PCT <=80 %   A decrease of PCT levels below or equal to 80 % defines a positive change in PCT test result representing a higher risk for 28-day all-cause mortality of patients diagnosed with severe sepsis or septic shock.  Change in PCT > 80 %   A decrease of PCT levels of more than 80 % defines a negative change in PCT result representing a lower risk for 28-day all-cause mortality of patients diagnosed with severe sepsis or septic shock.                Blood Culture - Blood, [[id number]]  (Normal) " Collected:  10/03/17 1830    Specimen:  Blood from Arm, Left Updated:  10/04/17 0731     Blood Culture No growth at less than 24 hours    Blood Culture - Blood, [[id number]]  (Normal) Collected:  10/03/17 1901    Specimen:  Blood from Arm, Left Updated:  10/04/17 0731     Blood Culture No growth at less than 24 hours    Urine Culture - Urine, Urine, Catheter [[id number]]  (Abnormal) Collected:  10/02/17 1319    Specimen:  Urine from Urine, Catheter Updated:  10/04/17 1048     Urine Culture --      50,000 CFU/mL Yeast, Not Candida albicans (A)    Narrative:       Yeast ID to follow           Imaging:  Imaging Results (last 24 hours)     Procedure Component Value Units Date/Time    XR Chest 1 View [[id number]] Collected:  09/30/17 2354     Updated:  10/04/17 0354    Narrative:       X-RAY CHEST 1 VIEW.     HISTORY: Fever.     COMPARISON: 09/19/2017.     FINDINGS:  Cardiomegaly, lung volumes are low. 4.7 cm opacity overlies the left  heart border, is a new finding but was also seen on the study of  09/09/2017, not seen on the study of 02/17/2016.     There is no consolidation or effusion.              Impression:       No acute findings. Abnormal 4.7 cm opacity along the left heart border  may be further evaluated by chest CT scan.     This report was finalized on 10/4/2017 3:50 AM by Dr. Neel Agudelo MD.                Assessment and Plan:     Principal Problem:    UTI (urinary tract infection)  Active Problems:    PAF (paroxysmal atrial fibrillation)    COPD (chronic obstructive pulmonary disease)    Type 2 diabetes mellitus with complication    Abdominal aortic aneurysm (AAA) without rupture    Long term current use of anticoagulant    Chronic combined systolic and diastolic congestive heart failure    Abnormal CXR    CKD (chronic kidney disease), stage III    PAU (acute kidney injury)    Arteriosclerotic coronary artery disease    Metabolic encephalopathy    Acidosis    Wide-complex tachycardia    Mass of upper lobe  of left lung    At this point, I am not in favor of a lumbar puncture unless infectious disease thinks we needed.  Recall this patient still has no headache no photophobia and no neck stiffness.  Certainly I would be a bit more likely to recommend a lumbar puncture if not for his anticoagulation.  I will continue to follow he is noted to have a left lung abnormality that is being worked up further      Fady Driscoll MD  10/04/17  1:54 PM

## 2017-10-04 NOTE — PROGRESS NOTES
Name: Aakash Sanchez Jr. ADMIT: 2017   : 1935  PCP: Dimitrios Lindquist MD    MRN: [id number] LOS: 3 days   AGE/SEX: 82 y.o. male  ROOM: [facility]     Subjective   Subjective:  Symptoms:  No shortness of breath, cough, chest pain or diarrhea.  (Unchanged).    Diet:  Poor intake.  No nausea.    Activity level: Impaired due to weakness.    Pain:  He complains of pain that is mild.  Pain is partially controlled and requiring pain medication.    Overall looks better.    Objective   Vital Signs  Temp:  [98.1 °F (36.7 °C)-101.8 °F (38.8 °C)] 100.2 °F (37.9 °C)  Heart Rate:  [] 104  Resp:  [16-18] 16  BP: (104-130)/(56-91) 130/75  SpO2:  [95 %-100 %] 97 %  on   ;   O2 Device: room air  Body mass index is 27.74 kg/(m^2).    Physical Exam   Constitutional: He is oriented to person, place, and time. He appears well-developed. He is cooperative. He has a sickly appearance. No distress.   Neck: No JVD present. No tracheal deviation present. No thyromegaly present.   Cardiovascular: Normal rate and regular rhythm.    No murmur heard.  Pulmonary/Chest: Effort normal and breath sounds normal. No respiratory distress.   Abdominal: Soft. Normal appearance and bowel sounds are normal. He exhibits no distension. There is no tenderness.   Neurological: He is alert and oriented to person, place, and time.   Skin: Skin is warm. No rash noted. He is diaphoretic.   Psychiatric: He has a normal mood and affect. His behavior is normal.   Nursing note and vitals reviewed.      Results Review:       I reviewed the patient's new clinical results.    Results from last 7 days  Lab Units 10/04/17  0404 10/03/17  0342 10/02/17  0516 10/01/17  0603 17  2252   WBC 10*3/mm3 5.53 6.27 7.33 8.06 8.98   HEMOGLOBIN g/dL 10.7* 10.9* 11.1* 11.6* 12.7*   PLATELETS 10*3/mm3 182 189 172 202 221       Results from last 7 days  Lab Units 10/04/17  0404 10/03/17  0342 10/02/17  0516 10/01/17  0603 17  2252   SODIUM mmol/L 138 138 137  141 141   POTASSIUM mmol/L 4.0 3.4* 3.6 3.5 4.1   CHLORIDE mmol/L 106 103 103 105 100   CO2 mmol/L 20.9* 19.7* 18.3* 19.9* 23.8   BUN mg/dL 15 15 16 17 18   CREATININE mg/dL 2.12* 2.14* 2.08* 1.81* 2.28*   GLUCOSE mg/dL 148* 144* 122* 134* 130*   ALBUMIN g/dL 2.90* 2.90*  --   --  3.80   BILIRUBIN mg/dL  --   --   --   --  1.0   ALK PHOS U/L  --   --   --   --  73   AST (SGOT) U/L  --   --   --   --  16   ALT (SGPT) U/L  --   --   --   --  12   Estimated Creatinine Clearance: 31 mL/min (by C-G formula based on Cr of 2.12).    Results from last 7 days  Lab Units 10/04/17  0404 10/03/17  0342 10/02/17  0516 10/01/17  0603 09/30/17  2252   CALCIUM mg/dL 8.3* 8.1* 7.8* 7.9* 8.9   ALBUMIN g/dL 2.90* 2.90*  --   --  3.80   MAGNESIUM mg/dL 1.9 1.9  --  1.9  --    PHOSPHORUS mg/dL 2.5 2.1*  --   --   --        Results from last 7 days  Lab Units 10/04/17  0404 10/01/17  0603 09/30/17  2252   PROCALCITONIN ng/mL 0.23 0.12  --    LACTATE mmol/L 0.8  --  1.7       Results from last 7 days  Lab Units 10/04/17  0404 10/03/17  0342 10/02/17  0516 10/01/17  0603 09/30/17  2252   PROTIME Seconds 32.4* 29.8* 26.9* 26.4* 26.9*   INR  3.28* 2.95* 2.58* 2.53* 2.58*       Results from last 7 days  Lab Units 10/03/17  1901 10/03/17  1830 10/02/17  1319 10/01/17  0134 10/01/17  0100 09/30/17  2253   BLOODCX  No growth at less than 24 hours No growth at less than 24 hours  --  No growth at 3 days No growth at 3 days  --    URINECX   --   --    50,000 CFU/mL Yeast, Not Candida albicans*  --   --    25,000 CFU/mL Yeast, Not Candida albicans*   VRP negative      CRANIAL CT SCAN WITHOUT CONTRAST  10/2/2017  No acute intracranial abnormality.        CT SCAN ABDOMEN AND PELVIS WITHOUT CONTRAST  10/02/17  1. There is a tiny left pleural effusion which is new since the study of  08/01/2017. Again noted are several scattered areas of somewhat focal  groundglass opacity, particularly at the right base which are unchanged  and likely related to  inflammatory or infectious change.  2. There is a very small pericardial effusion which is unchanged. The  liver, spleen, pancreas, and both adrenal glands are unremarkable  without intravenous contrast.  3. The kidneys are normal in size with some changes of cortical atrophy.  There is a small intermediate density mass at the lower margin of the  left kidney likely representing a somewhat complex cyst and can be seen  more clearly as a cyst on the enhanced CT scan dating back to 2013.  There is a very tiny cortical cyst in the right kidney also unchanged.  4. There is an abdominal aortic aneurysm treated with a stent graft.  There is no retroperitoneal mass. The large and small bowel loops are  normal in caliber and show no inflammatory change. In the pelvis, there  are isotope seeds within the prostate for treatment of prostate cancer  and these appear unchanged. A Whaley catheter is present within the  urinary bladder which is somewhat decompressed and again noted is some  bladder wall thickening likely related to an element of outlet  obstruction as also noted on CT scans dating back to 2013.    CT CHEST WITHOUT CONTRAST 10/01/17  1. 3.8 cm solid-appearing lung mass anterior-inferior left upper lobe  suspicious for neoplasm. Tissue sampling recommended.  2. 0.6 cm right upper lobe pulmonary nodule. 1.9 cm right middle lobe  groundglass nodule.  3. Small left pleural effusion.  4. Cardiomegaly. Mild splenomegaly.       amiodarone 200 mg Oral Q12H   atorvastatin 40 mg Oral Nightly   digoxin 125 mcg Oral Every Other Day   fluconazole 200 mg Intravenous Daily   isosorbide mononitrate 15 mg Oral Daily   lactobacillus acidophilus 1 capsule Oral Daily   metoprolol tartrate 100 mg Oral Q12H   pantoprazole 40 mg Oral Daily      Diet Regular; Cardiac      Assessment/Plan   Assessment:     Active Hospital Problems (** Indicates Principal Problem)    Diagnosis Date Noted   • **UTI (urinary tract infection) [N39.0] 10/01/2017    • Mass of upper lobe of left lung [R91.8] 10/02/2017   • Abnormal CXR [R93.8] 10/01/2017   • CKD (chronic kidney disease), stage III [N18.3] 10/01/2017   • PAU (acute kidney injury) [N17.9] 10/01/2017   • Arteriosclerotic coronary artery disease [I25.10] 10/01/2017   • Metabolic encephalopathy [G93.41] 10/01/2017   • Acidosis [E87.2] 10/01/2017   • Wide-complex tachycardia [I47.2] 10/01/2017   • Chronic combined systolic and diastolic congestive heart failure [I50.42] 09/05/2017   • Long term current use of anticoagulant [Z79.01] 12/15/2016   • Abdominal aortic aneurysm (AAA) without rupture [I71.4] 12/15/2016   • Type 2 diabetes mellitus with complication [E11.8] 11/23/2016   • COPD (chronic obstructive pulmonary disease) [J44.9] 11/15/2016   • PAF (paroxysmal atrial fibrillation) [I48.0]       Resolved Hospital Problems    Diagnosis Date Noted Date Resolved   No resolved problems to display.       Plan:   · Admitted to monitored unit  · Off antibiotics.  Diflucan for urine.  · Blood cultures x2 done in ED negative at 2 days.  VRP negative. Will recheck BCx given persistent fevers.  · Cardiology has started him on amiodarone for wide-complex tachycardia.  Discussed with Dr. Terrazas.  No obvious signs of infection related to the implanted CRT device.  No obvious bloodstream infection.  · Nephrology following regarding renal failure.    · Stool studies pending. Had large loose stool today.  Reorder C. difficile toxin.  · Pulmonology seeing regarding lung mass.  Could represent neoplasm.  HCT negative for mets. Continue to hold Coumadin for possible biopsy.  · Discussed with Dr. Byrd.  Will check PSA for possible prostatitis.  · Physical therapy.  Discussed with family.        Jamel Mendoza MD  [facility]  10/04/17  3:25 PM

## 2017-10-05 NOTE — PROGRESS NOTES
LOS: 4 days   Patient Care Team:  Dimitrios Lindquist MD as PCP - General (Internal Medicine)    Chief Complaint:     F/u cad, chf    Interval History:     Still has abdominal fullness.  He has no chest pain or difficulty breathing.  He's had no tachycardia, dizziness.  He has no nausea.  He's had no diarrhea today and did not have a bowel movement yesterday.  He really hasn't been out of bed.    Overall he has defervesced     Objective   Vital Signs  Temp:  [97.6 °F (36.4 °C)-100.2 °F (37.9 °C)] 97.6 °F (36.4 °C)  Heart Rate:  [] 88  Resp:  [16-18] 18  BP: (105-143)/(57-75) 142/71    Intake/Output Summary (Last 24 hours) at 10/05/17 0743  Last data filed at 10/05/17 0300   Gross per 24 hour   Intake              360 ml   Output              350 ml   Net               10 ml       Comfortable NAD  Neck supple, no JVD or thyromegaly appreciated  S1/S2 RRR, no m/r/g  Lungs CTA B, normal effort  Abdomen S/NT/ Distended (+) BS, no HSM appreciated  Extremities warm, no clubbing, cyanosis, or edema  No visible or palpable skin lesions  A/Ox4, mood and affect appropriate    Results Review:        Results from last 7 days  Lab Units 10/05/17  0420 10/04/17  0404 10/03/17  0342   SODIUM mmol/L 138 138 138   POTASSIUM mmol/L 4.2 4.0 3.4*   CHLORIDE mmol/L 106 106 103   CO2 mmol/L 19.9* 20.9* 19.7*   BUN mg/dL 15 15 15   CREATININE mg/dL 2.08* 2.12* 2.14*   GLUCOSE mg/dL 134* 148* 144*   CALCIUM mg/dL 8.3* 8.3* 8.1*           Results from last 7 days  Lab Units 10/05/17  0420 10/04/17  0404 10/03/17  0342   WBC 10*3/mm3 5.79 5.53 6.27   HEMOGLOBIN g/dL 10.3* 10.7* 10.9*   HEMATOCRIT % 33.7* 35.2* 35.0*   PLATELETS 10*3/mm3 198 182 189       Results from last 7 days  Lab Units 10/05/17  0420 10/04/17  0404 10/03/17  0342   INR  2.92* 3.28* 2.95*           Results from last 7 days  Lab Units 10/04/17  0404   MAGNESIUM mg/dL 1.9           I reviewed the patient's new clinical results.  I personally viewed and interpreted the  patient's EKG/Telemetry data        Medication Review:     amiodarone 200 mg Oral Q12H   atorvastatin 40 mg Oral Nightly   digoxin 125 mcg Oral Every Other Day   fluconazole 200 mg Intravenous Daily   isosorbide mononitrate 15 mg Oral Daily   lactobacillus acidophilus 1 capsule Oral Daily   metoprolol tartrate 100 mg Oral Q12H   pantoprazole 40 mg Oral Daily         sodium chloride 75 mL/hr Last Rate: 75 mL/hr (10/05/17 0154)       Assessment/Plan     Principal Problem:    UTI (urinary tract infection)  Active Problems:    PAF (paroxysmal atrial fibrillation)    COPD (chronic obstructive pulmonary disease)    Type 2 diabetes mellitus with complication    Abdominal aortic aneurysm (AAA) without rupture    Long term current use of anticoagulant    Chronic combined systolic and diastolic congestive heart failure    Abnormal CXR    CKD (chronic kidney disease), stage III    PAU (acute kidney injury)    Arteriosclerotic coronary artery disease    Metabolic encephalopathy    Acidosis    Wide-complex tachycardia    Mass of upper lobe of left lung    1.  candida UTI, on diflucan  2.  NSVT -- known history of severe CAD and ischemic cardiomyopathy (EF 30-35%), so some NSVT is to be expected, but he's having even more in the setting of acute illness.  He really has frequent salvos of this.  He's on a good dose of metoprolol, and oral amiodarone was added yesterday. Biv/AICD with normal function per Dr. Terrazas.  He is also have bursts of a fib with aberrancy.   3.  PAF -- currently not an issue.  He's been on warfarin with therapeutic inr. Held now.   4.  CAD with chronic angina -- no issue overnight. He's on clopidogrel but not aspirin as he's anticoagulated.  5.  ICM / chronic systolic CHF -- lying flat, no oxygen requirement.  Resume oral diuretic when appropriate.  6. Lung mass, head ct negative for met disease.  Pulmonary would like to biopsy as concern for cancer.   7. Abdominal pain with abdominal fullness, getting  better. CT abd and pelvis was unremarkable yesterday. c diff pending.   8. Fever - blood culture negative for 24 hours and 3 days, candida in urine.  No elevated WBC.     Stable cardiac, will see as needed, watch volume status as is not on diuretics.      Carolyn Ramirez MD  10/05/17  7:43 AM

## 2017-10-05 NOTE — PROGRESS NOTES
"  Infectious Diseases Progress Note    Carmen Byrd MD     [facility]  Los: 4 days  Patient Identification:  Name: Aakash Sanchez Jr.  Age: 82 y.o.  Sex: male  :  1935  MRN: [id number]         Primary Care Physician: Dimitrios Lindquist MD            Subjective: Feeling better, able to smile and interact.  Interval History: See consultation note    Objective:    Scheduled Meds:    amiodarone 200 mg Oral Q12H   atorvastatin 40 mg Oral Nightly   digoxin 125 mcg Oral Every Other Day   fluconazole 200 mg Intravenous Daily   isosorbide mononitrate 15 mg Oral Daily   lactobacillus acidophilus 1 capsule Oral Daily   metoprolol tartrate 100 mg Oral Q12H   pantoprazole 40 mg Oral Daily     Continuous Infusions:    sodium chloride 75 mL/hr Last Rate: 75 mL/hr (10/05/17 0157)       Vital signs in last 24 hours:  Temp:  [97.6 °F (36.4 °C)-100.2 °F (37.9 °C)] 97.8 °F (36.6 °C)  Heart Rate:  [84-94] 91  Resp:  [16-18] 18  BP: (105-143)/(57-75) 127/67    Intake/Output:    Intake/Output Summary (Last 24 hours) at 10/05/17 1039  Last data filed at 10/05/17 0300   Gross per 24 hour   Intake              360 ml   Output              350 ml   Net               10 ml       Exam:  /67 (BP Location: Left arm, Patient Position: Lying)  Pulse 91  Temp 97.8 °F (36.6 °C) (Oral)   Resp 18  Ht 70\" (177.8 cm)  Wt 193 lb 4.8 oz (87.7 kg)  SpO2 91%  BMI 27.74 kg/m2    General Appearance:    Much more interactive and talkative and does not appear to be ill.                          Head:    Normocephalic, without obvious abnormality, atraumatic                           Eyes:    PERRL, conjunctiva/corneas clear, EOM's intact, both eyes                         Throat:   Lips, tongue, gums normal; oral mucosa pink and moist                           Neck:   Supple, symmetrical, trachea midline, no JVD                         Lungs:    Clear to auscultation bilaterally, respirations unlabored                 Chest " Wall:    No tenderness or deformity                          Heart:    Regular rate and rhythm, S1 and S2 normal, no murmur,no  Rub                                      or gallop                  Abdomen:     Soft nontender, catheter in place                 Extremities:   Extremities normal, atraumatic, no cyanosis or edema                        Pulses:   Pulses palpable in all extremities                            Skin:   Skin is warm and dry,  no rashes or palpable lesions                    Data Review:    I reviewed the patient's new clinical results.    Results from last 7 days  Lab Units 10/05/17  0420 10/04/17  0404 10/03/17  0342 10/02/17  0516 10/01/17  0603 09/30/17  2252   WBC 10*3/mm3 5.79 5.53 6.27 7.33 8.06 8.98   HEMOGLOBIN g/dL 10.3* 10.7* 10.9* 11.1* 11.6* 12.7*   PLATELETS 10*3/mm3 198 182 189 172 202 221       Results from last 7 days  Lab Units 10/05/17  0420 10/04/17  0404 10/03/17  0342 10/02/17  0516 10/01/17  0603 09/30/17  2252   SODIUM mmol/L 138 138 138 137 141 141   POTASSIUM mmol/L 4.2 4.0 3.4* 3.6 3.5 4.1   CHLORIDE mmol/L 106 106 103 103 105 100   CO2 mmol/L 19.9* 20.9* 19.7* 18.3* 19.9* 23.8   BUN mg/dL 15 15 15 16 17 18   CREATININE mg/dL 2.08* 2.12* 2.14* 2.08* 1.81* 2.28*   CALCIUM mg/dL 8.3* 8.3* 8.1* 7.8* 7.9* 8.9   GLUCOSE mg/dL 134* 148* 144* 122* 134* 130*     Atypical ANCA 1:40 significance ?  Microbiology Results (last 10 days)     Procedure Component Value - Date/Time    Blood Culture - Blood, [[id number]]  (Normal) Collected:  10/03/17 1901    Lab Status:  Preliminary result Specimen:  Blood from Arm, Left Updated:  10/04/17 1931     Blood Culture No growth at 24 hours    Blood Culture - Blood, [[id number]]  (Normal) Collected:  10/03/17 1830    Lab Status:  Preliminary result Specimen:  Blood from Arm, Left Updated:  10/04/17 1931     Blood Culture No growth at 24 hours    Urine Culture - Urine, Urine, Catheter [[id number]]  (Abnormal) Collected:  10/02/17 1319    Lab  Status:  Preliminary result Specimen:  Urine from Urine, Catheter Updated:  10/04/17 1048     Urine Culture --      50,000 CFU/mL Yeast, Not Candida albicans (A)    Narrative:       Yeast ID to follow    Respiratory Panel, PCR - Swab, Nasopharynx [[id number]]  (Normal) Collected:  10/01/17 0213    Lab Status:  Final result Specimen:  Swab from Nasopharynx Updated:  10/01/17 0332     ADENOVIRUS, PCR Not Detected     Coronavirus 229E Not Detected     Coronavirus HKU1 Not Detected     Coronavirus NL63 Not Detected     Coronavirus OC43 Not Detected     Human Metapneumovirus Not Detected     Human Rhinovirus/Enterovirus Not Detected     Influenza B PCR Not Detected     Parainfluenza Virus 1 Not Detected     Parainfluenza Virus 2 Not Detected     Parainfluenza Virus 3 Not Detected     Parainfluenza Virus 4 Not Detected     Bordetella pertussis pcr Not Detected     Influenza 2009 H1N1 by PCR Not Detected     Chlamydophila pneumoniae PCR Not Detected     Mycoplasma pneumo by PCR Not Detected     Influenza A PCR Not Detected     Influenza A H3 Not Detected     Influenza A H1 Not Detected     RSV, PCR Not Detected    Blood Culture - Blood, [[id number]]  (Normal) Collected:  10/01/17 0134    Lab Status:  Preliminary result Specimen:  Blood from Arm, Left Updated:  10/05/17 0146     Blood Culture No growth at 4 days    Blood Culture - Blood, [[id number]]  (Normal) Collected:  10/01/17 0100    Lab Status:  Preliminary result Specimen:  Blood from Arm, Left Updated:  10/05/17 0116     Blood Culture No growth at 4 days    Urine Culture - Urine, Urine, Clean Catch [[id number]]  (Abnormal) Collected:  09/30/17 2253    Lab Status:  Final result Specimen:  Urine from Urine, Clean Catch Updated:  10/02/17 1153     Urine Culture --      25,000 CFU/mL Yeast, Not Candida albicans (A)    Narrative:       Call if further workup needed.          Assessment:  Principal Problem:    UTI (urinary tract infection)  Active Problems:    PAF (paroxysmal  atrial fibrillation)    COPD (chronic obstructive pulmonary disease)    Type 2 diabetes mellitus with complication    Abdominal aortic aneurysm (AAA) without rupture    Long term current use of anticoagulant    Chronic combined systolic and diastolic congestive heart failure    Abnormal CXR    CKD (chronic kidney disease), stage III    PAU (acute kidney injury)    Arteriosclerotic coronary artery disease    Metabolic encephalopathy    Acidosis    Wide-complex tachycardia    Mass of upper lobe of left lung      Plan/recommendation: It seems like he is doing very well.  We'll treat him with couple weeks of Diflucan.  I think underlying urinary retention and frequent instrumentation in terms of catheterization to alleviate that coupled with previous use of broad-spectrum antibiotics resulted in this type of UTI.      Carmen Byrd MD  10/5/2017  10:39 AM    Much of this encounter note is an electronic transcription/translation of spoken language to printed text. The electronic translation of spoken language may permit erroneous, or at times, nonsensical words or phrases to be inadvertently transcribed; Although I have reviewed the note for such errors, some may still exist

## 2017-10-05 NOTE — PLAN OF CARE
Problem: Patient Care Overview (Adult)  Goal: Plan of Care Review  Outcome: Ongoing (interventions implemented as appropriate)    10/05/17 0506   Coping/Psychosocial Response Interventions   Plan Of Care Reviewed With patient   Outcome Evaluation   Outcome Summary/Follow up Plan no complaints of pain, IV ativan given 1x, O2 sats dropping while sleeping, VSS on 2L NC, will continue to monitor   Patient Care Overview   Progress no change         Problem: Infection, Risk/Actual (Adult)  Goal: Infection Prevention/Resolution  Outcome: Ongoing (interventions implemented as appropriate)    Problem: Fall Risk (Adult)  Goal: Absence of Falls  Outcome: Ongoing (interventions implemented as appropriate)    Problem: Urine Elimination, Impaired (Adult)  Goal: Effective Urinary Elimination  Outcome: Ongoing (interventions implemented as appropriate)  Goal: Effective Containment of Urine  Outcome: Ongoing (interventions implemented as appropriate)  Goal: Reduced Incontinence Episodes  Outcome: Ongoing (interventions implemented as appropriate)    Problem: Pressure Ulcer Risk (Jayme Scale) (Adult,Obstetrics,Pediatric)  Goal: Skin Integrity  Outcome: Ongoing (interventions implemented as appropriate)

## 2017-10-05 NOTE — THERAPY EVALUATION
Acute Care - Physical Therapy Initial Evaluation  [facility]     Patient Name: Aakash Sanchez Jr.  : 1935  MRN: [id number]  Today's Date: 10/5/2017                Admit Date: 2017     Visit Dx:    ICD-10-CM ICD-9-CM   1. Fever and chills R50.9 780.60   2. Altered mental status, unspecified altered mental status type R41.82 780.97   3. Difficulty walking R26.2 719.7     Patient Active Problem List   Diagnosis   • Arthritis   • Hyperlipidemia   • Hypertension   • Hypokalemia   • Lung mass   • Ventricular premature beats   • Coronary artery disease   • Ischemic cardiomyopathy   • PAF (paroxysmal atrial fibrillation)   • COPD (chronic obstructive pulmonary disease)   • VRE (vancomycin-resistant Enterococci) infection   • Type 2 diabetes mellitus with complication   • Diarrhea   • Abdominal aortic aneurysm (AAA) without rupture   • Long term current use of anticoagulant   • Urinary tract infection with hematuria   • CKD (chronic kidney disease), stage III   • Hypotension   • Hyperglycemia   • Imbalance   • Concepcion's esophagus without dysplasia   • Colon polyps   • B12 deficiency   • Cerebrovascular accident (CVA)   • History of coronary artery stent placement   • Rash of body   • Insomnia   • Cardiomyopathy   • Benign non-nodular prostatic hyperplasia with lower urinary tract symptoms   • Prostate hypertrophy   • Chronic combined systolic and diastolic congestive heart failure   • Pneumonia   • Sepsis   • 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   • Fever and chills   • UTI (urinary tract infection)   • Abnormal CXR   • CKD (chronic kidney disease), stage III   • PAU (acute kidney injury)   • Arteriosclerotic coronary artery disease   • Metabolic encephalopathy   • Acidosis   • Wide-complex tachycardia   • Mass of upper lobe of left lung     Past Medical History:   Diagnosis Date   • AAA (abdominal aortic aneurysm)     s/p repair, Dr. Felix   • Acute myocardial infarction    •  Arteriosclerotic coronary artery disease    • Arthritis    • Atrial fibrillation    • Atrial fibrillation    • B12 deficiency 6/1/2017   • Cardiomyopathy    • Chicken pox    • Chronic combined systolic and diastolic congestive heart failure 9/5/2017   • Chronic systolic (congestive) heart failure    • CKD (chronic kidney disease), stage III    • COPD (chronic obstructive pulmonary disease) 11/15/2016   • Coronary artery disease     MI 1990's, CABG x 2 V (LIMA-LAD, SVG-PDA), 1997   • Dyspnea    • Dyspnea on exertion 8/25/2017   • Fatigue    • Heart attack    • Hyperglycemia 3/29/2017   • Hyperlipidemia    • Hypertension    • Imbalance 3/29/2017   • Insomnia 8/25/2017   • Ischemic cardiomyopathy     LVEF 40%   • Lung mass 04/18/2016    spiculated, being followed   • Lung nodule seen on imaging study    • Malignant neoplasm    • Measles    • Mumps    • PAF (paroxysmal atrial fibrillation)    • Pleural effusion    • Premature ventricular contractions    • Prostate cancer    • PVC (premature ventricular contraction)    • Rash of body 6/21/2017   • Stroke    • Thoracic aortic ectasia    • Urinary tract infection with hematuria 1/18/2017     Past Surgical History:   Procedure Laterality Date   • APPENDECTOMY     • ARTERIAL ANEURYSM REPAIR     • BRONCHOSCOPY Right 10/5/2016    Procedure: BRONCHOSCOPY;  Surgeon: Jhony Quintanilla MD;  Location: [facility] ENDOSCOPY;  Service:    • BRONCHOSCOPY N/A 11/29/2016    Procedure: BRONCHOSCOPY WITH FLUORO w/biopsy AND BAL;  Surgeon: Ervin Stiles MD;  Location: [facility] ENDOSCOPY;  Service:    • CARDIAC CATHETERIZATION N/A 6/7/2017    Procedure: Left Heart Cath;  Surgeon: Vincent Middleton MD;  Location: [facility] CATH INVASIVE LOCATION;  Service:    • CARDIAC CATHETERIZATION N/A 6/7/2017    Procedure: Coronary angiography;  Surgeon: Vincent Middleton MD;  Location: [facility] CATH INVASIVE LOCATION;  Service:    • CARDIAC CATHETERIZATION N/A 6/7/2017    Procedure: Left ventriculography;  Surgeon:  Vincent Middleton MD;  Location: [facility] CATH INVASIVE LOCATION;  Service:    • CARDIAC CATHETERIZATION N/A 6/7/2017    Procedure: Bypass graft study;  Surgeon: Vincent Middleton MD;  Location: [facility] CATH INVASIVE LOCATION;  Service:    • CARDIAC CATHETERIZATION N/A 6/7/2017    Procedure: Native mammary injection;  Surgeon: Vincent Middleton MD;  Location: [facility] CATH INVASIVE LOCATION;  Service:    • CARDIAC CATHETERIZATION N/A 6/7/2017    Procedure: Stent NYA bypass graft;  Surgeon: Vincent Middleton MD;  Location: [facility] CATH INVASIVE LOCATION;  Service:    • CARDIAC DEFIBRILLATOR PLACEMENT     • CARDIAC ELECTROPHYSIOLOGY PROCEDURE N/A 9/19/2017    Procedure: ICD SC new  CRT-D  MEDTRONIC;  Surgeon: Fady Terrazas MD;  Location: [facility] CATH INVASIVE LOCATION;  Service:    • CARDIAC SURGERY     • CHOLECYSTECTOMY     • COLONOSCOPY N/A 12/16/2016    Procedure: COLONOSCOPY TO ASC COLON  WITH BIOPSY AND POLYPECTOMY;  Surgeon: Lane Brian MD;  Location: [facility] ENDOSCOPY;  Service:    • COLONOSCOPY N/A 3/1/2017    multiple polyps, lipoma in proximal transverse colon/hepatic flexure, repeat in one year   • CORONARY ARTERY BYPASS GRAFT     • ENDOSCOPY N/A 3/1/2017    Concepcion's, HH, gastric mucosal atrophy   • INSERT / REPLACE / REMOVE PACEMAKER     • PROSTATE SURGERY            PT ASSESSMENT (last 72 hours)      PT Evaluation       10/05/17 1400 10/03/17 1041    Rehab Evaluation    Document Type evaluation  -     Subjective Information agree to therapy;complains of;fatigue;pain  -     General Information    General Observations in bed, exit alarm  -     Pertinent History Of Current Problem fever and chills, UTI  -     Precautions/Limitations fall precautions  -     Equipment Currently Used at Home cane, straight;walker, rolling  - cane, straight;commode;grab bar  -[place]    Plans/Goals Discussed With patient  -     Living Environment    Lives With  child(darrion), adult  -[place]    Living Arrangements  house  -[place]    Type of  Financial/Environmental Concern  none  -[place]    Clinical Impression    Patient/Family Goals Statement return to PLOF  -     Criteria for Skilled Therapeutic Interventions Met treatment indicated  -     Impairments Found (describe specific impairments) gait, locomotion, and balance  -     Rehab Potential good, to achieve stated therapy goals  -     Pain Assessment    Pain Assessment Oshea-Pichardo FACES  -     Oshea-Pichardo FACES Pain Rating 6  -     Pain Location Generalized  -     Pain Intervention(s) Repositioned;Ambulation/increased activity  -     Response to Interventions tolerated  -     Cognitive Assessment/Intervention    Current Cognitive/Communication Assessment functional  -     Orientation Status oriented x 4  -     Follows Commands/Answers Questions 100% of the time  -     Personal Safety WNL/WFL  -     Personal Safety Interventions fall prevention program maintained;gait belt;nonskid shoes/slippers when out of bed  -     ROM (Range of Motion)    General ROM Detail limited 25% throughout  -     MMT (Manual Muscle Testing)    General MMT Assessment Detail WFL  -     Bed Mobility, Assessment/Treatment    Bed Mobility, Assistive Device bed rails  -     Bed Mob, Supine to Sit, [place] moderate assist (50% patient effort)  -     Bed Mob, Sit to Supine, [place] minimum assist (75% patient effort)  -     Transfer Assessment/Treatment    Transfers, Sit-Stand [place] contact guard assist  -     Transfers, Stand-Sit [place] contact guard assist  -     Gait Assessment/Treatment    Gait, [place] Level minimum assist (75% patient effort);hand held assist  -     Gait, Distance (Feet) 5   forward/backwards  -     Gait, Gait Deviations antalgic;johnny decreased;decreased heel strike;forward flexed posture;narrow base;step length decreased  -     Gait, Safety Issues step length decreased  -     Gait, Impairments strength decreased;impaired balance  -      Therapy Exercises    Bilateral Lower Extremities AROM:;10 reps;ankle pumps/circles;hip flexion;LAQ  -KH     Positioning and Restraints    Pre-Treatment Position in bed  -     Post Treatment Position bed  -     In Bed supine;call light within reach;encouraged to call for assist;exit alarm on;notified [facility]       User Key  (r) = Recorded By, (t) = Taken By, (c) = Cosigned By    Initials Name Provider Type     Kiera Lanza, PT Physical Therapist    DARLING Crabtree RN Case Manager          Physical Therapy Education     Title: PT OT SLP Therapies (Done)     Topic: Physical Therapy (Done)     Point: Mobility training (Done)    Learning Progress Summary    Learner Readiness Method Response Comment Documented by Status   Patient Acceptance E ,NR   10/05/17 1413 Done               Point: Home exercise program (Done)    Learning Progress Summary    Learner Readiness Method Response Comment Documented by Status   Patient Acceptance E ,NR   10/05/17 1413 Done               Point: Body mechanics (Done)    Learning Progress Summary    Learner Readiness Method Response Comment Documented by Status   Patient Acceptance E ,[facility] 10/05/17 1413 Done               Point: Precautions (Done)    Learning Progress Summary    Learner Readiness Method Response Comment Documented by Status   Patient Acceptance E ,[facility] 10/05/17 1413 Done                      User Key     Initials Effective Dates Name Provider Type [facility] 05/18/15 -  Kiera Lanza, PT Physical Therapist PT                PT Recommendation and Plan  Anticipated Discharge Disposition: skilled nursing facility  Planned Therapy Interventions: balance training, bed mobility training, gait training, home exercise program, patient/family education, strengthening, transfer training, ROM (Range of Motion)  PT Frequency: daily  Plan of Care Review  Outcome Summary/Follow up Plan: Pt presents with generalized weakness and pain,  decreased ROM, impaired balance and difficulty walking. Pt would benefit from PT to address these impairments.  Pt would benefit from SNF for ongoing therapy .          IP PT Goals       10/05/17 1414          Bed Mobility PT LTG    Bed Mobility PT LTG, Date Established 10/05/17  -KH      Bed Mobility PT LTG, Time to Achieve 1 wk  -KH      Bed Mobility PT LTG, Activity Type all bed mobility  -KH      Bed Mobility PT LTG, [place] Level contact guard assist  -KH      Transfer Training PT LTG    Transfer Training PT LTG, Date Established 10/05/17  -KH      Transfer Training PT LTG, Time to Achieve 1 wk  -KH      Transfer Training PT LTG, [place] Level contact guard assist  -KH      Transfer Training PT LTG, Assist Device walker, rolling  -KH      Gait Training PT LTG    Gait Training Goal PT LTG, Date Established 10/05/17  -KH      Gait Training Goal PT LTG, Time to Achieve 1 wk  -KH      Gait Training Goal PT LTG, [place] Level contact guard assist  -KH      Gait Training Goal PT LTG, Assist Device walker, rolling  -KH      Gait Training Goal PT LTG, Distance to Achieve 75 ft  -KH      Patient Education PT LTG    Patient Education PT LTG, Date Established 10/05/17  -KH      Patient Education PT LTG, Time to Achieve 1 wk  -KH      Patient Education PT LTG, Education Type HEP  -KH      Patient Education PT LTG, Education Understanding demonstrate adequately  -KH        User Key  (r) = Recorded By, (t) = Taken By, (c) = Cosigned By    Initials Name Provider Type    AYE Lanza, PT Physical Therapist                Outcome Measures       10/05/17 1418          How much help from another person do you currently need...    Turning from your back to your side while in flat bed without using bedrails? 3  -KH      Moving from lying on back to sitting on the side of a flat bed without bedrails? 2  -KH      Moving to and from a bed to a chair (including a wheelchair)? 3  -KH      Standing up from a  chair using your arms (e.g., wheelchair, bedside chair)? 3  -KH      Climbing 3-5 steps with a railing? 3  -KH      To walk in hospital room? 3  -KH      AM-PAC 6 Clicks Score 17  -KH      Functional Assessment    Outcome Measure Options AM-PAC 6 Clicks Basic Mobility (PT)  -        User Key  (r) = Recorded By, (t) = Taken By, (c) = Cosigned By    Initials Name Provider Type    AYE Lanza, PT Physical Therapist           Time Calculation:         PT Charges       10/05/17 1419          Time Calculation    Start Time 1348  -      Stop Time 1406  -KH      Time Calculation (min) 18 min  -KH      PT Received On 10/05/17  -AYE      PT - Next Appointment 10/06/17  -      PT Goal Re-Cert Due Date 10/12/17  -        User Key  (r) = Recorded By, (t) = Taken By, (c) = Cosigned By    Initials Name Provider Type    AYE Lanza, PT Physical Therapist          Therapy Charges for Today     Code Description Service Date Service Provider Modifiers Qty    95630871344 HC PT EVAL MOD COMPLEXITY 2 10/5/2017 Kiera Lanza, PT GP 1    89407119257 HC PT THER PROC EA 15 MIN 10/5/2017 Kiera Lanza, PT GP 1          PT G-Codes  Outcome Measure Options: AM-PAC 6 Clicks Basic Mobility (PT)      Kiera Lanza, PT  10/5/2017

## 2017-10-05 NOTE — PROGRESS NOTES
Tahir Simon MD                         [phone]      Patient ID:    Name:  Aakash Sanchez Jr.    MRN:  [id number]    1935   82 y.o.  male            CC/Reason for visit: Lung nodule f/u    Subjective: Pt seen and examined in the am. No acute overnight events. Patient is at baseline mental status now.    LOS: 4    ROS: Review of Systems   Denies any cough,fevers, chills, SOA, CP.    Objective     Vital Signs past 24hrs  BP range: BP: (105-143)/(57-86) 138/86  Pulse range: Heart Rate:  [84-94] 84  Resp rate range: Resp:  [18] 18  Temp range: Temp (24hrs), Av.1 °F (36.7 °C), Min:96.8 °F (36 °C), Max:100.1 °F (37.8 °C)    O2 Device: room air     190 lb 9.6 oz (86.5 kg); Body mass index is 27.35 kg/(m^2).    Intake/Output Summary (Last 24 hours) at 10/05/17 9334  Last data filed at 10/05/17 0300   Gross per 24 hour   Intake                0 ml   Output              350 ml   Net             -350 ml       Exam:  GEN:  Obese WM in no acute distress. No focal deficits. AAO X 3.  EYES:   EOM-i, anicteric sclera bilat  ENT:    External ears/nose normal, OP clear  NECK:  Supple, midline trachea, No cervical LApathy  LUNGS: Bilateral breath sounds heard, No adventitious sounds  CV:  Regular rate and rhythm, No murmur  ABD:  Non tender, Obese, no palpable liver or masses  EXT:  No cyanosis or clubbing, no peripheral/sacral edema    Scheduled meds:      amiodarone 200 mg Oral Q12H   atorvastatin 40 mg Oral Nightly   digoxin 125 mcg Oral Every Other Day   famotidine 20 mg Oral Daily   [START ON 10/6/2017] fluconazole 200 mg Oral Daily   isosorbide mononitrate 15 mg Oral Daily   lactobacillus acidophilus 1 capsule Oral Daily   metoprolol tartrate 100 mg Oral Q12H     IV meds:                          sodium chloride 75 mL/hr Last Rate: 75 mL/hr (10/05/17 3567)       Data Review:        Results from last 7 days  Lab Units 10/05/17  0420 10/04/17  4494 10/03/17  0342  10/01/17  0603  09/30/17  2252   SODIUM mmol/L 138 138 138  < > 141 141   POTASSIUM mmol/L 4.2 4.0 3.4*  < > 3.5 4.1   CHLORIDE mmol/L 106 106 103  < > 105 100   CO2 mmol/L 19.9* 20.9* 19.7*  < > 19.9* 23.8   BUN mg/dL 15 15 15  < > 17 18   CREATININE mg/dL 2.08* 2.12* 2.14*  < > 1.81* 2.28*   CALCIUM mg/dL 8.3* 8.3* 8.1*  < > 7.9* 8.9   BILIRUBIN mg/dL  --   --   --   --   --  1.0   ALK PHOS U/L  --   --   --   --   --  73   ALT (SGPT) U/L  --   --   --   --   --  12   AST (SGOT) U/L  --   --   --   --   --  16   GLUCOSE mg/dL 134* 148* 144*  < > 134* 130*   WBC 10*3/mm3 5.79 5.53 6.27  < > 8.06 8.98   HEMOGLOBIN g/dL 10.3* 10.7* 10.9*  < > 11.6* 12.7*   PLATELETS 10*3/mm3 198 182 189  < > 202 221   INR  2.92* 3.28* 2.95*  < > 2.53* 2.58*   PROCALCITONIN ng/mL  --  0.23  --   --  0.12  --    < > = values in this interval not displayed.    Lab Results   Component Value Date    CALCIUM 8.3 (L) 10/05/2017    PHOS 2.5 10/04/2017         Results from last 7 days  Lab Units 10/03/17  1901 10/03/17  1830 10/02/17  1319 10/01/17  0134 10/01/17  0100 09/30/17  2253   BLOODCX  No growth at 24 hours No growth at 24 hours  --  No growth at 4 days No growth at 4 days  --    URINECX   --   --    50,000 CFU/mL Yeast, Not Candida albicans*  --   --    25,000 CFU/mL Yeast, Not Candida albicans*                       Estimated Creatinine Clearance: 34.7 mL/min (by C-G formula based on Cr of 2.08).    Results Review:    I have reviewed the laboratory and imaging data since the last note by [facility] physician.                    IMPRESSION:  1. 3.8 cm solid-appearing lung mass anterior-inferior left upper lobe  suspicious for neoplasm. Tissue sampling recommended.  2. 0.6 cm right upper lobe pulmonary nodule. 1.9 cm right middle lobe  groundglass nodule.  3. Small left pleural effusion.  4. Cardiomegaly. Mild splenomegaly.      ECHO 6/5/17  Interpretation Summary   · Left ventricular systolic function is moderately decreased. Calculated EF = 26.9%.  Estimated EF was in disagreement with the calculated EF. Estimated EF = 35%. The left ventricular cavity is borderline dilated. Left ventricular wall thickness is consistent with moderate concentric hypertrophy. Left ventricular diastolic dysfunction is noted (grade I) consistent with impaired relaxation.  · The following segments are akinetic: basal inferolateral. The following segments are hypokinetic: basal inferior, mid inferior, mid inferolateral and apical lateral. The following segments are normal: basal anterior, basal anteroseptal, basal inferoseptal, mid anterior, mid anteroseptal, mid inferoseptal, apical anterior, apical septal, apical inferior and apex. Other segments could not be evaluated.  · Left atrial volume is severely increased. Saline test results are negative.  · Right atrial cavity size is moderate-to-severely dilated.  · There is mild-to-moderate thickening of the aortic valve.  · Mild mitral annular calcification is present. Mild mitral valve regurgitation is present.  · Mild tricuspid valve regurgitation is present. Estimated right ventricular systolic pressure from tricuspid regurgitation is normal (<35 mmHg).             Assessment:  3.8cm JT lung mass  6 mm right upper lobe lung nodule  1.9 cm right middle lobe groundglass nodule  Toxic metabolic encephalopathy - resolved ? sepsis  History of recurrent pneumonia   H/o ? BOOP with prolonged steroid taper  History of AAA status post stent  CAD s/p PCI/ 2V CABG(1997)  Now on DAPT   Chronic systolic and diastolic heart failure s/p recent CRT-D (9/17)   CKD   Afib on AC   COPD not in exac     Recommendations:  Incidental left upper lobe lung mass noted on the CAT scan today is concerning for primary lung cancer given the history of smoking.  While the patient has history of prostate cancer I suspect this could be a primary lung cancer.  His right middle lobe lung nodule has been stable since 2014.  No clear evidence of this nodule noted on  the CAT scan 01/17 but he had significant abnormality in that lung at that point making it difficult to delineate this mass from the surrounding infiltrates.   In any case this nodule needs to be biopsied.  I did not see any significant lymphadenopathy. We could offer the patient navigation bronchoscopy if we do not find any other source of sampling.  CT abdomen pelvis does not show any evidence of metastatic disease.  MRI of the brain cannot be done because of the devices being not compatible.  CT head is neg. TME resolved now. ?r/t UTI.    Appreciate cardiology holding Coumadin and Plavix.  We will plan for procedure once INR less than 1.4 and Plavix held for 5 days. Patient does not need to be in the hospital for the same.  I spoke to the daughter and son in detail and answered all questions.  Will follow along with you.    Tahir Simon MD  10/5/2017

## 2017-10-05 NOTE — PLAN OF CARE
Problem: Patient Care Overview (Adult)  Goal: Plan of Care Review  Outcome: Ongoing (interventions implemented as appropriate)    10/05/17 0202   Coping/Psychosocial Response Interventions   Plan Of Care Reviewed With patient   Outcome Evaluation   Outcome Summary/Follow up Plan Pt c/o minimal pain during the shift. Pts VSS. Pt's INR today was 2.9, unable to bronch until INR decreases. Will continue to monitor.   Patient Care Overview   Progress improving       Goal: Adult Individualization and Mutuality  Outcome: Ongoing (interventions implemented as appropriate)  Goal: Discharge Needs Assessment  Outcome: Ongoing (interventions implemented as appropriate)    Problem: Infection, Risk/Actual (Adult)  Goal: Infection Prevention/Resolution  Outcome: Ongoing (interventions implemented as appropriate)    Problem: Fall Risk (Adult)  Goal: Absence of Falls  Outcome: Ongoing (interventions implemented as appropriate)    Problem: Urine Elimination, Impaired (Adult)  Goal: Effective Urinary Elimination  Outcome: Ongoing (interventions implemented as appropriate)  Goal: Effective Containment of Urine  Outcome: Ongoing (interventions implemented as appropriate)  Goal: Reduced Incontinence Episodes  Outcome: Ongoing (interventions implemented as appropriate)    Problem: Pressure Ulcer Risk (Jayme Scale) (Adult,Obstetrics,Pediatric)  Goal: Skin Integrity  Outcome: Ongoing (interventions implemented as appropriate)

## 2017-10-05 NOTE — PLAN OF CARE
Problem: Patient Care Overview (Adult)  Goal: Plan of Care Review    10/05/17 1414   Outcome Evaluation   Outcome Summary/Follow up Plan Pt presents with generalized weakness and pain, decreased ROM, impaired balance and difficulty walking. Pt would benefit from PT to address these impairments. Pt would benefit from SNF for ongoing therapy .         Problem: Inpatient Physical Therapy  Goal: Bed Mobility Goal LTG- PT    10/05/17 1414   Bed Mobility PT LTG   Bed Mobility PT LTG, Date Established 10/05/17   Bed Mobility PT LTG, Time to Achieve 1 wk   Bed Mobility PT LTG, Activity Type all bed mobility   Bed Mobility PT LTG, [place] contact guard assist       Goal: Transfer Training Goal 1 LTG- PT    10/05/17 1414   Transfer Training PT LTG   Transfer Training PT LTG, Date Established 10/05/17   Transfer Training PT LTG, Time to Achieve 1 wk   Transfer Training PT LTG, [place] contact guard assist   Transfer Training PT LTG, Assist Device walker, rolling       Goal: Gait Training Goal LTG- PT    10/05/17 1414   Gait Training PT LTG   Gait Training Goal PT LTG, Date Established 10/05/17   Gait Training Goal PT LTG, Time to Achieve 1 wk   Gait Training Goal PT LTG, [place] contact guard assist   Gait Training Goal PT LTG, Assist Device walker, rolling   Gait Training Goal PT LTG, Distance to Achieve 75 ft       Goal: Patient Education Goal LTG- PT    10/05/17 1414   Patient Education PT LTG   Patient Education PT LTG, Date Established 10/05/17   Patient Education PT LTG, Time to Achieve 1 wk   Patient Education PT LTG, Education Type HEP   Patient Education PT LTG, Education Understanding demonstrate adequately

## 2017-10-05 NOTE — PROGRESS NOTES
NEPHROLOGY PROGRESS NOTE    PATIENT IDENTIFICATION:   Name:  Aakash Sanchez Jr.      MRN:  [id number]     82 y.o.  male             Reason for visit: PAU on CKD3    SUBJECTIVE:    The patient is complaining of being weak and fatigued he has no specific Complaints denies any chest pain or shortness of air, no nausea or vomiting, no diarrhea, he has Whaley catheter anchored in place no lower extremity edema  OBJECTIVE:  Vitals:    10/05/17 0745 10/05/17 0935 10/05/17 0945 10/05/17 1335   BP: 127/67   138/86   BP Location: Left arm   Right arm   Patient Position: Lying   Lying   Pulse: 85 91  84   Resp: 18   18   Temp: 97.8 °F (36.6 °C)   96.8 °F (36 °C)   TempSrc: Oral   Oral   SpO2: 91%   97%   Weight:   190 lb 9.6 oz (86.5 kg)    Height:               Body mass index is 27.35 kg/(m^2).    Intake/Output Summary (Last 24 hours) at 10/05/17 1358  Last data filed at 10/05/17 0300   Gross per 24 hour   Intake                0 ml   Output              350 ml   Net             -350 ml     Wt Readings from Last 1 Encounters:   10/05/17 0945 190 lb 9.6 oz (86.5 kg)   10/04/17 0600 193 lb 4.8 oz (87.7 kg)   10/03/17 0617 187 lb 14.4 oz (85.2 kg)   10/02/17 0549 183 lb 5 oz (83.2 kg)   09/30/17 2211 180 lb (81.6 kg)     Wt Readings from Last 3 Encounters:   10/05/17 190 lb 9.6 oz (86.5 kg)   09/28/17 184 lb (83.5 kg)   09/26/17 181 lb (82.1 kg)         Exam:  General Appearance: alert, oriented x 3, no acute distress, appears to be chronically ill  Skin: warm and dry  HEENT: Nonicteric sclerae, oral mucosa normal,   Neck: supple, no JVD, trachea midline  Lungs: Bilateral rhonchi, unlabored breathing effort  Heart: RRR, normal S1 and S2, no S3, no rub  Abdomen: soft, non-tender, no palpable bladder, present bowel sounds to auscultation, abdomen slightly distended  Extremities: no edema, cyanosis or clubbing  Neuro: normal speech and mental status      Scheduled meds:      amiodarone 200 mg Oral Q12H   atorvastatin 40 mg Oral  Nightly   digoxin 125 mcg Oral Every Other Day   [START ON 10/6/2017] fluconazole 200 mg Oral Daily   isosorbide mononitrate 15 mg Oral Daily   lactobacillus acidophilus 1 capsule Oral Daily   metoprolol tartrate 100 mg Oral Q12H   pantoprazole 40 mg Oral Daily     IV meds:                          sodium chloride 75 mL/hr Last Rate: 75 mL/hr (10/05/17 0157)       Data Review:      Results from last 7 days  Lab Units 10/05/17  0420 10/04/17  0404 10/03/17  0342  09/30/17  2252   SODIUM mmol/L 138 138 138  < > 141   POTASSIUM mmol/L 4.2 4.0 3.4*  < > 4.1   CHLORIDE mmol/L 106 106 103  < > 100   CO2 mmol/L 19.9* 20.9* 19.7*  < > 23.8   BUN mg/dL 15 15 15  < > 18   CREATININE mg/dL 2.08* 2.12* 2.14*  < > 2.28*   CALCIUM mg/dL 8.3* 8.3* 8.1*  < > 8.9   BILIRUBIN mg/dL  --   --   --   --  1.0   ALK PHOS U/L  --   --   --   --  73   ALT (SGPT) U/L  --   --   --   --  12   AST (SGOT) U/L  --   --   --   --  16   GLUCOSE mg/dL 134* 148* 144*  < > 130*   < > = values in this interval not displayed.  Estimated Creatinine Clearance: 34.7 mL/min (by C-G formula based on Cr of 2.08).        Results from last 7 days  Lab Units 10/04/17  0404 10/03/17  0342 10/01/17  0603   MAGNESIUM mg/dL 1.9 1.9 1.9   PHOSPHORUS mg/dL 2.5 2.1*  --          Results from last 7 days  Lab Units 10/05/17  0420 10/04/17  0404 10/03/17  0342 10/02/17  0516 10/01/17  0603   WBC 10*3/mm3 5.79 5.53 6.27 7.33 8.06   HEMOGLOBIN g/dL 10.3* 10.7* 10.9* 11.1* 11.6*   PLATELETS 10*3/mm3 198 182 189 172 202         Results from last 7 days  Lab Units 10/05/17  0420 10/04/17  0404 10/03/17  0342 10/02/17  0516 10/01/17  0603   INR  2.92* 3.28* 2.95* 2.58* 2.53*             ASSESSMENT:   1.  PAU on CKD3, non-oliguric, unchanged:  Creatinine has been stable this 2.08 today, she hated with poor intake and diuretics.  2.  TME, resolved  3.   recent cardioversion and pacemaker.  4.  Fever and chills, currently afebrile  5.  Urinary retention, currently has Whaley  catheter anchored in place  6.  Left lung mass worrisome for neoplasm      PLAN:  1.  We'll continue the same treatment  2. Stop the PPI and switch to H2 blocker  2.  Surveillance labs      I discussed the case with Dr. Reji Kam MD  10/5/2017  1:58 PM

## 2017-10-05 NOTE — PROGRESS NOTES
Continued Stay Note  [facility]     Patient Name: Aakash Sanchez Jr.  MRN: [id number]  Today's Date: 10/5/2017    Admit Date: 9/30/2017          Discharge Plan       10/05/17 1245    Case Management/Social Work Plan    Plan Home with [facility]    Patient/Family In Agreement With Plan yes    Additional Comments Confirmed with daughter Norma plan is home with [facility] and no other needs.               Discharge Codes     None            Devonte Gonzalez RN

## 2017-10-05 NOTE — PROGRESS NOTES
Name: Aakash Sanchez Jr. ADMIT: 2017   : 1935  PCP: Dimitrios Lindquist MD    MRN: [id number] LOS: 4 days   AGE/SEX: 82 y.o. male  ROOM: [facility]     Subjective   Subjective:  Symptoms:  No shortness of breath, cough, chest pain or diarrhea.  (Mild abdominal distention but no N/V.  Not worse by eating. No BM in 2 days since diarrhea. No abd pain.).    Diet:  Poor intake.  No nausea or vomiting.    Activity level: Impaired due to weakness.    Pain:  He reports no pain.      Objective   Vital Signs  Temp:  [97.6 °F (36.4 °C)-100.2 °F (37.9 °C)] 97.8 °F (36.6 °C)  Heart Rate:  [84-94] 91  Resp:  [16-18] 18  BP: (105-143)/(57-75) 127/67  SpO2:  [91 %-97 %] 91 %  on   ;   O2 Device: room air  Body mass index is 27.35 kg/(m^2).    Physical Exam   Constitutional: He is oriented to person, place, and time. He appears well-developed. He is cooperative. He has a sickly appearance. No distress.   Neck: No JVD present. No tracheal deviation present. No thyromegaly present.   Cardiovascular: Normal rate and regular rhythm.    No murmur heard.  Pulmonary/Chest: Effort normal and breath sounds normal. No respiratory distress.   Abdominal: Soft. Normal appearance and bowel sounds are normal. He exhibits no distension. There is no tenderness.   Neurological: He is alert and oriented to person, place, and time.   Skin: Skin is warm. No rash noted. He is not diaphoretic.   Psychiatric: He has a normal mood and affect. His behavior is normal.   Nursing note and vitals reviewed.      Results Review:       I reviewed the patient's new clinical results.    Results from last 7 days  Lab Units 10/05/17  0420 10/04/17  0404 10/03/17  0342 10/02/17  0516 10/01/17  0603 17  2252   WBC 10*3/mm3 5.79 5.53 6.27 7.33 8.06 8.98   HEMOGLOBIN g/dL 10.3* 10.7* 10.9* 11.1* 11.6* 12.7*   PLATELETS 10*3/mm3 198 182 189 172 202 221       Results from last 7 days  Lab Units 10/05/17  0420 10/04/17  0404 10/03/17  0342 10/02/17  0516  10/01/17  0603 09/30/17  2252   SODIUM mmol/L 138 138 138 137 141 141   POTASSIUM mmol/L 4.2 4.0 3.4* 3.6 3.5 4.1   CHLORIDE mmol/L 106 106 103 103 105 100   CO2 mmol/L 19.9* 20.9* 19.7* 18.3* 19.9* 23.8   BUN mg/dL 15 15 15 16 17 18   CREATININE mg/dL 2.08* 2.12* 2.14* 2.08* 1.81* 2.28*   GLUCOSE mg/dL 134* 148* 144* 122* 134* 130*   ALBUMIN g/dL  --  2.90* 2.90*  --   --  3.80   BILIRUBIN mg/dL  --   --   --   --   --  1.0   ALK PHOS U/L  --   --   --   --   --  73   AST (SGOT) U/L  --   --   --   --   --  16   ALT (SGPT) U/L  --   --   --   --   --  12   Estimated Creatinine Clearance: 34.7 mL/min (by C-G formula based on Cr of 2.08).    Results from last 7 days  Lab Units 10/05/17  0420 10/04/17  0404 10/03/17  0342 10/02/17  0516 10/01/17  0603 09/30/17  2252   CALCIUM mg/dL 8.3* 8.3* 8.1* 7.8* 7.9* 8.9   ALBUMIN g/dL  --  2.90* 2.90*  --   --  3.80   MAGNESIUM mg/dL  --  1.9 1.9  --  1.9  --    PHOSPHORUS mg/dL  --  2.5 2.1*  --   --   --        Results from last 7 days  Lab Units 10/04/17  0404 10/01/17  0603 09/30/17  2252   PROCALCITONIN ng/mL 0.23 0.12  --    LACTATE mmol/L 0.8  --  1.7       Results from last 7 days  Lab Units 10/05/17  0420 10/04/17  0404 10/03/17  0342 10/02/17  0516 10/01/17  0603 09/30/17  2252   PROTIME Seconds 29.6* 32.4* 29.8* 26.9* 26.4* 26.9*   INR  2.92* 3.28* 2.95* 2.58* 2.53* 2.58*       Results from last 7 days  Lab Units 10/03/17  1901 10/03/17  1830 10/02/17  1319 10/01/17  0134 10/01/17  0100 09/30/17  2253   BLOODCX  No growth at 24 hours No growth at 24 hours  --  No growth at 4 days No growth at 4 days  --    URINECX   --   --    50,000 CFU/mL Yeast, Not Candida albicans*  --   --    25,000 CFU/mL Yeast, Not Candida albicans*   VRP negative      CRANIAL CT SCAN WITHOUT CONTRAST  10/2/2017  No acute intracranial abnormality.        CT SCAN ABDOMEN AND PELVIS WITHOUT CONTRAST  10/02/17  1. There is a tiny left pleural effusion which is new since the study  of  08/01/2017. Again noted are several scattered areas of somewhat focal  groundglass opacity, particularly at the right base which are unchanged  and likely related to inflammatory or infectious change.  2. There is a very small pericardial effusion which is unchanged. The  liver, spleen, pancreas, and both adrenal glands are unremarkable  without intravenous contrast.  3. The kidneys are normal in size with some changes of cortical atrophy.  There is a small intermediate density mass at the lower margin of the  left kidney likely representing a somewhat complex cyst and can be seen  more clearly as a cyst on the enhanced CT scan dating back to 2013.  There is a very tiny cortical cyst in the right kidney also unchanged.  4. There is an abdominal aortic aneurysm treated with a stent graft.  There is no retroperitoneal mass. The large and small bowel loops are  normal in caliber and show no inflammatory change. In the pelvis, there  are isotope seeds within the prostate for treatment of prostate cancer  and these appear unchanged. A Whaley catheter is present within the  urinary bladder which is somewhat decompressed and again noted is some  bladder wall thickening likely related to an element of outlet  obstruction as also noted on CT scans dating back to 2013.    CT CHEST WITHOUT CONTRAST 10/01/17  1. 3.8 cm solid-appearing lung mass anterior-inferior left upper lobe  suspicious for neoplasm. Tissue sampling recommended.  2. 0.6 cm right upper lobe pulmonary nodule. 1.9 cm right middle lobe  groundglass nodule.  3. Small left pleural effusion.  4. Cardiomegaly. Mild splenomegaly.       amiodarone 200 mg Oral Q12H   atorvastatin 40 mg Oral Nightly   digoxin 125 mcg Oral Every Other Day   fluconazole 200 mg Intravenous Daily   isosorbide mononitrate 15 mg Oral Daily   lactobacillus acidophilus 1 capsule Oral Daily   metoprolol tartrate 100 mg Oral Q12H   pantoprazole 40 mg Oral Daily       sodium chloride 75 mL/hr  "Last Rate: 75 mL/hr (10/05/17 0157)   Diet Regular; Cardiac      Assessment/Plan   Assessment:     Active Hospital Problems (** Indicates Principal Problem)    Diagnosis Date Noted   • **UTI (urinary tract infection) [N39.0] 10/01/2017   • Mass of upper lobe of left lung [R91.8] 10/02/2017   • Abnormal CXR [R93.8] 10/01/2017   • CKD (chronic kidney disease), stage III [N18.3] 10/01/2017   • PAU (acute kidney injury) [N17.9] 10/01/2017   • Arteriosclerotic coronary artery disease [I25.10] 10/01/2017   • Metabolic encephalopathy [G93.41] 10/01/2017   • Acidosis [E87.2] 10/01/2017   • Wide-complex tachycardia [I47.2] 10/01/2017   • Chronic combined systolic and diastolic congestive heart failure [I50.42] 09/05/2017   • Long term current use of anticoagulant [Z79.01] 12/15/2016   • Abdominal aortic aneurysm (AAA) without rupture [I71.4] 12/15/2016   • Type 2 diabetes mellitus with complication [E11.8] 11/23/2016   • COPD (chronic obstructive pulmonary disease) [J44.9] 11/15/2016   • PAF (paroxysmal atrial fibrillation) [I48.0]       Resolved Hospital Problems    Diagnosis Date Noted Date Resolved   No resolved problems to display.       Plan:   · Off antibiotics.  Diflucan for urine.  · Blood cultures x2 done in ED negative at 4 days.  Repeat blood cultures negative at 24 hours.  · Cardiology has started him on amiodarone for wide-complex tachycardia.  Discussed with Dr. Terrazas.  No obvious signs of infection related to the implanted CRT device.  No obvious bloodstream infection.  · Nephrology following regarding renal failure.  Relatively stable.  · Diarrhea subsided.  CT a/p negative.  No pain, n/v.  Will monitor for c/o \"distention.\" Consider GI eval.  · Pulmonology seeing regarding lung mass.  Could represent neoplasm.  HCT negative for mets. Continue to hold Coumadin for possible biopsy.  · Discussed with Dr. Byrd.  Stable off abx.  PSA okay.  · Physical therapy yet to see.        Jamel Mendoza MD  [facility]  10/05/17  12:22 PM

## 2017-10-05 NOTE — PROGRESS NOTES
Patient Identification:  NAME:  Aakash Sanchez Jr.  Age:  82 y.o.   Sex:  male   :  1935   MRN:  [id number]       Chief complaint: Fever of unknown origin    History of present illness:  He is doing okay still having some fevers and his INR is 2.9, he denies any headache photophobia or neck stiffness      Past medical history:  Past Medical History:   Diagnosis Date   • AAA (abdominal aortic aneurysm)     s/p repair, Dr. Felix   • Acute myocardial infarction    • Arteriosclerotic coronary artery disease    • Arthritis    • Atrial fibrillation    • Atrial fibrillation    • B12 deficiency 2017   • Cardiomyopathy    • Chicken pox    • Chronic combined systolic and diastolic congestive heart failure 2017   • Chronic systolic (congestive) heart failure    • CKD (chronic kidney disease), stage III    • COPD (chronic obstructive pulmonary disease) 11/15/2016   • Coronary artery disease     MI , CABG x 2 V (LIMA-LAD, SVG-PDA),    • Dyspnea    • Dyspnea on exertion 2017   • Fatigue    • Heart attack    • Hyperglycemia 3/29/2017   • Hyperlipidemia    • Hypertension    • Imbalance 3/29/2017   • Insomnia 2017   • Ischemic cardiomyopathy     LVEF 40%   • Lung mass 2016    spiculated, being followed   • Lung nodule seen on imaging study    • Malignant neoplasm    • Measles    • Mumps    • PAF (paroxysmal atrial fibrillation)    • Pleural effusion    • Premature ventricular contractions    • Prostate cancer    • PVC (premature ventricular contraction)    • Rash of body 2017   • Stroke    • Thoracic aortic ectasia    • Urinary tract infection with hematuria 2017       Allergies:  Review of patient's allergies indicates no known allergies.    Home medications:  Prescriptions Prior to Admission   Medication Sig Dispense Refill Last Dose   • finasteride (PROSCAR) 5 MG tablet Take 1 tablet by mouth Daily. 90 tablet 1 Taking   • furosemide (LASIX) 40 MG tablet Take 2 tablets by  mouth Daily. 90 tablet 2 Taking   • isosorbide mononitrate (IMDUR) 30 MG 24 hr tablet Take 0.5 tablets by mouth Daily. 45 tablet 3 Taking   • metoprolol tartrate (LOPRESSOR) 100 MG tablet Take 1 tablet by mouth Every 12 (Twelve) Hours. 60 tablet 1 Taking   • pantoprazole (PROTONIX) 40 MG EC tablet Take 40 mg by mouth Daily.   Taking   • potassium chloride (K-TAB) 20 MEQ tablet controlled-release ER tablet Take 1 tablet by mouth Daily. 90 tablet 3 Taking   • Probiotic Product (PROBIOTIC PO) Take 1 tablet by mouth Daily.   Taking   • warfarin (COUMADIN) 4 MG tablet Take 1 tablet by mouth Daily. (Patient taking differently: Take 2 mg by mouth Daily.) 30 tablet 1 Taking   • atorvastatin (LIPITOR) 40 MG tablet Take 1 tablet by mouth Every Night. 30 tablet 6 Unknown at Unknown time   • clopidogrel (PLAVIX) 75 MG tablet Take 1 tablet by mouth Daily. 90 tablet 3 Unknown at Unknown time   • digoxin (LANOXIN) 125 MCG tablet Take 1 tablet by mouth Every Other Day. 15 tablet 1 Unknown at Unknown time   • nitroglycerin (NITROSTAT) 0.4 MG SL tablet 1 under the tongue as needed for angina, may repeat q5mins for up three doses 35 tablet 0 Taking   • oxyCODONE-acetaminophen (PERCOCET) 5-325 MG per tablet Take 1 tablet by mouth Every 6 (Six) Hours As Needed.   Unknown at Unknown time   • warfarin (COUMADIN) 3 MG tablet TAKE ONE TABLET BY MOUTH DAILY 90 tablet 0 Taking        Hospital medications:    amiodarone 200 mg Oral Q12H   atorvastatin 40 mg Oral Nightly   digoxin 125 mcg Oral Every Other Day   famotidine 20 mg Oral Daily   [START ON 10/6/2017] fluconazole 200 mg Oral Daily   isosorbide mononitrate 15 mg Oral Daily   lactobacillus acidophilus 1 capsule Oral Daily   metoprolol tartrate 100 mg Oral Q12H       sodium chloride 75 mL/hr Last Rate: 75 mL/hr (10/05/17 0157)     •  acetaminophen  •  LORazepam  •  nitroglycerin  •  ondansetron **OR** ondansetron ODT **OR** ondansetron  •  oxyCODONE-acetaminophen  •  sodium  chloride      Objective:  Vitals Ranges:   Temp:  [96.8 °F (36 °C)-100.1 °F (37.8 °C)] 96.8 °F (36 °C)  Heart Rate:  [84-94] 84  Resp:  [18] 18  BP: (105-143)/(57-86) 138/86      Physical Exam:  He looks a little better today is able to laugh and smile but is still having some fevers.  He still denies any headache normal cranial nerves II through VII    Results review:   I reviewed the patient's new clinical results.    Data review:  Lab Results (last 24 hours)     Procedure Component Value Units Date/Time    ANCA Panel [[id number]]  (Abnormal) Collected:  10/02/17 1058    Specimen:  Blood Updated:  10/04/17 1711     Myeloperoxidase Ab <9.0 U/mL      Antiproteinase 3 (WY-3) <3.5 U/mL      C-ANCA <1:20 titer      P-ANCA <1:20 titer       The presence of positive fluorescence exhibiting P-ANCA or C-ANCA  patterns alone is not specific for the diagnosis of Wegener's  Granulomatosis (WG) or microscopic polyangiitis. Decisions about  treatment should not be based solely on ANCA IFA results.  The  International ANCA Group Consensus recommends follow up testing of  positive sera with both WY-3 and MPO-ANCA enzyme immunoassays. As  many as 5% serum samples are positive only by EIA.  Ref. AM J Clin Pathol 1999;111:507-513.        Atypical pANCA 1:40 (H) titer       The atypical pANCA pattern has been observed in a significant  percentage of patients with ulcerative colitis, primary sclerosing  cholangitis and autoimmune hepatitis.       Narrative:       Performed at:  01 - [address]  [id number]  : Vincent Verduzco MD, Phone:  [phone]  Performed at:  02 - [address]  [id number]  : Lucas Hdez PhD, Phone:  [phone]    Blood Culture - Blood, [[id number]]  (Normal) Collected:  10/03/17 1830    Specimen:  Blood from Arm, Left Updated:  10/04/17 1931     Blood Culture No growth at 24 hours    Blood Culture - Blood,  [[id number]]  (Normal) Collected:  10/03/17 1901    Specimen:  Blood from Arm, Left Updated:  10/04/17 1931     Blood Culture No growth at 24 hours    Blood Culture - Blood, [[id number]]  (Normal) Collected:  10/01/17 0100    Specimen:  Blood from Arm, Left Updated:  10/05/17 0116     Blood Culture No growth at 4 days    Blood Culture - Blood, [[id number]]  (Normal) Collected:  10/01/17 0134    Specimen:  Blood from Arm, Left Updated:  10/05/17 0146     Blood Culture No growth at 4 days    CBC (No Diff) [[id number]]  (Abnormal) Collected:  10/05/17 0420    Specimen:  Blood Updated:  10/05/17 0509     WBC 5.79 10*3/mm3      RBC 4.09 (L) 10*6/mm3      Hemoglobin 10.3 (L) g/dL      Hematocrit 33.7 (L) %      MCV 82.4 fL      MCH 25.2 (L) pg      MCHC 30.6 (L) g/dL      RDW 16.6 (H) %      RDW-SD 49.9 fl      MPV 10.8 fL      Platelets 198 10*3/mm3     Protime-INR [[id number]]  (Abnormal) Collected:  10/05/17 0420    Specimen:  Blood Updated:  10/05/17 0520     Protime 29.6 (H) Seconds      INR 2.92 (H)    Basic Metabolic Panel [[id number]]  (Abnormal) Collected:  10/05/17 0420    Specimen:  Blood Updated:  10/05/17 0535     Glucose 134 (H) mg/dL      BUN 15 mg/dL      Creatinine 2.08 (H) mg/dL      Sodium 138 mmol/L      Potassium 4.2 mmol/L      Chloride 106 mmol/L      CO2 19.9 (L) mmol/L      Calcium 8.3 (L) mg/dL      eGFR Non African Amer 31 (L) mL/min/1.73      BUN/Creatinine Ratio 7.2     Anion Gap 12.1 mmol/L     Narrative:       The MDRD GFR formula is only valid for adults with stable renal function between ages 18 and 70.    Urine Culture - Urine, Urine, Catheter [[id number]]  (Abnormal) Collected:  10/02/17 1319    Specimen:  Urine from Urine, Catheter Updated:  10/05/17 1410     Urine Culture --      50,000 CFU/mL Yeast, Not Candida albicans (A)    Narrative:       Yeast ID to follow           Imaging:  Imaging Results (last 24 hours)     ** No results found for the last 24 hours. **             Assessment and  Plan:     Principal Problem:    UTI (urinary tract infection)  Active Problems:    PAF (paroxysmal atrial fibrillation)    COPD (chronic obstructive pulmonary disease)    Type 2 diabetes mellitus with complication    Abdominal aortic aneurysm (AAA) without rupture    Long term current use of anticoagulant    Chronic combined systolic and diastolic congestive heart failure    Abnormal CXR    CKD (chronic kidney disease), stage III    PAU (acute kidney injury)    Arteriosclerotic coronary artery disease    Metabolic encephalopathy    Acidosis    Wide-complex tachycardia    Mass of upper lobe of left lung    He still denies any headache photophobia and has no pain upon neck flexion we have not performed a lumbar puncture at this point and I am continuing to hold off on this recommendation. INR is 2.9.  I'm still following      Fady Drsicoll MD  10/05/17  2:53 PM

## 2017-10-06 NOTE — PLAN OF CARE
Problem: Patient Care Overview (Adult)  Goal: Plan of Care Review  Outcome: Ongoing (interventions implemented as appropriate)    10/06/17 0648   Coping/Psychosocial Response Interventions   Plan Of Care Reviewed With patient   Outcome Evaluation   Outcome Summary/Follow up Plan Pt had one episode of night sweats this shift. Pain managed with PRN Percocet (given x1). IV fluids continued. VSS. Unable to bronch until INR 1.4.    Patient Care Overview   Progress improving       Goal: Adult Individualization and Mutuality  Outcome: Ongoing (interventions implemented as appropriate)  Goal: Discharge Needs Assessment  Outcome: Ongoing (interventions implemented as appropriate)    Problem: Fall Risk (Adult)  Goal: Absence of Falls  Outcome: Ongoing (interventions implemented as appropriate)    Problem: Urine Elimination, Impaired (Adult)  Goal: Effective Urinary Elimination  Outcome: Ongoing (interventions implemented as appropriate)  Goal: Reduced Incontinence Episodes  Outcome: Ongoing (interventions implemented as appropriate)    Problem: Pressure Ulcer Risk (Jayme Scale) (Adult,Obstetrics,Pediatric)  Goal: Skin Integrity  Outcome: Ongoing (interventions implemented as appropriate)

## 2017-10-06 NOTE — THERAPY TREATMENT NOTE
Acute Care - Physical Therapy Treatment Note  [facility]     Patient Name: Aakash Sanchez Jr.  : 1935  MRN: [id number]  Today's Date: 10/6/2017             Admit Date: 2017    Visit Dx:    ICD-10-CM ICD-9-CM   1. Fever and chills R50.9 780.60   2. Altered mental status, unspecified altered mental status type R41.82 780.97   3. Difficulty walking R26.2 719.7     Patient Active Problem List   Diagnosis   • Arthritis   • Hyperlipidemia   • Hypertension   • Hypokalemia   • Lung mass   • Ventricular premature beats   • Coronary artery disease   • Ischemic cardiomyopathy   • PAF (paroxysmal atrial fibrillation)   • COPD (chronic obstructive pulmonary disease)   • VRE (vancomycin-resistant Enterococci) infection   • Type 2 diabetes mellitus with complication   • Diarrhea   • Abdominal aortic aneurysm (AAA) without rupture   • Long term current use of anticoagulant   • Urinary tract infection with hematuria   • CKD (chronic kidney disease), stage III   • Hypotension   • Hyperglycemia   • Imbalance   • Concepcion's esophagus without dysplasia   • Colon polyps   • B12 deficiency   • Cerebrovascular accident (CVA)   • History of coronary artery stent placement   • Rash of body   • Insomnia   • Cardiomyopathy   • Benign non-nodular prostatic hyperplasia with lower urinary tract symptoms   • Prostate hypertrophy   • Chronic combined systolic and diastolic congestive heart failure   • Pneumonia   • Sepsis   • 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   • Fever and chills   • UTI (urinary tract infection)   • Abnormal CXR   • CKD (chronic kidney disease), stage III   • PAU (acute kidney injury)   • Arteriosclerotic coronary artery disease   • Metabolic encephalopathy   • Acidosis   • Wide-complex tachycardia   • Mass of upper lobe of left lung               Adult Rehabilitation Note       10/06/17 3795          Rehab Assessment/Intervention    Discipline physical therapist  -EE      Document Type  therapy note (daily note)  -EE      Subjective Information agree to therapy;no complaints  -EE      Patient Effort, Rehab Treatment good  -EE      Symptoms Noted During/After Treatment none  -EE      Precautions/Limitations fall precautions;other (see comments)   contact precautions  -EE      Recorded by [EE] Carmita Salcedo, PT      Pain Assessment    Pain Assessment No/denies pain  -EE      Recorded by [EE] Carmita Salcedo, PT      Cognitive Assessment/Intervention    Current Cognitive/Communication Assessment functional  -EE      Orientation Status oriented x 4  -EE      Follows Commands/Answers Questions 100% of the time  -EE      Personal Safety WNL/WFL  -EE      Personal Safety Interventions fall prevention program maintained;gait belt;nonskid shoes/slippers when out of bed;supervised activity  -EE      Recorded by [EE] Carmita Salcedo PT      Bed Mobility, Assessment/Treatment    Bed Mobility, Assistive Device bed rails;head of bed elevated  -EE      Bed Mob, Supine to Sit, [place] supervision required  -EE      Bed Mob, Sit to Supine, [place] supervision required  -EE      Recorded by [EE] Carmita Salcedo PT      Transfer Assessment/Treatment    Transfers, Sit-Stand [place] contact guard assist;verbal cues required  -EE      Transfers, Stand-Sit [place] contact guard assist  -EE      Transfers, Sit-Stand-Sit, Assist Device rolling walker  -EE      Transfer, Safety Issues weight-shifting ability decreased  -EE      Transfer, Impairments strength decreased  -EE      Recorded by [EE] Carmita Salcedo PT      Gait Assessment/Treatment    Gait, [place] Level contact guard assist  -EE      Gait, Assistive Device rolling walker  -EE      Gait, Distance (Feet) 200  -EE      Gait, Gait Deviations forward flexed posture;johnny decreased;step length decreased  -EE      Gait, Safety Issues step length decreased  -EE      Gait, Impairments strength decreased;impaired balance  -EE      Recorded by [EE] Carmita  Matias PT      Positioning and Restraints    Pre-Treatment Position in bed  -EE      Post Treatment Position bed  -EE      In Bed supine;call light within reach;encouraged to call for assist;exit alarm on;notified nsg  -EE      Recorded by [EE] Carmita Salcedo, PT        User Key  (r) = Recorded By, (t) = Taken By, (c) = Cosigned By    Initials Name Effective Dates    EE Carmita Salcedo, PT 12/01/15 -                 IP PT Goals       10/05/17 1414          Bed Mobility PT LTG    Bed Mobility PT LTG, Date Established 10/05/17  -KH      Bed Mobility PT LTG, Time to Achieve 1 wk  -KH      Bed Mobility PT LTG, Activity Type all bed mobility  -KH      Bed Mobility PT LTG, [place] Level contact guard assist  -KH      Transfer Training PT LTG    Transfer Training PT LTG, Date Established 10/05/17  -KH      Transfer Training PT LTG, Time to Achieve 1 wk  -KH      Transfer Training PT LTG, [place] Level contact guard assist  -KH      Transfer Training PT LTG, Assist Device walker, rolling  -KH      Gait Training PT LTG    Gait Training Goal PT LTG, Date Established 10/05/17  -KH      Gait Training Goal PT LTG, Time to Achieve 1 wk  -KH      Gait Training Goal PT LTG, [place] Level contact guard assist  -KH      Gait Training Goal PT LTG, Assist Device walker, rolling  -KH      Gait Training Goal PT LTG, Distance to Achieve 75 ft  -KH      Patient Education PT LTG    Patient Education PT LTG, Date Established 10/05/17  -KH      Patient Education PT LTG, Time to Achieve 1 wk  -KH      Patient Education PT LTG, Education Type HEP  -KH      Patient Education PT LTG, Education Understanding demonstrate adequately  -KH        User Key  (r) = Recorded By, (t) = Taken By, (c) = Cosigned By    Initials Name Provider Type    AYE Lanza, PT Physical Therapist          Physical Therapy Education     Title: PT OT SLP Therapies (Done)     Topic: Physical Therapy (Done)     Point: Mobility training (Done)    Learning  Progress Summary    Learner Readiness Method Response Comment Documented by Status   Patient Acceptance E,TB NR,VU  EE 10/06/17 1422 Done    Acceptance E VUNR   10/05/17 1413 Done               Point: Home exercise program (Done)    Learning Progress Summary    Learner Readiness Method Response Comment Documented by Status   Patient Acceptance E VUNR   10/05/17 1413 Done               Point: Body mechanics (Done)    Learning Progress Summary    Learner Readiness Method Response Comment Documented by Status   Patient Acceptance E LANANR   10/05/17 1413 Done               Point: Precautions (Done)    Learning Progress Summary    Learner Readiness Method Response Comment Documented by Status   Patient Acceptance E LANANR   10/05/17 1413 Done                      User Key     Initials Effective Dates Name Provider Type Discipline     05/18/15 -  Kiera Lanza, PT Physical Therapist PT     12/01/15 -  Carmita Salcedo, PT Physical Therapist PT                    PT Recommendation and Plan  Anticipated Discharge Disposition: skilled nursing facility  Planned Therapy Interventions: balance training, bed mobility training, gait training, home exercise program, patient/family education, strengthening, transfer training, ROM (Range of Motion)  PT Frequency: daily  Plan of Care Review  Plan Of Care Reviewed With: patient  Progress: improving  Outcome Summary/Follow up Plan: Pt demonstrates improved strength and balance as evidenced by increased gait distance and increased independence with mobility.           Outcome Measures       10/06/17 1400 10/05/17 1418       How much help from another person do you currently need...    Turning from your back to your side while in flat bed without using bedrails? 4  -EE 3  -KH     Moving from lying on back to sitting on the side of a flat bed without bedrails? 3  -EE 2  -KH     Moving to and from a bed to a chair (including a wheelchair)? 3  -EE 3  -KH     Standing up  from a chair using your arms (e.g., wheelchair, bedside chair)? 3  -EE 3  -KH     Climbing 3-5 steps with a railing? 3  -EE 3  -KH     To walk in hospital room? 3  -EE 3  -KH     AM-PAC 6 Clicks Score 19  -EE 17  -KH     Functional Assessment    Outcome Measure Options AM-PAC 6 Clicks Basic Mobility (PT)  -EE AM-PAC 6 Clicks Basic Mobility (PT)  -KH       User Key  (r) = Recorded By, (t) = Taken By, (c) = Cosigned By    Initials Name Provider Type    AYE Lanza, PT Physical Therapist    EE Carmita Salcedo, PT Physical Therapist           Time Calculation:         PT Charges       10/06/17 1423          Time Calculation    Start Time 1405  -EE      Stop Time 1415  -EE      Time Calculation (min) 10 min  -EE      PT Received On 10/06/17  -EE      PT - Next Appointment 10/07/17  -EE        User Key  (r) = Recorded By, (t) = Taken By, (c) = Cosigned By    Initials Name Provider Type     Carmita Salcedo PT Physical Therapist          Therapy Charges for Today     Code Description Service Date Service Provider Modifiers Qty    73199411991 HC PT THER PROC EA 15 MIN 10/6/2017 Carmita Salcedo, PT GP 1    12946297990 HC PT THER SUPP EA 15 MIN 10/6/2017 Carmita Salcedo, PT GP 1          PT G-Codes  Outcome Measure Options: AM-PAC 6 Clicks Basic Mobility (PT)    Carmita Salcedo PT  10/6/2017

## 2017-10-06 NOTE — PROGRESS NOTES
Tahir Simon MD                         [phone]      Patient ID:    Name:  Aakash Sanchez Jr.    MRN:  [id number]    1935   82 y.o.  male            CC/Reason for visit: Lung nodule f/u    Subjective: Pt seen and examined in the am. No acute overnight events. INR noted to be increasing.     LOS: 5    ROS: Review of Systems   Denies any cough,fevers, chills, SOA, CP.    Objective     Vital Signs past 24hrs  BP range: BP: (131-148)/(64-86) 136/81  Pulse range: Heart Rate:  [84-87] 85  Resp rate range: Resp:  [16] 16  Temp range: Temp (24hrs), Av.2 °F (36.8 °C), Min:97.1 °F (36.2 °C), Max:99.6 °F (37.6 °C)    O2 Device: room air     190 lb 3.2 oz (86.3 kg); Body mass index is 27.29 kg/(m^2).    Intake/Output Summary (Last 24 hours) at 10/06/17 1843  Last data filed at 10/06/17 05   Gross per 24 hour   Intake             2854 ml   Output              350 ml   Net             2504 ml       Exam:  GEN:  Obese WM in no acute distress. No focal deficits. AAO X 3.  EYES:   EOM-i, anicteric sclera bilat  ENT:    External ears/nose normal, OP clear  NECK:  Supple, midline trachea, No cervical LApathy  LUNGS: Bilateral breath sounds heard, No adventitious sounds  CV:  Regular rate and rhythm, No murmur  ABD:  Non tender, Obese, no palpable liver or masses  EXT:  No cyanosis or clubbing, no peripheral/sacral edema    Scheduled meds:      amiodarone 200 mg Oral Q12H   atorvastatin 40 mg Oral Nightly   digoxin 125 mcg Oral Every Other Day   famotidine 20 mg Oral Daily   fluconazole 200 mg Oral Daily   isosorbide mononitrate 15 mg Oral Daily   lactobacillus acidophilus 1 capsule Oral Daily   metoprolol tartrate 100 mg Oral Q12H   polyethylene glycol 17 g Oral Daily   sennosides-docusate sodium 2 tablet Oral Nightly     IV meds:                          sodium chloride 75 mL/hr Last Rate: 75 mL/hr (10/06/17 0548)       Data Review:        Results from last 7 days  Lab Units  10/06/17  0538 10/05/17  0420 10/04/17  0404  10/01/17  0603 09/30/17  2252   SODIUM mmol/L 137  137 138 138  < > 141 141   POTASSIUM mmol/L 4.4  4.4 4.2 4.0  < > 3.5 4.1   CHLORIDE mmol/L 106  106 106 106  < > 105 100   CO2 mmol/L 19.0*  19.0* 19.9* 20.9*  < > 19.9* 23.8   BUN mg/dL 15  15 15 15  < > 17 18   CREATININE mg/dL 2.01*  2.01* 2.08* 2.12*  < > 1.81* 2.28*   CALCIUM mg/dL 8.3*  8.3* 8.3* 8.3*  < > 7.9* 8.9   BILIRUBIN mg/dL  --   --   --   --   --  1.0   ALK PHOS U/L  --   --   --   --   --  73   ALT (SGPT) U/L  --   --   --   --   --  12   AST (SGOT) U/L  --   --   --   --   --  16   GLUCOSE mg/dL 130*  130* 134* 148*  < > 134* 130*   WBC 10*3/mm3 6.50 5.79 5.53  < > 8.06 8.98   HEMOGLOBIN g/dL 10.3* 10.3* 10.7*  < > 11.6* 12.7*   PLATELETS 10*3/mm3 224 198 182  < > 202 221   INR  2.66* 2.92* 3.28*  < > 2.53* 2.58*   PROCALCITONIN ng/mL  --   --  0.23  --  0.12  --    < > = values in this interval not displayed.    Lab Results   Component Value Date    CALCIUM 8.3 (L) 10/06/2017    CALCIUM 8.3 (L) 10/06/2017    PHOS 2.6 10/06/2017         Results from last 7 days  Lab Units 10/03/17  1901 10/03/17  1830 10/02/17  1319 10/01/17  0134 10/01/17  0100 09/30/17  2253   BLOODCX  No growth at 2 days No growth at 2 days  --  No growth at 5 days No growth at 5 days  --    URINECX   --   --    50,000 CFU/mL Candida kefyr*  --   --    25,000 CFU/mL Yeast, Not Candida albicans*                       Estimated Creatinine Clearance: 35.8 mL/min (by C-G formula based on Cr of 2.01).    Results Review:    I have reviewed the laboratory and imaging data since the last note by [facility] physician.                    IMPRESSION:  1. 3.8 cm solid-appearing lung mass anterior-inferior left upper lobe  suspicious for neoplasm. Tissue sampling recommended.  2. 0.6 cm right upper lobe pulmonary nodule. 1.9 cm right middle lobe  groundglass nodule.  3. Small left pleural effusion.  4. Cardiomegaly. Mild splenomegaly.       ECHO 6/5/17  Interpretation Summary   · Left ventricular systolic function is moderately decreased. Calculated EF = 26.9%. Estimated EF was in disagreement with the calculated EF. Estimated EF = 35%. The left ventricular cavity is borderline dilated. Left ventricular wall thickness is consistent with moderate concentric hypertrophy. Left ventricular diastolic dysfunction is noted (grade I) consistent with impaired relaxation.  · The following segments are akinetic: basal inferolateral. The following segments are hypokinetic: basal inferior, mid inferior, mid inferolateral and apical lateral. The following segments are normal: basal anterior, basal anteroseptal, basal inferoseptal, mid anterior, mid anteroseptal, mid inferoseptal, apical anterior, apical septal, apical inferior and apex. Other segments could not be evaluated.  · Left atrial volume is severely increased. Saline test results are negative.  · Right atrial cavity size is moderate-to-severely dilated.  · There is mild-to-moderate thickening of the aortic valve.  · Mild mitral annular calcification is present. Mild mitral valve regurgitation is present.  · Mild tricuspid valve regurgitation is present. Estimated right ventricular systolic pressure from tricuspid regurgitation is normal (<35 mmHg).             Assessment:  3.8cm JT lung mass  6 mm right upper lobe lung nodule  1.9 cm right middle lobe groundglass nodule  Toxic metabolic encephalopathy - resolved ? sepsis  History of recurrent pneumonia   H/o ? BOOP with prolonged steroid taper  History of AAA status post stent  CAD s/p PCI/ 2V CABG(1997)  Now on DAPT   Chronic systolic and diastolic heart failure s/p recent CRT-D (9/17)   CKD   Afib on AC   COPD not in exac     Recommendations:  Incidental left upper lobe lung mass noted on the CAT scan today is concerning for primary lung cancer given the history of smoking.  While the patient has history of prostate cancer I suspect this could be a  primary lung cancer.  His right middle lobe lung nodule has been stable since 2014.  No clear evidence of this nodule noted on the CAT scan 01/17 but he had significant abnormality in that lung at that point making it difficult to delineate this mass from the surrounding infiltrates.   In any case this nodule needs to be biopsied. I did not see any significant lymphadenopathy. We could offer the patient navigation bronchoscopy if we do not find any other source of sampling. CT abdomen pelvis does not show any evidence of metastatic disease.  MRI of the brain cannot be done because of the devices being not compatible. CT head is neg. TME resolved now. ?r/t UTI.    Appreciate cardiology holding Coumadin and Plavix.  We will plan for procedure once INR less than 1.4 and Plavix held for 5 days. Patient does not need to be in the hospital for the same.  I spoke to the daughter and son in detail earlier in the week and answered all questions.  Will follow along with you.    Tahir Simon MD  10/6/2017

## 2017-10-06 NOTE — PROGRESS NOTES
Patient Identification:  NAME:  Aakash Sanchez Jr.  Age:  82 y.o.   Sex:  male   :  1935   MRN:  [id number]       Chief complaint: FUO, metabolic encephalopathy    History of present illness:  He is much more awake and alert he's not getting any headache pain he feels a little bloated in his abdomen but no headache pain no neck stiffness no photophobia      Past medical history:  Past Medical History:   Diagnosis Date   • AAA (abdominal aortic aneurysm)     s/p repair, Dr. Felix   • Acute myocardial infarction    • Arteriosclerotic coronary artery disease    • Arthritis    • Atrial fibrillation    • Atrial fibrillation    • B12 deficiency 2017   • Cardiomyopathy    • Chicken pox    • Chronic combined systolic and diastolic congestive heart failure 2017   • Chronic systolic (congestive) heart failure    • CKD (chronic kidney disease), stage III    • COPD (chronic obstructive pulmonary disease) 11/15/2016   • Coronary artery disease     MI , CABG x 2 V (LIMA-LAD, SVG-PDA),    • Dyspnea    • Dyspnea on exertion 2017   • Fatigue    • Heart attack    • Hyperglycemia 3/29/2017   • Hyperlipidemia    • Hypertension    • Imbalance 3/29/2017   • Insomnia 2017   • Ischemic cardiomyopathy     LVEF 40%   • Lung mass 2016    spiculated, being followed   • Lung nodule seen on imaging study    • Malignant neoplasm    • Measles    • Mumps    • PAF (paroxysmal atrial fibrillation)    • Pleural effusion    • Premature ventricular contractions    • Prostate cancer    • PVC (premature ventricular contraction)    • Rash of body 2017   • Stroke    • Thoracic aortic ectasia    • Urinary tract infection with hematuria 2017       Allergies:  Review of patient's allergies indicates no known allergies.    Home medications:  Prescriptions Prior to Admission   Medication Sig Dispense Refill Last Dose   • finasteride (PROSCAR) 5 MG tablet Take 1 tablet by mouth Daily. 90 tablet 1 Taking   •  furosemide (LASIX) 40 MG tablet Take 2 tablets by mouth Daily. 90 tablet 2 Taking   • isosorbide mononitrate (IMDUR) 30 MG 24 hr tablet Take 0.5 tablets by mouth Daily. 45 tablet 3 Taking   • metoprolol tartrate (LOPRESSOR) 100 MG tablet Take 1 tablet by mouth Every 12 (Twelve) Hours. 60 tablet 1 Taking   • pantoprazole (PROTONIX) 40 MG EC tablet Take 40 mg by mouth Daily.   Taking   • potassium chloride (K-TAB) 20 MEQ tablet controlled-release ER tablet Take 1 tablet by mouth Daily. 90 tablet 3 Taking   • Probiotic Product (PROBIOTIC PO) Take 1 tablet by mouth Daily.   Taking   • warfarin (COUMADIN) 4 MG tablet Take 1 tablet by mouth Daily. (Patient taking differently: Take 2 mg by mouth Daily.) 30 tablet 1 Taking   • atorvastatin (LIPITOR) 40 MG tablet Take 1 tablet by mouth Every Night. 30 tablet 6 Unknown at Unknown time   • clopidogrel (PLAVIX) 75 MG tablet Take 1 tablet by mouth Daily. 90 tablet 3 Unknown at Unknown time   • digoxin (LANOXIN) 125 MCG tablet Take 1 tablet by mouth Every Other Day. 15 tablet 1 Unknown at Unknown time   • nitroglycerin (NITROSTAT) 0.4 MG SL tablet 1 under the tongue as needed for angina, may repeat q5mins for up three doses 35 tablet 0 Taking   • oxyCODONE-acetaminophen (PERCOCET) 5-325 MG per tablet Take 1 tablet by mouth Every 6 (Six) Hours As Needed.   Unknown at Unknown time   • warfarin (COUMADIN) 3 MG tablet TAKE ONE TABLET BY MOUTH DAILY 90 tablet 0 Taking        Hospital medications:    amiodarone 200 mg Oral Q12H   atorvastatin 40 mg Oral Nightly   digoxin 125 mcg Oral Every Other Day   famotidine 20 mg Oral Daily   fluconazole 200 mg Oral Daily   isosorbide mononitrate 15 mg Oral Daily   lactobacillus acidophilus 1 capsule Oral Daily   metoprolol tartrate 100 mg Oral Q12H       sodium chloride 75 mL/hr Last Rate: 75 mL/hr (10/06/17 0548)     •  acetaminophen  •  LORazepam  •  nitroglycerin  •  ondansetron **OR** ondansetron ODT **OR** ondansetron  •   oxyCODONE-acetaminophen  •  sodium chloride      Objective:  Vitals Ranges:   Temp:  [96.8 °F (36 °C)-99.6 °F (37.6 °C)] 97.7 °F (36.5 °C)  Heart Rate:  [84-87] 85  Resp:  [16-18] 16  BP: (131-148)/(64-86) 136/81      Physical Exam:More awake and alert he looks better smiling and talkative no headache complaints normal cranial nerves II through VII tongue is midline good motor times four extremities reflexes 1+ symmetrical toes downgoing bilaterally    Results review:   I reviewed the patient's new clinical results.    Data review:  Lab Results (last 24 hours)     Procedure Component Value Units Date/Time    Blood Culture - Blood, [[id number]]  (Normal) Collected:  10/03/17 1830    Specimen:  Blood from Arm, Left Updated:  10/05/17 1931     Blood Culture No growth at 2 days    Blood Culture - Blood, [[id number]]  (Normal) Collected:  10/03/17 1901    Specimen:  Blood from Arm, Left Updated:  10/05/17 1931     Blood Culture No growth at 2 days    Blood Culture - Blood, [[id number]]  (Normal) Collected:  10/01/17 0100    Specimen:  Blood from Arm, Left Updated:  10/06/17 0116     Blood Culture No growth at 5 days    Blood Culture - Blood, [[id number]]  (Normal) Collected:  10/01/17 0134    Specimen:  Blood from Arm, Left Updated:  10/06/17 0146     Blood Culture No growth at 5 days    CBC Auto Differential [[id number]]  (Abnormal) Collected:  10/06/17 0538    Specimen:  Blood Updated:  10/06/17 0627     WBC 6.50 10*3/mm3      RBC 4.12 (L) 10*6/mm3      Hemoglobin 10.3 (L) g/dL      Hematocrit 33.6 (L) %      MCV 81.6 fL      MCH 25.0 (L) pg      MCHC 30.7 (L) g/dL      RDW 16.5 (H) %      RDW-SD 49.3 fl      MPV 11.0 fL      Platelets 224 10*3/mm3      Neutrophil % 57.5 %      Lymphocyte % 21.8 %      Monocyte % 15.1 (H) %      Eosinophil % 4.6 %      Basophil % 0.5 %      Immature Grans % 0.5 %      Neutrophils, Absolute 3.74 10*3/mm3      Lymphocytes, Absolute 1.42 10*3/mm3      Monocytes, Absolute 0.98 10*3/mm3       Eosinophils, Absolute 0.30 10*3/mm3      Basophils, Absolute 0.03 10*3/mm3      Immature Grans, Absolute 0.03 10*3/mm3     CBC & Differential [[id number]] Collected:  10/06/17 0538    Specimen:  Blood Updated:  10/06/17 0627    Narrative:       The following orders were created for panel order CBC & Differential.  Procedure                               Abnormality         Status                     ---------                               -----------         ------                     CBC Auto Differential[[id number]]        Abnormal            Final result                 Please view results for these tests on the individual orders.    Uric Acid [[id number]]  (Normal) Collected:  10/06/17 0538    Specimen:  Blood Updated:  10/06/17 0636     Uric Acid 6.5 mg/dL     Magnesium [[id number]]  (Normal) Collected:  10/06/17 0538    Specimen:  Blood Updated:  10/06/17 0636     Magnesium 2.2 mg/dL     Renal Function Panel [[id number]]  (Abnormal) Collected:  10/06/17 0538    Specimen:  Blood Updated:  10/06/17 0637     Glucose 130 (H) mg/dL      BUN 15 mg/dL      Creatinine 2.01 (H) mg/dL      Sodium 137 mmol/L      Potassium 4.4 mmol/L      Chloride 106 mmol/L      CO2 19.0 (L) mmol/L      Calcium 8.3 (L) mg/dL      Albumin 2.70 (L) g/dL      Phosphorus 2.6 mg/dL      Anion Gap 12.0 mmol/L      BUN/Creatinine Ratio 7.5     eGFR Non African Amer 32 (L) mL/min/1.73     Narrative:       The MDRD GFR formula is only valid for adults with stable renal function between ages 18 and 70.    Protime-INR [[id number]]  (Abnormal) Collected:  10/06/17 0538    Specimen:  Blood Updated:  10/06/17 0639     Protime 27.5 (H) Seconds      INR 2.66 (H)    Basic Metabolic Panel [[id number]]  (Abnormal) Collected:  10/06/17 0538    Specimen:  Blood Updated:  10/06/17 0655     Glucose 130 (H) mg/dL      BUN 15 mg/dL      Creatinine 2.01 (H) mg/dL      Sodium 137 mmol/L      Potassium 4.4 mmol/L      Chloride 106 mmol/L      CO2 19.0 (L) mmol/L       Calcium 8.3 (L) mg/dL      eGFR Non African Amer 32 (L) mL/min/1.73      BUN/Creatinine Ratio 7.5     Anion Gap 12.0 mmol/L     Narrative:       The MDRD GFR formula is only valid for adults with stable renal function between ages 18 and 70.    Urine Culture - Urine, Urine, Catheter [[id number]]  (Abnormal) Collected:  10/02/17 1319    Specimen:  Urine from Urine, Catheter Updated:  10/06/17 1111     Urine Culture --      50,000 CFU/mL Candida farazfyr (A)    Narrative:       Yeast ID to follow           Imaging:  Imaging Results (last 24 hours)     ** No results found for the last 24 hours. **             Assessment and Plan:     Principal Problem:    UTI (urinary tract infection)  Active Problems:    PAF (paroxysmal atrial fibrillation)    COPD (chronic obstructive pulmonary disease)    Type 2 diabetes mellitus with complication    Abdominal aortic aneurysm (AAA) without rupture    Long term current use of anticoagulant    Chronic combined systolic and diastolic congestive heart failure    Abnormal CXR    CKD (chronic kidney disease), stage III    PAU (acute kidney injury)    Arteriosclerotic coronary artery disease    Metabolic encephalopathy    Acidosis    Wide-complex tachycardia    Mass of upper lobe of left lung    At this point he does not have any headache at all and his temperature curve does seem to be coming down some I will sign off in follow-up when necessary reconsult thank you      Fady Driscoll MD  10/06/17  1:34 PM

## 2017-10-06 NOTE — PLAN OF CARE
Problem: Patient Care Overview (Adult)  Goal: Plan of Care Review    10/06/17 1422   Coping/Psychosocial Response Interventions   Plan Of Care Reviewed With patient   Outcome Evaluation   Outcome Summary/Follow up Plan Pt demonstrates improved strength and balance as evidenced by increased gait distance and increased independence with mobility.    Patient Care Overview   Progress improving

## 2017-10-06 NOTE — PLAN OF CARE
Problem: Patient Care Overview (Adult)  Goal: Plan of Care Review  Outcome: Ongoing (interventions implemented as appropriate)    10/06/17 1703   Coping/Psychosocial Response Interventions   Plan Of Care Reviewed With patient   Outcome Evaluation   Outcome Summary/Follow up Plan patient c/o constipation, mirlax, stool softners and supp. ordered to help with BM, IVF continued at 77, INR still not therapuetic for bronch, vss, will continue to monitor    Patient Care Overview   Progress improving       Goal: Adult Individualization and Mutuality  Outcome: Ongoing (interventions implemented as appropriate)  Goal: Discharge Needs Assessment  Outcome: Ongoing (interventions implemented as appropriate)    Problem: Infection, Risk/Actual (Adult)  Goal: Infection Prevention/Resolution  Outcome: Ongoing (interventions implemented as appropriate)    Problem: Fall Risk (Adult)  Goal: Absence of Falls  Outcome: Ongoing (interventions implemented as appropriate)    Problem: Urine Elimination, Impaired (Adult)  Goal: Effective Urinary Elimination  Outcome: Ongoing (interventions implemented as appropriate)  Goal: Effective Containment of Urine  Outcome: Ongoing (interventions implemented as appropriate)  Goal: Reduced Incontinence Episodes  Outcome: Ongoing (interventions implemented as appropriate)    Problem: Pressure Ulcer Risk (Jayme Scale) (Adult,Obstetrics,Pediatric)  Goal: Skin Integrity  Outcome: Ongoing (interventions implemented as appropriate)

## 2017-10-06 NOTE — PROGRESS NOTES
Name: Aakash Sanchez Jr. ADMIT: 2017   : 1935  PCP: Dimitrios Lindquist MD    MRN: [id number] LOS: 5 days   AGE/SEX: 82 y.o. male  ROOM: [facility]     Subjective   Subjective:  Symptoms:  No shortness of breath, cough, chest pain or diarrhea.  (Mild abdominal distention but no N/V.  Not worse by eating. No BM in 2 days since diarrhea. No abd pain.).    Diet:  Poor intake.  No nausea or vomiting.    Activity level: Impaired due to weakness.    Pain:  He reports no pain.      Objective   Vital Signs  Temp:  [97.1 °F (36.2 °C)-99.6 °F (37.6 °C)] 97.7 °F (36.5 °C)  Heart Rate:  [84-87] 85  Resp:  [16] 16  BP: (131-148)/(64-86) 136/81  SpO2:  [91 %-98 %] 98 %  on   ;   O2 Device: room air  Body mass index is 27.29 kg/(m^2).    Physical Exam   Constitutional: He is oriented to person, place, and time. He appears well-developed. He is cooperative. He has a sickly appearance. No distress.   Neck: No JVD present. No tracheal deviation present. No thyromegaly present.   Cardiovascular: Normal rate and regular rhythm.    No murmur heard.  Pulmonary/Chest: Effort normal and breath sounds normal. No respiratory distress.   Abdominal: Soft. Normal appearance and bowel sounds are normal. He exhibits no distension. There is no tenderness.   Neurological: He is alert and oriented to person, place, and time.   Skin: Skin is warm. No rash noted. He is not diaphoretic.   Psychiatric: He has a normal mood and affect. His behavior is normal.   Nursing note and vitals reviewed.      Results Review:       I reviewed the patient's new clinical results.    Results from last 7 days  Lab Units 10/06/17  0538 10/05/17  0420 10/04/17  0404 10/03/17  0342 10/02/17  0516 10/01/17  0603 17  2252   WBC 10*3/mm3 6.50 5.79 5.53 6.27 7.33 8.06 8.98   HEMOGLOBIN g/dL 10.3* 10.3* 10.7* 10.9* 11.1* 11.6* 12.7*   PLATELETS 10*3/mm3 224 198 182 189 172 202 221       Results from last 7 days  Lab Units 10/06/17  0538 10/05/17  0420  10/04/17  0404 10/03/17  0342 10/02/17  0516 10/01/17  0603 09/30/17  2252   SODIUM mmol/L 137  137 138 138 138 137 141 141   POTASSIUM mmol/L 4.4  4.4 4.2 4.0 3.4* 3.6 3.5 4.1   CHLORIDE mmol/L 106  106 106 106 103 103 105 100   CO2 mmol/L 19.0*  19.0* 19.9* 20.9* 19.7* 18.3* 19.9* 23.8   BUN mg/dL 15  15 15 15 15 16 17 18   CREATININE mg/dL 2.01*  2.01* 2.08* 2.12* 2.14* 2.08* 1.81* 2.28*   GLUCOSE mg/dL 130*  130* 134* 148* 144* 122* 134* 130*   ALBUMIN g/dL 2.70*  --  2.90* 2.90*  --   --  3.80   BILIRUBIN mg/dL  --   --   --   --   --   --  1.0   ALK PHOS U/L  --   --   --   --   --   --  73   AST (SGOT) U/L  --   --   --   --   --   --  16   ALT (SGPT) U/L  --   --   --   --   --   --  12   Estimated Creatinine Clearance: 35.8 mL/min (by C-G formula based on Cr of 2.01).    Results from last 7 days  Lab Units 10/06/17  0538 10/05/17  0420 10/04/17  0404 10/03/17  0342 10/02/17  0516 10/01/17  0603 09/30/17  2252   CALCIUM mg/dL 8.3*  8.3* 8.3* 8.3* 8.1* 7.8* 7.9* 8.9   ALBUMIN g/dL 2.70*  --  2.90* 2.90*  --   --  3.80   MAGNESIUM mg/dL 2.2  --  1.9 1.9  --  1.9  --    PHOSPHORUS mg/dL 2.6  --  2.5 2.1*  --   --   --        Results from last 7 days  Lab Units 10/04/17  0404 10/01/17  0603 09/30/17  2252   PROCALCITONIN ng/mL 0.23 0.12  --    LACTATE mmol/L 0.8  --  1.7       Results from last 7 days  Lab Units 10/06/17  0538 10/05/17  0420 10/04/17  0404 10/03/17  0342 10/02/17  0516 10/01/17  0603 09/30/17  2252   PROTIME Seconds 27.5* 29.6* 32.4* 29.8* 26.9* 26.4* 26.9*   INR  2.66* 2.92* 3.28* 2.95* 2.58* 2.53* 2.58*       Results from last 7 days  Lab Units 10/03/17  1901 10/03/17  1830 10/02/17  1319 10/01/17  0134 10/01/17  0100 09/30/17  2253   BLOODCX  No growth at 2 days No growth at 2 days  --  No growth at 5 days No growth at 5 days  --    URINECX   --   --    50,000 CFU/mL Candida kefyr*  --   --    25,000 CFU/mL Yeast, Not Candida albicans*   VRP negative      CRANIAL CT SCAN WITHOUT  CONTRAST  10/2/2017  No acute intracranial abnormality.        CT SCAN ABDOMEN AND PELVIS WITHOUT CONTRAST  10/02/17  1. There is a tiny left pleural effusion which is new since the study of  08/01/2017. Again noted are several scattered areas of somewhat focal  groundglass opacity, particularly at the right base which are unchanged  and likely related to inflammatory or infectious change.  2. There is a very small pericardial effusion which is unchanged. The  liver, spleen, pancreas, and both adrenal glands are unremarkable  without intravenous contrast.  3. The kidneys are normal in size with some changes of cortical atrophy.  There is a small intermediate density mass at the lower margin of the  left kidney likely representing a somewhat complex cyst and can be seen  more clearly as a cyst on the enhanced CT scan dating back to 2013.  There is a very tiny cortical cyst in the right kidney also unchanged.  4. There is an abdominal aortic aneurysm treated with a stent graft.  There is no retroperitoneal mass. The large and small bowel loops are  normal in caliber and show no inflammatory change. In the pelvis, there  are isotope seeds within the prostate for treatment of prostate cancer  and these appear unchanged. A Whaley catheter is present within the  urinary bladder which is somewhat decompressed and again noted is some  bladder wall thickening likely related to an element of outlet  obstruction as also noted on CT scans dating back to 2013.    CT CHEST WITHOUT CONTRAST 10/01/17  1. 3.8 cm solid-appearing lung mass anterior-inferior left upper lobe  suspicious for neoplasm. Tissue sampling recommended.  2. 0.6 cm right upper lobe pulmonary nodule. 1.9 cm right middle lobe  groundglass nodule.  3. Small left pleural effusion.  4. Cardiomegaly. Mild splenomegaly.       amiodarone 200 mg Oral Q12H   atorvastatin 40 mg Oral Nightly   digoxin 125 mcg Oral Every Other Day   famotidine 20 mg Oral Daily   fluconazole  200 mg Oral Daily   isosorbide mononitrate 15 mg Oral Daily   lactobacillus acidophilus 1 capsule Oral Daily   metoprolol tartrate 100 mg Oral Q12H       sodium chloride 75 mL/hr Last Rate: 75 mL/hr (10/06/17 0548)   Diet Regular; Cardiac      Assessment/Plan   Assessment:     Active Hospital Problems (** Indicates Principal Problem)    Diagnosis Date Noted   • **UTI (urinary tract infection) [N39.0] 10/01/2017   • Mass of upper lobe of left lung [R91.8] 10/02/2017   • Abnormal CXR [R93.8] 10/01/2017   • CKD (chronic kidney disease), stage III [N18.3] 10/01/2017   • PAU (acute kidney injury) [N17.9] 10/01/2017   • Arteriosclerotic coronary artery disease [I25.10] 10/01/2017   • Metabolic encephalopathy [G93.41] 10/01/2017   • Acidosis [E87.2] 10/01/2017   • Wide-complex tachycardia [I47.2] 10/01/2017   • Chronic combined systolic and diastolic congestive heart failure [I50.42] 09/05/2017   • Long term current use of anticoagulant [Z79.01] 12/15/2016   • Abdominal aortic aneurysm (AAA) without rupture [I71.4] 12/15/2016   • Type 2 diabetes mellitus with complication [E11.8] 11/23/2016   • COPD (chronic obstructive pulmonary disease) [J44.9] 11/15/2016   • PAF (paroxysmal atrial fibrillation) [I48.0]       Resolved Hospital Problems    Diagnosis Date Noted Date Resolved   No resolved problems to display.       Plan:   · Off antibiotics.  Diflucan for urine. Fever improved.  · All cultures negative except yeast in urine.  · Cardiology has started him on amiodarone for wide-complex tachycardia.  No obvious signs of infection related to the implanted CRT device.    · Nephrology following regarding renal failure.  Relatively stable.  · Diarrhea subsided.  CT a/p negative.  Has not had BM in 4 days. Add stool softeners.  · Pulmonology seeing regarding lung mass.  Could represent neoplasm.  HCT negative for mets. Continue to hold Coumadin for possible biopsy. Day 3 off Plavix. Likely biopsy Monday if still here.  · Physical  therapy following.        Jamel Mendoza MD  [facility]  10/06/17  1:36 PM

## 2017-10-06 NOTE — PROGRESS NOTES
NEPHROLOGY PROGRESS NOTE    PATIENT IDENTIFICATION:   Name:  Aakash Sanchez Jr.      MRN:  [id number]     82 y.o.  male             Reason for visit: PAU on CKD3    SUBJECTIVE:   Feels bit better; appetite improving; no N or V; no chills today    OBJECTIVE:  Vitals:    10/06/17 0341 10/06/17 0458 10/06/17 0700 10/06/17 1300   BP:   136/78 136/81   BP Location:   Right arm Right arm   Patient Position:   Lying Sitting   Pulse:   84 85   Resp:   16 16   Temp: 97.7 °F (36.5 °C)  97.1 °F (36.2 °C) 97.7 °F (36.5 °C)   TempSrc: Oral  Oral Oral   SpO2:   91% 98%   Weight:  190 lb 3.2 oz (86.3 kg)     Height:               Body mass index is 27.29 kg/(m^2).    Intake/Output Summary (Last 24 hours) at 10/06/17 1551  Last data filed at 10/06/17 0548   Gross per 24 hour   Intake             2854 ml   Output             1100 ml   Net             1754 ml     Wt Readings from Last 1 Encounters:   10/06/17 0458 190 lb 3.2 oz (86.3 kg)   10/05/17 0945 190 lb 9.6 oz (86.5 kg)   10/04/17 0600 193 lb 4.8 oz (87.7 kg)   10/03/17 0617 187 lb 14.4 oz (85.2 kg)   10/02/17 0549 183 lb 5 oz (83.2 kg)   09/30/17 2211 180 lb (81.6 kg)     Wt Readings from Last 3 Encounters:   10/06/17 190 lb 3.2 oz (86.3 kg)   09/28/17 184 lb (83.5 kg)   09/26/17 181 lb (82.1 kg)         Exam:  General Appearance: alert, oriented x 3, no acute distress, appears to be chronically ill  Skin: warm and dry  HEENT: Nonicteric sclerae, oral mucosa normal,   Neck: supple, no JVD, trachea midline  Lungs: Bilateral rhonchi, unlabored breathing effort  Heart: RRR, normal S1 and S2, no S3, no rub  Abdomen: soft, non-tender, no palpable bladder, BS+, abdomen slightly distended  Extremities: no edema, cyanosis or clubbing  Neuro: normal speech and mental status      Scheduled meds:      amiodarone 200 mg Oral Q12H   atorvastatin 40 mg Oral Nightly   digoxin 125 mcg Oral Every Other Day   famotidine 20 mg Oral Daily   fluconazole 200 mg Oral Daily   isosorbide  mononitrate 15 mg Oral Daily   lactobacillus acidophilus 1 capsule Oral Daily   metoprolol tartrate 100 mg Oral Q12H     IV meds:                          sodium chloride 75 mL/hr Last Rate: 75 mL/hr (10/06/17 0548)       Data Review:      Results from last 7 days  Lab Units 10/06/17  0538 10/05/17  0420 10/04/17  0404  09/30/17  2252   SODIUM mmol/L 137  137 138 138  < > 141   POTASSIUM mmol/L 4.4  4.4 4.2 4.0  < > 4.1   CHLORIDE mmol/L 106  106 106 106  < > 100   CO2 mmol/L 19.0*  19.0* 19.9* 20.9*  < > 23.8   BUN mg/dL 15  15 15 15  < > 18   CREATININE mg/dL 2.01*  2.01* 2.08* 2.12*  < > 2.28*   CALCIUM mg/dL 8.3*  8.3* 8.3* 8.3*  < > 8.9   BILIRUBIN mg/dL  --   --   --   --  1.0   ALK PHOS U/L  --   --   --   --  73   ALT (SGPT) U/L  --   --   --   --  12   AST (SGOT) U/L  --   --   --   --  16   GLUCOSE mg/dL 130*  130* 134* 148*  < > 130*   < > = values in this interval not displayed.  Estimated Creatinine Clearance: 35.8 mL/min (by C-G formula based on Cr of 2.01).    Results from last 7 days  Lab Units 10/06/17  0538   URIC ACID mg/dL 6.5       Results from last 7 days  Lab Units 10/06/17  0538 10/04/17  0404 10/03/17  0342   MAGNESIUM mg/dL 2.2 1.9 1.9   PHOSPHORUS mg/dL 2.6 2.5 2.1*         Results from last 7 days  Lab Units 10/06/17  0538 10/05/17  0420 10/04/17  0404 10/03/17  0342 10/02/17  0516   WBC 10*3/mm3 6.50 5.79 5.53 6.27 7.33   HEMOGLOBIN g/dL 10.3* 10.3* 10.7* 10.9* 11.1*   PLATELETS 10*3/mm3 224 198 182 189 172         Results from last 7 days  Lab Units 10/06/17  0538 10/05/17  0420 10/04/17  0404 10/03/17  0342 10/02/17  0516   INR  2.66* 2.92* 3.28* 2.95* 2.58*             ASSESSMENT:   1.  PAU on CKD3, non-oliguric, unchanged:  Vol fine, and lytes compensated  2.  TME, resolved  3.  Recent cardioversion and pacemaker.  4.  Fever and chills, currently afebrile  5.  Urinary retention, currently has Whaley catheter anchored in place  6.  Left lung mass worrisome for  neoplasm      PLAN:  1.  IVF still  2.  Surveillance labs    Eliecer Brower MD  10/6/2017  3:51 PM

## 2017-10-06 NOTE — PROGRESS NOTES
"  Infectious Diseases Progress Note    Carmen Byrd MD     [facility]  [place]: 5 days  Patient Identification:  Name: Aakash Sanchez Jr.  Age: 82 y.o.  Sex: male  :  1935  MRN: [id number]         Primary Care Physician: Dimitrios Lindquist MD            Subjective: Feeling much better denies any fever and chills.  Interval History: See consultation note    Objective:    Scheduled Meds:    amiodarone 200 mg Oral Q12H   atorvastatin 40 mg Oral Nightly   digoxin 125 mcg Oral Every Other Day   famotidine 20 mg Oral Daily   fluconazole 200 mg Oral Daily   isosorbide mononitrate 15 mg Oral Daily   lactobacillus acidophilus 1 capsule Oral Daily   metoprolol tartrate 100 mg Oral Q12H     Continuous Infusions:    sodium chloride 75 mL/hr Last Rate: 75 mL/hr (10/06/17 0548)       Vital signs in last 24 hours:  Temp:  [96.8 °F (36 °C)-99.6 °F (37.6 °C)] 97.1 °F (36.2 °C)  Heart Rate:  [84-87] 84  Resp:  [16-18] 16  BP: (131-148)/(64-86) 136/78    Intake/Output:    Intake/Output Summary (Last 24 hours) at 10/06/17 1109  Last data filed at 10/06/17 0548   Gross per 24 hour   Intake             2854 ml   Output             1100 ml   Net             1754 ml       Exam:  /78 (BP Location: Right arm, Patient Position: Lying)  Pulse 84  Temp 97.1 °F (36.2 °C) (Oral)   Resp 16  Ht 70\" (177.8 cm)  Wt 190 lb 3.2 oz (86.3 kg)  SpO2 91%  BMI 27.29 kg/m2    General Appearance:    Alert awake does not appear toxic or septic has better color                          Head:    Normocephalic, without obvious abnormality, atraumatic                           Eyes:    PERRL, conjunctiva/corneas clear, EOM's intact, both eyes                         Throat:   Lips, tongue, gums normal; oral mucosa pink and moist                           Neck:   Supple, symmetrical, trachea midline, no JVD                         Lungs:    Clear to auscultation bilaterally, respirations unlabored                 Chest Wall:    No " tenderness or deformity                          Heart:    Regular rate and rhythm, S1 and S2 normal, no murmur,no  Rub                                      or gallop                  Abdomen:     Soft nontender, catheter in place                 Extremities:   Extremities normal, atraumatic, no cyanosis or edema                        Pulses:   Pulses palpable in all extremities                            Skin:   Skin is warm and dry,  no rashes or palpable lesions                    Data Review:    I reviewed the patient's new clinical results.    Results from last 7 days  Lab Units 10/06/17  0538 10/05/17  0420 10/04/17  0404 10/03/17  0342 10/02/17  0516 10/01/17  0603 09/30/17  2252   WBC 10*3/mm3 6.50 5.79 5.53 6.27 7.33 8.06 8.98   HEMOGLOBIN g/dL 10.3* 10.3* 10.7* 10.9* 11.1* 11.6* 12.7*   PLATELETS 10*3/mm3 224 198 182 189 172 202 221       Results from last 7 days  Lab Units 10/06/17  0538 10/05/17  0420 10/04/17  0404 10/03/17  0342 10/02/17  0516 10/01/17  0603 09/30/17  2252   SODIUM mmol/L 137  137 138 138 138 137 141 141   POTASSIUM mmol/L 4.4  4.4 4.2 4.0 3.4* 3.6 3.5 4.1   CHLORIDE mmol/L 106  106 106 106 103 103 105 100   CO2 mmol/L 19.0*  19.0* 19.9* 20.9* 19.7* 18.3* 19.9* 23.8   BUN mg/dL 15  15 15 15 15 16 17 18   CREATININE mg/dL 2.01*  2.01* 2.08* 2.12* 2.14* 2.08* 1.81* 2.28*   CALCIUM mg/dL 8.3*  8.3* 8.3* 8.3* 8.1* 7.8* 7.9* 8.9   GLUCOSE mg/dL 130*  130* 134* 148* 144* 122* 134* 130*     Atypical ANCA 1:40 significance ?  Microbiology Results (last 10 days)     Procedure Component Value - Date/Time    Blood Culture - Blood, [[id number]]  (Normal) Collected:  10/03/17 1901    Lab Status:  Preliminary result Specimen:  Blood from Arm, Left Updated:  10/05/17 1931     Blood Culture No growth at 2 days    Blood Culture - Blood, [[id number]]  (Normal) Collected:  10/03/17 1830    Lab Status:  Preliminary result Specimen:  Blood from Arm, Left Updated:  10/05/17 1931     Blood Culture No  growth at 2 days    Urine Culture - Urine, Urine, Catheter [[id number]]  (Abnormal) Collected:  10/02/17 1319    Lab Status:  Preliminary result Specimen:  Urine from Urine, Catheter Updated:  10/05/17 1410     Urine Culture --      50,000 CFU/mL Yeast, Not Candida albicans (A)    Narrative:       Yeast ID to follow    Respiratory Panel, PCR - Swab, Nasopharynx [[id number]]  (Normal) Collected:  10/01/17 0213    Lab Status:  Final result Specimen:  Swab from Nasopharynx Updated:  10/01/17 0332     ADENOVIRUS, PCR Not Detected     Coronavirus 229E Not Detected     Coronavirus HKU1 Not Detected     Coronavirus NL63 Not Detected     Coronavirus OC43 Not Detected     Human Metapneumovirus Not Detected     Human Rhinovirus/Enterovirus Not Detected     Influenza B PCR Not Detected     Parainfluenza Virus 1 Not Detected     Parainfluenza Virus 2 Not Detected     Parainfluenza Virus 3 Not Detected     Parainfluenza Virus 4 Not Detected     Bordetella pertussis pcr Not Detected     Influenza 2009 H1N1 by PCR Not Detected     Chlamydophila pneumoniae PCR Not Detected     Mycoplasma pneumo by PCR Not Detected     Influenza A PCR Not Detected     Influenza A H3 Not Detected     Influenza A H1 Not Detected     RSV, PCR Not Detected    Blood Culture - Blood, [[id number]]  (Normal) Collected:  10/01/17 0134    Lab Status:  Final result Specimen:  Blood from Arm, Left Updated:  10/06/17 0146     Blood Culture No growth at 5 days    Blood Culture - Blood, [[id number]]  (Normal) Collected:  10/01/17 0100    Lab Status:  Final result Specimen:  Blood from Arm, Left Updated:  10/06/17 0116     Blood Culture No growth at 5 days    Urine Culture - Urine, Urine, Clean Catch [[id number]]  (Abnormal) Collected:  09/30/17 2253    Lab Status:  Final result Specimen:  Urine from Urine, Clean Catch Updated:  10/02/17 1153     Urine Culture --      25,000 CFU/mL Yeast, Not Candida albicans (A)    Narrative:       Call if further workup needed.           Assessment:  Principal Problem:    UTI (urinary tract infection)  Active Problems:    PAF (paroxysmal atrial fibrillation)    COPD (chronic obstructive pulmonary disease)    Type 2 diabetes mellitus with complication    Abdominal aortic aneurysm (AAA) without rupture    Long term current use of anticoagulant    Chronic combined systolic and diastolic congestive heart failure    Abnormal CXR    CKD (chronic kidney disease), stage III    PAU (acute kidney injury)    Arteriosclerotic coronary artery disease    Metabolic encephalopathy    Acidosis    Wide-complex tachycardia    Mass of upper lobe of left lung      Plan/recommendation: Continue oral Diflucan for 2 weeks.  Avoid urinary retention if possible.  If he is catheter dependent and exchange catheter every couple weeks.      Carmen Byrd MD  10/6/2017  11:09 AM    Much of this encounter note is an electronic transcription/translation of spoken language to printed text. The electronic translation of spoken language may permit erroneous, or at times, nonsensical words or phrases to be inadvertently transcribed; Although I have reviewed the note for such errors, some may still exist

## 2017-10-07 NOTE — PROGRESS NOTES
NEPHROLOGY PROGRESS NOTE    PATIENT IDENTIFICATION:   Name:  Aakash Sanchez Jr.      MRN:  [id number]     82 y.o.  male             Reason for visit: PAU on CKD3    SUBJECTIVE:   Seen and examined. No SOA or CP. C/O diarrhea. D/W staff  OBJECTIVE:  Vitals:    10/06/17 1900 10/07/17 0045 10/07/17 0500 10/07/17 0700   BP: 116/89 130/81  137/87   BP Location: Right arm Right arm  Left arm   Patient Position: Lying Lying  Lying   Pulse: 90   84   Resp: 18 18  16   Temp: 98 °F (36.7 °C) 98.1 °F (36.7 °C)  97.8 °F (36.6 °C)   TempSrc: Oral Oral  Oral   SpO2: 97%   95%   Weight:   194 lb (88 kg)    Height:               Body mass index is 27.84 kg/(m^2).    Intake/Output Summary (Last 24 hours) at 10/07/17 1059  Last data filed at 10/07/17 0540   Gross per 24 hour   Intake              950 ml   Output              300 ml   Net              650 ml     Wt Readings from Last 1 Encounters:   10/07/17 0500 194 lb (88 kg)   10/06/17 0458 190 lb 3.2 oz (86.3 kg)   10/05/17 0945 190 lb 9.6 oz (86.5 kg)   10/04/17 0600 193 lb 4.8 oz (87.7 kg)   10/03/17 0617 187 lb 14.4 oz (85.2 kg)   10/02/17 0549 183 lb 5 oz (83.2 kg)   09/30/17 2211 180 lb (81.6 kg)     Wt Readings from Last 3 Encounters:   10/07/17 194 lb (88 kg)   09/28/17 184 lb (83.5 kg)   09/26/17 181 lb (82.1 kg)         Exam:  General Appearance: alert, oriented x 3, no acute distress, appears to be chronically ill  Skin: warm and dry  HEENT: Nonicteric sclerae, oral mucosa normal,   Neck: supple, no JVD, trachea midline  Lungs: Bilateral rhonchi, unlabored breathing effort  Heart: RRR, normal S1 and S2, no S3, no rub  Abdomen: soft, non-tender, no palpable bladder, BS+, abdomen slightly distended  Extremities: no edema, cyanosis or clubbing  Neuro: normal speech and mental status      Scheduled meds:      amiodarone 200 mg Oral Q12H   atorvastatin 40 mg Oral Nightly   digoxin 125 mcg Oral Every Other Day   famotidine 20 mg Oral Daily   fluconazole 200 mg Oral Daily    isosorbide mononitrate 15 mg Oral Daily   lactobacillus acidophilus 1 capsule Oral Daily   metoprolol tartrate 100 mg Oral Q12H   polyethylene glycol 17 g Oral Daily   sennosides-docusate sodium 2 tablet Oral Nightly     IV meds:                          sodium chloride 75 mL/hr Last Rate: 75 mL/hr (10/07/17 0408)       Data Review:      Results from last 7 days  Lab Units 10/07/17  0532 10/06/17  0538 10/05/17  0420  09/30/17  2252   SODIUM mmol/L 137 137  137 138  < > 141   POTASSIUM mmol/L 4.4 4.4  4.4 4.2  < > 4.1   CHLORIDE mmol/L 107 106  106 106  < > 100   CO2 mmol/L 18.6* 19.0*  19.0* 19.9*  < > 23.8   BUN mg/dL 13 15  15 15  < > 18   CREATININE mg/dL 1.86* 2.01*  2.01* 2.08*  < > 2.28*   CALCIUM mg/dL 8.2* 8.3*  8.3* 8.3*  < > 8.9   BILIRUBIN mg/dL  --   --   --   --  1.0   ALK PHOS U/L  --   --   --   --  73   ALT (SGPT) U/L  --   --   --   --  12   AST (SGOT) U/L  --   --   --   --  16   GLUCOSE mg/dL 142* 130*  130* 134*  < > 130*   < > = values in this interval not displayed.  Estimated Creatinine Clearance: 35.4 mL/min (by C-G formula based on Cr of 1.86).    Results from last 7 days  Lab Units 10/06/17  0538   URIC ACID mg/dL 6.5       Results from last 7 days  Lab Units 10/07/17  0532 10/06/17  0538 10/04/17  0404 10/03/17  0342   MAGNESIUM mg/dL  --  2.2 1.9 1.9   PHOSPHORUS mg/dL 2.2* 2.6 2.5 2.1*         Results from last 7 days  Lab Units 10/06/17  0538 10/05/17  0420 10/04/17  0404 10/03/17  0342 10/02/17  0516   WBC 10*3/mm3 6.50 5.79 5.53 6.27 7.33   HEMOGLOBIN g/dL 10.3* 10.3* 10.7* 10.9* 11.1*   PLATELETS 10*3/mm3 224 198 182 189 172         Results from last 7 days  Lab Units 10/07/17  0532 10/06/17  0538 10/05/17  0420 10/04/17  0404 10/03/17  0342   INR  2.53* 2.66* 2.92* 3.28* 2.95*             ASSESSMENT:   1.  PAU on CKD3, non-oliguric, unchanged:  Vol fine, and lytes compensated  2.  TME, resolved  3.  Recent cardioversion and pacemaker.  4.  Fever and chills, currently  afebrile  5.  Urinary retention, currently has Whaley catheter anchored in place  6.  Left lung mass worrisome for neoplasm    PLAN:    Will stop IVF and encourage him to increase oral intake  Diflucan for UTI  Volume and lytes are acceptable  BP is well controlled  Plan for lung biopsy per pulmonary once INR < 1.4   Surveillance labs    Rex Guadalupe MD  10/7/2017  10:59 AM

## 2017-10-07 NOTE — PLAN OF CARE
Problem: Patient Care Overview (Adult)  Goal: Plan of Care Review  Outcome: Ongoing (interventions implemented as appropriate)    10/07/17 1537   Coping/Psychosocial Response Interventions   Plan Of Care Reviewed With patient   Outcome Evaluation   Outcome Summary/Follow up Plan VSS. Pt had stool softners and suppository yesturday due to constipation. Pt now has diarrhea. Whaley still in place. No new c/o. Vit K given. Will continue to monitor    Patient Care Overview   Progress no change       Goal: Adult Individualization and Mutuality  Outcome: Ongoing (interventions implemented as appropriate)  Goal: Discharge Needs Assessment  Outcome: Ongoing (interventions implemented as appropriate)    Problem: Infection, Risk/Actual (Adult)  Goal: Infection Prevention/Resolution  Outcome: Ongoing (interventions implemented as appropriate)    Problem: Fall Risk (Adult)  Goal: Absence of Falls  Outcome: Ongoing (interventions implemented as appropriate)    Problem: Urine Elimination, Impaired (Adult)  Goal: Effective Urinary Elimination  Outcome: Ongoing (interventions implemented as appropriate)  Goal: Effective Containment of Urine  Outcome: Ongoing (interventions implemented as appropriate)  Goal: Reduced Incontinence Episodes  Outcome: Ongoing (interventions implemented as appropriate)    Problem: Pressure Ulcer Risk (Jayme Scale) (Adult,Obstetrics,Pediatric)  Goal: Skin Integrity  Outcome: Ongoing (interventions implemented as appropriate)

## 2017-10-07 NOTE — PLAN OF CARE
Problem: Patient Care Overview (Adult)  Goal: Plan of Care Review  Outcome: Ongoing (interventions implemented as appropriate)    10/07/17 0535   Coping/Psychosocial Response Interventions   Plan Of Care Reviewed With patient   Outcome Evaluation   Outcome Summary/Follow up Plan IVF continued. Suppository given, still no BM. Pt had increasing confusion tonight. VSS, will continue to monitor.   Patient Care Overview   Progress no change       Goal: Adult Individualization and Mutuality  Outcome: Ongoing (interventions implemented as appropriate)  Goal: Discharge Needs Assessment  Outcome: Ongoing (interventions implemented as appropriate)    Problem: Infection, Risk/Actual (Adult)  Goal: Infection Prevention/Resolution  Outcome: Ongoing (interventions implemented as appropriate)    Problem: Fall Risk (Adult)  Goal: Absence of Falls  Outcome: Ongoing (interventions implemented as appropriate)    Problem: Urine Elimination, Impaired (Adult)  Goal: Effective Urinary Elimination  Outcome: Ongoing (interventions implemented as appropriate)  Goal: Effective Containment of Urine  Outcome: Ongoing (interventions implemented as appropriate)  Goal: Reduced Incontinence Episodes  Outcome: Ongoing (interventions implemented as appropriate)    Problem: Pressure Ulcer Risk (Jayme Scale) (Adult,Obstetrics,Pediatric)  Goal: Skin Integrity  Outcome: Ongoing (interventions implemented as appropriate)

## 2017-10-07 NOTE — PLAN OF CARE
Problem: Patient Care Overview (Adult)  Goal: Plan of Care Review  Outcome: Ongoing (interventions implemented as appropriate)    10/07/17 1411   Coping/Psychosocial Response Interventions   Plan Of Care Reviewed With patient   Outcome Evaluation   Outcome Summary/Follow up Plan Patient steady with gait but unable to ambulate as far today due to complaints of diarrhea   Patient Care Overview   Progress improving

## 2017-10-07 NOTE — PROGRESS NOTES
Name: Aakash Sanchez Jr. ADMIT: 2017   : 1935  PCP: Dimitrios Lindquist MD    MRN: [id number] LOS: 6 days   AGE/SEX: 82 y.o. male  ROOM: [facility]     Subjective   Subjective:  Symptoms:  No shortness of breath, cough, chest pain or diarrhea.  (Mild abdominal distention but no N/V.  Not worse by eating. Now again complains of diarrhea. No abd pain.).    Diet:  Poor intake.  No nausea or vomiting.    Activity level: Impaired due to weakness.    Pain:  He reports no pain.      Objective   Vital Signs  Temp:  [97.7 °F (36.5 °C)-98.1 °F (36.7 °C)] 97.8 °F (36.6 °C)  Heart Rate:  [84-90] 84  Resp:  [16-18] 16  BP: (116-137)/(81-89) 137/87  SpO2:  [95 %-98 %] 95 %  on   ;   O2 Device: room air  Body mass index is 27.84 kg/(m^2).    Physical Exam   Constitutional: He is oriented to person, place, and time. He appears well-developed. He is cooperative. He has a sickly appearance. No distress.   Neck: No JVD present. No tracheal deviation present. No thyromegaly present.   Cardiovascular: Normal rate and regular rhythm.    No murmur heard.  Pulmonary/Chest: Effort normal and breath sounds normal. No respiratory distress.   Abdominal: Soft. Normal appearance and bowel sounds are normal. He exhibits no distension. There is no tenderness.   Neurological: He is alert and oriented to person, place, and time.   Skin: Skin is warm. No rash noted. He is not diaphoretic.   Psychiatric: He has a normal mood and affect. His behavior is normal.   Nursing note and vitals reviewed.      Results Review:       I reviewed the patient's new clinical results.    Results from last 7 days  Lab Units 10/06/17  0538 10/05/17  0420 10/04/17  0404 10/03/17  0342 10/02/17  0516 10/01/17  0603 17  2252   WBC 10*3/mm3 6.50 5.79 5.53 6.27 7.33 8.06 8.98   HEMOGLOBIN g/dL 10.3* 10.3* 10.7* 10.9* 11.1* 11.6* 12.7*   PLATELETS 10*3/mm3 224 198 182 189 172 202 221       Results from last 7 days  Lab Units 10/07/17  0532 10/06/17  0538  10/05/17  0420 10/04/17  0404 10/03/17  0342 10/02/17  0516 10/01/17  0603 09/30/17  2252   SODIUM mmol/L 137 137  137 138 138 138 137 141 141   POTASSIUM mmol/L 4.4 4.4  4.4 4.2 4.0 3.4* 3.6 3.5 4.1   CHLORIDE mmol/L 107 106  106 106 106 103 103 105 100   CO2 mmol/L 18.6* 19.0*  19.0* 19.9* 20.9* 19.7* 18.3* 19.9* 23.8   BUN mg/dL 13 15  15 15 15 15 16 17 18   CREATININE mg/dL 1.86* 2.01*  2.01* 2.08* 2.12* 2.14* 2.08* 1.81* 2.28*   GLUCOSE mg/dL 142* 130*  130* 134* 148* 144* 122* 134* 130*   ALBUMIN g/dL 2.60* 2.70*  --  2.90* 2.90*  --   --  3.80   BILIRUBIN mg/dL  --   --   --   --   --   --   --  1.0   ALK PHOS U/L  --   --   --   --   --   --   --  73   AST (SGOT) U/L  --   --   --   --   --   --   --  16   ALT (SGPT) U/L  --   --   --   --   --   --   --  12   Estimated Creatinine Clearance: 35.4 mL/min (by C-G formula based on Cr of 1.86).    Results from last 7 days  Lab Units 10/07/17  0532 10/06/17  0538 10/05/17  0420 10/04/17  0404 10/03/17  0342 10/02/17  0516 10/01/17  0603 09/30/17  2252   CALCIUM mg/dL 8.2* 8.3*  8.3* 8.3* 8.3* 8.1* 7.8* 7.9* 8.9   ALBUMIN g/dL 2.60* 2.70*  --  2.90* 2.90*  --   --  3.80   MAGNESIUM mg/dL  --  2.2  --  1.9 1.9  --  1.9  --    PHOSPHORUS mg/dL 2.2* 2.6  --  2.5 2.1*  --   --   --        Results from last 7 days  Lab Units 10/04/17  0404 10/01/17  0603 09/30/17  2252   PROCALCITONIN ng/mL 0.23 0.12  --    LACTATE mmol/L 0.8  --  1.7       Results from last 7 days  Lab Units 10/07/17  0532 10/06/17  0538 10/05/17  0420 10/04/17  0404 10/03/17  0342 10/02/17  0516 10/01/17  0603   PROTIME Seconds 26.4* 27.5* 29.6* 32.4* 29.8* 26.9* 26.4*   INR  2.53* 2.66* 2.92* 3.28* 2.95* 2.58* 2.53*       Results from last 7 days  Lab Units 10/03/17  1901 10/03/17  1830 10/02/17  1319 10/01/17  0134 10/01/17  0100 09/30/17  2253   BLOODCX  No growth at 3 days No growth at 3 days  --  No growth at 5 days No growth at 5 days  --    URINECX   --   --    50,000 CFU/mL Candida  kefyr*  --   --    25,000 CFU/mL Yeast, Not Candida albicans*   VRP negative      CRANIAL CT SCAN WITHOUT CONTRAST  10/2/2017  No acute intracranial abnormality.        CT SCAN ABDOMEN AND PELVIS WITHOUT CONTRAST  10/02/17  1. There is a tiny left pleural effusion which is new since the study of  08/01/2017. Again noted are several scattered areas of somewhat focal  groundglass opacity, particularly at the right base which are unchanged  and likely related to inflammatory or infectious change.  2. There is a very small pericardial effusion which is unchanged. The  liver, spleen, pancreas, and both adrenal glands are unremarkable  without intravenous contrast.  3. The kidneys are normal in size with some changes of cortical atrophy.  There is a small intermediate density mass at the lower margin of the  left kidney likely representing a somewhat complex cyst and can be seen  more clearly as a cyst on the enhanced CT scan dating back to 2013.  There is a very tiny cortical cyst in the right kidney also unchanged.  4. There is an abdominal aortic aneurysm treated with a stent graft.  There is no retroperitoneal mass. The large and small bowel loops are  normal in caliber and show no inflammatory change. In the pelvis, there  are isotope seeds within the prostate for treatment of prostate cancer  and these appear unchanged. A Whaley catheter is present within the  urinary bladder which is somewhat decompressed and again noted is some  bladder wall thickening likely related to an element of outlet  obstruction as also noted on CT scans dating back to 2013.    CT CHEST WITHOUT CONTRAST 10/01/17  1. 3.8 cm solid-appearing lung mass anterior-inferior left upper lobe  suspicious for neoplasm. Tissue sampling recommended.  2. 0.6 cm right upper lobe pulmonary nodule. 1.9 cm right middle lobe  groundglass nodule.  3. Small left pleural effusion.  4. Cardiomegaly. Mild splenomegaly.       amiodarone 200 mg Oral Q12H    atorvastatin 40 mg Oral Nightly   digoxin 125 mcg Oral Every Other Day   famotidine 20 mg Oral Daily   fluconazole 200 mg Oral Daily   isosorbide mononitrate 15 mg Oral Daily   lactobacillus acidophilus 1 capsule Oral Daily   metoprolol tartrate 100 mg Oral Q12H   polyethylene glycol 17 g Oral Daily   sennosides-docusate sodium 2 tablet Oral Nightly       sodium chloride 75 mL/hr Last Rate: 75 mL/hr (10/07/17 0408)   Diet Regular; Cardiac      Assessment/Plan   Assessment:     Active Hospital Problems (** Indicates Principal Problem)    Diagnosis Date Noted   • **UTI (urinary tract infection) [N39.0] 10/01/2017   • Mass of upper lobe of left lung [R91.8] 10/02/2017   • Abnormal CXR [R93.8] 10/01/2017   • CKD (chronic kidney disease), stage III [N18.3] 10/01/2017   • PAU (acute kidney injury) [N17.9] 10/01/2017   • Arteriosclerotic coronary artery disease [I25.10] 10/01/2017   • Metabolic encephalopathy [G93.41] 10/01/2017   • Acidosis [E87.2] 10/01/2017   • Wide-complex tachycardia [I47.2] 10/01/2017   • Chronic combined systolic and diastolic congestive heart failure [I50.42] 09/05/2017   • Long term current use of anticoagulant [Z79.01] 12/15/2016   • Abdominal aortic aneurysm (AAA) without rupture [I71.4] 12/15/2016   • Type 2 diabetes mellitus with complication [E11.8] 11/23/2016   • COPD (chronic obstructive pulmonary disease) [J44.9] 11/15/2016   • PAF (paroxysmal atrial fibrillation) [I48.0]       Resolved Hospital Problems    Diagnosis Date Noted Date Resolved   No resolved problems to display.       Plan:   · Off antibiotics.  Diflucan for urine. Afebrile.  · All cultures remain negative except yeast in urine.  · Cardiology has started him on amiodarone for wide-complex tachycardia.  No obvious signs of infection related to the implanted CRT device.    · Nephrology following regarding renal failure.  Relatively stable.  · Diarrhea had subsided.  CT a/p negative.  Now again has diarrhea. Given recent  antibiotics will check for C. difficile. Will ask gastroenterology to evaluate. Still not clear what caused his admitting symptoms of fever, encephalopathy etc.  · Pulmonology seeing regarding lung mass.  Could represent neoplasm.  HCT negative for mets. Continue to hold Coumadin for possible biopsy. Day 4 off Plavix. Likely biopsy Monday if still here. INR remaining elevated likely due to mild malnutrition. He is not eating much at all. Will give a dose of vitamin K.  · Physical therapy following.        Jamel Mendoza MD  [facility]  10/07/17  10:11 AM

## 2017-10-07 NOTE — CONSULTS
Inpatient Consult to Gastroenterology  Consult performed by: JAIRON NELSON  Consult ordered by: TAISHA PALACIOS          Patient Care Team:  Dimitrios Lindquist MD as PCP - General (Internal Medicine)    Chief complaint: distention diarrhea    Subjective     History of Present Illness  Patient admitted with UTI,  Had some constipation, given rx, now has diarrhea,    Also distention. Of the abdomen as well.   Had did have diarrhea in the past ,  A cscope noted a polyp and eosinophillic inflammation, but a repeat exam 3/2016 noted polyps and resolution of the colitis,  Upper scope noted short segment barretts, no enteritis of celiac disease.  He is tired at this time.   Review of Systems   Constitutional: Positive for fatigue and fever.   HENT: Negative.    Eyes: Negative.    Respiratory: Positive for shortness of breath.    Cardiovascular: Negative.    Gastrointestinal: Positive for abdominal distention and diarrhea.   Endocrine: Negative.    Genitourinary: Negative.    Musculoskeletal: Negative.    Skin: Negative.    Allergic/Immunologic: Negative.    Neurological: Negative.    Hematological: Negative.         Past Medical History:   Diagnosis Date   • AAA (abdominal aortic aneurysm)     s/p repair, Dr. Felix   • Acute myocardial infarction    • Arteriosclerotic coronary artery disease    • Arthritis    • Atrial fibrillation    • Atrial fibrillation    • B12 deficiency 6/1/2017   • Cardiomyopathy    • Chicken pox    • Chronic combined systolic and diastolic congestive heart failure 9/5/2017   • Chronic systolic (congestive) heart failure    • CKD (chronic kidney disease), stage III    • COPD (chronic obstructive pulmonary disease) 11/15/2016   • Coronary artery disease     MI 1990's, CABG x 2 V (LIMA-LAD, SVG-PDA), 1997   • Dyspnea    • Dyspnea on exertion 8/25/2017   • Fatigue    • Heart attack    • Hyperglycemia 3/29/2017   • Hyperlipidemia    • Hypertension    • Imbalance 3/29/2017   • Insomnia 8/25/2017   •  Ischemic cardiomyopathy     LVEF 40%   • Lung mass 04/18/2016    spiculated, being followed   • Lung nodule seen on imaging study    • Malignant neoplasm    • Measles    • Mumps    • PAF (paroxysmal atrial fibrillation)    • Pleural effusion    • Premature ventricular contractions    • Prostate cancer    • PVC (premature ventricular contraction)    • Rash of body 6/21/2017   • Stroke    • Thoracic aortic ectasia    • Urinary tract infection with hematuria 1/18/2017   ,   Past Surgical History:   Procedure Laterality Date   • APPENDECTOMY     • ARTERIAL ANEURYSM REPAIR     • BRONCHOSCOPY Right 10/5/2016    Procedure: BRONCHOSCOPY;  Surgeon: Jhony Quintanilla MD;  Location: [facility] ENDOSCOPY;  Service:    • BRONCHOSCOPY N/A 11/29/2016    Procedure: BRONCHOSCOPY WITH FLUORO w/biopsy AND BAL;  Surgeon: Ervin Stiles MD;  Location: [facility] ENDOSCOPY;  Service:    • CARDIAC CATHETERIZATION N/A 6/7/2017    Procedure: Left Heart Cath;  Surgeon: Vincent Middleton MD;  Location: [facility] CATH INVASIVE LOCATION;  Service:    • CARDIAC CATHETERIZATION N/A 6/7/2017    Procedure: Coronary angiography;  Surgeon: Vincent Middleton MD;  Location: [facility] CATH INVASIVE LOCATION;  Service:    • CARDIAC CATHETERIZATION N/A 6/7/2017    Procedure: Left ventriculography;  Surgeon: Vincent Middleton MD;  Location: [facility] CATH INVASIVE LOCATION;  Service:    • CARDIAC CATHETERIZATION N/A 6/7/2017    Procedure: Bypass graft study;  Surgeon: Vincent Middleton MD;  Location: [facility] CATH INVASIVE LOCATION;  Service:    • CARDIAC CATHETERIZATION N/A 6/7/2017    Procedure: Native mammary injection;  Surgeon: Vincent Middleton MD;  Location: [facility] CATH INVASIVE LOCATION;  Service:    • CARDIAC CATHETERIZATION N/A 6/7/2017    Procedure: Stent NYA bypass graft;  Surgeon: Vincent Middleton MD;  Location: [facility] CATH INVASIVE LOCATION;  Service:    • CARDIAC DEFIBRILLATOR PLACEMENT     • CARDIAC ELECTROPHYSIOLOGY PROCEDURE N/A 9/19/2017    Procedure: ICD SC new- Bi  Ventricular  CRT-D  MEDTRONIC;  Surgeon: Fady Terrazas MD;  Location: [facility] CATH INVASIVE LOCATION;  Service:    • CARDIAC SURGERY     • CHOLECYSTECTOMY     • COLONOSCOPY N/A 12/16/2016    Procedure: COLONOSCOPY TO ASC COLON  WITH BIOPSY AND POLYPECTOMY;  Surgeon: Lane Brian MD;  Location: [facility] ENDOSCOPY;  Service:    • COLONOSCOPY N/A 3/1/2017    multiple polyps, lipoma in proximal transverse colon/hepatic flexure, repeat in one year   • CORONARY ARTERY BYPASS GRAFT     • ENDOSCOPY N/A 3/1/2017    Concepcion's, HH, gastric mucosal atrophy   • INSERT / REPLACE / REMOVE PACEMAKER     • PROSTATE SURGERY     ,   Family History   Problem Relation Age of Onset   • Cancer Mother    • Cancer Father    • COPD Father    • Arthritis Sister    • Diabetes Sister    • Hyperlipidemia Sister    • Arthritis Brother    • Diabetes Brother    • Stroke Brother    • Lung cancer Other    • Stroke Other    • No Known Problems Maternal Grandmother    • No Known Problems Maternal Grandfather    • No Known Problems Paternal Grandmother    • No Known Problems Paternal Grandfather    ,   Social History   Substance Use Topics   • Smoking status: Former Smoker     Packs/day: 1.00     Years: 25.00     Types: Cigarettes   • Smokeless tobacco: Former User     Quit date: 9/22/1985      Comment: caffeine use   • Alcohol use No   ,   Prescriptions Prior to Admission   Medication Sig Dispense Refill Last Dose   • finasteride (PROSCAR) 5 MG tablet Take 1 tablet by mouth Daily. 90 tablet 1 Taking   • furosemide (LASIX) 40 MG tablet Take 2 tablets by mouth Daily. 90 tablet 2 Taking   • isosorbide mononitrate (IMDUR) 30 MG 24 hr tablet Take 0.5 tablets by mouth Daily. 45 tablet 3 Taking   • metoprolol tartrate (LOPRESSOR) 100 MG tablet Take 1 tablet by mouth Every 12 (Twelve) Hours. 60 tablet 1 Taking   • pantoprazole (PROTONIX) 40 MG EC tablet Take 40 mg by mouth Daily.   Taking   • potassium chloride (K-TAB) 20 MEQ tablet controlled-release ER  tablet Take 1 tablet by mouth Daily. 90 tablet 3 Taking   • Probiotic Product (PROBIOTIC PO) Take 1 tablet by mouth Daily.   Taking   • warfarin (COUMADIN) 4 MG tablet Take 1 tablet by mouth Daily. (Patient taking differently: Take 2 mg by mouth Daily.) 30 tablet 1 Taking   • atorvastatin (LIPITOR) 40 MG tablet Take 1 tablet by mouth Every Night. 30 tablet 6 Unknown at Unknown time   • clopidogrel (PLAVIX) 75 MG tablet Take 1 tablet by mouth Daily. 90 tablet 3 Unknown at Unknown time   • digoxin (LANOXIN) 125 MCG tablet Take 1 tablet by mouth Every Other Day. 15 tablet 1 Unknown at Unknown time   • nitroglycerin (NITROSTAT) 0.4 MG SL tablet 1 under the tongue as needed for angina, may repeat q5mins for up three doses 35 tablet 0 Taking   • oxyCODONE-acetaminophen (PERCOCET) 5-325 MG per tablet Take 1 tablet by mouth Every 6 (Six) Hours As Needed.   Unknown at Unknown time   • warfarin (COUMADIN) 3 MG tablet TAKE ONE TABLET BY MOUTH DAILY 90 tablet 0 Taking   , Scheduled Meds:    amiodarone 200 mg Oral Q12H   atorvastatin 40 mg Oral Nightly   digoxin 125 mcg Oral Every Other Day   famotidine 20 mg Oral Daily   fluconazole 200 mg Oral Daily   isosorbide mononitrate 15 mg Oral Daily   lactobacillus acidophilus 1 capsule Oral Daily   metoprolol tartrate 100 mg Oral Q12H   , Continuous Infusions:   , PRN Meds:  •  acetaminophen  •  bisacodyl  •  nitroglycerin  •  ondansetron **OR** ondansetron ODT **OR** ondansetron  •  oxyCODONE-acetaminophen  •  polyethylene glycol  •  sodium chloride and Allergies:  Review of patient's allergies indicates no known allergies.    Objective      Vital Signs  Temp:  [97.6 °F (36.4 °C)-98.1 °F (36.7 °C)] 97.6 °F (36.4 °C)  Heart Rate:  [84-96] 96  Resp:  [16-18] 16  BP: (130-140)/(76-87) 140/76    Physical Exam   Constitutional: He appears well-developed and well-nourished.   HENT:   Mouth/Throat: Oropharynx is clear and moist.   Eyes: Conjunctivae are normal.   Neck: Neck supple.    Cardiovascular: Normal rate and regular rhythm.    Pulmonary/Chest: Effort normal and breath sounds normal.   Abdominal: Bowel sounds are normal. He exhibits distension. There is tenderness in the periumbilical area. A hernia is present. Hernia confirmed positive in the ventral area.           Results Review:    I reviewed the patient's new clinical results.  I reviewed the patient's new imaging results and agree with the interpretation.        Assessment/Plan     Principal Problem:    UTI (urinary tract infection)  Active Problems:    PAF (paroxysmal atrial fibrillation)    COPD (chronic obstructive pulmonary disease)    Type 2 diabetes mellitus with complication    Abdominal aortic aneurysm (AAA) without rupture    Long term current use of anticoagulant    Chronic combined systolic and diastolic congestive heart failure    Abnormal CXR    CKD (chronic kidney disease), stage III    PAU (acute kidney injury)    Arteriosclerotic coronary artery disease    Metabolic encephalopathy    Acidosis    Wide-complex tachycardia    Mass of upper lobe of left lung      Assessment:  (Abdominal distention possible ileus  Diarrhea  barretts esophagus  Personal history of colon polyps).     Plan:   (Agree with stool studies,  Will check abdominal series to exclude ileus).       I discussed the patients findings and my recommendations with patient and nursing staff    Anirudh Jefferson MD  10/07/17  7:56 PM    Time: Critical care 20 min

## 2017-10-07 NOTE — PROGRESS NOTES
"                                              LOS: 6 days   Patient Care Team:  Dimitrios Lindquist MD as PCP - General (Internal Medicine)    Chief Complaint:  F/up lung mass    Interval History:   On RA. He denies dyspnea.     Ventilator/Non-Invasive Ventilation Settings     None            Vital Signs  Temp:  [97.6 °F (36.4 °C)-98.1 °F (36.7 °C)] 97.6 °F (36.4 °C)  Heart Rate:  [84-96] 96  Resp:  [16-18] 16  BP: (116-140)/(76-89) 140/76    Intake/Output Summary (Last 24 hours) at 10/07/17 1613  Last data filed at 10/07/17 1300   Gross per 24 hour   Intake              950 ml   Output              750 ml   Net              200 ml     Flowsheet Rows         First Filed Value    Admission Height  70\" (177.8 cm) Documented at 09/30/2017 2211    Admission Weight  180 lb (81.6 kg) Documented at 09/30/2017 2211          Physical Exam:   General Appearance:    Alert, cooperative, in no acute distress   Lungs:     Slightly decreased breath sounds. No wheezing    Heart:    Irregular rhythm and normal rate, normal S1 and S2, no            murmur   Chest Wall:    No abnormalities observed   Abdomen:    Obese. Soft   Neuro:   Conscious, alert, oriented x3   Extremities:   Moves all extremities well, no edema, no cyanosis, no             Redness          Results Review:          Results from last 7 days  Lab Units 10/07/17  0532 10/06/17  0538 10/05/17  0420   SODIUM mmol/L 137 137  137 138   POTASSIUM mmol/L 4.4 4.4  4.4 4.2   CHLORIDE mmol/L 107 106  106 106   CO2 mmol/L 18.6* 19.0*  19.0* 19.9*   BUN mg/dL 13 15  15 15   CREATININE mg/dL 1.86* 2.01*  2.01* 2.08*   GLUCOSE mg/dL 142* 130*  130* 134*   CALCIUM mg/dL 8.2* 8.3*  8.3* 8.3*           Results from last 7 days  Lab Units 10/06/17  0538 10/05/17  0420 10/04/17  0404   WBC 10*3/mm3 6.50 5.79 5.53   HEMOGLOBIN g/dL 10.3* 10.3* 10.7*   HEMATOCRIT % 33.6* 33.7* 35.2*   PLATELETS 10*3/mm3 224 198 182       Results from last 7 days  Lab Units 10/07/17  0532 " 10/06/17  0538 10/05/17  0420   INR  2.53* 2.66* 2.92*         @LABRCNT(bnp)@  I reviewed the patient's new clinical results.  I personally viewed and interpreted the patient's CXR        Medication Review:     amiodarone 200 mg Oral Q12H   atorvastatin 40 mg Oral Nightly   digoxin 125 mcg Oral Every Other Day   famotidine 20 mg Oral Daily   fluconazole 200 mg Oral Daily   isosorbide mononitrate 15 mg Oral Daily   lactobacillus acidophilus 1 capsule Oral Daily   metoprolol tartrate 100 mg Oral Q12H            Diagnostic imaging:  I personally and independently reviewed the CT chest dated 10/1  JT mass with bronchus sign. RUL nodule. No significant adenopathies.        Assessment:  3.8cm JT lung mass  6 mm right upper lobe lung nodule  1.9 cm right middle lobe groundglass nodule  Toxic metabolic encephalopathy - resolved ? sepsis  History of recurrent pneumonia   H/o ? BOOP with prolonged steroid taper  History of AAA status post stent  CAD s/p PCI/ 2V CABG(1997)  Now on DAPT   Chronic systolic and diastolic heart failure   CKD   Afib on AC   COPD not in exacerbation    Recommendation  SUMANTH bronch vs CT guided biopsy of the JT mass. Yield is higher for the latter but complication rate are higher as well. Awaiting INR correction. Potentially Monday. Will defer to Dr. Smith tomorrow    Lawrence Reynolds MD  10/07/17  4:13 PM            EMR Dragon/Transcription disclaimer:   Much of this encounter note is an electronic transcription/translation of spoken language to printed text. The electronic translation of spoken language may permit erroneous, or at times, nonsensical words or phrases to be inadvertently transcribed; Although I have reviewed the note for such errors, some may still exist.

## 2017-10-07 NOTE — THERAPY TREATMENT NOTE
Acute Care - Physical Therapy Treatment Note  [facility]     Patient Name: Aakash Sanchez Jr.  : 1935  MRN: [id number]  Today's Date: 10/7/2017             Admit Date: 2017    Visit Dx:    ICD-10-CM ICD-9-CM   1. Fever and chills R50.9 780.60   2. Altered mental status, unspecified altered mental status type R41.82 780.97   3. Difficulty walking R26.2 719.7     Patient Active Problem List   Diagnosis   • Arthritis   • Hyperlipidemia   • Hypertension   • Hypokalemia   • Lung mass   • Ventricular premature beats   • Coronary artery disease   • Ischemic cardiomyopathy   • PAF (paroxysmal atrial fibrillation)   • COPD (chronic obstructive pulmonary disease)   • VRE (vancomycin-resistant Enterococci) infection   • Type 2 diabetes mellitus with complication   • Diarrhea   • Abdominal aortic aneurysm (AAA) without rupture   • Long term current use of anticoagulant   • Urinary tract infection with hematuria   • CKD (chronic kidney disease), stage III   • Hypotension   • Hyperglycemia   • Imbalance   • Concepcion's esophagus without dysplasia   • Colon polyps   • B12 deficiency   • Cerebrovascular accident (CVA)   • History of coronary artery stent placement   • Rash of body   • Insomnia   • Cardiomyopathy   • Benign non-nodular prostatic hyperplasia with lower urinary tract symptoms   • Prostate hypertrophy   • Chronic combined systolic and diastolic congestive heart failure   • Pneumonia   • Sepsis   • 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   • Fever and chills   • UTI (urinary tract infection)   • Abnormal CXR   • CKD (chronic kidney disease), stage III   • PAU (acute kidney injury)   • Arteriosclerotic coronary artery disease   • Metabolic encephalopathy   • Acidosis   • Wide-complex tachycardia   • Mass of upper lobe of left lung               Adult Rehabilitation Note       10/07/17 1409 10/06/17 1405       Rehab Assessment/Intervention    Discipline physical therapist  - physical  therapist  -EE     Document Type therapy note (daily note)  -KH therapy note (daily note)  -EE     Subjective Information no complaints;agree to therapy  -KH agree to therapy;no complaints  -EE     Patient Effort, Rehab Treatment good  -KH good  -EE     Symptoms Noted During/After Treatment none  -KH none  -EE     Precautions/Limitations fall precautions  -KH fall precautions;other (see comments)   contact precautions  -EE     Recorded by [KH] Marie Mtz, PT [EE] Carmita Salcedo, PT     Pain Assessment    Pain Assessment No/denies pain  -KH No/denies pain  -EE     Recorded by [KH] Marie Mtz, PT [EE] Carmita Salcedo, PT     Cognitive Assessment/Intervention    Current Cognitive/Communication Assessment functional  -KH functional  -EE     Orientation Status oriented x 4  -KH oriented x 4  -EE     Follows Commands/Answers Questions 100% of the time  -% of the time  -EE     Personal Safety WNL/WFL  -KH WNL/WFL  -EE     Personal Safety Interventions fall prevention program maintained;gait belt;nonskid shoes/slippers when out of bed;supervised activity  - fall prevention program maintained;gait belt;nonskid shoes/slippers when out of bed;supervised activity  -EE     Recorded by [KH] Marie Mtz, PT [EE] Carmita Salcedo, PT     Bed Mobility, Assessment/Treatment    Bed Mobility, Assistive Device  bed rails;head of bed elevated  -EE     Bed Mob, Supine to Sit, [place] not tested  - supervision required  -EE     Bed Mob, Sit to Supine, [place] not tested  - supervision required  -EE     Bed Mobility, Comment patient up in chair  -KH      Recorded by [KH] Marie Mtz, PT [EE] Carmita Salcedo, PT     Transfer Assessment/Treatment    Transfers, Sit-Stand [place] stand by assist;contact guard assist  - contact guard assist;verbal cues required  -EE     Transfers, Stand-Sit [place] stand by assist;contact guard assist  -KH contact guard assist  -EE     Transfers, Sit-Stand-Sit, Assist Device rolling walker   -KH rolling walker  -EE     Toilet Transfer, [place] supervision required  -KH      Transfer, Safety Issues weight-shifting ability decreased  -KH weight-shifting ability decreased  -EE     Transfer, Impairments strength decreased  -KH strength decreased  -EE     Recorded by [KH] Marie Mtz, PT [EE] Carmita Salcedo, PT     Gait Assessment/Treatment    Gait, [place] Level stand by assist;contact guard assist  -KH contact guard assist  -EE     Gait, Assistive Device rolling walker   and partially no AD  -KH rolling walker  -EE     Gait, Distance (Feet) 30  -  -EE     Gait, Gait Deviations johnny decreased;forward flexed posture  -KH forward flexed posture;johnny decreased;step length decreased  -EE     Gait, Safety Issues  step length decreased  -EE     Gait, Impairments strength decreased;impaired balance  -KH strength decreased;impaired balance  -EE     Recorded by [KH] Marie Mtz, PT [EE] Carmita Salcedo, PT     Positioning and Restraints    Pre-Treatment Position sitting in chair/recliner  -KH in bed  -EE     Post Treatment Position bathroom  -KH bed  -EE     In Bed  supine;call light within reach;encouraged to call for assist;exit alarm on;notified nsg  -EE     Bathroom sitting;call light within reach;encouraged to call for assist  -KH      Recorded by [KH] Marie Mtz PT [EE] Carmita Salcedo, PT       User Key  (r) = Recorded By, (t) = Taken By, (c) = Cosigned By    Initials Name Effective Dates    EE Carmita Salcedo, PT 12/01/15 -     AYE Mtz, PT 06/22/16 -                 IP PT Goals       10/05/17 1414          Bed Mobility PT LTG    Bed Mobility PT LTG, Date Established 10/05/17  -KH      Bed Mobility PT LTG, Time to Achieve 1 wk  -KH      Bed Mobility PT LTG, Activity Type all bed mobility  -KH      Bed Mobility PT LTG, [place] Level contact guard assist  -KH      Transfer Training PT LTG    Transfer Training PT LTG, Date Established 10/05/17  -      Transfer Training PT LTG, Time to  Achieve 1 wk  -KH      Transfer Training PT LTG, [place] Level contact guard assist  -KH      Transfer Training PT LTG, Assist Device walker, rolling  -KH      Gait Training PT LTG    Gait Training Goal PT LTG, Date Established 10/05/17  -KH      Gait Training Goal PT LTG, Time to Achieve 1 wk  -KH      Gait Training Goal PT LTG, [place] Level contact guard assist  -KH      Gait Training Goal PT LTG, Assist Device walker, rolling  -KH      Gait Training Goal PT LTG, Distance to Achieve 75 ft  -KH      Patient Education PT LTG    Patient Education PT LTG, Date Established 10/05/17  -KH      Patient Education PT LTG, Time to Achieve 1 wk  -KH      Patient Education PT LTG, Education Type HEP  -KH      Patient Education PT LTG, Education Understanding demonstrate adequately  -KH        User Key  (r) = Recorded By, (t) = Taken By, (c) = Cosigned By    Initials Name Provider Type    AYE Lanza, PT Physical Therapist          Physical Therapy Education     Title: PT OT SLP Therapies (Done)     Topic: Physical Therapy (Done)     Point: Mobility training (Done)    Learning Progress Summary    Learner Readiness Method Response Comment Documented by Status   Patient Acceptance E LANA  KH 10/07/17 1411 Done    Acceptance E,TB NR,LANA  EE 10/06/17 1422 Done    Acceptance E LANANR  KH1 10/05/17 1413 Done               Point: Home exercise program (Done)    Learning Progress Summary    Learner Readiness Method Response Comment Documented by Status   Patient Acceptance E AILEEN SCHWARTZ  KH1 10/05/17 1413 Done               Point: Body mechanics (Done)    Learning Progress Summary    Learner Readiness Method Response Comment Documented by Status   Patient Acceptance E LANA  KH 10/07/17 1411 Done    Acceptance E LANANR  KH1 10/05/17 1413 Done               Point: Precautions (Done)    Learning Progress Summary    Learner Readiness Method Response Comment Documented by Status   Patient Acceptance E LANANR  KH1 10/05/17 1413  Done                      User Key     Initials Effective Dates Name Provider Type Discipline    KH1 05/18/15 -  Kiera Lanza, PT Physical Therapist PT     12/01/15 -  Carmita Salcedo, PT Physical Therapist PT     06/22/16 -  Marie Mtz, PT Physical Therapist PT                    PT Recommendation and Plan  Anticipated Discharge Disposition: skilled nursing facility  Planned Therapy Interventions: balance training, bed mobility training, gait training, home exercise program, patient/family education, strengthening, transfer training, ROM (Range of Motion)  PT Frequency: daily  Plan of Care Review  Plan Of Care Reviewed With: patient  Progress: improving  Outcome Summary/Follow up Plan: Patient steady with gait but unable to ambulate as far today due to complaints of diarrhea          Outcome Measures       10/07/17 1400 10/06/17 1400 10/05/17 1418    How much help from another person do you currently need...    Turning from your back to your side while in flat bed without using bedrails? 4  -KH 4  -EE 3  -BRITTNI    Moving from lying on back to sitting on the side of a flat bed without bedrails? 4  -KH 3  -EE 2  -BRITTNI    Moving to and from a bed to a chair (including a wheelchair)? 3  -KH 3  -EE 3  -BRITTNI    Standing up from a chair using your arms (e.g., wheelchair, bedside chair)? 3  -KH 3  -EE 3  -BRITTNI    Climbing 3-5 steps with a railing? 3  -KH 3  -EE 3  -BRITTNI    To walk in hospital room? 3  -KH 3  -EE 3  -BRITTNI    AM-PAC 6 Clicks Score 20  -KH 19  -EE 17  -BRITTNI    Functional Assessment    Outcome Measure Options AM-PAC 6 Clicks Basic Mobility (PT)  -KH AM-PAC 6 Clicks Basic Mobility (PT)  -EE AM-PAC 6 Clicks Basic Mobility (PT)  -BRITTNI      User Key  (r) = Recorded By, (t) = Taken By, (c) = Cosigned By    Initials Name Provider Type    [facility] Kiera Lanza, PT Physical Therapist    EE Carmita Salcedo, PT Physical Therapist     Marie Mtz, PT Physical Therapist           Time Calculation:         PT Charges        10/07/17 1409          Time Calculation    Start Time 1400  -KH      Stop Time 1411  -      Time Calculation (min) 11 min  -      PT Received On 10/07/17  -AYE      PT - Next Appointment 10/08/17  -        User Key  (r) = Recorded By, (t) = Taken By, (c) = Cosigned By    Initials Name Provider Type     Marie Mtz PT Physical Therapist          Therapy Charges for Today     Code Description Service Date Service Provider Modifiers Qty    [id number] HC PT THER PROC EA 15 MIN 10/7/2017 Marie Mtz PT GP 1          PT G-Codes  Outcome Measure Options: AM-PAC 6 Clicks Basic Mobility (PT)    Marie Mtz PT  10/7/2017

## 2017-10-08 PROBLEM — G93.41 METABOLIC ENCEPHALOPATHY: Status: RESOLVED | Noted: 2017-01-01 | Resolved: 2017-01-01

## 2017-10-08 PROBLEM — R00.0 WIDE-COMPLEX TACHYCARDIA: Status: RESOLVED | Noted: 2017-01-01 | Resolved: 2017-01-01

## 2017-10-08 NOTE — PROGRESS NOTES
"  Infectious Diseases Progress Note    Carmen Byrd MD     [facility]  [place]: 7 days  Patient Identification:  Name: Aakash Sanchez Jr.  Age: 82 y.o.  Sex: male  :  1935  MRN: [id number]         Primary Care Physician: Dimitrios Lindquist MD            Subjective: Much calmer and understanding.  Still frustrated but accepting of the fact that he needs answers for his lung lesion.  Interval History: See consultation note    Objective:    Scheduled Meds:    amiodarone 200 mg Oral Q12H   atorvastatin 40 mg Oral Nightly   digoxin 125 mcg Oral Every Other Day   famotidine 20 mg Oral Daily   fluconazole 200 mg Oral Daily   isosorbide mononitrate 15 mg Oral Daily   lactobacillus acidophilus 1 capsule Oral Daily   metoprolol tartrate 100 mg Oral Q12H     Continuous Infusions:       Vital signs in last 24 hours:  Temp:  [97.4 °F (36.3 °C)-97.9 °F (36.6 °C)] 97.6 °F (36.4 °C)  Heart Rate:  [] 84  Resp:  [16-20] 16  BP: (151-160)/(78-97) 155/87    Intake/Output:    Intake/Output Summary (Last 24 hours) at 10/08/17 1754  Last data filed at 10/08/17 1611   Gross per 24 hour   Intake              480 ml   Output             1300 ml   Net             -820 ml       Exam:  /87 (BP Location: Right arm, Patient Position: Sitting)  Pulse 84  Temp 97.6 °F (36.4 °C) (Oral)   Resp 16  Ht 70\" (177.8 cm)  Wt 194 lb 1.6 oz (88 kg)  SpO2 96%  BMI 27.85 kg/m2    General Appearance:    Awake alert interactive does not appear to be in any acute distress                          Head:    Normocephalic, without obvious abnormality, atraumatic                           Eyes:    PERRL, conjunctiva/corneas clear, EOM's intact, both eyes                         Throat:   Lips, tongue, gums normal; oral mucosa pink and moist                           Neck:   Supple, symmetrical, trachea midline, no JVD                         Lungs:    Clear to auscultation bilaterally, respirations unlabored                 Chest " Wall:    No tenderness or deformity                          Heart:    Regular rate and rhythm, S1 and S2 normal, no murmur,no  Rub                                      or gallop                  Abdomen:     Soft and nontender                 Extremities:   Extremities normal, atraumatic, no cyanosis or edema                        Pulses:   Pulses palpable in all extremities                            Skin:   Skin is warm and dry,  no rashes or palpable lesions                    Data Review:    I reviewed the patient's new clinical results.    Results from last 7 days  Lab Units 10/06/17  0538 10/05/17  0420 10/04/17  0404 10/03/17  0342 10/02/17  0516   WBC 10*3/mm3 6.50 5.79 5.53 6.27 7.33   HEMOGLOBIN g/dL 10.3* 10.3* 10.7* 10.9* 11.1*   PLATELETS 10*3/mm3 224 198 182 189 172       Results from last 7 days  Lab Units 10/07/17  0532 10/06/17  0538 10/05/17  0420 10/04/17  0404 10/03/17  0342 10/02/17  0516   SODIUM mmol/L 137 137  137 138 138 138 137   POTASSIUM mmol/L 4.4 4.4  4.4 4.2 4.0 3.4* 3.6   CHLORIDE mmol/L 107 106  106 106 106 103 103   CO2 mmol/L 18.6* 19.0*  19.0* 19.9* 20.9* 19.7* 18.3*   BUN mg/dL 13 15  15 15 15 15 16   CREATININE mg/dL 1.86* 2.01*  2.01* 2.08* 2.12* 2.14* 2.08*   CALCIUM mg/dL 8.2* 8.3*  8.3* 8.3* 8.3* 8.1* 7.8*   GLUCOSE mg/dL 142* 130*  130* 134* 148* 144* 122*     Atypical ANCA 1:40 significance ?  Microbiology Results (last 10 days)     Procedure Component Value - Date/Time    Blood Culture - Blood, [[id number]]  (Normal) Collected:  10/03/17 1901    Lab Status:  Preliminary result Specimen:  Blood from Arm, Left Updated:  10/07/17 1931     Blood Culture No growth at 4 days    Blood Culture - Blood, [[id number]]  (Normal) Collected:  10/03/17 1830    Lab Status:  Preliminary result Specimen:  Blood from Arm, Left Updated:  10/07/17 1931     Blood Culture No growth at 4 days    Urine Culture - Urine, Urine, Catheter [[id number]]  (Abnormal) Collected:  10/02/17 1319     Lab Status:  Final result Specimen:  Urine from Urine, Catheter Updated:  10/06/17 1111     Urine Culture --      50,000 CFU/mL Candida farazfyr (A)    Narrative:       Yeast ID to follow    Respiratory Panel, PCR - Swab, Nasopharynx [[id number]]  (Normal) Collected:  10/01/17 0213    Lab Status:  Final result Specimen:  Swab from Nasopharynx Updated:  10/01/17 0332     ADENOVIRUS, PCR Not Detected     Coronavirus 229E Not Detected     Coronavirus HKU1 Not Detected     Coronavirus NL63 Not Detected     Coronavirus OC43 Not Detected     Human Metapneumovirus Not Detected     Human Rhinovirus/Enterovirus Not Detected     Influenza B PCR Not Detected     Parainfluenza Virus 1 Not Detected     Parainfluenza Virus 2 Not Detected     Parainfluenza Virus 3 Not Detected     Parainfluenza Virus 4 Not Detected     Bordetella pertussis pcr Not Detected     Influenza 2009 H1N1 by PCR Not Detected     Chlamydophila pneumoniae PCR Not Detected     Mycoplasma pneumo by PCR Not Detected     Influenza A PCR Not Detected     Influenza A H3 Not Detected     Influenza A H1 Not Detected     RSV, PCR Not Detected    Blood Culture - Blood, [[id number]]  (Normal) Collected:  10/01/17 0134    Lab Status:  Final result Specimen:  Blood from Arm, Left Updated:  10/06/17 0146     Blood Culture No growth at 5 days    Blood Culture - Blood, [[id number]]  (Normal) Collected:  10/01/17 0100    Lab Status:  Final result Specimen:  Blood from Arm, Left Updated:  10/06/17 0116     Blood Culture No growth at 5 days    Urine Culture - Urine, Urine, Clean Catch [[id number]]  (Abnormal) Collected:  09/30/17 2253    Lab Status:  Final result Specimen:  Urine from Urine, Clean Catch Updated:  10/02/17 1153     Urine Culture --      25,000 CFU/mL Yeast, Not Candida albicans (A)    Narrative:       Call if further workup needed.          Assessment:  Principal Problem:    UTI (urinary tract infection) due to yeast  Active Problems:    PAF (paroxysmal atrial  fibrillation)    COPD (chronic obstructive pulmonary disease)    Type 2 diabetes mellitus with complication    Abdominal aortic aneurysm (AAA) without rupture    Long term current use of anticoagulant    Chronic combined systolic and diastolic congestive heart failure    Abnormal CXR    CKD (chronic kidney disease), stage III    PAU (acute kidney injury)    Arteriosclerotic coronary artery disease    Acidosis    Mass of upper lobe of left lung      Plan/recommendation: Continue oral Diflucan for total of 2 weeks from the time it was started.      Carmen Byrd MD  10/8/2017  5:54 PM    Much of this encounter note is an electronic transcription/translation of spoken language to printed text. The electronic translation of spoken language may permit erroneous, or at times, nonsensical words or phrases to be inadvertently transcribed; Although I have reviewed the note for such errors, some may still exist

## 2017-10-08 NOTE — PROGRESS NOTES
"   LOS: 7 days   Patient Care Team:  Dimitrios Lindquist MD as PCP - General (Internal Medicine)    Chief Complaint: abdominal discomfort    Subjective     History of Present Illness    Subjective:  Symptoms:  Improved.    Diet:  Poor intake.    Activity level: Impaired due to weakness.    Pain:  He reports no pain.      Had bowel movements yesterday, none today  Some abdominal distention, belching.   Family is present and concerned about his mental status   History taken from: patient chart family RN    Objective     Vital Signs  Temp:  [97.4 °F (36.3 °C)-97.9 °F (36.6 °C)] 97.6 °F (36.4 °C)  Heart Rate:  [] 84  Resp:  [16-20] 16  BP: (151-160)/(78-97) 155/87    Objective:  General Appearance:  Ill-appearing.    Vital signs: (most recent): Blood pressure 155/87, pulse 84, temperature 97.6 °F (36.4 °C), temperature source Oral, resp. rate 16, height 70\" (177.8 cm), weight 194 lb 1.6 oz (88 kg), SpO2 96 %.  Vital signs are normal.    Output: Producing urine and producing stool.    HEENT: Normal HEENT exam.    Lungs:  There are rhonchi and decreased breath sounds.    Heart: Normal rate.    Abdomen: Abdomen is soft.    Neurological: Patient is alert and oriented to person, place and time.    Skin:  Warm and dry.              Results Review:     I reviewed the patient's new clinical results.  Discussed with DR PALACIOS    Medication Review:     Assessment/Plan     Principal Problem:    UTI (urinary tract infection) due to yeast  Active Problems:    PAF (paroxysmal atrial fibrillation)    COPD (chronic obstructive pulmonary disease)    Type 2 diabetes mellitus with complication    Abdominal aortic aneurysm (AAA) without rupture    Long term current use of anticoagulant    Chronic combined systolic and diastolic congestive heart failure    Abnormal CXR    CKD (chronic kidney disease), stage III    PAU (acute kidney injury)    Arteriosclerotic coronary artery disease    Acidosis    Mass of upper lobe of left " lung      Assessment:  (Abdominal distention    Diarrhea resolved  Barretts esophagus  Hx of colon polyps  ).     Plan:   (Supportive care   Given poor appetite will start supplements.   BGA to resume care tomorrow. ).       Anirudh Jefferson MD  10/08/17  5:38 PM    Time: Critical care 22 min

## 2017-10-08 NOTE — PROGRESS NOTES
"  PROGRESS NOTE   LOS: 7 days   Patient Care Team:  Dimitrios Lindquist MD as PCP - General (Internal Medicine)    Chief Complaint: Normal imaging of the chest with a need for tissue diagnosis    Interval History: Patient came in was UTI he has chronic kidney disease he had negative culture and is currently on Diflucan.  He had CT of the chest that showed the left upper lung mass with brother lesions that are of concern and tissue diagnosis was necessary.  Patient has options of either doing a CT-guided biopsy or navigational bronchoscopy and the pros and cons of each one of these procedure was discussed with the patient in details on today's visit    REVIEW OF SYSTEMS:   CARDIOVASCULAR: No chest pain, chest pressure or chest discomfort. No palpitations or edema.   RESPIRATORY: He is reporting some positive dyspnea while sitting up, no cough or sputum.   GASTROINTESTINAL: No anorexia, nausea, vomiting or diarrhea. No abdominal pain or blood.   HEMATOLOGIC: No bleeding or bruising.     Ventilator/Non-Invasive Ventilation Settings     None            Vital Signs  Temp:  [97.4 °F (36.3 °C)-97.9 °F (36.6 °C)] 97.4 °F (36.3 °C)  Heart Rate:  [] 88  Resp:  [18-20] 20  BP: (151-160)/(78-97) 160/78  SpO2:  [93 %] 93 %  on    O2 Device: room air    Intake/Output Summary (Last 24 hours) at 10/08/17 1330  Last data filed at 10/08/17 0601   Gross per 24 hour   Intake              360 ml   Output              750 ml   Net             -390 ml     Flowsheet Rows         First Filed Value    Admission Height  70\" (177.8 cm) Documented at 09/30/2017 2211    Admission Weight  180 lb (81.6 kg) Documented at 09/30/2017 2211        Last 3 weights    10/06/17  0458 10/07/17  0500 10/08/17  0601   Weight: 190 lb 3.2 oz (86.3 kg) 194 lb (88 kg) 194 lb 1.6 oz (88 kg)       Physical Exam:  GEN:  No acute distress, alert, cooperative, well developed   EYES:   Sclera clear. No icterus. PERRL. Normal EOM  ENT:   External ears/nose normal, no " oral lesions, no thrush, mucous membranes moist  NECK:  Supple, midline trachea, no JVD  LUNGS: Normal chest on inspection, CTAB,Decreased breath sounds otherwise no wheezes or abnormal sounds. Respirations regular, even and unlabored.   CV:  Irregular rhythm with controlled rate. Normal S1/S2. No murmurs, gallops, or rubs noted.  ABD:  Soft, non-tender and gaseous distention with tympany to percussion. Normal bowel sounds. No guarding  EXT:  Moves all extremities well. No cyanosis. No redness. No edema.   Skin: dry, intact, no bleeding    Results Review:        Results from last 7 days  Lab Units 10/07/17  0532 10/06/17  0538 10/05/17  0420 10/04/17  0404 10/03/17  0342 10/02/17  0516   SODIUM mmol/L 137 137  137 138 138 138 137   POTASSIUM mmol/L 4.4 4.4  4.4 4.2 4.0 3.4* 3.6   CHLORIDE mmol/L 107 106  106 106 106 103 103   CO2 mmol/L 18.6* 19.0*  19.0* 19.9* 20.9* 19.7* 18.3*   BUN mg/dL 13 15  15 15 15 15 16   CREATININE mg/dL 1.86* 2.01*  2.01* 2.08* 2.12* 2.14* 2.08*   CALCIUM mg/dL 8.2* 8.3*  8.3* 8.3* 8.3* 8.1* 7.8*   ANION GAP mmol/L 11.4 12.0  12.0 12.1 11.1 15.3 15.7   ALBUMIN g/dL 2.60* 2.70*  --  2.90* 2.90*  --            Results from last 7 days  Lab Units 10/06/17  0538 10/05/17  0420 10/04/17  0404 10/03/17  0342 10/02/17  0516   WBC 10*3/mm3 6.50 5.79 5.53 6.27 7.33   HEMOGLOBIN g/dL 10.3* 10.3* 10.7* 10.9* 11.1*   HEMATOCRIT % 33.6* 33.7* 35.2* 35.0* 35.9*   PLATELETS 10*3/mm3 224 198 182 189 172   NEUTROPHIL % % 57.5  --   --   --   --    LYMPHOCYTE % % 21.8  --   --   --   --    MONOCYTES % % 15.1*  --   --   --   --    EOSINOPHIL % % 4.6  --   --   --   --    BASOPHIL % % 0.5  --   --   --   --    IMM GRAN % % 0.5  --   --   --   --        Results from last 7 days  Lab Units 10/08/17  0527 10/07/17  0532 10/06/17  0538   INR  1.65* 2.53* 2.66*       Results from last 7 days  Lab Units 10/06/17  0538 10/04/17  0404 10/03/17  0342   MAGNESIUM mg/dL 2.2 1.9 1.9                 No results  found for: POCGLU    Results from last 7 days  Lab Units 10/04/17  0404   PROCALCITONIN ng/mL 0.23   LACTATE mmol/L 0.8           Results from last 7 days  Lab Units 10/03/17  1901 10/03/17  1830 10/02/17  1319   BLOODCX  No growth at 4 days No growth at 4 days  --    URINECX   --   --    50,000 CFU/mL Candida kefyr*       Results from last 7 days  Lab Units 10/02/17  1319   URINECX    50,000 CFU/mL Candida kefyr*           Results from last 7 days  Lab Units 10/06/17  0538   URIC ACID mg/dL 6.5       Imaging Results (all)     Procedure Component Value Units Date/Time    CT Chest Without Contrast [[id number]] Collected:  10/01/17 1435     Updated:  10/01/17 1449    Narrative:       CT CHEST WITHOUT CONTRAST     HISTORY: 82-year-old male with abnormal chest x-ray for which CT was  recommended.     TECHNIQUE: Chest CT includes axial imaging from the thoracic inlet to  the upper abdomen without IV contrast.     FINDINGS: There is a solid-appearing mass within the anterior-inferior  left upper lobe measuring 3.8 x 3.3 cm and contacting the pleura along  the left cardiac border. Anterior to the left pulmonary trunk, there is  a rounded 9 mm lymph node which is upper normal for size. There is no  axillary guerita enlargement. The heart size is enlarged. Small left  pleural effusion layers dependently.     Within the posterior inferior right middle lobe, there is a 1.9 cm focal  area of groundglass opacity. Within the central right upper lobe at the  horizontal level just below the luis, there is a 6 mm pulmonary  nodule. Multilevel bridging endplate spur formation is present in the  thoracic spine. There has been previous median sternotomy. Imaging  through the upper abdomen demonstrates cholecystectomy clips. Aortic  stent graft is noted. Splenic size is mildly enlarged measuring 15 x 9.5  cm.       Impression:       1. 3.8 cm solid-appearing lung mass anterior-inferior left upper lobe  suspicious for neoplasm. Tissue  sampling recommended.  2. 0.6 cm right upper lobe pulmonary nodule. 1.9 cm right middle lobe  groundglass nodule.  3. Small left pleural effusion.  4. Cardiomegaly. Mild splenomegaly.      Radiation dose reduction techniques were utilized, including automated  exposure control and exposure modulation based on body size.     This report was finalized on 10/1/2017 2:46 PM by Dr. Jose De La Torre MD.       CT Abdomen Pelvis Without Contrast [[id number]] Collected:  10/02/17 1422     Updated:  10/02/17 1708    Narrative:       CT SCAN OF THE ABDOMEN AND PELVIS WITHOUT CONTRAST     HISTORY: Abdominal pain and fever and chills.     FINDINGS:  The CT scan was performed through the abdomen and pelvis  without contrast and demonstrates the followin. There is a tiny left pleural effusion which is new since the study of  2017. Again noted are several scattered areas of somewhat focal  groundglass opacity, particularly at the right base which are unchanged  and likely related to inflammatory or infectious change.  2. There is a very small pericardial effusion which is unchanged. The  liver, spleen, pancreas, and both adrenal glands are unremarkable  without intravenous contrast.  3. The kidneys are normal in size with some changes of cortical atrophy.  There is a small intermediate density mass at the lower margin of the  left kidney likely representing a somewhat complex cyst and can be seen  more clearly as a cyst on the enhanced CT scan dating back to .  There is a very tiny cortical cyst in the right kidney also unchanged.  4. There is an abdominal aortic aneurysm treated with a stent graft.  There is no retroperitoneal mass. The large and small bowel loops are  normal in caliber and show no inflammatory change. In the pelvis, there  are isotope seeds within the prostate for treatment of prostate cancer  and these appear unchanged. A Whaley catheter is present within the  urinary bladder which is somewhat  decompressed and again noted is some  bladder wall thickening likely related to an element of outlet  obstruction as also noted on CT scans dating back to 2013.     Radiation dose reduction techniques were utilized, including automated  exposure control and exposure modulation based on body size.     This report was finalized on 10/2/2017 5:05 PM by Dr. Isacc Baugh MD.       CT Head Without Contrast [[id number]] Collected:  10/02/17 2217     Updated:  10/02/17 2256    Narrative:       CRANIAL CT SCAN WITHOUT CONTRAST     CLINICAL HISTORY: AMS with lung mass . r/o mets; R50.9-Fever,  unspecified; R41.82-Altered mental status, unspecified     COMPARISON: 06/04/2017.     TECHNIQUE: Radiation dose reduction techniques were utilized, including  automated exposure control and exposure modulation based on body size.  Multiple axial images of the head were obtained without contrast.      FINDINGS:  There are no abnormal areas of increased density or mass  effect. There is diffuse atrophy. There are mild scattered areas of  decreased density in the white matter likely related to chronic ischemic  gliotic changes.   Ventricles, sulci, and cisterns appear normal. Bone  window images are unremarkable.     If there is high clinical suspicion of intracranial metastatic disease,  a contrasted study or preferably MRI examination would be far superior  to noncontrast CT technique.          Impression:       1. No acute intracranial abnormality.            This study was performed with techniques to keep radiation doses as low  as reasonably achievable (ALARA). Individualized dose reduction  techniques using automated exposure control or adjustment of mA and/or  kV according to the patient size were employed.      This report was finalized on 10/2/2017 10:53 PM by Ousmane Wayne MD.       XR Chest 1 View [[id number]] Collected:  09/30/17 3490     Updated:  10/04/17 3887    Narrative:       X-RAY CHEST 1 VIEW.     HISTORY: Fever.      COMPARISON: 09/19/2017.     FINDINGS:  Cardiomegaly, lung volumes are low. 4.7 cm opacity overlies the left  heart border, is a new finding but was also seen on the study of  09/09/2017, not seen on the study of 02/17/2016.     There is no consolidation or effusion.              Impression:       No acute findings. Abnormal 4.7 cm opacity along the left heart border  may be further evaluated by chest CT scan.     This report was finalized on 10/4/2017 3:50 AM by Dr. Neel Agudelo MD.       XR Abdomen 2 View With Chest 1 View [[id number]] Collected:  10/07/17 2115     Updated:  10/07/17 2115    Narrative:       X-RAY PORTABLE ABDOMEN 2 VIEWS, CHEST X-RAY 1 VIEW.     HISTORY:  Abdominal distention, exclude impaction.     COMPARISON:  No prior studies for comparison.     FINDINGS:   Cardiomediastinal silhouette is within normal limits. Lungs demonstrate  no consolidation or effusion. Low lung volumes and chronic lung changes.     Bowel gas pattern is unremarkable. No abnormal calcifications are seen.     No free air in the abdomen. No evidence for large amount of stool in the  colon and rectum.       Impression:       1.  No acute findings in the chest, abdomen and pelvis.  2.  No definite evidence for fecal impaction.                   I reviewed the patient's new clinical results.  I personally viewed and interpreted the patient's imaging results:        Medication Review:     amiodarone 200 mg Oral Q12H   atorvastatin 40 mg Oral Nightly   digoxin 125 mcg Oral Every Other Day   famotidine 20 mg Oral Daily   fluconazole 200 mg Oral Daily   isosorbide mononitrate 15 mg Oral Daily   lactobacillus acidophilus 1 capsule Oral Daily   metoprolol tartrate 100 mg Oral Q12H            ASSESSMENT:   1. 3.8cm JT lung mass  2. 6 mm right upper lobe lung nodule  3. 1.9 cm right middle lobe groundglass nodule  4. Toxic metabolic encephalopathy - resolved ? sepsis  5. History of recurrent pneumonia   6. 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 History of AAA status post stent  8. CAD s/p PCI/ 2V CABG(1997)  Now on DAPT   9. Chronic systolic and diastolic heart failure   10. CKD   11. Afib on AC   12. COPD not in exacerbation  13. Admitted with UTI and renal failure    PLAN:    Discussed with Patient the alternative options and my preference and his case would be the CT-guided biopsy especially that he does not have any significant emphysematous changes over the upper lobe and his risk of pneumothorax complication or reasonable.  Patient had question about bronchoscopy versus CT-guided biopsy and he is agreeable with the CT-guided biopsy  We will arrange for that in a.m. and will follow-up on the clinical response was further recommendation accordingly  Patient is on room air he does have some subjective dyspnea but he is cleared for the CT-guided biopsy from the pulmonary standpoint     Disposition:     Latanya Smith MD  10/08/17  1:30 PM          EMR Dragon/Transcription disclaimer:   Much of this encounter note is an electronic transcription/translation of spoken language to printed text. The electronic translation of spoken language may permit erroneous, or at times, nonsensical words or phrases to be inadvertently transcribed; Although I have reviewed the note for such errors, some may still exist.

## 2017-10-08 NOTE — SIGNIFICANT NOTE
10/08/17 1513   Rehab Treatment   Discipline physical therapist   Treatment Not Performed patient/family declined treatment  (Pt reported too fatigued at this time to participate.)   Recommendation   PT - Next Appointment 10/09/17

## 2017-10-08 NOTE — NURSING NOTE
Spoke w/ Dr. Whitley w/ Urology about pt f/c irritation and being in place since 9/18/17. Dr. Whitley will be here in the AM to replace the f/c. Also received the order to continue pts f/c from home.

## 2017-10-08 NOTE — PROGRESS NOTES
"  Infectious Diseases Progress Note    Carmen Byrd MD     [facility]  [place]: 6 days  Patient Identification:  Name: Aakash Sanchez Jr.  Age: 82 y.o.  Sex: male  :  1935  MRN: [id number]         Primary Care Physician: Dimitrios Lindquist MD            Subjective: Overall frustrated and wants to go home denies any specific complaints.  Still have issues with urinary retention, despite the fact of Whaley catheter in place.  Interval History: See consultation note    Objective:    Scheduled Meds:    amiodarone 200 mg Oral Q12H   atorvastatin 40 mg Oral Nightly   digoxin 125 mcg Oral Every Other Day   famotidine 20 mg Oral Daily   fluconazole 200 mg Oral Daily   isosorbide mononitrate 15 mg Oral Daily   lactobacillus acidophilus 1 capsule Oral Daily   metoprolol tartrate 100 mg Oral Q12H     Continuous Infusions:       Vital signs in last 24 hours:  Temp:  [97.6 °F (36.4 °C)-98.1 °F (36.7 °C)] 97.6 °F (36.4 °C)  Heart Rate:  [] 102  Resp:  [16-18] 18  BP: (130-151)/(76-97) 151/97    Intake/Output:    Intake/Output Summary (Last 24 hours) at 10/07/17 2007  Last data filed at 10/07/17 1800   Gross per 24 hour   Intake              580 ml   Output              750 ml   Net             -170 ml       Exam:  /97 (BP Location: Left arm, Patient Position: Lying)  Pulse 102  Temp 97.6 °F (36.4 °C) (Oral)   Resp 18  Ht 70\" (177.8 cm)  Wt 194 lb (88 kg)  SpO2 97%  BMI 27.84 kg/m2    General Appearance:    Awake alert interactive does not appear to be in any acute distress                          Head:    Normocephalic, without obvious abnormality, atraumatic                           Eyes:    PERRL, conjunctiva/corneas clear, EOM's intact, both eyes                         Throat:   Lips, tongue, gums normal; oral mucosa pink and moist                           Neck:   Supple, symmetrical, trachea midline, no JVD                         Lungs:    Clear to auscultation bilaterally, respirations " unlabored                 Chest Wall:    No tenderness or deformity                          Heart:    Regular rate and rhythm, S1 and S2 normal, no murmur,no  Rub                                      or gallop                  Abdomen:     Soft and nontender                 Extremities:   Extremities normal, atraumatic, no cyanosis or edema                        Pulses:   Pulses palpable in all extremities                            Skin:   Skin is warm and dry,  no rashes or palpable lesions                    Data Review:    I reviewed the patient's new clinical results.    Results from last 7 days  Lab Units 10/06/17  0538 10/05/17  0420 10/04/17  0404 10/03/17  0342 10/02/17  0516 10/01/17  0603 09/30/17  2252   WBC 10*3/mm3 6.50 5.79 5.53 6.27 7.33 8.06 8.98   HEMOGLOBIN g/dL 10.3* 10.3* 10.7* 10.9* 11.1* 11.6* 12.7*   PLATELETS 10*3/mm3 224 198 182 189 172 202 221       Results from last 7 days  Lab Units 10/07/17  0532 10/06/17  0538 10/05/17  0420 10/04/17  0404 10/03/17  0342 10/02/17  0516 10/01/17  0603   SODIUM mmol/L 137 137  137 138 138 138 137 141   POTASSIUM mmol/L 4.4 4.4  4.4 4.2 4.0 3.4* 3.6 3.5   CHLORIDE mmol/L 107 106  106 106 106 103 103 105   CO2 mmol/L 18.6* 19.0*  19.0* 19.9* 20.9* 19.7* 18.3* 19.9*   BUN mg/dL 13 15  15 15 15 15 16 17   CREATININE mg/dL 1.86* 2.01*  2.01* 2.08* 2.12* 2.14* 2.08* 1.81*   CALCIUM mg/dL 8.2* 8.3*  8.3* 8.3* 8.3* 8.1* 7.8* 7.9*   GLUCOSE mg/dL 142* 130*  130* 134* 148* 144* 122* 134*     Atypical ANCA 1:40 significance ?  Microbiology Results (last 10 days)     Procedure Component Value - Date/Time    Blood Culture - Blood, [[id number]]  (Normal) Collected:  10/03/17 1901    Lab Status:  Preliminary result Specimen:  Blood from Arm, Left Updated:  10/07/17 1931     Blood Culture No growth at 4 days    Blood Culture - Blood, [[id number]]  (Normal) Collected:  10/03/17 1830    Lab Status:  Preliminary result Specimen:  Blood from Arm, Left Updated:   10/07/17 1931     Blood Culture No growth at 4 days    Urine Culture - Urine, Urine, Catheter [[id number]]  (Abnormal) Collected:  10/02/17 1319    Lab Status:  Final result Specimen:  Urine from Urine, Catheter Updated:  10/06/17 1111     Urine Culture --      50,000 CFU/mL Candida kefyr (A)    Narrative:       Yeast ID to follow    Respiratory Panel, PCR - Swab, Nasopharynx [[id number]]  (Normal) Collected:  10/01/17 0213    Lab Status:  Final result Specimen:  Swab from Nasopharynx Updated:  10/01/17 0332     ADENOVIRUS, PCR Not Detected     Coronavirus 229E Not Detected     Coronavirus HKU1 Not Detected     Coronavirus NL63 Not Detected     Coronavirus OC43 Not Detected     Human Metapneumovirus Not Detected     Human Rhinovirus/Enterovirus Not Detected     Influenza B PCR Not Detected     Parainfluenza Virus 1 Not Detected     Parainfluenza Virus 2 Not Detected     Parainfluenza Virus 3 Not Detected     Parainfluenza Virus 4 Not Detected     Bordetella pertussis pcr Not Detected     Influenza 2009 H1N1 by PCR Not Detected     Chlamydophila pneumoniae PCR Not Detected     Mycoplasma pneumo by PCR Not Detected     Influenza A PCR Not Detected     Influenza A H3 Not Detected     Influenza A H1 Not Detected     RSV, PCR Not Detected    Blood Culture - Blood, [[id number]]  (Normal) Collected:  10/01/17 0134    Lab Status:  Final result Specimen:  Blood from Arm, Left Updated:  10/06/17 0146     Blood Culture No growth at 5 days    Blood Culture - Blood, [[id number]]  (Normal) Collected:  10/01/17 0100    Lab Status:  Final result Specimen:  Blood from Arm, Left Updated:  10/06/17 0116     Blood Culture No growth at 5 days    Urine Culture - Urine, Urine, Clean Catch [[id number]]  (Abnormal) Collected:  09/30/17 2253    Lab Status:  Final result Specimen:  Urine from Urine, Clean Catch Updated:  10/02/17 1153     Urine Culture --      25,000 CFU/mL Yeast, Not Candida albicans (A)    Narrative:       Call if further workup  needed.          Assessment:  Principal Problem:    UTI (urinary tract infection)  Active Problems:    PAF (paroxysmal atrial fibrillation)    COPD (chronic obstructive pulmonary disease)    Type 2 diabetes mellitus with complication    Abdominal aortic aneurysm (AAA) without rupture    Long term current use of anticoagulant    Chronic combined systolic and diastolic congestive heart failure    Abnormal CXR    CKD (chronic kidney disease), stage III    PAU (acute kidney injury)    Arteriosclerotic coronary artery disease    Metabolic encephalopathy    Acidosis    Wide-complex tachycardia    Mass of upper lobe of left lung      Plan/recommendation: Continue Diflucan for 2 weeks.  Rest of the management including plans for lung biopsy for lung mass   per Internal medicine and pulmonary service.      Carmen Byrd MD  10/7/2017  8:07 PM    Much of this encounter note is an electronic transcription/translation of spoken language to printed text. The electronic translation of spoken language may permit erroneous, or at times, nonsensical words or phrases to be inadvertently transcribed; Although I have reviewed the note for such errors, some may still exist

## 2017-10-08 NOTE — PLAN OF CARE
Problem: Patient Care Overview (Adult)  Goal: Plan of Care Review  Outcome: Ongoing (interventions implemented as appropriate)    10/08/17 0516   Coping/Psychosocial Response Interventions   Plan Of Care Reviewed With patient   Outcome Evaluation   Outcome Summary/Follow up Plan Pt has not had any diarrhea this shift. Complains of the sensation of needing to urinate and pain w f/c, Urology will see in AM and replace f/c. Pt has had increasing confusion, more so than previous night shift. 0.5mg IV ativan one time dose given, pt slept for about an hour and was awake again pulling at catheter and getting out of bed. This confusion was present all shift. VSS, will continue to monitor.        Goal: Adult Individualization and Mutuality  Outcome: Ongoing (interventions implemented as appropriate)  Goal: Discharge Needs Assessment  Outcome: Ongoing (interventions implemented as appropriate)    Problem: Infection, Risk/Actual (Adult)  Goal: Infection Prevention/Resolution  Outcome: Ongoing (interventions implemented as appropriate)    Problem: Fall Risk (Adult)  Goal: Absence of Falls  Outcome: Ongoing (interventions implemented as appropriate)    Problem: Urine Elimination, Impaired (Adult)  Goal: Effective Urinary Elimination  Outcome: Ongoing (interventions implemented as appropriate)  Goal: Effective Containment of Urine  Outcome: Ongoing (interventions implemented as appropriate)  Goal: Reduced Incontinence Episodes  Outcome: Ongoing (interventions implemented as appropriate)    Problem: Pressure Ulcer Risk (Jayme Scale) (Adult,Obstetrics,Pediatric)  Goal: Skin Integrity  Outcome: Ongoing (interventions implemented as appropriate)

## 2017-10-08 NOTE — PLAN OF CARE
Problem: Patient Care Overview (Adult)  Goal: Plan of Care Review  Outcome: Ongoing (interventions implemented as appropriate)    10/08/17 1641   Coping/Psychosocial Response Interventions   Plan Of Care Reviewed With patient   Outcome Evaluation   Outcome Summary/Follow up Plan Pt still has c/o pain that urology believes are bladder spasms. Whaley cath d/c'd at 1500 this pm and awaiting void. Confusion still present with no relieving factors. Pain medication administered this am with some relief and 3 hours of rest. Pt to have CT guided lung biopsy tomorrow. VSS will continue to monitor.   Patient Care Overview   Progress no change       Goal: Adult Individualization and Mutuality  Outcome: Ongoing (interventions implemented as appropriate)  Goal: Discharge Needs Assessment  Outcome: Ongoing (interventions implemented as appropriate)    Problem: Infection, Risk/Actual (Adult)  Goal: Infection Prevention/Resolution  Outcome: Ongoing (interventions implemented as appropriate)    Problem: Fall Risk (Adult)  Goal: Absence of Falls  Outcome: Ongoing (interventions implemented as appropriate)    Problem: Urine Elimination, Impaired (Adult)  Goal: Effective Urinary Elimination  Outcome: Ongoing (interventions implemented as appropriate)  Goal: Effective Containment of Urine  Outcome: Ongoing (interventions implemented as appropriate)  Goal: Reduced Incontinence Episodes  Outcome: Ongoing (interventions implemented as appropriate)    Problem: Pressure Ulcer Risk (Jayme Scale) (Adult,Obstetrics,Pediatric)  Goal: Skin Integrity  Outcome: Ongoing (interventions implemented as appropriate)

## 2017-10-08 NOTE — PROGRESS NOTES
NEPHROLOGY PROGRESS NOTE    PATIENT IDENTIFICATION:   Name:  Aakash Sanchez Jr.      MRN:  [id number]     82 y.o.  male             Reason for visit: PAU on CKD3    SUBJECTIVE:   Seen and examined. No SOA or CP. Whaley in place. Feels weak . Poor oral intake. Diarrhea is better.   OBJECTIVE:  Vitals:    10/08/17 0023 10/08/17 0601 10/08/17 0700 10/08/17 0819   BP: 158/91  160/78    BP Location: Left arm  Right arm    Patient Position: Lying  Lying    Pulse: 84  85 88   Resp: 18  20    Temp: 97.9 °F (36.6 °C)  97.4 °F (36.3 °C)    TempSrc: Oral  Oral    SpO2:   93%    Weight:  194 lb 1.6 oz (88 kg)     Height:               Body mass index is 27.85 kg/(m^2).    Intake/Output Summary (Last 24 hours) at 10/08/17 1152  Last data filed at 10/08/17 0601   Gross per 24 hour   Intake              360 ml   Output             1200 ml   Net             -840 ml     Wt Readings from Last 1 Encounters:   10/08/17 0601 194 lb 1.6 oz (88 kg)   10/07/17 0500 194 lb (88 kg)   10/06/17 0458 190 lb 3.2 oz (86.3 kg)   10/05/17 0945 190 lb 9.6 oz (86.5 kg)   10/04/17 0600 193 lb 4.8 oz (87.7 kg)   10/03/17 0617 187 lb 14.4 oz (85.2 kg)   10/02/17 0549 183 lb 5 oz (83.2 kg)   09/30/17 2211 180 lb (81.6 kg)     Wt Readings from Last 3 Encounters:   10/08/17 194 lb 1.6 oz (88 kg)   09/28/17 184 lb (83.5 kg)   09/26/17 181 lb (82.1 kg)         Exam:  General Appearance: alert, oriented x 3, no acute distress, appears to be chronically ill  Skin: warm and dry  HEENT: Nonicteric sclerae, oral mucosa normal,   Neck: supple, no JVD, trachea midline  Lungs: Bilateral rhonchi, unlabored breathing effort  Heart: RRR, normal S1 and S2, no S3, no rub  Abdomen: soft, non-tender, no palpable bladder, BS+, abdomen slightly distended  Extremities: no edema, cyanosis or clubbing  Neuro: normal speech and mental status      Scheduled meds:      amiodarone 200 mg Oral Q12H   atorvastatin 40 mg Oral Nightly   digoxin 125 mcg Oral Every Other Day    famotidine 20 mg Oral Daily   fluconazole 200 mg Oral Daily   isosorbide mononitrate 15 mg Oral Daily   lactobacillus acidophilus 1 capsule Oral Daily   metoprolol tartrate 100 mg Oral Q12H     IV meds:                             Data Review:      Results from last 7 days  Lab Units 10/07/17  0532 10/06/17  0538 10/05/17  0420   SODIUM mmol/L 137 137  137 138   POTASSIUM mmol/L 4.4 4.4  4.4 4.2   CHLORIDE mmol/L 107 106  106 106   CO2 mmol/L 18.6* 19.0*  19.0* 19.9*   BUN mg/dL 13 15  15 15   CREATININE mg/dL 1.86* 2.01*  2.01* 2.08*   CALCIUM mg/dL 8.2* 8.3*  8.3* 8.3*   GLUCOSE mg/dL 142* 130*  130* 134*     Estimated Creatinine Clearance: 35.4 mL/min (by C-G formula based on Cr of 1.86).    Results from last 7 days  Lab Units 10/06/17  0538   URIC ACID mg/dL 6.5       Results from last 7 days  Lab Units 10/07/17  0532 10/06/17  0538 10/04/17  0404 10/03/17  0342   MAGNESIUM mg/dL  --  2.2 1.9 1.9   PHOSPHORUS mg/dL 2.2* 2.6 2.5 2.1*         Results from last 7 days  Lab Units 10/06/17  0538 10/05/17  0420 10/04/17  0404 10/03/17  0342 10/02/17  0516   WBC 10*3/mm3 6.50 5.79 5.53 6.27 7.33   HEMOGLOBIN g/dL 10.3* 10.3* 10.7* 10.9* 11.1*   PLATELETS 10*3/mm3 224 198 182 189 172         Results from last 7 days  Lab Units 10/08/17  0527 10/07/17  0532 10/06/17  0538 10/05/17  0420 10/04/17  0404   INR  1.65* 2.53* 2.66* 2.92* 3.28*             ASSESSMENT:   1.  PAU on CKD3, non-oliguric, unchanged:  Vol fine, and lytes compensated  2.  TME, resolved  3.  Recent cardioversion and pacemaker.  4.  Fever and chills, currently afebrile  5.  Urinary retention, currently has Oliver catheter anchored in place  6.  Left lung mass worrisome for neoplasm    PLAN:    No labs today. Will order one for tomorrow  Plan to remove olivre if OK with urology ( has been in since his last admission with a plan for voiding trial after discharge)  Diflucan for UTI  Volume and lytes are acceptable  BP is well controlled  Plan for  lung biopsy per pulmonary on Monday  once INR < 1.4   Surveillance labs    Rex Guadalupe MD  10/8/2017  11:52 AM

## 2017-10-08 NOTE — PROGRESS NOTES
Name: Aakash Sanchez Jr. ADMIT: 2017   : 1935  PCP: Dimitrios Lindquist MD    MRN: [id number] LOS: 7 days   AGE/SEX: 82 y.o. male  ROOM: [facility]     Subjective   Subjective:  Symptoms:  No shortness of breath, cough, chest pain or diarrhea.  (States no further diarrhea since yesterday. Evidently intermittent.).    Diet:  Poor intake.  No nausea or vomiting.    Activity level: Impaired due to weakness.    Pain:  He reports no pain.    weak    Objective   Vital Signs  Temp:  [97.4 °F (36.3 °C)-97.9 °F (36.6 °C)] 97.4 °F (36.3 °C)  Heart Rate:  [] 88  Resp:  [16-20] 20  BP: (140-160)/(76-97) 160/78  SpO2:  [93 %-97 %] 93 %  on   ;   O2 Device: room air  Body mass index is 27.85 kg/(m^2).    Physical Exam   Constitutional: He is oriented to person, place, and time. He appears well-developed. He is cooperative. He has a sickly appearance. No distress.   Neck: No JVD present. No tracheal deviation present. No thyromegaly present.   Cardiovascular: Normal rate and regular rhythm.    No murmur heard.  Pulmonary/Chest: Effort normal and breath sounds normal. No respiratory distress.   Abdominal: Soft. Normal appearance and bowel sounds are normal. He exhibits no distension. There is no tenderness.   Neurological: He is alert and oriented to person, place, and time.   Skin: Skin is warm. No rash noted. He is not diaphoretic.   Psychiatric: He has a normal mood and affect. His behavior is normal.   Nursing note and vitals reviewed.      Results Review:       I reviewed the patient's new clinical results.    Results from last 7 days  Lab Units 10/06/17  0538 10/05/17  0420 10/04/17  0404 10/03/17  0342 10/02/17  0516   WBC 10*3/mm3 6.50 5.79 5.53 6.27 7.33   HEMOGLOBIN g/dL 10.3* 10.3* 10.7* 10.9* 11.1*   PLATELETS 10*3/mm3 224 198 182 189 172       Results from last 7 days  Lab Units 10/07/17  0532 10/06/17  0538 10/05/17  0420 10/04/17  0404 10/03/17  0342 10/02/17  0516   SODIUM mmol/L 137 137  137 138 138  138 137   POTASSIUM mmol/L 4.4 4.4  4.4 4.2 4.0 3.4* 3.6   CHLORIDE mmol/L 107 106  106 106 106 103 103   CO2 mmol/L 18.6* 19.0*  19.0* 19.9* 20.9* 19.7* 18.3*   BUN mg/dL 13 15  15 15 15 15 16   CREATININE mg/dL 1.86* 2.01*  2.01* 2.08* 2.12* 2.14* 2.08*   GLUCOSE mg/dL 142* 130*  130* 134* 148* 144* 122*   ALBUMIN g/dL 2.60* 2.70*  --  2.90* 2.90*  --    Estimated Creatinine Clearance: 35.4 mL/min (by C-G formula based on Cr of 1.86).    Results from last 7 days  Lab Units 10/07/17  0532 10/06/17  0538 10/05/17  0420 10/04/17  0404 10/03/17  0342 10/02/17  0516   CALCIUM mg/dL 8.2* 8.3*  8.3* 8.3* 8.3* 8.1* 7.8*   ALBUMIN g/dL 2.60* 2.70*  --  2.90* 2.90*  --    MAGNESIUM mg/dL  --  2.2  --  1.9 1.9  --    PHOSPHORUS mg/dL 2.2* 2.6  --  2.5 2.1*  --        Results from last 7 days  Lab Units 10/04/17  0404   PROCALCITONIN ng/mL 0.23   LACTATE mmol/L 0.8       Results from last 7 days  Lab Units 10/08/17  0527 10/07/17  0532 10/06/17  0538 10/05/17  0420 10/04/17  0404 10/03/17  0342 10/02/17  0516   PROTIME Seconds 18.9* 26.4* 27.5* 29.6* 32.4* 29.8* 26.9*   INR  1.65* 2.53* 2.66* 2.92* 3.28* 2.95* 2.58*       Results from last 7 days  Lab Units 10/03/17  1901 10/03/17  1830 10/02/17  1319   BLOODCX  No growth at 4 days No growth at 4 days  --    URINECX   --   --    50,000 CFU/mL Candida kefyr*       X-RAY ABDOMEN 2 VIEWS, CHEST X-RAY 1 VIEW  10/07/17  1.  No acute findings in the chest, abdomen and pelvis.  2.  No definite evidence for fecal impaction.    CRANIAL CT SCAN WITHOUT CONTRAST  10/2/2017  No acute intracranial abnormality.        CT SCAN ABDOMEN AND PELVIS WITHOUT CONTRAST  10/02/17  1. There is a tiny left pleural effusion which is new since the study of  08/01/2017. Again noted are several scattered areas of somewhat focal  groundglass opacity, particularly at the right base which are unchanged  and likely related to inflammatory or infectious change.  2. There is a very small pericardial  effusion which is unchanged. The  liver, spleen, pancreas, and both adrenal glands are unremarkable  without intravenous contrast.  3. The kidneys are normal in size with some changes of cortical atrophy.  There is a small intermediate density mass at the lower margin of the  left kidney likely representing a somewhat complex cyst and can be seen  more clearly as a cyst on the enhanced CT scan dating back to 2013.  There is a very tiny cortical cyst in the right kidney also unchanged.  4. There is an abdominal aortic aneurysm treated with a stent graft.  There is no retroperitoneal mass. The large and small bowel loops are  normal in caliber and show no inflammatory change. In the pelvis, there  are isotope seeds within the prostate for treatment of prostate cancer  and these appear unchanged. A Whaley catheter is present within the  urinary bladder which is somewhat decompressed and again noted is some  bladder wall thickening likely related to an element of outlet  obstruction as also noted on CT scans dating back to 2013.    CT CHEST WITHOUT CONTRAST 10/01/17  1. 3.8 cm solid-appearing lung mass anterior-inferior left upper lobe  suspicious for neoplasm. Tissue sampling recommended.  2. 0.6 cm right upper lobe pulmonary nodule. 1.9 cm right middle lobe  groundglass nodule.  3. Small left pleural effusion.  4. Cardiomegaly. Mild splenomegaly.       amiodarone 200 mg Oral Q12H   atorvastatin 40 mg Oral Nightly   digoxin 125 mcg Oral Every Other Day   famotidine 20 mg Oral Daily   fluconazole 200 mg Oral Daily   isosorbide mononitrate 15 mg Oral Daily   lactobacillus acidophilus 1 capsule Oral Daily   metoprolol tartrate 100 mg Oral Q12H      Diet Regular; Cardiac      Assessment/Plan   Assessment:     Active Hospital Problems (** Indicates Principal Problem)    Diagnosis Date Noted   • **UTI (urinary tract infection) due to yeast [N39.0] 10/01/2017   • Mass of upper lobe of left lung [R91.8] 10/02/2017   • Abnormal  CXR [R93.8] 10/01/2017   • CKD (chronic kidney disease), stage III [N18.3] 10/01/2017   • PAU (acute kidney injury) [N17.9] 10/01/2017   • Arteriosclerotic coronary artery disease [I25.10] 10/01/2017   • Acidosis [E87.2] 10/01/2017   • Chronic combined systolic and diastolic congestive heart failure [I50.42] 09/05/2017   • Long term current use of anticoagulant [Z79.01] 12/15/2016   • Abdominal aortic aneurysm (AAA) without rupture [I71.4] 12/15/2016   • Type 2 diabetes mellitus with complication [E11.8] 11/23/2016   • COPD (chronic obstructive pulmonary disease) [J44.9] 11/15/2016   • PAF (paroxysmal atrial fibrillation) [I48.0]       Resolved Hospital Problems    Diagnosis Date Noted Date Resolved   • Metabolic encephalopathy [G93.41] 10/01/2017 10/08/2017   • Wide-complex tachycardia [I47.2] 10/01/2017 10/08/2017       Plan:   · Off antibiotics.  Diflucan for urine. Afebrile. Treat for 14 days per ID.  · All cultures remain negative except yeast in urine.  · Cardiology has started him on amiodarone for wide-complex tachycardia.  No obvious signs of infection related to the implanted CRT device.    · Nephrology following regarding renal failure.  Relatively stable.  · Diarrhea had subsided (again).  CT a/p negative. AXR negative. Stool pending. Appreciate GI eval.   · Pulmonology seeing regarding lung mass.  Could represent neoplasm.  HCT negative for mets. Continue to hold Coumadin for possible biopsy. Day 5 off Plavix. Likely biopsy Monday. INR better after vitamin K x1 yesterday.  · Physical therapy following. Will need SNU.        Jamel Mendoza MD  [facility]  10/08/17  11:09 AM

## 2017-10-09 PROBLEM — R33.9 URINARY RETENTION: Status: ACTIVE | Noted: 2017-01-01

## 2017-10-09 NOTE — PROGRESS NOTES
Name: Aakash Sanchez Jr. ADMIT: 2017   : 1935  PCP: Dimitrios Lindquist MD    MRN: [id number] LOS: 8 days   AGE/SEX: 82 y.o. male  ROOM: [facility]     Subjective   Subjective:  Symptoms:  No shortness of breath, cough, chest pain or diarrhea.  (No further diarrhea. Evidently intermittent.).    Diet:  Poor intake.  No nausea or vomiting.    Activity level: Impaired due to weakness.    Pain:  He reports no pain.        Objective   Vital Signs  Temp:  [96.9 °F (36.1 °C)-97.6 °F (36.4 °C)] 97 °F (36.1 °C)  Heart Rate:  [] 99  Resp:  [16-18] 16  BP: (138-163)/(74-92) 138/79  SpO2:  [95 %-98 %] 95 %  on   ;   O2 Device: room air  Body mass index is 27.27 kg/(m^2).    Physical Exam   Constitutional: He is oriented to person, place, and time. He appears well-developed. He is cooperative. He has a sickly appearance. No distress.   Neck: No JVD present. No tracheal deviation present. No thyromegaly present.   Cardiovascular: Normal rate and regular rhythm.    No murmur heard.  Pulmonary/Chest: Effort normal and breath sounds normal. No respiratory distress.   Abdominal: Soft. Normal appearance and bowel sounds are normal. He exhibits no distension. There is no tenderness.   Neurological: He is alert and oriented to person, place, and time.   Skin: Skin is warm. No rash noted. He is not diaphoretic.   Psychiatric: He has a normal mood and affect. His behavior is normal.   Nursing note and vitals reviewed.      Results Review:       I reviewed the patient's new clinical results.    Results from last 7 days  Lab Units 10/09/17  0440 10/06/17  0538 10/05/17  0420 10/04/17  0404 10/03/17  0342   WBC 10*3/mm3 10.38 6.50 5.79 5.53 6.27   HEMOGLOBIN g/dL 11.7* 10.3* 10.3* 10.7* 10.9*   PLATELETS 10*3/mm3 342 224 198 182 189       Results from last 7 days  Lab Units 10/09/17  0440 10/07/17  0532 10/06/17  0538 10/05/17  0420 10/04/17  0404 10/03/17  0342   SODIUM mmol/L 138 137 137  137 138 138 138   POTASSIUM mmol/L  4.2 4.4 4.4  4.4 4.2 4.0 3.4*   CHLORIDE mmol/L 105 107 106  106 106 106 103   CO2 mmol/L 19.2* 18.6* 19.0*  19.0* 19.9* 20.9* 19.7*   BUN mg/dL 14 13 15  15 15 15 15   CREATININE mg/dL 2.01* 1.86* 2.01*  2.01* 2.08* 2.12* 2.14*   GLUCOSE mg/dL 137* 142* 130*  130* 134* 148* 144*   ALBUMIN g/dL 2.90* 2.60* 2.70*  --  2.90* 2.90*   BILIRUBIN mg/dL 0.5  --   --   --   --   --    ALK PHOS U/L 55  --   --   --   --   --    AST (SGOT) U/L 27  --   --   --   --   --    ALT (SGPT) U/L 24  --   --   --   --   --    Estimated Creatinine Clearance: 35.7 mL/min (by C-G formula based on Cr of 2.01).    Results from last 7 days  Lab Units 10/09/17  0440 10/07/17  0532 10/06/17  0538 10/05/17  0420 10/04/17  0404 10/03/17  0342   CALCIUM mg/dL 8.6 8.2* 8.3*  8.3* 8.3* 8.3* 8.1*   ALBUMIN g/dL 2.90* 2.60* 2.70*  --  2.90* 2.90*   MAGNESIUM mg/dL 2.0  --  2.2  --  1.9 1.9   PHOSPHORUS mg/dL 2.7 2.2* 2.6  --  2.5 2.1*       Results from last 7 days  Lab Units 10/04/17  0404   PROCALCITONIN ng/mL 0.23   LACTATE mmol/L 0.8       Results from last 7 days  Lab Units 10/09/17  0440 10/08/17  0527 10/07/17  0532 10/06/17  0538 10/05/17  0420 10/04/17  0404 10/03/17  0342   PROTIME Seconds 17.2* 18.9* 26.4* 27.5* 29.6* 32.4* 29.8*   INR  1.46* 1.65* 2.53* 2.66* 2.92* 3.28* 2.95*       Results from last 7 days  Lab Units 10/03/17  1901 10/03/17  1830 10/02/17  1319   BLOODCX  No growth at 5 days No growth at 5 days  --    URINECX   --   --    50,000 CFU/mL Candida kefyr*       X-RAY ABDOMEN 2 VIEWS, CHEST X-RAY 1 VIEW  10/07/17  1.  No acute findings in the chest, abdomen and pelvis.  2.  No definite evidence for fecal impaction.    CRANIAL CT SCAN WITHOUT CONTRAST  10/2/2017  No acute intracranial abnormality.        CT SCAN ABDOMEN AND PELVIS WITHOUT CONTRAST  10/02/17  1. There is a tiny left pleural effusion which is new since the study of  08/01/2017. Again noted are several scattered areas of somewhat focal  groundglass  opacity, particularly at the right base which are unchanged  and likely related to inflammatory or infectious change.  2. There is a very small pericardial effusion which is unchanged. The  liver, spleen, pancreas, and both adrenal glands are unremarkable  without intravenous contrast.  3. The kidneys are normal in size with some changes of cortical atrophy.  There is a small intermediate density mass at the lower margin of the  left kidney likely representing a somewhat complex cyst and can be seen  more clearly as a cyst on the enhanced CT scan dating back to 2013.  There is a very tiny cortical cyst in the right kidney also unchanged.  4. There is an abdominal aortic aneurysm treated with a stent graft.  There is no retroperitoneal mass. The large and small bowel loops are  normal in caliber and show no inflammatory change. In the pelvis, there  are isotope seeds within the prostate for treatment of prostate cancer  and these appear unchanged. A Whaley catheter is present within the  urinary bladder which is somewhat decompressed and again noted is some  bladder wall thickening likely related to an element of outlet  obstruction as also noted on CT scans dating back to 2013.    CT CHEST WITHOUT CONTRAST 10/01/17  1. 3.8 cm solid-appearing lung mass anterior-inferior left upper lobe  suspicious for neoplasm. Tissue sampling recommended.  2. 0.6 cm right upper lobe pulmonary nodule. 1.9 cm right middle lobe  groundglass nodule.  3. Small left pleural effusion.  4. Cardiomegaly. Mild splenomegaly.       amiodarone 200 mg Oral Q12H   atorvastatin 40 mg Oral Nightly   digoxin 125 mcg Oral Every Other Day   famotidine 20 mg Oral Daily   fluconazole 200 mg Oral Daily   isosorbide mononitrate 15 mg Oral Daily   lactobacillus acidophilus 1 capsule Oral Daily   metoprolol tartrate 100 mg Oral Q12H   tamsulosin 0.4 mg Oral Daily      Diet Regular; Cardiac      Assessment/Plan   Assessment:     Active Hospital Problems (**  Indicates Principal Problem)    Diagnosis Date Noted   • **UTI (urinary tract infection) due to yeast [N39.0] 10/01/2017   • Urinary retention [R33.9] 10/09/2017   • Mass of upper lobe of left lung [R91.8] 10/02/2017   • CKD (chronic kidney disease), stage III [N18.3] 10/01/2017   • PAU (acute kidney injury) [N17.9] 10/01/2017   • Arteriosclerotic coronary artery disease [I25.10] 10/01/2017   • Metabolic encephalopathy [G93.41] 10/01/2017   • Acidosis [E87.2] 10/01/2017   • Chronic combined systolic and diastolic congestive heart failure [I50.42] 09/05/2017   • Long term current use of anticoagulant [Z79.01] 12/15/2016   • Abdominal aortic aneurysm (AAA) without rupture [I71.4] 12/15/2016   • Type 2 diabetes mellitus with complication [E11.8] 11/23/2016   • COPD (chronic obstructive pulmonary disease) [J44.9] 11/15/2016   • PAF (paroxysmal atrial fibrillation) [I48.0]       Resolved Hospital Problems    Diagnosis Date Noted Date Resolved   • Wide-complex tachycardia [I47.2] 10/01/2017 10/08/2017       Plan:   · Diflucan for urine. Afebrile. Treat for 14 days per ID. All cultures remain negative except yeast in urine. WBC up to 10.4 from 6.5. Monitor. Add PCT to morning labs.  · Whaley to be replaced due to ongoing retention. Flomax started.  · Cardiology has started him on amiodarone for wide-complex tachycardia.  No obvious signs of infection related to the implanted CRT device.    · Nephrology following regarding renal failure.  Relatively stable but not improving.  · Diarrhea off and on. Doubt Cdiff. GI eval appreciated. Poor appetite. CT a/p negative. AXR negative. Stool pending.   · Pulmonology seeing regarding lung mass.  Could represent neoplasm.  HCT negative for mets. Coumadin held for CT guided biopsy today. Day 6 off Plavix. Restart both Plavix and coumadin after procedure.  · Family still concerned about his mental status. Improved from admit but not returned to normal. Today oriented to month, year and  Hospitals in [place]. Neurology to reevaluate.   · Physical therapy following. Will need SNU.        Jamel Mendoza MD  [facility]  10/09/17  8:19 AM

## 2017-10-09 NOTE — PLAN OF CARE
Problem: Patient Care Overview (Adult)  Goal: Plan of Care Review  Outcome: Ongoing (interventions implemented as appropriate)    10/09/17 0969   Coping/Psychosocial Response Interventions   Plan Of Care Reviewed With patient;son   Outcome Evaluation   Outcome Summary/Follow up Plan f/c replaced this am, added flomax, per uro notes, VT in 2 weeks; no BM today, stool speciman still needed; seems more confused at times, comes and goes; CT head and overnight oximetry ordered per Dr. Mcgraw (neuro); unable to obtain MRI d/t recent PM placement; CT neg; getting OOB without assistance, wanting to get dressed, nonsensical at times; reorients easily; Dr. Mcgraw s/w Dr. Mendoza, radiologist (post CT lung bx) and Dr. Ramirez, plan to resume a/c tomorrow (pulmonology said it was okay this evening, and could be discharged tomorrow from pulm standpoint); bx site CDI; VSS; will continue to monitor;   Patient Care Overview   Progress declining       Goal: Adult Individualization and Mutuality  Outcome: Ongoing (interventions implemented as appropriate)  Goal: Discharge Needs Assessment  Outcome: Ongoing (interventions implemented as appropriate)    Problem: Infection, Risk/Actual (Adult)  Goal: Infection Prevention/Resolution  Outcome: Ongoing (interventions implemented as appropriate)    Problem: Fall Risk (Adult)  Goal: Absence of Falls  Outcome: Ongoing (interventions implemented as appropriate)    Problem: Urine Elimination, Impaired (Adult)  Goal: Effective Urinary Elimination  Outcome: Ongoing (interventions implemented as appropriate)  Goal: Effective Containment of Urine  Outcome: Ongoing (interventions implemented as appropriate)  Goal: Reduced Incontinence Episodes  Outcome: Ongoing (interventions implemented as appropriate)    Problem: Pressure Ulcer Risk (Jayme Scale) (Adult,Obstetrics,Pediatric)  Goal: Skin Integrity  Outcome: Ongoing (interventions implemented as appropriate)

## 2017-10-09 NOTE — PROGRESS NOTES
Pharmacy consult for Lovenox dosing    Aakash Sanchez Jr. is a 82 y.o. male 190 lb 1 oz (86.2 kg).    Indication:A Fib    Physician:Dr Ramirez    Estimated Creatinine Clearance: 35.7 mL/min (by C-G formula based on Cr of 2.01).  Creatinine   Date Value Ref Range Status   10/09/2017 2.01 (H) 0.76 - 1.27 mg/dL Final   10/07/2017 1.86 (H) 0.76 - 1.27 mg/dL Final     Platelets   Date Value Ref Range Status   10/09/2017 342 140 - 500 10*3/mm3 Final     Hematocrit   Date Value Ref Range Status   10/09/2017 36.7 (L) 40.4 - 52.2 % Final     Hemoglobin   Date Value Ref Range Status   10/09/2017 11.7 (L) 13.7 - 17.6 g/dL Final         PLAN:  Will dose Lovenox 1 mg/Kg sc Q 12 hr.  Will monitor renal function closely, may need to adjust to Q24h if Cr rises.  Will monitor and adjust as needed.      Yamilet Joiner PharmD, BCPS  10/09/17 12:41 PM

## 2017-10-09 NOTE — PLAN OF CARE
Problem: Patient Care Overview (Adult)  Goal: Plan of Care Review  Outcome: Ongoing (interventions implemented as appropriate)    10/09/17 0500   Coping/Psychosocial Response Interventions   Plan Of Care Reviewed With patient   Outcome Evaluation   Outcome Summary/Follow up Plan Pt up to bedside commode frequently with little urinary elimination, despite multiple attempts to reorient and educate patient to call out before getting out of bed. I/O cath completed once with coude as bladder scan was 450. Urine culture sent to lab. Plan for CT guided biopsy of the lung today. SBP elevated with max in the lower 160s. Will continue to closely monitor.    Patient Care Overview   Progress no change       Goal: Adult Individualization and Mutuality  Outcome: Ongoing (interventions implemented as appropriate)  Goal: Discharge Needs Assessment  Outcome: Ongoing (interventions implemented as appropriate)    Problem: Infection, Risk/Actual (Adult)  Goal: Infection Prevention/Resolution  Outcome: Ongoing (interventions implemented as appropriate)    Problem: Fall Risk (Adult)  Goal: Absence of Falls  Outcome: Ongoing (interventions implemented as appropriate)    Problem: Urine Elimination, Impaired (Adult)  Goal: Effective Urinary Elimination  Outcome: Ongoing (interventions implemented as appropriate)  Goal: Effective Containment of Urine  Outcome: Ongoing (interventions implemented as appropriate)  Goal: Reduced Incontinence Episodes  Outcome: Ongoing (interventions implemented as appropriate)    Problem: Pressure Ulcer Risk (Jayme Scale) (Adult,Obstetrics,Pediatric)  Goal: Skin Integrity  Outcome: Ongoing (interventions implemented as appropriate)

## 2017-10-09 NOTE — PROGRESS NOTES
PROGRESS NOTE   LOS: 8 days   Patient Care Team:  Dimitrios Lindquist MD as PCP - General (Internal Medicine)    Chief Complaint: Abnormal imaging of the chest with a need for tissue diagnosis    Interval History: Patient came in was UTI he has chronic kidney disease he had negative culture and is currently on Diflucan.  He had CT of the chest that showed the left upper lung mass with brother lesions that are of concern and tissue diagnosis was necessary.  Patient had options of either doing a CT-guided biopsy or navigational bronchoscopy and the pros and cons of each one of these procedure was discussed with the patient in details, he elected to go for the CT-guided biopsy which was successfully done on the morning of October 9, 2017.  Patient had some confusion earlier on that day and was seen by neurology, the plan is to check for any possible AMY by check an overnight oximetry as initial screening test and patient had a CT that was negative for any bleed or obvious acute stroke and the plan is to resume the antibiotic patient as soon as possible to reduce the risk of embolic stroke.  Per nurse he was slightly confused and slightly hallucinating but at the time of my assessment patient was back in touch with surrounding and oriented ×3.    REVIEW OF SYSTEMS:   CARDIOVASCULAR: No chest pain, chest pressure or chest discomfort. No palpitations or edema.   RESPIRATORY: Minimal pain from biopsy site, no cough or sputum.   GASTROINTESTINAL: No anorexia, nausea, vomiting or diarrhea. No abdominal pain or blood.   HEMATOLOGIC: No bleeding or bruising.     Ventilator/Non-Invasive Ventilation Settings     None            Vital Signs  Temp:  [96.9 °F (36.1 °C)-97.6 °F (36.4 °C)] 97.6 °F (36.4 °C)  Heart Rate:  [] 84  Resp:  [16-18] 16  BP: (134-163)/(68-97) 143/97  SpO2:  [94 %-98 %] 96 %  on    O2 Device: room air    Intake/Output Summary (Last 24 hours) at 10/09/17 4814  Last data filed at 10/09/17 1349   Gross per  "24 hour   Intake              480 ml   Output             1250 ml   Net             -770 ml     Flowsheet Rows         First Filed Value    Admission Height  70\" (177.8 cm) Documented at 09/30/2017 2211    Admission Weight  180 lb (81.6 kg) Documented at 09/30/2017 2211        Last 3 weights    10/07/17  0500 10/08/17  0601 10/09/17  0535   Weight: 194 lb (88 kg) 194 lb 1.6 oz (88 kg) 190 lb 1 oz (86.2 kg)       Physical Exam:  GEN:  No acute distress, alert, cooperative, well developed   EYES:   Sclera clear. No icterus. PERRL. Normal EOM  ENT:   External ears/nose normal, no oral lesions, no thrush, mucous membranes moist  NECK:  Supple, midline trachea, no JVD  LUNGS: Normal chest on inspection, CTAB,Decreased breath sounds otherwise no wheezes or abnormal sounds. Respirations regular, even and unlabored.   CV:  Irregular rhythm with controlled rate. Normal S1/S2. No murmurs, gallops, or rubs noted.  ABD:  Soft, non-tender and no abdominal distention today. Normal bowel sounds. No guarding  EXT:  Moves all extremities well. No cyanosis. No redness. No edema.   Skin: dry, intact, no bleeding    Results Review:        Results from last 7 days  Lab Units 10/09/17  0440 10/07/17  0532 10/06/17  0538 10/05/17  0420 10/04/17  0404 10/03/17  0342   SODIUM mmol/L 138 137 137  137 138 138 138   POTASSIUM mmol/L 4.2 4.4 4.4  4.4 4.2 4.0 3.4*   CHLORIDE mmol/L 105 107 106  106 106 106 103   CO2 mmol/L 19.2* 18.6* 19.0*  19.0* 19.9* 20.9* 19.7*   BUN mg/dL 14 13 15  15 15 15 15   CREATININE mg/dL 2.01* 1.86* 2.01*  2.01* 2.08* 2.12* 2.14*   CALCIUM mg/dL 8.6 8.2* 8.3*  8.3* 8.3* 8.3* 8.1*   AST (SGOT) U/L 27  --   --   --   --   --    ALT (SGPT) U/L 24  --   --   --   --   --    ANION GAP mmol/L 13.8 11.4 12.0  12.0 12.1 11.1 15.3   ALBUMIN g/dL 2.90* 2.60* 2.70*  --  2.90* 2.90*           Results from last 7 days  Lab Units 10/09/17  0440 10/06/17  0538 10/05/17  0420 10/04/17  0404 10/03/17  0342   WBC 10*3/mm3 " 10.38 6.50 5.79 5.53 6.27   HEMOGLOBIN g/dL 11.7* 10.3* 10.3* 10.7* 10.9*   HEMATOCRIT % 36.7* 33.6* 33.7* 35.2* 35.0*   PLATELETS 10*3/mm3 342 224 198 182 189   NEUTROPHIL % %  --  57.5  --   --   --    LYMPHOCYTE % %  --  21.8  --   --   --    MONOCYTES % %  --  15.1*  --   --   --    EOSINOPHIL % %  --  4.6  --   --   --    BASOPHIL % %  --  0.5  --   --   --    IMM GRAN % %  --  0.5  --   --   --        Results from last 7 days  Lab Units 10/09/17  0440 10/08/17  0527 10/07/17  0532   INR  1.46* 1.65* 2.53*       Results from last 7 days  Lab Units 10/09/17  0440 10/06/17  0538 10/04/17  0404 10/03/17  0342   MAGNESIUM mg/dL 2.0 2.2 1.9 1.9                 No results found for: POCGLU    Results from last 7 days  Lab Units 10/09/17  0440 10/04/17  0404   PROCALCITONIN ng/mL 0.16 0.23   LACTATE mmol/L  --  0.8           Results from last 7 days  Lab Units 10/03/17  1901 10/03/17  1830   BLOODCX  No growth at 5 days No growth at 5 days       Results from last 7 days  Lab Units 10/09/17  0440   NITRITE UA  Negative   WBC UA /HPF 6-12*   BACTERIA UA /HPF None Seen   SQUAM EPITHEL UA /HPF 0-2           Results from last 7 days  Lab Units 10/06/17  0538   URIC ACID mg/dL 6.5       Imaging Results (all)     Procedure Component Value Units Date/Time    CT Chest Without Contrast [[id number]] Collected:  10/01/17 1435     Updated:  10/01/17 1449    Narrative:       CT CHEST WITHOUT CONTRAST     HISTORY: 82-year-old male with abnormal chest x-ray for which CT was  recommended.     TECHNIQUE: Chest CT includes axial imaging from the thoracic inlet to  the upper abdomen without IV contrast.     FINDINGS: There is a solid-appearing mass within the anterior-inferior  left upper lobe measuring 3.8 x 3.3 cm and contacting the pleura along  the left cardiac border. Anterior to the left pulmonary trunk, there is  a rounded 9 mm lymph node which is upper normal for size. There is no  axillary guerita enlargement. The heart size is  enlarged. Small left  pleural effusion layers dependently.     Within the posterior inferior right middle lobe, there is a 1.9 cm focal  area of groundglass opacity. Within the central right upper lobe at the  horizontal level just below the luis, there is a 6 mm pulmonary  nodule. Multilevel bridging endplate spur formation is present in the  thoracic spine. There has been previous median sternotomy. Imaging  through the upper abdomen demonstrates cholecystectomy clips. Aortic  stent graft is noted. Splenic size is mildly enlarged measuring 15 x 9.5  cm.       Impression:       1. 3.8 cm solid-appearing lung mass anterior-inferior left upper lobe  suspicious for neoplasm. Tissue sampling recommended.  2. 0.6 cm right upper lobe pulmonary nodule. 1.9 cm right middle lobe  groundglass nodule.  3. Small left pleural effusion.  4. Cardiomegaly. Mild splenomegaly.      Radiation dose reduction techniques were utilized, including automated  exposure control and exposure modulation based on body size.     This report was finalized on 10/1/2017 2:46 PM by Dr. Jose De La Torre MD.       CT Abdomen Pelvis Without Contrast [[id number]] Collected:  10/02/17 1422     Updated:  10/02/17 1708    Narrative:       CT SCAN OF THE ABDOMEN AND PELVIS WITHOUT CONTRAST     HISTORY: Abdominal pain and fever and chills.     FINDINGS:  The CT scan was performed through the abdomen and pelvis  without contrast and demonstrates the followin. There is a tiny left pleural effusion which is new since the study of  2017. Again noted are several scattered areas of somewhat focal  groundglass opacity, particularly at the right base which are unchanged  and likely related to inflammatory or infectious change.  2. There is a very small pericardial effusion which is unchanged. The  liver, spleen, pancreas, and both adrenal glands are unremarkable  without intravenous contrast.  3. The kidneys are normal in size with some changes of  cortical atrophy.  There is a small intermediate density mass at the lower margin of the  left kidney likely representing a somewhat complex cyst and can be seen  more clearly as a cyst on the enhanced CT scan dating back to 2013.  There is a very tiny cortical cyst in the right kidney also unchanged.  4. There is an abdominal aortic aneurysm treated with a stent graft.  There is no retroperitoneal mass. The large and small bowel loops are  normal in caliber and show no inflammatory change. In the pelvis, there  are isotope seeds within the prostate for treatment of prostate cancer  and these appear unchanged. A Whaley catheter is present within the  urinary bladder which is somewhat decompressed and again noted is some  bladder wall thickening likely related to an element of outlet  obstruction as also noted on CT scans dating back to 2013.     Radiation dose reduction techniques were utilized, including automated  exposure control and exposure modulation based on body size.     This report was finalized on 10/2/2017 5:05 PM by Dr. Isacc Baugh MD.       CT Head Without Contrast [[id number]] Collected:  10/02/17 2217     Updated:  10/02/17 2256    Narrative:       CRANIAL CT SCAN WITHOUT CONTRAST     CLINICAL HISTORY: AMS with lung mass . r/o mets; R50.9-Fever,  unspecified; R41.82-Altered mental status, unspecified     COMPARISON: 06/04/2017.     TECHNIQUE: Radiation dose reduction techniques were utilized, including  automated exposure control and exposure modulation based on body size.  Multiple axial images of the head were obtained without contrast.      FINDINGS:  There are no abnormal areas of increased density or mass  effect. There is diffuse atrophy. There are mild scattered areas of  decreased density in the white matter likely related to chronic ischemic  gliotic changes.   Ventricles, sulci, and cisterns appear normal. Bone  window images are unremarkable.     If there is high clinical suspicion of  intracranial metastatic disease,  a contrasted study or preferably MRI examination would be far superior  to noncontrast CT technique.          Impression:       1. No acute intracranial abnormality.            This study was performed with techniques to keep radiation doses as low  as reasonably achievable (ALARA). Individualized dose reduction  techniques using automated exposure control or adjustment of mA and/or  kV according to the patient size were employed.      This report was finalized on 10/2/2017 10:53 PM by Ousmane Wayne MD.       XR Chest 1 View [[id number]] Collected:  09/30/17 2354     Updated:  10/04/17 0354    Narrative:       X-RAY CHEST 1 VIEW.     HISTORY: Fever.     COMPARISON: 09/19/2017.     FINDINGS:  Cardiomegaly, lung volumes are low. 4.7 cm opacity overlies the left  heart border, is a new finding but was also seen on the study of  09/09/2017, not seen on the study of 02/17/2016.     There is no consolidation or effusion.              Impression:       No acute findings. Abnormal 4.7 cm opacity along the left heart border  may be further evaluated by chest CT scan.     This report was finalized on 10/4/2017 3:50 AM by Dr. Neel Agudelo MD.       XR Abdomen 2 View With Chest 1 View [[id number]] Collected:  10/07/17 2115     Updated:  10/07/17 2115    Narrative:       X-RAY PORTABLE ABDOMEN 2 VIEWS, CHEST X-RAY 1 VIEW.     HISTORY:  Abdominal distention, exclude impaction.     COMPARISON:  No prior studies for comparison.     FINDINGS:   Cardiomediastinal silhouette is within normal limits. Lungs demonstrate  no consolidation or effusion. Low lung volumes and chronic lung changes.     Bowel gas pattern is unremarkable. No abnormal calcifications are seen.     No free air in the abdomen. No evidence for large amount of stool in the  colon and rectum.       Impression:       1.  No acute findings in the chest, abdomen and pelvis.  2.  No definite evidence for fecal impaction.                    I reviewed the patient's new clinical results.  I personally viewed and interpreted the patient's imaging results:        Medication Review:     amiodarone 200 mg Oral Q12H   atorvastatin 40 mg Oral Nightly   digoxin 125 mcg Oral Every Other Day   [START ON 10/10/2017] enoxaparin 1 mg/kg Subcutaneous Q12H   famotidine 20 mg Oral Daily   fluconazole 200 mg Oral Daily   isosorbide mononitrate 15 mg Oral Daily   lactobacillus acidophilus 1 capsule Oral Daily   metoprolol tartrate 100 mg Oral Q12H   tamsulosin 0.4 mg Oral Daily   [START ON 10/10/2017] warfarin 3 mg Oral Nightly         [START ON 10/10/2017] Pharmacy to Dose enoxaparin (LOVENOX)        ASSESSMENT:   1. 3.8cm JT lung massStatus post CT guided biopsy on October 9, 2017  2. 6 mm right upper lobe lung nodule  3. 1.9 cm right middle lobe groundglass nodule  4. Toxic metabolic encephalopathy - resolved ? sepsis  5. History of recurrent pneumonia   6. 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 History of AAA status post stent  8. CAD s/p PCI/ 2V CABG(1997)  Now on DAPT   9. Chronic systolic and diastolic heart failure   10. CKD   11. Afib on AC   12. COPD not in exacerbation  13. Admitted with UTI and renal failure  14. Urinary retention requiring placement of Whaley catheter    PLAN:    CT-guided biopsy done today and was uneventful, okay to resume anticoagulation from the pulmonary standpoint  Confusion workup per neurology, agree with the overnight oximetry and will consider further recommendation after reviewing the oximetry profile in a.m.  Discussed with the patient we should expect some preliminary biopsy results by tomorrow afternoon  Patient is good for discharge from the pulmonary standpoint need to verify if okay with urology and neurology and cardiology and the primary team    Disposition:     Latanya Smith MD  10/09/17  2:59 PM          EMR Dragon/Transcription disclaimer:   Much of this encounter note is an electronic  transcription/translation of spoken language to printed text. The electronic translation of spoken language may permit erroneous, or at times, nonsensical words or phrases to be inadvertently transcribed; Although I have reviewed the note for such errors, some may still exist.

## 2017-10-09 NOTE — PROGRESS NOTES
Encounter Date: 10/9/2017    CHIEF COMPLAINT: Altered mental status.    Patient Active Problem List   Diagnosis   • Arthritis   • Hyperlipidemia   • Hypertension   • Hypokalemia   • Lung mass   • Ventricular premature beats   • Coronary artery disease   • Ischemic cardiomyopathy   • PAF (paroxysmal atrial fibrillation)   • COPD (chronic obstructive pulmonary disease)   • VRE (vancomycin-resistant Enterococci) infection   • Type 2 diabetes mellitus with complication   • Diarrhea   • Abdominal aortic aneurysm (AAA) without rupture   • Long term current use of anticoagulant   • Urinary tract infection with hematuria   • CKD (chronic kidney disease), stage III   • Hypotension   • Hyperglycemia   • Imbalance   • Concepcion's esophagus without dysplasia   • Colon polyps   • B12 deficiency   • Cerebrovascular accident (CVA)   • History of coronary artery stent placement   • Rash of body   • Insomnia   • Cardiomyopathy   • Benign non-nodular prostatic hyperplasia with lower urinary tract symptoms   • Prostate hypertrophy   • Chronic combined systolic and diastolic congestive heart failure   • Pneumonia   • Sepsis   • 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    Background:     Aakash Sanchez Jr. is a 82 y.o. male who was readmitted on the first of this monthFor fever and chills, UTI, AKA and abnormal chest x-ray. Pulmonology seeing regarding lung mass.  Could represent neoplasm.  HCT negative for mets. Continue to hold Coumadin for possible biopsy. Day 5 off Plavix. Likely biopsy Monday. INR better after vitamin K x1  yesterday.    Admission on September 12 for generalized weakness and urinary retention and shortness of breath.  Prior to that he was admitted for congestive heart failure exacerbation and was discharged and of August. September admission he was noted to have pneumonia and sepsis and was given antibiotics.  His condition worsened and he was transferred to ICU.  Later on he had atrial fibrillation with rapid ventricular rate and he had successful cardioversion and permanent pacemaker placement. Patient is a 82 y.o. male presented to [facility] complaining of weakness shortness of breath and urinary issues.  He was discharged on 22nd of September.   At the time of discharge he slowly improved and is agreeable for discharge home today.  He is follow-up arranged with both cardiology and neurology in the outpatient setting.     Today patient examined by the bedside: Neurology has been consulted for altered mental status.  He was seen by my colleagues for metabolic encephalopathy and we had signed off as he was not having any more headache and his temperature went better. Patient's son present.  Patient is back to his baseline mental status as he is alert oriented to time place and person and he was able to participate in the history.  The patient confirms he has snoring and daytime sleepiness.      Review of systems   No neck stiffness  No photophobia  All systems reviewed and are negative.         Past Medical History:   Diagnosis Date   • AAA (abdominal aortic aneurysm)     s/p repair, Dr. Felix   • Acute myocardial infarction    • Arteriosclerotic coronary artery disease    • Arthritis    • Atrial fibrillation    • Atrial fibrillation    • B12 deficiency 6/1/2017   • Cardiomyopathy    • Chicken pox    • Chronic combined systolic and diastolic congestive heart failure 9/5/2017   • Chronic systolic (congestive) heart failure    • CKD (chronic kidney disease), stage III    • COPD (chronic obstructive  pulmonary disease) 11/15/2016   • Coronary artery disease     MI 1990's, CABG x 2 V (LIMA-LAD, SVG-PDA), 1997   • Dyspnea    • Dyspnea on exertion 8/25/2017   • Fatigue    • Heart attack    • Hyperglycemia 3/29/2017   • Hyperlipidemia    • Hypertension    • Imbalance 3/29/2017   • Insomnia 8/25/2017   • Ischemic cardiomyopathy     LVEF 40%   • Lung mass 04/18/2016    spiculated, being followed   • Lung nodule seen on imaging study    • Malignant neoplasm    • Measles    • Mumps    • PAF (paroxysmal atrial fibrillation)    • Pleural effusion    • Premature ventricular contractions    • Prostate cancer    • PVC (premature ventricular contraction)    • Rash of body 6/21/2017   • Stroke    • Thoracic aortic ectasia    • Urinary tract infection with hematuria 1/18/2017       Past Surgical History:   Procedure Laterality Date   • APPENDECTOMY     • ARTERIAL ANEURYSM REPAIR     • BRONCHOSCOPY Right 10/5/2016    Procedure: BRONCHOSCOPY;  Surgeon: Jhony Quintanilla MD;  Location: [facility] ENDOSCOPY;  Service:    • BRONCHOSCOPY N/A 11/29/2016    Procedure: BRONCHOSCOPY WITH FLUORO w/biopsy AND BAL;  Surgeon: Ervin Stiles MD;  Location: [facility] ENDOSCOPY;  Service:    • CARDIAC CATHETERIZATION N/A 6/7/2017    Procedure: Left Heart Cath;  Surgeon: Vincent Middleton MD;  Location: [facility] CATH INVASIVE LOCATION;  Service:    • CARDIAC CATHETERIZATION N/A 6/7/2017    Procedure: Coronary angiography;  Surgeon: Vincent Middleton MD;  Location: [facility] CATH INVASIVE LOCATION;  Service:    • CARDIAC CATHETERIZATION N/A 6/7/2017    Procedure: Left ventriculography;  Surgeon: Vincent Middleton MD;  Location: [facility] INVASIVE LOCATION;  Service:    • CARDIAC CATHETERIZATION N/A 6/7/2017    Procedure: Bypass graft study;  Surgeon: Vincent Middleton MD;  Location: [facility] CATH INVASIVE LOCATION;  Service:    • CARDIAC CATHETERIZATION N/A 6/7/2017    Procedure: Native mammary injection;  Surgeon: Vincent Middleton MD;  Location: [facility] INVASIVE  LOCATION;  Service:    • CARDIAC CATHETERIZATION N/A 6/7/2017    Procedure: Stent NYA bypass graft;  Surgeon: Vincent Middleton MD;  Location:  [facility] CATH INVASIVE LOCATION;  Service:    • CARDIAC DEFIBRILLATOR PLACEMENT     • CARDIAC ELECTROPHYSIOLOGY PROCEDURE N/A 9/19/2017    Procedure: ICD SC new- Bi Ventricular  CRT-D  MEDTRONIC;  Surgeon: Fady Terrazas MD;  Location:  [facility] CATH INVASIVE LOCATION;  Service:    • CARDIAC SURGERY     • CHOLECYSTECTOMY     • COLONOSCOPY N/A 12/16/2016    Procedure: COLONOSCOPY TO ASC COLON  WITH BIOPSY AND POLYPECTOMY;  Surgeon: Lane Brian MD;  Location:  [facility] ENDOSCOPY;  Service:    • COLONOSCOPY N/A 3/1/2017    multiple polyps, lipoma in proximal transverse colon/hepatic flexure, repeat in one year   • CORONARY ARTERY BYPASS GRAFT     • ENDOSCOPY N/A 3/1/2017    Concepcion's, HH, gastric mucosal atrophy   • INSERT / REPLACE / REMOVE PACEMAKER     • PROSTATE SURGERY         Current Facility-Administered Medications   Medication Dose Route Frequency Provider Last Rate Last Dose   • acetaminophen (TYLENOL) tablet 650 mg  650 mg Oral Q4H PRN Jeramie Pate MD   650 mg at 10/07/17 2202   • amiodarone (PACERONE) tablet 200 mg  200 mg Oral Q12H Carolyn Ramirez MD   200 mg at 10/08/17 2012   • atorvastatin (LIPITOR) tablet 40 mg  40 mg Oral Nightly Jeramie Pate MD   40 mg at 10/08/17 2013   • bisacodyl (DULCOLAX) suppository 10 mg  10 mg Rectal Daily PRN Jamel Mendoza MD   10 mg at 10/07/17 0403   • digoxin (LANOXIN) tablet 125 mcg  125 mcg Oral Every Other Day Jeramie Pate MD   125 mcg at 10/07/17 0842   • famotidine (PEPCID) tablet 20 mg  20 mg Oral Daily Jamel Mendoza MD   20 mg at 10/08/17 0819   • fluconazole (DIFLUCAN) tablet 200 mg  200 mg Oral Daily Carmen Byrd MD   200 mg at 10/08/17 0819   • isosorbide mononitrate (IMDUR) 24 hr tablet 15 mg  15 mg Oral Daily Jeramie Pate MD   15 mg at 10/08/17 0819   • lactobacillus acidophilus (RISAQUAD)  capsule 1 capsule  1 capsule Oral Daily Jeramie Pate MD   1 capsule at 10/08/17 0819   • metoprolol tartrate (LOPRESSOR) tablet 100 mg  100 mg Oral Q12H Jeramie Pate MD   100 mg at 10/08/17 2012   • nitroglycerin (NITROSTAT) SL tablet 0.4 mg  0.4 mg Sublingual Q5 Min PRN Jeramie Pate MD       • ondansetron (ZOFRAN) tablet 4 mg  4 mg Oral Q6H PRN Jeramie Pate MD        Or   • ondansetron ODT (ZOFRAN-ODT) disintegrating tablet 4 mg  4 mg Oral Q6H PRN Jeramie Pate MD        Or   • ondansetron (ZOFRAN) injection 4 mg  4 mg Intravenous Q6H PRN Jeramie Pate MD   4 mg at 10/03/17 1708   • oxyCODONE-acetaminophen (PERCOCET) 5-325 MG per tablet 1 tablet  1 tablet Oral Q6H PRN Jeramie Pate MD   1 tablet at 10/08/17 1412   • polyethylene glycol (MIRALAX) powder 17 g  17 g Oral Daily PRN Jamel Mendoza MD       • sodium chloride 0.9 % flush 1-10 mL  1-10 mL Intravenous PRN Jeramie Pate MD       • tamsulosin (FLOMAX) 24 hr capsule 0.4 mg  0.4 mg Oral Daily Bryan Bernstein MD           No Known Allergies    Social History     Social History   • Marital status:      Spouse name: N/A   • Number of children: N/A   • Years of education: N/A     Occupational History   • controller [employer]     Social History Main Topics   • Smoking status: Former Smoker     Packs/day: 1.00     Years: 25.00     Types: Cigarettes   • Smokeless tobacco: Former User     Quit date: 9/22/1985      Comment: caffeine use   • Alcohol use No   • Drug use: No   • Sexual activity: Defer      Comment:      Other Topics Concern   • Not on file     Social History Narrative        3 children     Sonlives with him.       Family History   Problem Relation Age of Onset   • Cancer Mother    • Cancer Father    • COPD Father    • Arthritis Sister    • Diabetes Sister    • Hyperlipidemia Sister    • Arthritis Brother    • Diabetes Brother    • Stroke Brother    • Lung cancer Other    • Stroke  Other    • No Known Problems Maternal Grandmother    • No Known Problems Maternal Grandfather    • No Known Problems Paternal Grandmother    • No Known Problems Paternal Grandfather        Family Status   Relation Status   • Mother    • Father    • Sister Alive   • Brother Alive   • Other    • Maternal Grandmother    • Maternal Grandfather    • Paternal Grandmother    • Paternal Grandfather        No current facility-administered medications on file prior to encounter.      Current Outpatient Prescriptions on File Prior to Encounter   Medication Sig   • finasteride (PROSCAR) 5 MG tablet Take 1 tablet by mouth Daily.   • furosemide (LASIX) 40 MG tablet Take 2 tablets by mouth Daily.   • isosorbide mononitrate (IMDUR) 30 MG 24 hr tablet Take 0.5 tablets by mouth Daily.   • metoprolol tartrate (LOPRESSOR) 100 MG tablet Take 1 tablet by mouth Every 12 (Twelve) Hours.   • pantoprazole (PROTONIX) 40 MG EC tablet Take 40 mg by mouth Daily.   • potassium chloride (K-TAB) 20 MEQ tablet controlled-release ER tablet Take 1 tablet by mouth Daily.   • Probiotic Product (PROBIOTIC PO) Take 1 tablet by mouth Daily.   • warfarin (COUMADIN) 4 MG tablet Take 1 tablet by mouth Daily. (Patient taking differently: Take 2 mg by mouth Daily.)   • atorvastatin (LIPITOR) 40 MG tablet Take 1 tablet by mouth Every Night.   • clopidogrel (PLAVIX) 75 MG tablet Take 1 tablet by mouth Daily.   • digoxin (LANOXIN) 125 MCG tablet Take 1 tablet by mouth Every Other Day.   • nitroglycerin (NITROSTAT) 0.4 MG SL tablet 1 under the tongue as needed for angina, may repeat q5mins for up three doses   • warfarin (COUMADIN) 3 MG tablet TAKE ONE TABLET BY MOUTH DAILY           PHYSICAL EXAMINATION:    Vitals: Patient Vitals for the past 4 hrs:   BP Temp Temp src Resp SpO2 Weight   10/09/17 0700 138/79 97 °F (36.1 °C) Oral 16 95 % -   10/09/17 0535 - - - - - 190 lb 1 oz (86.2 kg)     Temp:  [96.9 °F (36.1  °C)-97.6 °F (36.4 °C)] 97 °F (36.1 °C)  Heart Rate:  [] 99  Resp:  [16-18] 16  BP: (138-163)/(74-92) 138/79    General:  pleasant in no acute distress.  HEENT: No pallor or icterus. Neck supple. No Carotid bruits.  Heart: S1 and S2 normal with regular rhythm.  No murmur.       GENERAL NEUROLOGIC EXAM     Mental Status: Awake alert and oriented to person place and time. Language is normal for comprehension, repetition, naming and fluency. Recent and remote memory were normal.  Attention span and concentration were normal. Fund of knowledge was normal.      Cranial nerves:  Fundi were normal bilaterally.  Visual fields were full to confrontation.  Pupils are equal regular and reactive to light. Extraocular movements are intact. Facial sensation was normal and symmetrical bilaterally. Muscles of facial expression were strong and symmetric. Hearing to finger rub normal bilaterally. Palate elevates symmetrically. Sternocleidomastoid and trapezius normal. The tongue protrudes midline without atrophy or fasciculations.      Motor: Normal tone and bulk with no involuntary movements or pronator drift.    Strength normal 5/5 in bilateral upper and lower extremities.      sensation intact bilaterally.  Did not test gait and  Coordination as he just had a procedure and resting.     Labs:     Lab Results   Component Value Date    HGB 11.7 (L) 10/09/2017    HCT 36.7 (L) 10/09/2017    WBC 10.38 10/09/2017     10/09/2017    EOSPCT 8.0 (H) 10/06/2016    MONOPCT 7.0 10/06/2016     Lab Results   Component Value Date    BUN 14 10/09/2017    CALCIUM 8.6 10/09/2017     10/09/2017    K 4.2 10/09/2017     10/09/2017    CO2 19.2 (L) 10/09/2017     (H) 06/21/2017     Lab Results   Component Value Date    ALT 24 10/09/2017    AST 27 10/09/2017    BILITOT 0.5 10/09/2017     Lab Results   Component Value Date    PHOS 2.7 10/09/2017    MG 2.0 10/09/2017    PROTIME 17.2 (H) 10/09/2017    INR 1.46 (H) 10/09/2017      No components found for: POCGLUC  No components found for: A1C  Lab Results   Component Value Date    HDL 40 06/08/2017    LDL 90 04/12/2017     No components found for: B12  Lab Results   Component Value Date    TSH 1.170 10/09/2017       Lab Results (last 24 hours)     Procedure Component Value Units Date/Time    Blood Culture - Blood, [[id number]]  (Normal) Collected:  10/03/17 1830    Specimen:  Blood from Arm, Left Updated:  10/08/17 1931     Blood Culture No growth at 5 days    Blood Culture - Blood, [[id number]]  (Normal) Collected:  10/03/17 1901    Specimen:  Blood from Arm, Left Updated:  10/08/17 1931     Blood Culture No growth at 5 days    Urinalysis With / Culture If Indicated - Urine, Clean Catch [[id number]]  (Abnormal) Collected:  10/09/17 0440    Specimen:  Urine from Urine, Catheter Updated:  10/09/17 0512     Color, UA Yellow     Appearance, UA Clear     pH, UA 6.0     Specific Gravity, UA 1.012     Glucose, UA Negative     Ketones, UA Negative     Bilirubin, UA Negative     Blood, UA Small (1+) (A)     Protein,  mg/dL (2+) (A)     Leuk Esterase, UA Negative     Nitrite, UA Negative     Urobilinogen, UA 0.2 E.U./dL    Urine Culture - Urine, Urine, Clean Catch [[id number]] Collected:  10/09/17 0440    Specimen:  Urine from Urine, Catheter Updated:  10/09/17 0513    Urinalysis, Microscopic Only - Urine, Clean Catch [[id number]]  (Abnormal) Collected:  10/09/17 0440    Specimen:  Urine from Urine, Catheter Updated:  10/09/17 0514     RBC, UA 3-5 (A) /HPF      WBC, UA 6-12 (A) /HPF      Bacteria, UA None Seen /HPF      Squamous Epithelial Cells, UA 0-2 /HPF      Hyaline Casts, UA None Seen /LPF      Methodology Automated Microscopy    Ammonia [[id number]]  (Normal) Collected:  10/09/17 0440    Specimen:  Blood Updated:  10/09/17 0517     Ammonia 32 umol/L     Protime-INR [[id number]]  (Abnormal) Collected:  10/09/17 0440    Specimen:  Blood Updated:  10/09/17 0525     Protime 17.2 (H) Seconds       INR 1.46 (H)    Magnesium [[id number]]  (Normal) Collected:  10/09/17 0440    Specimen:  Blood Updated:  10/09/17 0538     Magnesium 2.0 mg/dL     Phosphorus [[id number]]  (Normal) Collected:  10/09/17 0440    Specimen:  Blood Updated:  10/09/17 0538     Phosphorus 2.7 mg/dL     Digoxin Level [[id number]]  (Abnormal) Collected:  10/09/17 0440    Specimen:  Blood Updated:  10/09/17 0538     Digoxin 0.50 (L) ng/mL     Comprehensive Metabolic Panel [[id number]]  (Abnormal) Collected:  10/09/17 0440    Specimen:  Blood Updated:  10/09/17 0551     Glucose 137 (H) mg/dL      BUN 14 mg/dL      Creatinine 2.01 (H) mg/dL      Sodium 138 mmol/L      Potassium 4.2 mmol/L      Chloride 105 mmol/L      CO2 19.2 (L) mmol/L      Calcium 8.6 mg/dL      Total Protein 6.2 g/dL      Albumin 2.90 (L) g/dL      ALT (SGPT) 24 U/L      AST (SGOT) 27 U/L      Alkaline Phosphatase 55 U/L      Total Bilirubin 0.5 mg/dL      eGFR Non African Amer 32 (L) mL/min/1.73      Globulin 3.3 gm/dL      A/G Ratio 0.9 g/dL      BUN/Creatinine Ratio 7.0     Anion Gap 13.8 mmol/L     Narrative:       The MDRD GFR formula is only valid for adults with stable renal function between ages 18 and 70.    Cortisol [[id number]]  (Normal) Collected:  10/09/17 0440    Specimen:  Blood Updated:  10/09/17 0551     Cortisol 13.47 mcg/dL     Narrative:       Cortisol Reference Ranges:    Cortisol 6AM - 10AM Range: 6.02-18.40 mcg/dl  Cortisol 4PM - 8PM Range: 2.68-10.50 mcg/dl  Critical AM/PM:    Less than 2 mcg/dl    TSH [[id number]]  (Normal) Collected:  10/09/17 0440    Specimen:  Blood Updated:  10/09/17 0551     TSH 1.170 mIU/mL     T4, Free [[id number]]  (Normal) Collected:  10/09/17 0440    Specimen:  Blood Updated:  10/09/17 0551     Free T4 1.69 ng/dL     CBC (No Diff) [[id number]]  (Abnormal) Collected:  10/09/17 0440    Specimen:  Blood Updated:  10/09/17 0605     WBC 10.38 10*3/mm3      RBC 4.62 10*6/mm3      Hemoglobin 11.7 (L) g/dL      Hematocrit 36.7 (L) %       MCV 79.4 (L) fL      MCH 25.3 (L) pg      MCHC 31.9 (L) g/dL      RDW 17.0 (H) %      RDW-SD 48.8 fl      MPV 11.3 fL      Platelets 342 10*3/mm3           .  Imaging Results (last 24 hours)     Procedure Component Value Units Date/Time    XR Abdomen 2 View With Chest 1 View [[id number]] Collected:  10/07/17 2115     Updated:  10/08/17 1948    Narrative:       X-RAY PORTABLE ABDOMEN 2 VIEWS, CHEST X-RAY 1 VIEW.     HISTORY:  Abdominal distention, exclude impaction.     COMPARISON:  No prior studies for comparison.     FINDINGS:   Cardiomediastinal silhouette is within normal limits. Lungs demonstrate  no consolidation or effusion. Low lung volumes and chronic lung changes.     Bowel gas pattern is unremarkable. No abnormal calcifications are seen.     No free air in the abdomen. No evidence for large amount of stool in the  colon and rectum.       Impression:       1.  No acute findings in the chest, abdomen and pelvis.  2.  No definite evidence for fecal impaction.     This report was finalized on 10/8/2017 7:44 PM by Dr. Neel Agudelo MD.         CRANIAL CT SCAN WITHOUT CONTRAST  10/2/2017  No acute intracranial abnormality.         CT SCAN ABDOMEN AND PELVIS WITHOUT CONTRAST  10/02/17  1. There is a tiny left pleural effusion which is new since the study of  08/01/2017. Again noted are several scattered areas of somewhat focal  groundglass opacity, particularly at the right base which are unchanged  and likely related to inflammatory or infectious change.  2. There is a very small pericardial effusion which is unchanged. The  liver, spleen, pancreas, and both adrenal glands are unremarkable  without intravenous contrast.  3. The kidneys are normal in size with some changes of cortical atrophy.  There is a small intermediate density mass at the lower margin of the  left kidney likely representing a somewhat complex cyst and can be seen  more clearly as a cyst on the enhanced CT scan dating back to 2013.  There is  a very tiny cortical cyst in the right kidney also unchanged.  4. There is an abdominal aortic aneurysm treated with a stent graft.  There is no retroperitoneal mass. The large and small bowel loops are  normal in caliber and show no inflammatory change. In the pelvis, there  are isotope seeds within the prostate for treatment of prostate cancer  and these appear unchanged. A Whaley catheter is present within the  urinary bladder which is somewhat decompressed and again noted is some  bladder wall thickening likely related to an element of outlet  obstruction as also noted on CT scans dating back to 2013.     CT CHEST WITHOUT CONTRAST 10/01/17  1. 3.8 cm solid-appearing lung mass anterior-inferior left upper lobe  suspicious for neoplasm. Tissue sampling recommended.  2. 0.6 cm right upper lobe pulmonary nodule. 1.9 cm right middle lobe  groundglass nodule.  3. Small left pleural effusion.  4. Cardiomegaly. Mild splenomegaly.     For this problem, the following was ordered:  Orders Placed This Encounter   Procedures   • Blood Culture - Blood,   • Blood Culture - Blood,   • Urine Culture - Urine, Urine, Clean Catch   • Respiratory Panel, PCR - Swab, Nasopharynx   • Urine Culture - Urine, Urine, Catheter   • Blood Culture - Blood,   • Blood Culture - Blood,   • Clostridium Difficile Toxin, PCR - Stool, Per Rectum   • Clostridium Difficile Toxin - Stool, Per Rectum   • Clostridium Difficile Toxin, PCR - Stool, Per Rectum   • Urine Culture - Urine, Urine, Clean Catch   • XR Chest 1 View   • CT Chest Without Contrast   • CT Abdomen Pelvis Without Contrast   • CT Head Without Contrast   • XR Abdomen 2 View With Chest 1 View   • CT Needle Biopsy Lung   • Comprehensive Metabolic Panel   • Lactic Acid, Plasma   • [place] Draw   • Protime-INR   • Digoxin Level   • CBC Auto Differential   • Urinalysis With / Culture If Indicated - Urine, Clean Catch   • Urinalysis, Microscopic Only - Urine, Clean Catch   • Urine Drug Screen -  Urine, Clean Catch   • Protime-INR   • Potassium   • Magnesium   • Troponin   • Blood Gas, Arterial   • CBC Auto Differential   • Procalcitonin   • Digoxin Level   • Vancomycin, Random   • Magnesium   • CBC (No Diff)   • Vancomycin, Random   • ANCA Panel   • CBC (No Diff)   • Vancomycin, Random   • Renal Function Panel   • Magnesium   • Magnesium   • Renal Function Panel   • Vancomycin, Random   • Creatinine, Serum   • Procalcitonin   • Lactic Acid, Plasma   • CBC (No Diff)   • PSA   • CBC (No Diff)   • CBC (No Diff)   • Renal Function Panel   • Uric Acid   • Magnesium   • CBC Auto Differential   • Renal Function Panel   • Fecal Lactoferrin - Stool, Per Rectum   • Comprehensive Metabolic Panel   • Ammonia   • CBC (No Diff)   • Cortisol   • TSH   • T4, Free   • Urinalysis With / Culture If Indicated - Urine, Clean Catch   • Magnesium   • Phosphorus   • Digoxin Level   • Urinalysis, Microscopic Only - Urine, Clean Catch   • Diet Regular; Cardiac   • Vital Signs   • Strict Intake and Output   • Saline Lock & Maintain IV Access   • Activity - Ad Babs   • Continuous Pulse Oximetry   • May Be Off Telemetry for Tests   • ACLS Protocol For Life Threatening Dysrhythmias (If No DNR Order)   • Notify Provider if ACLS Protocol Activated   • Please have pacer interrogated   • Continue Indwelling Urinary Catheter   • Weigh Patient   • Physician Communication Order   • Daily Weights   • Continue Indwelling Urinary Catheter   • Ask Dr. Ledezma to see again regarding Whaley catheter. Pt possibly to be dc'ed in next couple days.   • Discontinue Indwelling Urinary Catheter   • Bladder Scan if Patient Unable to Void 4-6 Hours After Catheter Removal   • If Bladder Scan Volume is Less Than 350-500mL & Patient is Without Symptoms of Bladder Discomfort / Distention Monitor Every 1-2 Hours for Spontaneous Void   • Straight Cath Every 4-6 Hours As Needed If Patient is Unable to Void After 4-6 Hours, Bladder Scan Volume is Greater Than 350-500mL  & Patient Has Symptoms of Bladder Discomfort / Distention   • Notify Provider if Bladder Distention Continues   • Consult Pharmacist For Review of Medications That May Cause Urinary Retention - RN To Place Order for Consult it Needed   • Schedule / Prompt Voiding For Patients With Urinary Incontinence   • Replace oliver   • Insert Indwelling Urinary Catheter   • Assess Need for Indwelling Urinary Catheter - Follow Removal Protocol   • Catheter Care   • Full Code   • [facility] (on-call MD unless specified)   • Inpatient Consult to Nephrology   • Inpatient Consult to Urology   • Inpatient Consult to Cardiology   • Inpatient Consult to Infectious Diseases   • Inpatient Consult to Cardiac Electrophysiologist   • Inpatient Consult to Pulmonology   • Inpatient Consult to Neurology   • Inpatient Consult to Gastroenterology   • Inpatient Consult to Neurology   • PT Consult: Eval & Treat   • PT Consult: Eval & Treat   • Oxygen Therapy-   • Oxygen Therapy- Nasal Cannula; Titrate for SPO2: 90%, equal to or greater than   • SCANNED - TELEMETRY     • ECG 12 Lead   • Insert peripheral IV   • Insert Peripheral IV   • Inpatient Admission       Problem List Items Addressed This Visit     Fever and chills - Primary      Other Visit Diagnoses     Altered mental status, unspecified altered mental status type              ASSESSMENT/PLAN: This is a 82 y.o. male who has   Past Medical History:   Diagnosis Date   • AAA (abdominal aortic aneurysm)     s/p repair, Dr. Felix   • Acute myocardial infarction    • Arteriosclerotic coronary artery disease    • Arthritis    • Atrial fibrillation    • Atrial fibrillation    • B12 deficiency 6/1/2017   • Cardiomyopathy    • Chicken pox    • Chronic combined systolic and diastolic congestive heart failure 9/5/2017   • Chronic systolic (congestive) heart failure    • CKD (chronic kidney disease), stage III    • COPD (chronic obstructive pulmonary disease) 11/15/2016   • Coronary artery disease     MI  1990's, CABG x 2 V (LIMA-LAD, SVG-PDA), 1997   • Dyspnea    • Dyspnea on exertion 8/25/2017   • Fatigue    • Heart attack    • Hyperglycemia 3/29/2017   • Hyperlipidemia    • Hypertension    • Imbalance 3/29/2017   • Insomnia 8/25/2017   • Ischemic cardiomyopathy     LVEF 40%   • Lung mass 04/18/2016    spiculated, being followed   • Lung nodule seen on imaging study    • Malignant neoplasm    • Measles    • Mumps    • PAF (paroxysmal atrial fibrillation)    • Pleural effusion    • Premature ventricular contractions    • Prostate cancer    • PVC (premature ventricular contraction)    • Rash of body 6/21/2017   • Stroke    • Thoracic aortic ectasia    • Urinary tract infection with hematuria 1/18/2017    presents with  Fluctuating mental status change.  Back in June patient had an MRI which showed a left PICA stroke with significant left vertebral artery disease.  Microhemorrhages in the posterior fossa. High risk for future strokes, restart anticoagulation as soon as possible TSH 1.1 B12 460 LDL 51 A1c 6.     1.  Acute encephalopathy resolved;  Conservative management.  2.  Possible sleep apnea:  Continuous  Pulse ox.  3. Patient has paroxysmal atrial fibrillation on long-term anticoagulation:  It has been held for the procedure. Discussed with primary note cardiologist and hospitalist and the CT radiologists.  Plan will be to start Coumadin from tomorrow with bridge with Lovenox.  4.  Lung mass under evaluation.  5. b12 def: replaced.    Will follow.    Thank you for allowing us to participate in the care of this patient.    Crystal Mcgraw MD  10/09/17  8:33 AM

## 2017-10-09 NOTE — PROGRESS NOTES
"Cc retention  patient voiding small amounts with large pvrs.  He is not on his alpha blocker.   LOS: 8 days   Patient Care Team:  Dimitrios Lindquist MD as PCP - General (Internal Medicine)        Subjective     History of Present Illness    Subjective      Objective     Vital Signs  Temp:  [96.9 °F (36.1 °C)-97.6 °F (36.4 °C)] 97 °F (36.1 °C)  Heart Rate:  [] 99  Resp:  [16-20] 18  BP: (140-163)/(74-92) 140/81    Objective:  General Appearance:  Comfortable.    Vital signs: (most recent): Blood pressure 140/81, pulse 99, temperature 97 °F (36.1 °C), temperature source Oral, resp. rate 18, height 70\" (177.8 cm), weight 190 lb 1 oz (86.2 kg), SpO2 98 %.  Vital signs are normal.    Output: Producing urine.    HEENT: Normal HEENT exam.    Lungs:  Normal respiratory rate and normal effort.    Abdomen: Abdomen is soft.              Results Review:  New clinical results reviewed.        Assessment/Plan     Principal Problem:    UTI (urinary tract infection) due to yeast  Active Problems:    PAF (paroxysmal atrial fibrillation)    COPD (chronic obstructive pulmonary disease)    Type 2 diabetes mellitus with complication    Abdominal aortic aneurysm (AAA) without rupture    Long term current use of anticoagulant    Chronic combined systolic and diastolic congestive heart failure    Abnormal CXR    CKD (chronic kidney disease), stage III    PAU (acute kidney injury)    Arteriosclerotic coronary artery disease    Acidosis    Mass of upper lobe of left lung      Assessment:  (Retention   Will restart alpha blocker   Flomax and replace oliver   Repeat vt in two weeks.).       Bryan Bernstein MD  10/09/17  6:37 AM            "

## 2017-10-09 NOTE — PROGRESS NOTES
NEPHROLOGY PROGRESS NOTE    PATIENT IDENTIFICATION:   Name:  Aakash Sanchez Jr.      MRN:  [id number]     82 y.o.  male             Reason for visit: PAU on CKD3    SUBJECTIVE:   Seen and examined. No SOA or CP. S/P lung biopsy. D/W staff.   OBJECTIVE:  Vitals:    10/09/17 0348 10/09/17 0535 10/09/17 0700 10/09/17 1010   BP: 140/81  138/79 150/82   BP Location: Left arm  Left arm Right arm   Patient Position: Lying  Lying Lying   Pulse: 99   86   Resp:   16 16   Temp:   97 °F (36.1 °C)    TempSrc:   Oral    SpO2:   95% 98%   Weight:  190 lb 1 oz (86.2 kg)     Height:               Body mass index is 27.27 kg/(m^2).    Intake/Output Summary (Last 24 hours) at 10/09/17 1023  Last data filed at 10/09/17 0900   Gross per 24 hour   Intake              240 ml   Output             1475 ml   Net            -1235 ml     Wt Readings from Last 1 Encounters:   10/09/17 0535 190 lb 1 oz (86.2 kg)   10/08/17 0601 194 lb 1.6 oz (88 kg)   10/07/17 0500 194 lb (88 kg)   10/06/17 0458 190 lb 3.2 oz (86.3 kg)   10/05/17 0945 190 lb 9.6 oz (86.5 kg)   10/04/17 0600 193 lb 4.8 oz (87.7 kg)   10/03/17 0617 187 lb 14.4 oz (85.2 kg)   10/02/17 0549 183 lb 5 oz (83.2 kg)   09/30/17 2211 180 lb (81.6 kg)     Wt Readings from Last 3 Encounters:   10/09/17 190 lb 1 oz (86.2 kg)   09/28/17 184 lb (83.5 kg)   09/26/17 181 lb (82.1 kg)         Exam:  General Appearance: alert, oriented x 3, no acute distress, appears to be chronically ill  Skin: warm and dry  HEENT: Nonicteric sclerae, oral mucosa normal,   Neck: supple, no JVD, trachea midline  Lungs: Bilateral rhonchi, unlabored breathing effort  Heart: RRR, normal S1 and S2, no S3, no rub  Abdomen: soft, non-tender, no palpable bladder, BS+, abdomen slightly distended  Extremities: no edema, cyanosis or clubbing  Neuro: normal speech and mental status      Scheduled meds:      amiodarone 200 mg Oral Q12H   atorvastatin 40 mg Oral Nightly   digoxin 125 mcg Oral Every Other Day   famotidine  20 mg Oral Daily   fluconazole 200 mg Oral Daily   isosorbide mononitrate 15 mg Oral Daily   lactobacillus acidophilus 1 capsule Oral Daily   metoprolol tartrate 100 mg Oral Q12H   tamsulosin 0.4 mg Oral Daily     IV meds:                             Data Review:      Results from last 7 days  Lab Units 10/09/17  0440 10/07/17  0532 10/06/17  0538   SODIUM mmol/L 138 137 137  137   POTASSIUM mmol/L 4.2 4.4 4.4  4.4   CHLORIDE mmol/L 105 107 106  106   CO2 mmol/L 19.2* 18.6* 19.0*  19.0*   BUN mg/dL 14 13 15  15   CREATININE mg/dL 2.01* 1.86* 2.01*  2.01*   CALCIUM mg/dL 8.6 8.2* 8.3*  8.3*   BILIRUBIN mg/dL 0.5  --   --    ALK PHOS U/L 55  --   --    ALT (SGPT) U/L 24  --   --    AST (SGOT) U/L 27  --   --    GLUCOSE mg/dL 137* 142* 130*  130*     Estimated Creatinine Clearance: 35.7 mL/min (by C-G formula based on Cr of 2.01).    Results from last 7 days  Lab Units 10/06/17  0538   URIC ACID mg/dL 6.5       Results from last 7 days  Lab Units 10/09/17  0440 10/07/17  0532 10/06/17  0538 10/04/17  0404   MAGNESIUM mg/dL 2.0  --  2.2 1.9   PHOSPHORUS mg/dL 2.7 2.2* 2.6 2.5         Results from last 7 days  Lab Units 10/09/17  0440 10/06/17  0538 10/05/17  0420 10/04/17  0404 10/03/17  0342   WBC 10*3/mm3 10.38 6.50 5.79 5.53 6.27   HEMOGLOBIN g/dL 11.7* 10.3* 10.3* 10.7* 10.9*   PLATELETS 10*3/mm3 342 224 198 182 189         Results from last 7 days  Lab Units 10/09/17  0440 10/08/17  0527 10/07/17  0532 10/06/17  0538 10/05/17  0420   INR  1.46* 1.65* 2.53* 2.66* 2.92*             ASSESSMENT:   1.  PAU on CKD3, non-oliguric, unchanged:  Vol fine, and lytes compensated  2.  TME, resolved  3.  Recent cardioversion and pacemaker.  4.  Fever and chills, currently afebrile  5.  Urinary retention, currently has Whaley catheter anchored in place  6.  Left lung mass worrisome for neoplasm S/P lung biopsy    PLAN:    Scr has been stable  Whaley was replaced  Diflucan for UTI  Volume and lytes are acceptable  BP is well  controlled  S/P lung biopsy  Ok for discharge from renal stand point with renal follow up in 2 weeks  Surveillance labs    Rex Guadalupe MD  10/9/2017  10:23 AM

## 2017-10-09 NOTE — NURSING NOTE
Spoke with Dr. Mcgraw this am who wants to re-consult cardiology in regards to anticoagulation therapy (switching from coumadin to Xarelto or Eliquis) and he is concerned the pt had a stroke seeing as this is not his baseline. Spoke with Dr. Ramirez at nurse's station in regards to this and she called Dr. Mcgraw and spoke with him. See orders.

## 2017-10-09 NOTE — SIGNIFICANT NOTE
10/09/17 1018   Rehab Treatment   Discipline physical therapist   Treatment Not Performed patient unavailable for treatment  (Pt off floor for CT biopsy, plan to attempt PT this afternoon. )   Recommendation   PT - Next Appointment 10/09/17

## 2017-10-09 NOTE — PROGRESS NOTES
LOS: 8 days   Patient Care Team:  Dimitrios Lindquist MD as PCP - General (Internal Medicine)    Chief Complaint:   Follow-up atrial fibrillation    Interval History:     He has no chest pain.  He has mild short windedness to the biopsy.  Is confused but just got morphine.  He doesn't feel his heart racing or skipping.  He has no dizziness or nausea.    Objective   Vital Signs  Temp:  [96.9 °F (36.1 °C)-97.6 °F (36.4 °C)] 97 °F (36.1 °C)  Heart Rate:  [] 85  Resp:  [16-18] 16  BP: (135-163)/(74-92) 135/75    Intake/Output Summary (Last 24 hours) at 10/09/17 1148  Last data filed at 10/09/17 0900   Gross per 24 hour   Intake              240 ml   Output             1475 ml   Net            -1235 ml       Comfortable NAD  Neck supple, no JVD or thyromegaly appreciated  S1/S2 RRR, no m/r/g  Lungs CTA B, normal effort  Abdomen S/NT/ND (+) BS, no HSM appreciated  Extremities warm, no clubbing, cyanosis, or edema  No visible or palpable skin lesions  A/Ox4, mood and affect appropriate    Results Review:        Results from last 7 days  Lab Units 10/09/17  0440 10/07/17  0532 10/06/17  0538   SODIUM mmol/L 138 137 137  137   POTASSIUM mmol/L 4.2 4.4 4.4  4.4   CHLORIDE mmol/L 105 107 106  106   CO2 mmol/L 19.2* 18.6* 19.0*  19.0*   BUN mg/dL 14 13 15  15   CREATININE mg/dL 2.01* 1.86* 2.01*  2.01*   GLUCOSE mg/dL 137* 142* 130*  130*   CALCIUM mg/dL 8.6 8.2* 8.3*  8.3*           Results from last 7 days  Lab Units 10/09/17  0440 10/06/17  0538 10/05/17  0420   WBC 10*3/mm3 10.38 6.50 5.79   HEMOGLOBIN g/dL 11.7* 10.3* 10.3*   HEMATOCRIT % 36.7* 33.6* 33.7*   PLATELETS 10*3/mm3 342 224 198       Results from last 7 days  Lab Units 10/09/17  0440 10/08/17  0527 10/07/17  0532   INR  1.46* 1.65* 2.53*           Results from last 7 days  Lab Units 10/09/17  0440   MAGNESIUM mg/dL 2.0           I reviewed the patient's new clinical results.  I personally viewed and interpreted the patient's EKG/Telemetry data         Medication Review:     amiodarone 200 mg Oral Q12H   atorvastatin 40 mg Oral Nightly   digoxin 125 mcg Oral Every Other Day   famotidine 20 mg Oral Daily   fluconazole 200 mg Oral Daily   isosorbide mononitrate 15 mg Oral Daily   lactobacillus acidophilus 1 capsule Oral Daily   metoprolol tartrate 100 mg Oral Q12H   tamsulosin 0.4 mg Oral Daily            Assessment/Plan     Principal Problem:    UTI (urinary tract infection) due to yeast  Active Problems:    PAF (paroxysmal atrial fibrillation)    COPD (chronic obstructive pulmonary disease)    Type 2 diabetes mellitus with complication    Abdominal aortic aneurysm (AAA) without rupture    Long term current use of anticoagulant    Chronic combined systolic and diastolic congestive heart failure    CKD (chronic kidney disease), stage III    PAU (acute kidney injury)    Arteriosclerotic coronary artery disease    Metabolic encephalopathy    Acidosis    Mass of upper lobe of left lung    Urinary retention    1.  candida UTI, on diflucan  2.  NSVT -- known history of severe CAD and ischemic cardiomyopathy (EF 30-35%), so some NSVT is to be expected, but he's having even more in the setting of acute illness.  He really has frequent salvos of this.  He's on a good dose of metoprolol, and oral amiodarone was added yesterday. Biv/AICD with normal function per Dr. Terrazas.  He is also have bursts of a fib with aberrancy.   3.  PAF -- currently not an issue.  He's been on warfarin with therapeutic inr. May restart in am with lovenox and warfarin.    4.  CAD with chronic angina -- no issue overnight. He's on clopidogrel but not aspirin as he's anticoagulated.  5.  ICM / chronic systolic CHF -- lying flat, no oxygen requirement.  Resume oral diuretic when appropriate.  6. Lung mass, head ct negative for met disease.  Pulmonary would like to biopsy as concern for cancer.   7. Abdominal pain with abdominal fullness, getting better. CT abd and pelvis was unremarkable  yesterday. c diff pending.   8. Fever - blood culture negative for 24 hours and 3 days, candida in urine.  No elevated WBC.     Biopsy today.  Restart warfarin in am lovenox tomorrow if ok with pulmonary.  Has continued confusion    Carolyn Ramirez MD  10/09/17  11:48 AM

## 2017-10-09 NOTE — SIGNIFICANT NOTE
10/09/17 1617   Rehab Treatment   Discipline physical therapist   Treatment Not Performed patient/family declined treatment  (Pt states he isn't feeling up to walking after procedure earlier today. Pt plans to get up in chair for dinner. PT will follow up tomorrow. Discussed with RNCarmita.)

## 2017-10-09 NOTE — PROGRESS NOTES
[facility]/GI Progress Note   Chief Complaint:  Intermittent diarrhea, abdominal distention    Subjective     Interval History: Pt off floor for CT guided lung biopsy.  Per RN no stools overnight or this am prior to leaving for procedure.    History taken from: chart RN    Review of Systems:    All systems were reviewed and negative except for:  Gastrointestinal: postitive for  diarrhea    Objective     Vital Signs  Temp:  [96.9 °F (36.1 °C)-97.6 °F (36.4 °C)] 97 °F (36.1 °C)  Heart Rate:  [] 86  Resp:  [16-18] 16  BP: (138-163)/(74-92) 150/82  Body mass index is 27.27 kg/(m^2).    Intake/Output Summary (Last 24 hours) at 10/09/17 1020  Last data filed at 10/09/17 0900   Gross per 24 hour   Intake              240 ml   Output             1475 ml   Net            -1235 ml     I/O this shift:  In: 240 [P.O.:240]  Out: 375 [Urine:375]    Physical Exam:   General: patient awake, alert and cooperative   Eyes: Normal lids and lashes, no scleral icterus, no conjunctival pallor   Neck: supple, normal ROM, no tracheal deviation, no thyromegaly   Skin: warm and dry, not jaundiced   Cardiovascular: regular rhythm and rate, no murmurs auscultated   Pulm: clear to auscultation bilaterally, regular and unlabored   Abdomen: soft, nontender, nondistended; normal bowel sounds   Rectal: deferred   Extremities: no rash or edema   Neurologic: Normal mood and behavior    All Medications Have Been Reviewed     Results Review:       Results from last 7 days  Lab Units 10/09/17  0440 10/06/17  0538 10/05/17  0420   WBC 10*3/mm3 10.38 6.50 5.79   HEMOGLOBIN g/dL 11.7* 10.3* 10.3*   HEMATOCRIT % 36.7* 33.6* 33.7*   PLATELETS 10*3/mm3 342 224 198         Results from last 7 days  Lab Units 10/09/17  0440 10/07/17  0532 10/06/17  0538   SODIUM mmol/L 138 137 137  137   POTASSIUM mmol/L 4.2 4.4 4.4  4.4   CHLORIDE mmol/L 105 107 106  106   CO2 mmol/L 19.2* 18.6* 19.0*  19.0*   BUN mg/dL 14 13 15  15   CREATININE mg/dL 2.01* 1.86*  2.01*  2.01*   CALCIUM mg/dL 8.6 8.2* 8.3*  8.3*   BILIRUBIN mg/dL 0.5  --   --    ALK PHOS U/L 55  --   --    ALT (SGPT) U/L 24  --   --    AST (SGOT) U/L 27  --   --    GLUCOSE mg/dL 137* 142* 130*  130*         Results from last 7 days  Lab Units 10/09/17  0440 10/08/17  0527 10/07/17  0532   INR  1.46* 1.65* 2.53*       RADIOLOGY:    Imaging Results (last 72 hours)     Procedure Component Value Units Date/Time    XR Abdomen 2 View With Chest 1 View [[id number]] Collected:  10/07/17 2115     Updated:  10/08/17 1948    Narrative:       X-RAY PORTABLE ABDOMEN 2 VIEWS, CHEST X-RAY 1 VIEW.     HISTORY:  Abdominal distention, exclude impaction.     COMPARISON:  No prior studies for comparison.     FINDINGS:   Cardiomediastinal silhouette is within normal limits. Lungs demonstrate  no consolidation or effusion. Low lung volumes and chronic lung changes.     Bowel gas pattern is unremarkable. No abnormal calcifications are seen.     No free air in the abdomen. No evidence for large amount of stool in the  colon and rectum.       Impression:       1.  No acute findings in the chest, abdomen and pelvis.  2.  No definite evidence for fecal impaction.     This report was finalized on 10/8/2017 7:44 PM by Dr. Neel Agudelo MD.             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.1, Z16.21   • Type 2 diabetes mellitus with complication E11.8   • Diarrhea R19.7   • Abdominal aortic aneurysm (AAA) without rupture I71.4   • Long term current use of anticoagulant Z79.01   • Urinary tract infection with hematuria N39.0, R31.9   • CKD  (chronic kidney disease), stage III N18.3   • Hypotension I95.9   • Hyperglycemia R73.9   • Imbalance R26.89   • Concepcion's esophagus without dysplasia K22.70   • Colon polyps K63.5   • B12 deficiency E53.8   • Cerebrovascular accident (CVA) I63.9   • History of coronary artery stent placement Z95.5   • Rash of body R21   • Insomnia G47.00   • Cardiomyopathy I42.9   • Benign non-nodular prostatic hyperplasia with lower urinary tract symptoms N40.1   • Prostate hypertrophy N40.0   • Chronic combined systolic and diastolic congestive heart failure I50.42   • Pneumonia J18.9   • Sepsis A41.9   • 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 He appears well-developed and well-nourished.   HENT: anicteric and no thyromegaly  Head: Normocephalic and atraumatic.   Eyes: Conjunctivae and EOM are normal.   Neck: Normal range of motion. No tracheal deviation present.   Cardiovascular: Normal rate and regular rhythm.    Pulmonary/Chest: Effort normal and breath sounds normal. No respiratory distress.   Abdominal: Soft. Bowel sounds are normal. He exhibits no distension and no mass. There is no tenderness. There is no rebound and no guarding.   Musculoskeletal: Normal range of motion.   Neurological: He is alert and oriented to person, place, and time.   Skin: Skin is warm and dry.   Psychiatric: He has a normal mood and affect. Judgment normal.   Nursing note and vitals  reviewed.    Assessment:  1) Abdominal distention- radiology studies with no acute findings, no fecal impaction  2) Diarrhea- intermittent, none overnight or today  3) Barretts- by hx, EGD 3/2017 with short segement diehl's, recommend f/u 3 years  4) Colon polyps- by hx, c-scope 3/2017 with TA and HP per path.  Recommend f/u c-scope one yr based upon multiple TA polyps  5) Lung Mass- status post biopsy today.  Awaiting pathology    - f/u stool studies, doubt c diff with intermittent diarrhea episodes and none over last 12 + hours  - will arrange for f/u appt with our office closer to discharge

## 2017-10-10 NOTE — THERAPY TREATMENT NOTE
Acute Care - Physical Therapy Treatment Note  [facility]     Patient Name: Aakash Sanchez Jr.  : 1935  MRN: [id number]  Today's Date: 10/10/2017             Admit Date: 2017    Visit Dx:    ICD-10-CM ICD-9-CM   1. Fever and chills R50.9 780.60   2. Altered mental status, unspecified altered mental status type R41.82 780.97   3. Difficulty walking R26.2 719.7     Patient Active Problem List   Diagnosis   • Arthritis   • Hyperlipidemia   • Hypertension   • Hypokalemia   • Lung mass   • Ventricular premature beats   • Coronary artery disease   • Ischemic cardiomyopathy   • PAF (paroxysmal atrial fibrillation)   • COPD (chronic obstructive pulmonary disease)   • VRE (vancomycin-resistant Enterococci) infection   • Type 2 diabetes mellitus with complication   • Diarrhea   • Abdominal aortic aneurysm (AAA) without rupture   • Long term current use of anticoagulant   • Urinary tract infection with hematuria   • CKD (chronic kidney disease), stage III   • Hypotension   • Hyperglycemia   • Imbalance   • Concepcion's esophagus without dysplasia   • Colon polyps   • B12 deficiency   • Cerebrovascular accident (CVA)   • History of coronary artery stent placement   • Rash of body   • Insomnia   • Cardiomyopathy   • Benign non-nodular prostatic hyperplasia with lower urinary tract symptoms   • Prostate hypertrophy   • Chronic combined systolic and diastolic congestive heart failure   • Pneumonia   • Sepsis   • 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   • Fever and chills   • UTI (urinary tract infection) due to yeast   • CKD (chronic kidney disease), stage III   • PAU (acute kidney injury)   • Arteriosclerotic coronary artery disease   • Metabolic encephalopathy   • Acidosis   • Mass of upper lobe of left lung   • Urinary retention               Adult Rehabilitation Note       10/10/17 1043 10/07/17 1408       Rehab Assessment/Intervention    Discipline physical therapist  -AR physical therapist   -KH     Document Type therapy note (daily note)  -AR therapy note (daily note)  -KH     Subjective Information agree to therapy  -AR no complaints;agree to therapy  -KH     Patient Effort, Rehab Treatment good  -AR good  -KH     Symptoms Noted During/After Treatment  none  -KH     Precautions/Limitations  fall precautions  -KH     Recorded by [AR] Carolann Rubalcava, PT [KH] Marie Mtz, PT     Pain Assessment    Pain Assessment No/denies pain  -AR No/denies pain  -KH     Recorded by [AR] Carolann Rubalcava, PT [KH] Marie Mtz PT     Cognitive Assessment/Intervention    Current Cognitive/Communication Assessment functional  -AR functional  -KH     Orientation Status oriented x 4  -AR oriented x 4  -KH     Follows Commands/Answers Questions  100% of the time  -KH     Personal Safety  WNL/WFL  -KH     Personal Safety Interventions  fall prevention program maintained;gait belt;nonskid shoes/slippers when out of bed;supervised activity  -KH     Recorded by [AR] Carolann Rubalcava, PT [KH] Marie Mtz, PT     Bed Mobility, Assessment/Treatment    Bed Mobility, Assistive Device bed rails;head of bed elevated  -AR      Bed Mob, Supine to Sit, [place] supervision required  -AR not tested  -KH     Bed Mob, Sit to Supine, [place] not tested  -AR not tested  -KH     Bed Mobility, Comment  patient up in chair  -KH     Recorded by [AR] Carolann Rubalcava, PT [KH] Marie Mtz, ROXANA     Transfer Assessment/Treatment    Transfers, Sit-Stand [place] contact guard assist  -AR stand by assist;contact guard assist  -KH     Transfers, Stand-Sit [place] contact guard assist  -AR stand by assist;contact guard assist  -KH     Transfers, Sit-Stand-Sit, Assist Device rolling walker  -AR rolling walker  -KH     Toilet Transfer, [place]  supervision required  -KH     Transfer, Safety Issues  weight-shifting ability decreased  -KH     Transfer, Impairments  strength decreased  -KH     Recorded by [AR] Carolann Rubalcava, PT [KH] Marie  Smith, PT     Gait Assessment/Treatment    Gait, [place] Level contact guard assist  -AR stand by assist;contact guard assist  -KH     Gait, Assistive Device rolling walker  -AR rolling walker   and partially no AD  -KH     Gait, Distance (Feet) 120  -AR 30  -KH     Gait, Gait Pattern Analysis swing-through gait  -AR      Gait, Gait Deviations johnny decreased;decreased heel strike;forward flexed posture  -AR johnny decreased;forward flexed posture  -KH     Gait, Safety Issues step length decreased;weight-shifting ability decreased  -AR      Gait, Impairments strength decreased;impaired balance  -AR strength decreased;impaired balance  -KH     Gait, Comment limited by fatigue  -AR      Recorded by [AR] Carolann Rubalcava, PT [KH] Marie Mtz, PT     Positioning and Restraints    Pre-Treatment Position in bed  -AR sitting in chair/recliner  -KH     Post Treatment Position chair  -AR bathroom  -KH     In Chair reclined;sitting;call light within reach;encouraged to call for assist;exit alarm on  -AR      Bathroom  sitting;call light within reach;encouraged to call for assist  -KH     Recorded by [AR] Carolann Rubalcava, PT [KH] Marie Mtz, PT       User Key  (r) = Recorded By, (t) = Taken By, (c) = Cosigned By    Initials Name Effective Dates    AR Carolann Rubalcava, PT 06/27/16 -     KH Marie Mtz, PT 06/22/16 -                 IP PT Goals       10/05/17 1414          Bed Mobility PT LTG    Bed Mobility PT LTG, Date Established 10/05/17  -KH      Bed Mobility PT LTG, Time to Achieve 1 wk  -KH      Bed Mobility PT LTG, Activity Type all bed mobility  -KH      Bed Mobility PT LTG, [place] Level contact guard assist  -KH      Transfer Training PT LTG    Transfer Training PT LTG, Date Established 10/05/17  -KH      Transfer Training PT LTG, Time to Achieve 1 wk  -KH      Transfer Training PT LTG, [place] Level contact guard assist  -KH      Transfer Training PT LTG, Assist Device walker, rolling  -KH      Gait  Training PT LTG    Gait Training Goal PT LTG, Date Established 10/05/17  -KH      Gait Training Goal PT LTG, Time to Achieve 1 wk  -KH      Gait Training Goal PT LTG, [place] Level contact guard assist  -KH      Gait Training Goal PT LTG, Assist Device walker, rolling  -KH      Gait Training Goal PT LTG, Distance to Achieve 75 ft  -KH      Patient Education PT LTG    Patient Education PT LTG, Date Established 10/05/17  -KH      Patient Education PT LTG, Time to Achieve 1 wk  -KH      Patient Education PT LTG, Education Type HEP  -KH      Patient Education PT LTG, Education Understanding demonstrate adequately  -KH        User Key  (r) = Recorded By, (t) = Taken By, (c) = Cosigned By    Initials Name Provider Type    AYE Lanza, PT Physical Therapist          Physical Therapy Education     Title: PT OT SLP Therapies (Active)     Topic: Physical Therapy (Active)     Point: Mobility training (Active)    Learning Progress Summary    Learner Readiness Method Response Comment Documented by Status   Patient Acceptance E NR  AR 10/10/17 1044 Active    Acceptance E VU  KH 10/07/17 1411 Done    Acceptance E,TB NR,VU  EE 10/06/17 1422 Done    Acceptance E VU,NR  KH1 10/05/17 1413 Done               Point: Home exercise program (Active)    Learning Progress Summary    Learner Readiness Method Response Comment Documented by Status   Patient Acceptance E NR  AR 10/10/17 1044 Active    Acceptance E VU,NR  KH1 10/05/17 1413 Done               Point: Body mechanics (Active)    Learning Progress Summary    Learner Readiness Method Response Comment Documented by Status   Patient Acceptance E NR  AR 10/10/17 1044 Active    Acceptance E VU  KH 10/07/17 1411 Done    Acceptance E VU,NR  KH1 10/05/17 1413 Done               Point: Precautions (Active)    Learning Progress Summary    Learner Readiness Method Response Comment Documented by Status   Patient Acceptance E NR  AR 10/10/17 1044 Active    Acceptance E VU,NR   1 10/05/17 1413 Done                      User Key     Initials Effective Dates Name Provider Type Discipline    [facility] -  Kiera Lanza, PT Physical Therapist PT    EE 12/01/15 -  Carmita Salcedo, PT Physical Therapist PT    AR 06/27/16 -  Carolann Rubalcava, PT Physical Therapist PT     06/22/16 -  Marie Mtz, PT Physical Therapist PT                    PT Recommendation and Plan  Anticipated Discharge Disposition: skilled nursing facility  Planned Therapy Interventions: balance training, bed mobility training, gait training, home exercise program, patient/family education, strengthening, transfer training, ROM (Range of Motion)  PT Frequency: daily  Plan of Care Review  Plan Of Care Reviewed With: patient  Outcome Summary/Follow up Plan: Improving independence w/ mobility and activitytolerance.  Ambulatled 120' w/ RW and contact assist.            Outcome Measures       10/10/17 1000 10/07/17 1400       How much help from another person do you currently need...    Turning from your back to your side while in flat bed without using bedrails? 4  -AR 4  -KH     Moving from lying on back to sitting on the side of a flat bed without bedrails? 4  -AR 4  -KH     Moving to and from a bed to a chair (including a wheelchair)? 3  -AR 3  -KH     Standing up from a chair using your arms (e.g., wheelchair, bedside chair)? 3  -AR 3  -KH     Climbing 3-5 steps with a railing? 3  -AR 3  -KH     To walk in hospital room? 3  -AR 3  -KH     AM-PAC 6 Clicks Score 20  -AR 20  -KH     Functional Assessment    Outcome Measure Options  AM-PAC 6 Clicks Basic Mobility (PT)  -KH       User Key  (r) = Recorded By, (t) = Taken By, (c) = Cosigned By    Initials Name Provider Type    AR Carolann Rubalcava, PT Physical Therapist    AYE Mtz, PT Physical Therapist           Time Calculation:         PT Charges       10/10/17 1045          Time Calculation    Start Time 1030  -AR      Stop Time 1045  -AR      Time Calculation (min)  15 min  -AR      PT Received On 10/10/17  -AR      PT - Next Appointment 10/11/17  -AR        User Key  (r) = Recorded By, (t) = Taken By, (c) = Cosigned By    Initials Name Provider Type    SOLO Rubalcava PT Physical Therapist          Therapy Charges for Today     Code Description Service Date Service Provider Modifiers Qty    06711399541 HC PT THER PROC EA 15 MIN 10/10/2017 Carolann Rubalcava, PT GP 1    [id number] HC PT THER SUPP EA 15 MIN 10/10/2017 Carolann Rubalcava PT GP 1          PT G-Codes  Outcome Measure Options: AM-PAC 6 Clicks Basic Mobility (PT)    Carolann Rubalcava PT  10/10/2017

## 2017-10-10 NOTE — PLAN OF CARE
Problem: Patient Care Overview (Adult)  Goal: Plan of Care Review  Outcome: Ongoing (interventions implemented as appropriate)    10/10/17 1723   Coping/Psychosocial Response Interventions   Plan Of Care Reviewed With patient   Outcome Evaluation   Outcome Summary/Follow up Plan VSS, in and out of confusion today, seemed worse this afternoon per neuro ordered zyprexa and prn ativan; per Dr Guadalupe will d/c zyprexa as pt became alert and oriented, would prefer we hold off on ativan if possible. Dr. Smith s/w pt and daughter about new lung cancer diagnosis (mentioned prn ativan may not be a bad idea w new dx) and consult placed for hem/onc, ok to see in am; lovenox and coumadin started today as well as plavix; up in chair twice today, in cardenas w PT; considering rehab ([facility]); no c/o pain; some SOA this afternoon, given one time dose of duoneb; will continue to monitor   Patient Care Overview   Progress no change       Goal: Adult Individualization and Mutuality  Outcome: Ongoing (interventions implemented as appropriate)  Goal: Discharge Needs Assessment  Outcome: Ongoing (interventions implemented as appropriate)    Problem: Infection, Risk/Actual (Adult)  Goal: Infection Prevention/Resolution  Outcome: Ongoing (interventions implemented as appropriate)    Problem: Fall Risk (Adult)  Goal: Absence of Falls  Outcome: Ongoing (interventions implemented as appropriate)    Problem: Urine Elimination, Impaired (Adult)  Goal: Effective Urinary Elimination  Outcome: Ongoing (interventions implemented as appropriate)  Goal: Effective Containment of Urine  Outcome: Ongoing (interventions implemented as appropriate)  Goal: Reduced Incontinence Episodes  Outcome: Ongoing (interventions implemented as appropriate)    Problem: Pressure Ulcer Risk (Jayme Scale) (Adult,Obstetrics,Pediatric)  Goal: Skin Integrity  Outcome: Ongoing (interventions implemented as appropriate)

## 2017-10-10 NOTE — PLAN OF CARE
Problem: Patient Care Overview (Adult)  Goal: Plan of Care Review  Outcome: Ongoing (interventions implemented as appropriate)    10/10/17 0500   Coping/Psychosocial Response Interventions   Plan Of Care Reviewed With patient   Outcome Evaluation   Outcome Summary/Follow up Plan Pt pleasantly confused and nonsensical at times, but seems more calm compared to the night prior. Overnight sleep study currently in progress. VSS. Will continue to monitor.    Patient Care Overview   Progress no change       Goal: Adult Individualization and Mutuality  Outcome: Ongoing (interventions implemented as appropriate)  Goal: Discharge Needs Assessment  Outcome: Ongoing (interventions implemented as appropriate)    Problem: Infection, Risk/Actual (Adult)  Goal: Infection Prevention/Resolution  Outcome: Ongoing (interventions implemented as appropriate)    Problem: Fall Risk (Adult)  Goal: Absence of Falls  Outcome: Ongoing (interventions implemented as appropriate)    Problem: Urine Elimination, Impaired (Adult)  Goal: Effective Urinary Elimination  Outcome: Ongoing (interventions implemented as appropriate)  Goal: Effective Containment of Urine  Outcome: Ongoing (interventions implemented as appropriate)  Goal: Reduced Incontinence Episodes  Outcome: Ongoing (interventions implemented as appropriate)    Problem: Pressure Ulcer Risk (Jayme Scale) (Adult,Obstetrics,Pediatric)  Goal: Skin Integrity  Outcome: Ongoing (interventions implemented as appropriate)

## 2017-10-10 NOTE — PLAN OF CARE
Problem: Patient Care Overview (Adult)  Goal: Plan of Care Review    10/10/17 1044   Coping/Psychosocial Response Interventions   Plan Of Care Reviewed With patient   Outcome Evaluation   Outcome Summary/Follow up Plan Improving independence w/ mobility and activitytolerance. Ambulatled 120' w/ RW and contact assist.

## 2017-10-10 NOTE — PROGRESS NOTES
BGA/GI Progress Note   Chief Complaint:  Abdominal distention, intermittent diarrhea    Subjective     Interval History: No BM 2-3 days, no co's of abdominal pain and normal imaging.  Eating fairly well, little appetite but no co's of n/v.    History taken from: patient chart    Review of Systems:    The following systems were reviewed and negative;  gastrointestinal    Objective     Vital Signs  Temp:  [97 °F (36.1 °C)-98.4 °F (36.9 °C)] 97.6 °F (36.4 °C)  Heart Rate:  [84-96] 84  Resp:  [16-18] 16  BP: (129-160)/(65-97) 160/70  Body mass index is 27.28 kg/(m^2).    Intake/Output Summary (Last 24 hours) at 10/10/17 0958  Last data filed at 10/10/17 0524   Gross per 24 hour   Intake              360 ml   Output              875 ml   Net             -515 ml          Physical Exam:   General: patient awake, alert and cooperative, resting in bed, no distress   Eyes: Normal lids and lashes, no scleral icterus   Neck: supple, normal ROM, no tracheal deviation   Skin: warm and dry, not jaundiced   Cardiovascular: regular rhythm and rate, no murmurs auscultated   Pulm: clear to auscultation bilaterally, regular and unlabored   Abdomen: soft, nontender, nondistended; normal bowel sounds   Rectal: deferred, f/c in place   Extremities: no rash or edema   Neurologic: Normal mood and behavior, intermittent confusion per record review but able to answer most questions appropriately    All Medications Have Been Reviewed     Results Review:       Results from last 7 days  Lab Units 10/10/17  0437 10/09/17  0440 10/06/17  0538   WBC 10*3/mm3 9.01 10.38 6.50   HEMOGLOBIN g/dL 10.2* 11.7* 10.3*   HEMATOCRIT % 33.1* 36.7* 33.6*   PLATELETS 10*3/mm3 278 342 224         Results from last 7 days  Lab Units 10/10/17  0437 10/09/17  0440 10/07/17  0532   SODIUM mmol/L 140 138 137   POTASSIUM mmol/L 3.9 4.2 4.4   CHLORIDE mmol/L 105 105 107   CO2 mmol/L 22.0 19.2* 18.6*   BUN mg/dL 13 14 13   CREATININE mg/dL 1.94* 2.01* 1.86*    CALCIUM mg/dL 8.6 8.6 8.2*   BILIRUBIN mg/dL  --  0.5  --    ALK PHOS U/L  --  55  --    ALT (SGPT) U/L  --  24  --    AST (SGOT) U/L  --  27  --    GLUCOSE mg/dL 151* 137* 142*         Results from last 7 days  Lab Units 10/10/17  0437 10/09/17  0440 10/08/17  0527   INR  1.44* 1.46* 1.65*       RADIOLOGY:    Imaging Results (last 72 hours)     Procedure Component Value Units Date/Time    XR Abdomen 2 View With Chest 1 View [[id number]] Collected:  10/07/17 2115     Updated:  10/08/17 1948    Narrative:       X-RAY PORTABLE ABDOMEN 2 VIEWS, CHEST X-RAY 1 VIEW.     HISTORY:  Abdominal distention, exclude impaction.     COMPARISON:  No prior studies for comparison.     FINDINGS:   Cardiomediastinal silhouette is within normal limits. Lungs demonstrate  no consolidation or effusion. Low lung volumes and chronic lung changes.     Bowel gas pattern is unremarkable. No abnormal calcifications are seen.     No free air in the abdomen. No evidence for large amount of stool in the  colon and rectum.       Impression:       1.  No acute findings in the chest, abdomen and pelvis.  2.  No definite evidence for fecal impaction.     This report was finalized on 10/8/2017 7:44 PM by Dr. Neel Agudelo MD.       CT Head Without Contrast [[id number]] Collected:  10/09/17 1236     Updated:  10/09/17 1334    Narrative:       CT HEAD WITHOUT CONTRAST     HISTORY: Lung mass.     A noncontrasted CT examination of the head was performed.     FINDINGS: The brain and ventricles are symmetrical. There is moderate  atrophy. No focal area of decreased attenuation to suggest acute  infarction or vasogenic edema is identified. Areas of decreased  attenuation are appreciated involving the white matter of the cerebral  hemispheres bilaterally, nonspecific but suggesting mild to moderate  small vessel ischemic changes.       Impression:       Atrophy and small vessel ischemic disease. No evidence of  acute infarction. No obvious metastatic disease  is identified on this  noncontrasted MRI examination of the brain. Metastatic disease could not  be excluded. Further evaluation could be performed with a MRI  examination of the brain with and without contrast.     Radiation dose reduction techniques were utilized, including automated  exposure control and exposure modulation based on body size.     This report was finalized on 10/9/2017 1:29 PM by Dr. Asif Herrera MD.       CT Needle Biopsy Lung [[id number]] Collected:  10/09/17 1600     Updated:  10/09/17 1600    Narrative:       CT-GUIDED BIOPSY OF LEFT LUNG MASS 10/09/2017     HISTORY: Left lung mass.     TECHNIQUE: After signed informed consent was obtained, the patient was  prepped and draped in the supine position. Lidocaine was used for local  anesthesia.     An 18-gauge biopsy device was used to obtain several core specimens of  the soft tissue mass in the lingula. Confirmatory images were obtained..  Patient tolerated the procedure well with no complications.     Radiation dose reduction techniques were utilized, including automated  exposure control and exposure modulation based on body size.                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.1, Z16.21   • Type 2 diabetes mellitus with complication E11.8   • Diarrhea R19.7   • Abdominal aortic aneurysm (AAA) without rupture I71.4   • Long term current use of anticoagulant Z79.01   • Urinary tract infection with hematuria N39.0, R31.9   • CKD (chronic kidney disease), stage III N18.3   • Hypotension I95.9   • Hyperglycemia R73.9   • Imbalance R26.89   •  Concepcion's esophagus without dysplasia K22.70   • Colon polyps K63.5   • B12 deficiency E53.8   • Cerebrovascular accident (CVA) I63.9   • History of coronary artery stent placement Z95.5   • Rash of body R21   • Insomnia G47.00   • Cardiomyopathy I42.9   • Benign non-nodular prostatic hyperplasia with lower urinary tract symptoms N40.1   • Prostate hypertrophy N40.0   • Chronic combined systolic and diastolic congestive heart failure I50.42   • Pneumonia J18.9   • Sepsis A41.9   • 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 Z95.0   • Fever and chills R50.9   • UTI (urinary tract infection) due to yeast N39.0   • CKD (chronic kidney disease), stage III N18.3   • PAU (acute kidney injury) N17.9   • Arteriosclerotic coronary artery disease I25.10   • Metabolic encephalopathy G93.41   • Acidosis E87.2   • Mass of upper lobe of left lung R91.8   • Urinary retention R33.9     Dominga Jesus, APRN  10/10/17  9:58 AM    We are following for abdominal bloating, pain and diarrhea.    No bloating, pain or diarrhea today       Constitutional: He is oriented to person, place, and time. He appears well-developed and well-nourished.   HENT: anicteric and no thyromegaly  Head: Normocephalic and atraumatic.   Eyes: Conjunctivae and EOM are normal.   Neck: Normal range of motion. No tracheal deviation present.   Cardiovascular: Normal rate and regular rhythm.    Pulmonary/Chest: Effort normal and breath sounds normal. No respiratory distress.   Abdominal: Soft. Bowel sounds are normal. He exhibits no distension and no mass. There is no tenderness. There is no rebound and no guarding.   Musculoskeletal: Normal range of motion.   Neurological: He is alert and oriented to person, place, and time.   Skin: Skin is warm and dry.   Psychiatric: He has a normal mood and affect. Judgment normal.   Nursing note and vitals reviewed.    Assessment:  1) Abdominal distention- resolved.  Abdominal CT 10/2 and KUB 10/7 with no  acute findings, no fecal impaction  2) Diarrhea- intermittent, no stools in 2-3 days per pt, diarrhea has resolved  3) Barretts- by hx, EGD 3/2017 with short segement diehl's, recommend f/u 3 years, we can schedule when necessary  4) Colon polyps- by hx, c-scope 3/2017 with TA and HP per path.  Recommend f/u c-scope one yr based upon multiple TA polyps, we can schedule when necessary  5) Lung Mass- pulmonary following, bx on 10/9, path pending    - f/u stool studies (no specimen provided by pt), doubt c diff with intermittent diarrhea episodes and none over last several days  - will arrange for f/u appt with our office with Dr. Corbett, message sent to office staff (initial consult performed 11/2016).  Dr. Corbett will discuss Diehl's esophagus and colon polyps with this patient in great detail as an outpatient  - We will follow

## 2017-10-10 NOTE — PROGRESS NOTES
Continued Stay Note  [facility]     Patient Name: Aakash Sanchez Jr.  MRN: [id number]  Today's Date: 10/10/2017    Admit Date: 9/30/2017          Discharge Plan       10/10/17 1044    Case Management/Social Work Plan    Plan [facility] pending bed approval    Patient/Family In Agreement With Plan yes   call to Pts dtr Norma [phone]    Additional Comments Met at bedside with pt who is alert and oriented this am. Discussed with pt progress with PT,  potential DC with FC and need for rehab. Pt last ambulation with PT was several days ago and pt only ambulated 50ft. Explained safety concerns with pt being alone. Pt states the last time he was in rehab he was there for 3 weeks and does not want rehab to be that long.  He is agreeable to CCP contacting his daughter Norma [phone]. Per Norma her brother will not be able to stay with her father as he has done in the past. Pt discussed this with PT and  states he understands the need for rehab. He would like a referral to [place]. Notified daughter Norma who would also like referral there. Call to Cande and referral made. Per Cande she will review but does anticipate bed availablilty at [place] tomorrow. CCP will follow....[facility]              Discharge Codes     None        Expected Discharge Date and Time     Expected Discharge Date Expected Discharge Time    Oct 11, 2017             Dariela Crabtree RN

## 2017-10-10 NOTE — PROGRESS NOTES
Continued Stay Note  [facility]     Patient Name: Aakash Sanchez Jr.  MRN: [id number]  Today's Date: 10/10/2017    Admit Date: 9/30/2017          Discharge Plan       10/10/17 1332    Case Management/Social Work Plan    Plan [facility]     Patient/Family In Agreement With Plan yes    Additional Comments Call from Cande with [facility], Pt is accepted pending biopsy results and pt need for chemo therapy. As long as pt is not discharged on chemo related medications they will accept. Biopsy results are still pending. CCP will follow.....      10/10/17 1044    Case Management/Social Work Plan    Plan [facility] pending bed approval    Patient/Family In Agreement With Plan yes   call to Pts dtr Norma [phone]    Additional Comments Met at bedside with pt who is alert and oriented this am. Discussed with pt progress with PT,  potential DC with [facility] and need for rehab. Pt last ambulation with PT was several days ago and pt only ambulated 50ft. Explained safety concerns with pt being alone. Pt states the last time he was in rehab he was there for 3 weeks and does not want rehab to be that long.  He is agreeable to CCP contacting his daughter Norma [phone]. Per Norma her brother will not be able to stay with her father as he has done in the past. Pt discussed this with PT and  states he understands the need for rehab. He would like a referral to [facility]. Notified daughter Norma who would also like referral there. Call to Cande and referral made. Per Cande she will review but does anticipate bed availablilty at [facility] tomorrow. CCP will follow....[facility]              Discharge Codes     None        Expected Discharge Date and Time     Expected Discharge Date Expected Discharge Time    Oct 11, 2017             Dariela Crabtree RN

## 2017-10-10 NOTE — PROGRESS NOTES
"Patient Name: Aakash Sanchez Jr.  :1935  82 y.o.      Patient Care Team:  Dimitrios Lindquist MD as PCP - General (Internal Medicine)    Interval History:   Lovenox started.  Warfarin ordered for today.    Subjective:  Following for A. fib/cardiomyopathy/coronary disease     Objective   Vital Signs  Temp:  [97 °F (36.1 °C)-98.4 °F (36.9 °C)] 97.6 °F (36.4 °C)  Heart Rate:  [84-96] 84  Resp:  [16-18] 16  BP: (129-160)/(65-97) 160/70    Intake/Output Summary (Last 24 hours) at 10/10/17 0936  Last data filed at 10/10/17 0524   Gross per 24 hour   Intake              360 ml   Output              875 ml   Net             -515 ml     Flowsheet Rows         First Filed Value    Admission Height  70\" (177.8 cm) Documented at 2017    Admission Weight  180 lb (81.6 kg) Documented at 2017          Physical Exam:   General Appearance:    Alert, cooperative, in no acute distress   Lungs:     Clear to auscultation.  Normal respiratory effort and rate.      Heart:    Regular rhythm and normal rate, normal S1 and S2, no murmurs, gallops or rubs.     Chest Wall:    No abnormalities observed   Abdomen:     Soft, nontender, positive bowel sounds.     Extremities:   no cyanosis, clubbing or edema.  No marked joint deformities.  Adequate musculoskeletal strength.       Results Review:      Results from last 7 days  Lab Units 10/10/17  0437   SODIUM mmol/L 140   POTASSIUM mmol/L 3.9   CHLORIDE mmol/L 105   CO2 mmol/L 22.0   BUN mg/dL 13   CREATININE mg/dL 1.94*   GLUCOSE mg/dL 151*   CALCIUM mg/dL 8.6           Results from last 7 days  Lab Units 10/10/17  0437   WBC 10*3/mm3 9.01   HEMOGLOBIN g/dL 10.2*   HEMATOCRIT % 33.1*   PLATELETS 10*3/mm3 278       Results from last 7 days  Lab Units 10/10/17  0437 10/09/17  0440 10/08/17  0527   INR  1.44* 1.46* 1.65*           Results from last 7 days  Lab Units 10/09/17  0440   MAGNESIUM mg/dL 2.0             Medication Review:     amiodarone 200 mg Oral Q12H "   atorvastatin 40 mg Oral Nightly   digoxin 125 mcg Oral Every Other Day   enoxaparin 1 mg/kg Subcutaneous Q12H   famotidine 20 mg Oral Daily   fluconazole 200 mg Oral Daily   isosorbide mononitrate 15 mg Oral Daily   lactobacillus acidophilus 1 capsule Oral Daily   metoprolol tartrate 100 mg Oral Q12H   tamsulosin 0.4 mg Oral Daily   warfarin 3 mg Oral Nightly          Pharmacy to Dose enoxaparin (LOVENOX)        Assessment/Plan     1.  candida UTI, on diflucan  2.  NSVT -- known history of severe CAD and ischemic cardiomyopathy (EF 30-35%), so some NSVT is to be expected, but he's having even more in the setting of acute illness.  He really has frequent salvos of this.  He's on a good dose of metoprolol, and oral amiodarone was added yesterday. Biv/AICD with normal function per Dr. Terrazas.  He is also have bursts of a fib with aberrancy.   3.  PAF -- currently not an issue.  He's been on warfarin with therapeutic inr.   4.  CAD with chronic angina -- no issue overnight. He's on clopidogrel but not aspirin as he's anticoagulated.  5.  ICM / chronic systolic CHF -- lying flat, no oxygen requirement.  Resume oral diuretic when appropriate.  6. Lung mass, head ct negative for met disease.  Pulmonary would like to biopsy as concern for cancer.   7. Abdominal pain with abdominal fullness, getting better. CT abd and pelvis was unremarkable yesterday. c diff pending.   8. Fever - blood culture negative for 24 hours and 3 days, candida in urine.  No elevated WBC.      Lovenox and warfarin restarted.   Resume plavix    Julia Zelaya MD, [facility]  10/10/17  9:36 AM

## 2017-10-10 NOTE — PROGRESS NOTES
NEPHROLOGY PROGRESS NOTE    PATIENT IDENTIFICATION:   Name:  Aakash Sanchez Jr.      MRN:  [id number]     82 y.o.  male             Reason for visit: PAU on CKD3    SUBJECTIVE:   Seen and examined. No SOA or CP.  Whaley is  anchored.  Discussed with the staff.  Patient was confused earlier.  Answered all questions properly.    OBJECTIVE:  Vitals:    10/10/17 1331 10/10/17 1430 10/10/17 1440 10/10/17 1457   BP: 121/65      BP Location: Left arm      Patient Position: Lying      Pulse: 84 89 84 84   Resp: 16   20   Temp: 96.6 °F (35.9 °C)      TempSrc: Oral      SpO2: 98% 93% 95% 93%   Weight:       Height:               Body mass index is 27.28 kg/(m^2).    Intake/Output Summary (Last 24 hours) at 10/10/17 1710  Last data filed at 10/10/17 1659   Gross per 24 hour   Intake              120 ml   Output             1300 ml   Net            -1180 ml     Wt Readings from Last 1 Encounters:   10/10/17 0511 190 lb 2 oz (86.2 kg)   10/09/17 0535 190 lb 1 oz (86.2 kg)   10/08/17 0601 194 lb 1.6 oz (88 kg)   10/07/17 0500 194 lb (88 kg)   10/06/17 0458 190 lb 3.2 oz (86.3 kg)   10/05/17 0945 190 lb 9.6 oz (86.5 kg)   10/04/17 0600 193 lb 4.8 oz (87.7 kg)   10/03/17 0617 187 lb 14.4 oz (85.2 kg)   10/02/17 0549 183 lb 5 oz (83.2 kg)   09/30/17 2211 180 lb (81.6 kg)     Wt Readings from Last 3 Encounters:   10/10/17 190 lb 2 oz (86.2 kg)   09/28/17 184 lb (83.5 kg)   09/26/17 181 lb (82.1 kg)         Exam:  General Appearance: alert, oriented x 3, no acute distress, appears to be chronically ill  Skin: warm and dry  HEENT: Nonicteric sclerae, oral mucosa normal,   Neck: supple, no JVD, trachea midline  Lungs: Bilateral rhonchi, unlabored breathing effort  Heart: RRR, normal S1 and S2, no S3, no rub  Abdomen: soft, non-tender, no palpable bladder, BS+, abdomen slightly distended  Extremities: no edema, cyanosis or clubbing  Neuro: normal speech and mental status      Scheduled meds:      amiodarone 200 mg Oral Q12H    atorvastatin 40 mg Oral Nightly   digoxin 125 mcg Oral Every Other Day   enoxaparin 1 mg/kg Subcutaneous Q12H   famotidine 20 mg Oral Daily   fluconazole 200 mg Oral Daily   isosorbide mononitrate 15 mg Oral Daily   lactobacillus acidophilus 1 capsule Oral Daily   metoprolol tartrate 100 mg Oral Q12H   tamsulosin 0.4 mg Oral Daily   warfarin 3 mg Oral Nightly     IV meds:                          Pharmacy to Dose enoxaparin (LOVENOX)        Data Review:      Results from last 7 days  Lab Units 10/10/17  0437 10/09/17  0440 10/07/17  0532   SODIUM mmol/L 140 138 137   POTASSIUM mmol/L 3.9 4.2 4.4   CHLORIDE mmol/L 105 105 107   CO2 mmol/L 22.0 19.2* 18.6*   BUN mg/dL 13 14 13   CREATININE mg/dL 1.94* 2.01* 1.86*   CALCIUM mg/dL 8.6 8.6 8.2*   BILIRUBIN mg/dL  --  0.5  --    ALK PHOS U/L  --  55  --    ALT (SGPT) U/L  --  24  --    AST (SGOT) U/L  --  27  --    GLUCOSE mg/dL 151* 137* 142*     Estimated Creatinine Clearance: 37 mL/min (by C-G formula based on Cr of 1.94).    Results from last 7 days  Lab Units 10/06/17  0538   URIC ACID mg/dL 6.5       Results from last 7 days  Lab Units 10/09/17  0440 10/07/17  0532 10/06/17  0538 10/04/17  0404   MAGNESIUM mg/dL 2.0  --  2.2 1.9   PHOSPHORUS mg/dL 2.7 2.2* 2.6 2.5         Results from last 7 days  Lab Units 10/10/17  0437 10/09/17  0440 10/06/17  0538 10/05/17  0420 10/04/17  0404   WBC 10*3/mm3 9.01 10.38 6.50 5.79 5.53   HEMOGLOBIN g/dL 10.2* 11.7* 10.3* 10.3* 10.7*   PLATELETS 10*3/mm3 278 342 224 198 182         Results from last 7 days  Lab Units 10/10/17  0437 10/09/17  0440 10/08/17  0527 10/07/17  0532 10/06/17  0538   INR  1.44* 1.46* 1.65* 2.53* 2.66*             ASSESSMENT:   1.  PAU on CKD3, non-oliguric, unchanged:  Vol fine, and lytes compensated  2.  TME, resolved  3.  Recent cardioversion and pacemaker.  4.  Fever and chills, currently afebrile  5.  Urinary retention, currently has Whaley catheter anchored in place  6.  Left lung mass worrisome for  neoplasm S/P lung biopsy    PLAN:    Scr has been stable  Whaley was replaced  Diflucan for UTI  Volume and lytes are acceptable  BP is well controlled  Confusion has improved.  Haldol and Zyprexa been held.  Biopsy showed non-small cell lung cancer  Ok for discharge from renal stand point with renal follow up in 2 weeks  Surveillance labs    Rex Guadalupe MD  10/10/2017  5:10 PM

## 2017-10-10 NOTE — PROGRESS NOTES
PROGRESS NOTE   LOS: 9 days   Patient Care Team:  Dimitrios Lindquist MD as PCP - General (Internal Medicine)    Chief Complaint: Normal imaging of the chest with a need for tissue diagnosis    Interval History: Patient came in was UTI he has chronic kidney disease he had negative culture and is currently on Diflucan.  He had CT of the chest that showed the left upper lung mass with brother lesions that are of concern and tissue diagnosis was necessary.  Patient had options of either doing a CT-guided biopsy or navigational bronchoscopy and the pros and cons of each one of these procedure was discussed with the patient in details, he elected to go for the CT-guided biopsy which was successfully done on the morning of October 9, 2017.  Patient had some confusion earlier on that day and was seen by neurology, the plan is to check for any possible AMY by check an overnight oximetry as initial screening test and patient had a CT that was negative for any bleed or obvious acute stroke and the plan is to resume the antibiotic patient as soon as possible to reduce the risk of embolic stroke.  Patient has been having episode of confusion and at this point and in the light of the new biopsy results were not sure if this is from metastatic brain disease or simple metabolic encephalopathy.  Biopsy results from the CT-guided biopsy done on October 9, 2017 came back positive for non-small cell lung cancer the immunohistochemical stain was not very conclusive but it was suggestive of neuroendocrine lineage.  Patient has no hemoptysis, he is hemodynamically stable.    REVIEW OF SYSTEMS:   CARDIOVASCULAR: No chest pain, chest pressure or chest discomfort. No palpitations or edema.   RESPIRATORY: Minimal pain from biopsy site, no cough or sputum.   GASTROINTESTINAL: No anorexia, nausea, vomiting or diarrhea. No abdominal pain or blood.   HEMATOLOGIC: No bleeding or bruising.     Ventilator/Non-Invasive Ventilation Settings     None     "        Vital Signs  Temp:  [96.6 °F (35.9 °C)-98.4 °F (36.9 °C)] 96.6 °F (35.9 °C)  Heart Rate:  [84-96] 84  Resp:  [16-20] 20  BP: (121-160)/(65-86) 121/65  SpO2:  [93 %-98 %] 93 %  on    O2 Device: room air    Intake/Output Summary (Last 24 hours) at 10/10/17 1654  Last data filed at 10/10/17 0524   Gross per 24 hour   Intake              120 ml   Output              700 ml   Net             -580 ml     Flowsheet Rows         First Filed Value    Admission Height  70\" (177.8 cm) Documented at 09/30/2017 2211    Admission Weight  180 lb (81.6 kg) Documented at 09/30/2017 2211        Last 3 weights    10/08/17  0601 10/09/17  0535 10/10/17  0511   Weight: 194 lb 1.6 oz (88 kg) 190 lb 1 oz (86.2 kg) 190 lb 2 oz (86.2 kg)       Physical Exam:  GEN:  No acute distress, alert, cooperative, well developed, Anxious and was overwhelmed when we discussed the diagnoses of cancer on the biopsy   EYES:   Sclera clear. No icterus. PERRL. Normal EOM  ENT:   External ears/nose normal, no oral lesions, no thrush, mucous membranes moist  NECK:  Supple, midline trachea, no JVD  LUNGS: Normal chest on inspection, CTAB,Decreased breath sounds , positive expiratory wheezes on coughing or forced exhalation. Respirations regular, even and unlabored.   CV:  Irregular rhythm with controlled rate. Normal S1/S2. No murmurs, gallops, or rubs noted.  ABD:  Soft, non-tender and no abdominal distention today. Normal bowel sounds. No guarding  EXT:  Moves all extremities well. No cyanosis. No redness. No edema.   Skin: dry, intact, no bleeding    Results Review:        Results from last 7 days  Lab Units 10/10/17  0437 10/09/17  0440 10/07/17  0532 10/06/17  0538 10/05/17  0420 10/04/17  0404   SODIUM mmol/L 140 138 137 137  137 138 138   POTASSIUM mmol/L 3.9 4.2 4.4 4.4  4.4 4.2 4.0   CHLORIDE mmol/L 105 105 107 106  106 106 106   CO2 mmol/L 22.0 19.2* 18.6* 19.0*  19.0* 19.9* 20.9*   BUN mg/dL 13 14 13 15  15 15 15   CREATININE mg/dL " 1.94* 2.01* 1.86* 2.01*  2.01* 2.08* 2.12*   CALCIUM mg/dL 8.6 8.6 8.2* 8.3*  8.3* 8.3* 8.3*   AST (SGOT) U/L  --  27  --   --   --   --    ALT (SGPT) U/L  --  24  --   --   --   --    ANION GAP mmol/L 13.0 13.8 11.4 12.0  12.0 12.1 11.1   ALBUMIN g/dL  --  2.90* 2.60* 2.70*  --  2.90*           Results from last 7 days  Lab Units 10/10/17  0437 10/09/17  0440 10/06/17  0538 10/05/17  0420 10/04/17  0404   WBC 10*3/mm3 9.01 10.38 6.50 5.79 5.53   HEMOGLOBIN g/dL 10.2* 11.7* 10.3* 10.3* 10.7*   HEMATOCRIT % 33.1* 36.7* 33.6* 33.7* 35.2*   PLATELETS 10*3/mm3 278 342 224 198 182   NEUTROPHIL % %  --   --  57.5  --   --    LYMPHOCYTE % %  --   --  21.8  --   --    MONOCYTES % %  --   --  15.1*  --   --    EOSINOPHIL % %  --   --  4.6  --   --    BASOPHIL % %  --   --  0.5  --   --    IMM GRAN % %  --   --  0.5  --   --        Results from last 7 days  Lab Units 10/10/17  0437 10/09/17  0440 10/08/17  0527   INR  1.44* 1.46* 1.65*       Results from last 7 days  Lab Units 10/09/17  0440 10/06/17  0538 10/04/17  0404   MAGNESIUM mg/dL 2.0 2.2 1.9                 No results found for: POCGLU    Results from last 7 days  Lab Units 10/09/17  0440 10/04/17  0404   PROCALCITONIN ng/mL 0.16 0.23   LACTATE mmol/L  --  0.8           Results from last 7 days  Lab Units 10/09/17  0440 10/03/17  1901 10/03/17  1830   BLOODCX   --  No growth at 5 days No growth at 5 days   URINECX  No growth  --   --        Results from last 7 days  Lab Units 10/09/17  0440   NITRITE UA  Negative   WBC UA /HPF 6-12*   BACTERIA UA /HPF None Seen   SQUAM EPITHEL UA /HPF 0-2   URINECX  No growth           Results from last 7 days  Lab Units 10/06/17  0538   URIC ACID mg/dL 6.5     JT CT guided needle biopsy 10/9/2017:    Imaging Results (all)     Procedure Component Value Units Date/Time    CT Chest Without Contrast [[id number]] Collected:  10/01/17 1435     Updated:  10/01/17 1449    Narrative:       CT CHEST WITHOUT CONTRAST     HISTORY:  82-year-old male with abnormal chest x-ray for which CT was  recommended.     TECHNIQUE: Chest CT includes axial imaging from the thoracic inlet to  the upper abdomen without IV contrast.     FINDINGS: There is a solid-appearing mass within the anterior-inferior  left upper lobe measuring 3.8 x 3.3 cm and contacting the pleura along  the left cardiac border. Anterior to the left pulmonary trunk, there is  a rounded 9 mm lymph node which is upper normal for size. There is no  axillary guerita enlargement. The heart size is enlarged. Small left  pleural effusion layers dependently.     Within the posterior inferior right middle lobe, there is a 1.9 cm focal  area of groundglass opacity. Within the central right upper lobe at the  horizontal level just below the luis, there is a 6 mm pulmonary  nodule. Multilevel bridging endplate spur formation is present in the  thoracic spine. There has been previous median sternotomy. Imaging  through the upper abdomen demonstrates cholecystectomy clips. Aortic  stent graft is noted. Splenic size is mildly enlarged measuring 15 x 9.5  cm.       Impression:       1. 3.8 cm solid-appearing lung mass anterior-inferior left upper lobe  suspicious for neoplasm. Tissue sampling recommended.  2. 0.6 cm right upper lobe pulmonary nodule. 1.9 cm right middle lobe  groundglass nodule.  3. Small left pleural effusion.  4. Cardiomegaly. Mild splenomegaly.      Radiation dose reduction techniques were utilized, including automated  exposure control and exposure modulation based on body size.     This report was finalized on 10/1/2017 2:46 PM by Dr. Jose De La Torre MD.       CT Abdomen Pelvis Without Contrast [[id number]] Collected:  10/02/17 1422     Updated:  10/02/17 1708    Narrative:       CT SCAN OF THE ABDOMEN AND PELVIS WITHOUT CONTRAST     HISTORY: Abdominal pain and fever and chills.     FINDINGS:  The CT scan was performed through the abdomen and pelvis  without contrast and  demonstrates the followin. There is a tiny left pleural effusion which is new since the study of  2017. Again noted are several scattered areas of somewhat focal  groundglass opacity, particularly at the right base which are unchanged  and likely related to inflammatory or infectious change.  2. There is a very small pericardial effusion which is unchanged. The  liver, spleen, pancreas, and both adrenal glands are unremarkable  without intravenous contrast.  3. The kidneys are normal in size with some changes of cortical atrophy.  There is a small intermediate density mass at the lower margin of the  left kidney likely representing a somewhat complex cyst and can be seen  more clearly as a cyst on the enhanced CT scan dating back to .  There is a very tiny cortical cyst in the right kidney also unchanged.  4. There is an abdominal aortic aneurysm treated with a stent graft.  There is no retroperitoneal mass. The large and small bowel loops are  normal in caliber and show no inflammatory change. In the pelvis, there  are isotope seeds within the prostate for treatment of prostate cancer  and these appear unchanged. A Whaley catheter is present within the  urinary bladder which is somewhat decompressed and again noted is some  bladder wall thickening likely related to an element of outlet  obstruction as also noted on CT scans dating back to .     Radiation dose reduction techniques were utilized, including automated  exposure control and exposure modulation based on body size.     This report was finalized on 10/2/2017 5:05 PM by Dr. Isacc Baugh MD.       CT Head Without Contrast [[id number]] Collected:  10/02/17 2217     Updated:  10/02/17 2256    Narrative:       CRANIAL CT SCAN WITHOUT CONTRAST     CLINICAL HISTORY: AMS with lung mass . r/o mets; R50.9-Fever,  unspecified; R41.82-Altered mental status, unspecified     COMPARISON: 2017.     TECHNIQUE: Radiation dose reduction techniques  were utilized, including  automated exposure control and exposure modulation based on body size.  Multiple axial images of the head were obtained without contrast.      FINDINGS:  There are no abnormal areas of increased density or mass  effect. There is diffuse atrophy. There are mild scattered areas of  decreased density in the white matter likely related to chronic ischemic  gliotic changes.   Ventricles, sulci, and cisterns appear normal. Bone  window images are unremarkable.     If there is high clinical suspicion of intracranial metastatic disease,  a contrasted study or preferably MRI examination would be far superior  to noncontrast CT technique.          Impression:       1. No acute intracranial abnormality.            This study was performed with techniques to keep radiation doses as low  as reasonably achievable (ALARA). Individualized dose reduction  techniques using automated exposure control or adjustment of mA and/or  kV according to the patient size were employed.      This report was finalized on 10/2/2017 10:53 PM by Ousmane Wayne MD.       XR Chest 1 View [[id number]] Collected:  09/30/17 7554     Updated:  10/04/17 0354    Narrative:       X-RAY CHEST 1 VIEW.     HISTORY: Fever.     COMPARISON: 09/19/2017.     FINDINGS:  Cardiomegaly, lung volumes are low. 4.7 cm opacity overlies the left  heart border, is a new finding but was also seen on the study of  09/09/2017, not seen on the study of 02/17/2016.     There is no consolidation or effusion.              Impression:       No acute findings. Abnormal 4.7 cm opacity along the left heart border  may be further evaluated by chest CT scan.     This report was finalized on 10/4/2017 3:50 AM by Dr. Neel Agudelo MD.       XR Abdomen 2 View With Chest 1 View [[id number]] Collected:  10/07/17 2115     Updated:  10/07/17 2115    Narrative:       X-RAY PORTABLE ABDOMEN 2 VIEWS, CHEST X-RAY 1 VIEW.     HISTORY:  Abdominal distention, exclude  impaction.     COMPARISON:  No prior studies for comparison.     FINDINGS:   Cardiomediastinal silhouette is within normal limits. Lungs demonstrate  no consolidation or effusion. Low lung volumes and chronic lung changes.     Bowel gas pattern is unremarkable. No abnormal calcifications are seen.     No free air in the abdomen. No evidence for large amount of stool in the  colon and rectum.       Impression:       1.  No acute findings in the chest, abdomen and pelvis.  2.  No definite evidence for fecal impaction.                   I reviewed the patient's new clinical results.  I personally viewed and interpreted the patient's imaging results:        Medication Review:     amiodarone 200 mg Oral Q12H   atorvastatin 40 mg Oral Nightly   digoxin 125 mcg Oral Every Other Day   enoxaparin 1 mg/kg Subcutaneous Q12H   famotidine 20 mg Oral Daily   fluconazole 200 mg Oral Daily   isosorbide mononitrate 15 mg Oral Daily   lactobacillus acidophilus 1 capsule Oral Daily   metoprolol tartrate 100 mg Oral Q12H   tamsulosin 0.4 mg Oral Daily   warfarin 3 mg Oral Nightly         Pharmacy to Dose enoxaparin (LOVENOX)        ASSESSMENT:   1. 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  2. 3.8cm JT lung mass, 6 mm right upper lobe lung nodule, 1.9 cm right middle lobe groundglass nodule, unsure is related  3. Toxic metabolic encephalopathy -versus brain mets, Ct negative unable to do MRI  4. History of recurrent pneumonia   5. History of AAA status post stent  6. CAD s/p PCI/ 2V CABG(1997)  Now on DAPT   7. Chronic systolic and diastolic heart failure   8. CKD   9. Afib on AC   10. COPD not in exacerbation  11. Admitted with UTI and renal failure  12. Urinary retention requiring placement of Whaley catheter    PLAN:  Need oncology consult for treatment recommendations  Need MRI of the head once safe to do, (need to wait 6 weeks post pacemaker placement)  Otherwise per  primary team  May not be a candidate for rehab in chemo is needed in the next 30 days.    Discussed with patient biopsy results ans treatment plan, Discussed with the daughter over the phone in details and discussed with the staff      Disposition:     Latanya Smith MD  10/10/17  4:54 PM          EMR Dragon/Transcription disclaimer:   Much of this encounter note is an electronic transcription/translation of spoken language to printed text. The electronic translation of spoken language may permit erroneous, or at times, nonsensical words or phrases to be inadvertently transcribed; Although I have reviewed the note for such errors, some may still exist.

## 2017-10-10 NOTE — DISCHARGE PLACEMENT REQUEST
"Aakash Sanchez Jr. (82 y.o. 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 1935 [id number]           Secondary Coverage     Payor Plan Insurance Group Employer/Plan Group    AARP MED SUPP AARP HEALTH CARE OPTIONS      Payor Plan Address Payor Plan Phone Number Effective From Effective To    [facility] [phone] 1/1/2016     [address]       [place] [address]       Subscriber Name Subscriber Birth Date Member ID       AAKASH SANCHEZ JR. 1935 [id number]                 Emergency Contacts      (Rel.) Home Phone Work Phone Mobile Phone    Norma Farah (Daughter) [phone] -- --    Joseph Sanchez (Son) [phone] -- --                "

## 2017-10-10 NOTE — PROGRESS NOTES
Name: Aakash Snachez Jr. ADMIT: 2017   : 1935  PCP: Dimitrios Lindquist MD    MRN: [id number] LOS: 9 days   AGE/SEX: 82 y.o. male  ROOM: [facility]     Subjective   Subjective:  Symptoms:  No shortness of breath, cough, chest pain or diarrhea.  (No further diarrhea. Evidently intermittent.).    Diet:  Poor intake.  No nausea or vomiting.    Activity level: Impaired due to weakness.    Pain:  He reports no pain.        Objective   Vital Signs  Temp:  [97 °F (36.1 °C)-98.4 °F (36.9 °C)] 97.6 °F (36.4 °C)  Heart Rate:  [84-96] 84  Resp:  [16-18] 16  BP: (129-160)/(65-97) 160/70  SpO2:  [94 %-98 %] 97 %  on   ;   O2 Device: room air  Body mass index is 27.28 kg/(m^2).    Physical Exam   Constitutional: He is oriented to person, place, and time. He appears well-developed. He is cooperative. He has a sickly appearance. No distress.   Neck: No JVD present. No tracheal deviation present. No thyromegaly present.   Cardiovascular: Normal rate and regular rhythm.    No murmur heard.  Pulmonary/Chest: Effort normal and breath sounds normal. No respiratory distress.   Abdominal: Soft. Normal appearance and bowel sounds are normal. He exhibits no distension. There is no tenderness.   Neurological: He is alert and oriented to person, place, and time.   Skin: Skin is warm. No rash noted. He is not diaphoretic.   Psychiatric: He has a normal mood and affect. His behavior is normal.   Nursing note and vitals reviewed.      Results Review:       I reviewed the patient's new clinical results.    Results from last 7 days  Lab Units 10/10/17  0437 10/09/17  0440 10/06/17  0538 10/05/17  0420 10/04/17  0404   WBC 10*3/mm3 9.01 10.38 6.50 5.79 5.53   HEMOGLOBIN g/dL 10.2* 11.7* 10.3* 10.3* 10.7*   PLATELETS 10*3/mm3 278 342 224 198 182       Results from last 7 days  Lab Units 10/10/17  0437 10/09/17  0440 10/07/17  0532 10/06/17  0538 10/05/17  0420 10/04/17  0404   SODIUM mmol/L 140 138 137 137  137 138 138   POTASSIUM mmol/L  3.9 4.2 4.4 4.4  4.4 4.2 4.0   CHLORIDE mmol/L 105 105 107 106  106 106 106   CO2 mmol/L 22.0 19.2* 18.6* 19.0*  19.0* 19.9* 20.9*   BUN mg/dL 13 14 13 15  15 15 15   CREATININE mg/dL 1.94* 2.01* 1.86* 2.01*  2.01* 2.08* 2.12*   GLUCOSE mg/dL 151* 137* 142* 130*  130* 134* 148*   ALBUMIN g/dL  --  2.90* 2.60* 2.70*  --  2.90*   BILIRUBIN mg/dL  --  0.5  --   --   --   --    ALK PHOS U/L  --  55  --   --   --   --    AST (SGOT) U/L  --  27  --   --   --   --    ALT (SGPT) U/L  --  24  --   --   --   --    Estimated Creatinine Clearance: 37 mL/min (by C-G formula based on Cr of 1.94).    Results from last 7 days  Lab Units 10/10/17  0437 10/09/17  0440 10/07/17  0532 10/06/17  0538 10/05/17  0420 10/04/17  0404   CALCIUM mg/dL 8.6 8.6 8.2* 8.3*  8.3* 8.3* 8.3*   ALBUMIN g/dL  --  2.90* 2.60* 2.70*  --  2.90*   MAGNESIUM mg/dL  --  2.0  --  2.2  --  1.9   PHOSPHORUS mg/dL  --  2.7 2.2* 2.6  --  2.5       Results from last 7 days  Lab Units 10/09/17  0440 10/04/17  0404   PROCALCITONIN ng/mL 0.16 0.23   LACTATE mmol/L  --  0.8       Results from last 7 days  Lab Units 10/10/17  0437 10/09/17  0440 10/08/17  0527 10/07/17  0532 10/06/17  0538 10/05/17  0420 10/04/17  0404   PROTIME Seconds 17.0* 17.2* 18.9* 26.4* 27.5* 29.6* 32.4*   INR  1.44* 1.46* 1.65* 2.53* 2.66* 2.92* 3.28*       Results from last 7 days  Lab Units 10/09/17  0440 10/03/17  1901 10/03/17  1830   BLOODCX   --  No growth at 5 days No growth at 5 days   URINECX  No growth  --   --        CT-GUIDED BIOPSY OF LEFT LUNG MASS 10/09/2017  An 18-gauge biopsy device was used to obtain several core specimens of  the soft tissue mass in the lingula. Confirmatory images were obtained..  Patient tolerated the procedure well with no complications.    CT HEAD WITHOUT CONTRAST  10/09/17  Atrophy and small vessel ischemic disease. No evidence of  acute infarction. No obvious metastatic disease is identified on this  noncontrasted MRI examination of the brain.  Metastatic disease could not  be excluded. Further evaluation could be performed with a MRI  examination of the brain with and without contrast.    X-RAY ABDOMEN 2 VIEWS, CHEST X-RAY 1 VIEW  10/07/17  1.  No acute findings in the chest, abdomen and pelvis.  2.  No definite evidence for fecal impaction.    CRANIAL CT SCAN WITHOUT CONTRAST  10/2/2017  No acute intracranial abnormality.        CT SCAN ABDOMEN AND PELVIS WITHOUT CONTRAST  10/02/17  1. There is a tiny left pleural effusion which is new since the study of  08/01/2017. Again noted are several scattered areas of somewhat focal  groundglass opacity, particularly at the right base which are unchanged  and likely related to inflammatory or infectious change.  2. There is a very small pericardial effusion which is unchanged. The  liver, spleen, pancreas, and both adrenal glands are unremarkable  without intravenous contrast.  3. The kidneys are normal in size with some changes of cortical atrophy.  There is a small intermediate density mass at the lower margin of the  left kidney likely representing a somewhat complex cyst and can be seen  more clearly as a cyst on the enhanced CT scan dating back to 2013.  There is a very tiny cortical cyst in the right kidney also unchanged.  4. There is an abdominal aortic aneurysm treated with a stent graft.  There is no retroperitoneal mass. The large and small bowel loops are  normal in caliber and show no inflammatory change. In the pelvis, there  are isotope seeds within the prostate for treatment of prostate cancer  and these appear unchanged. A Whaley catheter is present within the  urinary bladder which is somewhat decompressed and again noted is some  bladder wall thickening likely related to an element of outlet  obstruction as also noted on CT scans dating back to 2013.    CT CHEST WITHOUT CONTRAST 10/01/17  1. 3.8 cm solid-appearing lung mass anterior-inferior left upper lobe  suspicious for neoplasm. Tissue  sampling recommended.  2. 0.6 cm right upper lobe pulmonary nodule. 1.9 cm right middle lobe  groundglass nodule.  3. Small left pleural effusion.  4. Cardiomegaly. Mild splenomegaly.       amiodarone 200 mg Oral Q12H   atorvastatin 40 mg Oral Nightly   digoxin 125 mcg Oral Every Other Day   enoxaparin 1 mg/kg Subcutaneous Q12H   famotidine 20 mg Oral Daily   fluconazole 200 mg Oral Daily   isosorbide mononitrate 15 mg Oral Daily   lactobacillus acidophilus 1 capsule Oral Daily   metoprolol tartrate 100 mg Oral Q12H   tamsulosin 0.4 mg Oral Daily   warfarin 3 mg Oral Nightly       Pharmacy to Dose enoxaparin (LOVENOX)    Diet Regular; Cardiac      Assessment/Plan   Assessment:     Active Hospital Problems (** Indicates Principal Problem)    Diagnosis Date Noted   • **UTI (urinary tract infection) due to yeast [N39.0] 10/01/2017   • Urinary retention [R33.9] 10/09/2017   • Mass of upper lobe of left lung [R91.8] 10/02/2017   • CKD (chronic kidney disease), stage III [N18.3] 10/01/2017   • PAU (acute kidney injury) [N17.9] 10/01/2017   • Arteriosclerotic coronary artery disease [I25.10] 10/01/2017   • Metabolic encephalopathy [G93.41] 10/01/2017   • Acidosis [E87.2] 10/01/2017   • Chronic combined systolic and diastolic congestive heart failure [I50.42] 09/05/2017   • Long term current use of anticoagulant [Z79.01] 12/15/2016   • Abdominal aortic aneurysm (AAA) without rupture [I71.4] 12/15/2016   • Type 2 diabetes mellitus with complication [E11.8] 11/23/2016   • COPD (chronic obstructive pulmonary disease) [J44.9] 11/15/2016   • PAF (paroxysmal atrial fibrillation) [I48.0]       Resolved Hospital Problems    Diagnosis Date Noted Date Resolved   • Wide-complex tachycardia [I47.2] 10/01/2017 10/08/2017       Plan:   · Diflucan for urine. Afebrile. Treat for 14 days per ID. All cultures remain negative except yeast in urine. WBC up to 10.4 from 6.5 back down this am. PCT normal.  · Whaley has been replaced due to  ongoing retention. Flomax started.  to follow up after discharge for voiding trial.   · Cardiology has started him on amiodarone for wide-complex tachycardia.  No obvious signs of infection related to the implanted CRT device. Back on LOVENOX/COUMADIN following lung mass bx yesterday. Not a candidate for NOAC per cards.  · Nephrology following regarding renal failure.  Relatively stable but not improving.  · Diarrhea/abd discomfort off and on. Doubt Cdiff. GI eval appreciated. Poor appetite. CT a/p negative. AXR negative.   · Pulmonology seeing regarding lung mass.  Could represent neoplasm.  HCT negative for mets. Day 7 off Plavix. Restarted coumadin. Should Plavix be restarted as well. Will have RN ask cardiology.  · Family still concerned about his mental status. Improved from admit but not returned to normal. Appreciate neuro reeval. HCT negative again. High risk for stroke. Possibly has a vascular dementia already. MS seems slightly better at time of my exam but fluctuates per staff.  · Physical therapy following. Not seen yesterday. May need SNU or at least will need family to stay with him for a period of time.        Jamel Mendoza MD  [facility]  10/10/17  8:46 AM

## 2017-10-11 PROBLEM — I63.9 CEREBELLAR STROKE (HCC): Status: ACTIVE | Noted: 2017-01-01

## 2017-10-11 NOTE — PLAN OF CARE
Problem: Patient Care Overview (Adult)  Goal: Plan of Care Review  Outcome: Ongoing (interventions implemented as appropriate)    10/11/17 0521   Coping/Psychosocial Response Interventions   Plan Of Care Reviewed With patient   Outcome Evaluation   Outcome Summary/Follow up Plan no complaints of pain, pt confused at times, IV replaced due to pt removal, pt complained of SOA, 2L NC and repositioned pt, VSS, will continue to monitor.   Patient Care Overview   Progress no change         Problem: Infection, Risk/Actual (Adult)  Goal: Infection Prevention/Resolution  Outcome: Ongoing (interventions implemented as appropriate)    Problem: Fall Risk (Adult)  Goal: Absence of Falls  Outcome: Ongoing (interventions implemented as appropriate)    Problem: Urine Elimination, Impaired (Adult)  Goal: Effective Urinary Elimination  Outcome: Ongoing (interventions implemented as appropriate)  Goal: Effective Containment of Urine  Outcome: Ongoing (interventions implemented as appropriate)  Goal: Reduced Incontinence Episodes  Outcome: Ongoing (interventions implemented as appropriate)    Problem: Pressure Ulcer Risk (Jayme Scale) (Adult,Obstetrics,Pediatric)  Goal: Skin Integrity  Outcome: Ongoing (interventions implemented as appropriate)

## 2017-10-11 NOTE — PROGRESS NOTES
"  Infectious Diseases Progress Note    Carmen Byrd MD     [facility]  [place]: 10 days  Patient Identification:  Name: Aakash Sanchez Jr.  Age: 82 y.o.  Sex: male  :  1935  MRN: [id number]         Primary Care Physician: Dimitrios Lindquist MD            Subjective: Feeling much better, not thrilled with the results of the lung biopsy.  Denies any fever and chills.  Interval History: See consultation note    Objective:    Scheduled Meds:    amiodarone 200 mg Oral Q12H   atorvastatin 40 mg Oral Nightly   digoxin 125 mcg Oral Every Other Day   enoxaparin 1 mg/kg Subcutaneous Q12H   famotidine 20 mg Oral Daily   fluconazole 200 mg Oral Daily   isosorbide mononitrate 15 mg Oral Daily   lactobacillus acidophilus 1 capsule Oral Daily   metoprolol tartrate 100 mg Oral Q12H   tamsulosin 0.4 mg Oral Daily   warfarin 4 mg Oral Nightly     Continuous Infusions:    Pharmacy to Dose enoxaparin (LOVENOX)        Vital signs in last 24 hours:  Temp:  [96.7 °F (35.9 °C)-98.2 °F (36.8 °C)] 97.1 °F (36.2 °C)  Heart Rate:  [82-94] 82  Resp:  [16-20] 20  BP: (139-152)/(65-91) 140/72    Intake/Output:    Intake/Output Summary (Last 24 hours) at 10/11/17 1405  Last data filed at 10/11/17 1355   Gross per 24 hour   Intake              360 ml   Output             1150 ml   Net             -790 ml       Exam:  /72 (BP Location: Right arm, Patient Position: Lying)  Pulse 82  Temp 97.1 °F (36.2 °C) (Oral)   Resp 20  Ht 70\" (177.8 cm)  Wt 185 lb 5 oz (84.1 kg)  SpO2 99%  BMI 26.59 kg/m2    General Appearance:    Awake alert interactive does not appear to be in any acute distress                          Head:    Normocephalic, without obvious abnormality, atraumatic                           Eyes:    PERRL, conjunctiva/corneas clear, EOM's intact, both eyes                         Throat:   Lips, tongue, gums normal; oral mucosa pink and moist                           Neck:   Supple, symmetrical, trachea midline, " no JVD                         Lungs:    Clear to auscultation bilaterally, respirations unlabored                 Chest Wall:    No tenderness or deformity                          Heart:    Regular rate and rhythm, S1 and S2 normal, no murmur,no  Rub                                      or gallop                  Abdomen:     Soft and nontender                 Extremities:   Extremities normal, atraumatic, no cyanosis or edema                        Pulses:   Pulses palpable in all extremities                            Skin:   Skin is warm and dry,  no rashes or palpable lesions                    Data Review:    I reviewed the patient's new clinical results.    Results from last 7 days  Lab Units 10/10/17  0437 10/09/17  0440 10/06/17  0538 10/05/17  0420   WBC 10*3/mm3 9.01 10.38 6.50 5.79   HEMOGLOBIN g/dL 10.2* 11.7* 10.3* 10.3*   PLATELETS 10*3/mm3 278 342 224 198       Results from last 7 days  Lab Units 10/11/17  0544 10/10/17  0437 10/09/17  0440 10/07/17  0532 10/06/17  0538 10/05/17  0420   SODIUM mmol/L 142 140 138 137 137  137 138   POTASSIUM mmol/L 4.2 3.9 4.2 4.4 4.4  4.4 4.2   CHLORIDE mmol/L 105 105 105 107 106  106 106   CO2 mmol/L 22.4 22.0 19.2* 18.6* 19.0*  19.0* 19.9*   BUN mg/dL 11 13 14 13 15  15 15   CREATININE mg/dL 1.80* 1.94* 2.01* 1.86* 2.01*  2.01* 2.08*   CALCIUM mg/dL 9.0 8.6 8.6 8.2* 8.3*  8.3* 8.3*   GLUCOSE mg/dL 134* 151* 137* 142* 130*  130* 134*     Atypical ANCA 1:40 significance ?  Microbiology Results (last 10 days)     Procedure Component Value - Date/Time    Urine Culture - Urine, Urine, Clean Catch [[id number]]  (Normal) Collected:  10/09/17 0440    Lab Status:  Final result Specimen:  Urine from Urine, Catheter Updated:  10/10/17 0657     Urine Culture No growth    Blood Culture - Blood, [[id number]]  (Normal) Collected:  10/03/17 1901    Lab Status:  Final result Specimen:  Blood from Arm, Left Updated:  10/08/17 1931     Blood Culture No growth at 5 days     Blood Culture - Blood, [[id number]]  (Normal) Collected:  10/03/17 1830    Lab Status:  Final result Specimen:  Blood from Arm, Left Updated:  10/08/17 1931     Blood Culture No growth at 5 days    Urine Culture - Urine, Urine, Catheter [[id number]]  (Abnormal) Collected:  10/02/17 1319    Lab Status:  Final result Specimen:  Urine from Urine, Catheter Updated:  10/06/17 1111     Urine Culture --      50,000 CFU/mL Candida farazfyr (A)    Narrative:       Yeast ID to follow          Assessment:  Principal Problem:    UTI (urinary tract infection) due to yeast  Active Problems:    PAF (paroxysmal atrial fibrillation)    COPD (chronic obstructive pulmonary disease)    Type 2 diabetes mellitus with complication    Abdominal aortic aneurysm (AAA) without rupture    Long term current use of anticoagulant    Chronic combined systolic and diastolic congestive heart failure    CKD (chronic kidney disease), stage III    PAU (acute kidney injury)    Arteriosclerotic coronary artery disease    Metabolic encephalopathy    Acidosis    Mass of upper lobe of left lung    Urinary retention      Plan/recommendation: Continue present treatment with oral Diflucan for Candida cystitis.  Last day of treatment is 10/20/2017.      Carmen Byrd MD  10/11/2017  2:05 PM    Much of this encounter note is an electronic transcription/translation of spoken language to printed text. The electronic translation of spoken language may permit erroneous, or at times, nonsensical words or phrases to be inadvertently transcribed; Although I have reviewed the note for such errors, some may still exist

## 2017-10-11 NOTE — PROGRESS NOTES
Continued Stay Note  [facility]     Patient Name: Aakash Sanchez Jr.  MRN: [id number]  Today's Date: 10/11/2017    Admit Date: 9/30/2017          Discharge Plan       10/11/17 1300    Case Management/Social Work Plan    Plan [facility]    Patient/Family In Agreement With Plan yes    Additional Comments Per [facility] bed available. Plan at [place] remains skilled bed at [facility].              Discharge Codes     None        Expected Discharge Date and Time     Expected Discharge Date Expected Discharge Time    Oct 11, 2017             Dariela Crabtree RN

## 2017-10-11 NOTE — PROGRESS NOTES
"Cc retention  Doing well  Urine clear in oliver and some urgency  No cath pain     LOS: 10 days   Patient Care Team:  Dimitrios Lindquist MD as PCP - General (Internal Medicine)        Subjective     History of Present Illness    Subjective      Objective     Vital Signs  Temp:  [96.6 °F (35.9 °C)-98.2 °F (36.8 °C)] 98.2 °F (36.8 °C)  Heart Rate:  [84-89] 84  Resp:  [16-20] 16  BP: (121-160)/(65-82) 139/82    Objective:  General Appearance:  Comfortable.    Vital signs: (most recent): Blood pressure 139/82, pulse 84, temperature 98.2 °F (36.8 °C), temperature source Oral, resp. rate 16, height 70\" (177.8 cm), weight 185 lb 5 oz (84.1 kg), SpO2 98 %.  Vital signs are normal.    Output: Producing urine.    HEENT: Normal HEENT exam.    Lungs:  Normal respiratory rate and normal effort.    Abdomen: Abdomen is soft.              Results Review:  New clinical results reviewed.        Assessment/Plan     Principal Problem:    UTI (urinary tract infection) due to yeast  Active Problems:    PAF (paroxysmal atrial fibrillation)    COPD (chronic obstructive pulmonary disease)    Type 2 diabetes mellitus with complication    Abdominal aortic aneurysm (AAA) without rupture    Long term current use of anticoagulant    Chronic combined systolic and diastolic congestive heart failure    CKD (chronic kidney disease), stage III    PAU (acute kidney injury)    Arteriosclerotic coronary artery disease    Metabolic encephalopathy    Acidosis    Mass of upper lobe of left lung    Urinary retention      Assessment:  (Retention doing well with oliver  Will cont flomax and voiding trial 2weeks).       Bryan Bernstein MD  10/11/17  6:55 AM            "

## 2017-10-11 NOTE — PROGRESS NOTES
BGA/GI Progress Note   Chief Complaint:  abd distention, diarrhea    Subjective     Interval History: no co's of abd pain, diarrhea has resolved, no BM in 203 days, + flatus.  Discussed with Rn and will administer dulcolax/MiraLax today.  Ate 75% of meal this am, prior meals eating only 25%.  Intermittent confusion.    History taken from: patient chart RN nurse aide    Review of Systems:    All systems were reviewed and negative except for:  Gastrointestinal: postitive for  constipation  Neurological: positive for  confusion    Objective     Vital Signs  Temp:  [96.6 °F (35.9 °C)-98.2 °F (36.8 °C)] 96.7 °F (35.9 °C)  Heart Rate:  [84-94] 94  Resp:  [16-20] 16  BP: (121-152)/(65-91) 149/91  Body mass index is 26.59 kg/(m^2).    Intake/Output Summary (Last 24 hours) at 10/11/17 1048  Last data filed at 10/11/17 0806   Gross per 24 hour   Intake              120 ml   Output              950 ml   Net             -830 ml     I/O this shift:  In: 120 [P.O.:120]  Out: 350 [Urine:350]    Physical Exam:   General: patient awake, alert and cooperative.  Resting in bed, no distress   Eyes: Normal lids and lashes, no scleral icterus   Neck: supple, normal ROM, no tracheal deviation   Skin: warm and dry, not jaundiced   Cardiovascular: regular rhythm and rate, no murmurs auscultated   Pulm: clear to auscultation bilaterally, regular and unlabored   Abdomen: soft, nontender, nondistended; normal bowel sounds   Rectal: deferred   Extremities: no rash or edema   Neurologic: Normal mood and behavior, some confusion but answers most questions appropriately this am    All Medications Have Been Reviewed     Results Review:       Results from last 7 days  Lab Units 10/10/17  0437 10/09/17  0440 10/06/17  0538   WBC 10*3/mm3 9.01 10.38 6.50   HEMOGLOBIN g/dL 10.2* 11.7* 10.3*   HEMATOCRIT % 33.1* 36.7* 33.6*   PLATELETS 10*3/mm3 278 342 224         Results from last 7 days  Lab Units 10/11/17  0544 10/10/17  0437 10/09/17  0440    SODIUM mmol/L 142 140 138   POTASSIUM mmol/L 4.2 3.9 4.2   CHLORIDE mmol/L 105 105 105   CO2 mmol/L 22.4 22.0 19.2*   BUN mg/dL 11 13 14   CREATININE mg/dL 1.80* 1.94* 2.01*   CALCIUM mg/dL 9.0 8.6 8.6   BILIRUBIN mg/dL  --   --  0.5   ALK PHOS U/L  --   --  55   ALT (SGPT) U/L  --   --  24   AST (SGOT) U/L  --   --  27   GLUCOSE mg/dL 134* 151* 137*         Results from last 7 days  Lab Units 10/11/17  0544 10/10/17  0437 10/09/17  0440   INR  1.45* 1.44* 1.46*       RADIOLOGY:    Imaging Results (last 72 hours)     Procedure Component Value Units Date/Time    XR Abdomen 2 View With Chest 1 View [[id number]] Collected:  10/07/17 2115     Updated:  10/08/17 1948    Narrative:       X-RAY PORTABLE ABDOMEN 2 VIEWS, CHEST X-RAY 1 VIEW.     HISTORY:  Abdominal distention, exclude impaction.     COMPARISON:  No prior studies for comparison.     FINDINGS:   Cardiomediastinal silhouette is within normal limits. Lungs demonstrate  no consolidation or effusion. Low lung volumes and chronic lung changes.     Bowel gas pattern is unremarkable. No abnormal calcifications are seen.     No free air in the abdomen. No evidence for large amount of stool in the  colon and rectum.       Impression:       1.  No acute findings in the chest, abdomen and pelvis.  2.  No definite evidence for fecal impaction.     This report was finalized on 10/8/2017 7:44 PM by Dr. Neel Agudelo MD.       CT Head Without Contrast [[id number]] Collected:  10/09/17 1236     Updated:  10/09/17 1334    Narrative:       CT HEAD WITHOUT CONTRAST     HISTORY: Lung mass.     A noncontrasted CT examination of the head was performed.     FINDINGS: The brain and ventricles are symmetrical. There is moderate  atrophy. No focal area of decreased attenuation to suggest acute  infarction or vasogenic edema is identified. Areas of decreased  attenuation are appreciated involving the white matter of the cerebral  hemispheres bilaterally, nonspecific but suggesting mild  to moderate  small vessel ischemic changes.       Impression:       Atrophy and small vessel ischemic disease. No evidence of  acute infarction. No obvious metastatic disease is identified on this  noncontrasted MRI examination of the brain. Metastatic disease could not  be excluded. Further evaluation could be performed with a MRI  examination of the brain with and without contrast.     Radiation dose reduction techniques were utilized, including automated  exposure control and exposure modulation based on body size.     This report was finalized on 10/9/2017 1:29 PM by Dr. Asif Herrera MD.       CT Needle Biopsy Lung [[id number]] Collected:  10/09/17 1600     Updated:  10/10/17 1718    Narrative:       CT-GUIDED BIOPSY OF LEFT LUNG MASS 10/09/2017     HISTORY: Left lung mass.     TECHNIQUE: After signed informed consent was obtained, the patient was  prepped and draped in the supine position. Lidocaine was used for local  anesthesia.     An 18-gauge biopsy device was used to obtain several core specimens of  the soft tissue mass in the lingula. Confirmatory images were obtained.  Patient tolerated the procedure well with no complications.     Radiation dose reduction techniques were utilized, including automated  exposure control and exposure modulation based on body size.     This report was finalized on 10/10/2017 5:15 PM by Dr. Kelby Espana MD.             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.1, Z16.21   • Type 2 diabetes mellitus with complication E11.8   • Diarrhea R19.7   • Abdominal aortic aneurysm (AAA)  without rupture I71.4   • Long term current use of anticoagulant Z79.01   • Urinary tract infection with hematuria N39.0, R31.9   • CKD (chronic kidney disease), stage III N18.3   • Hypotension I95.9   • Hyperglycemia R73.9   • Imbalance R26.89   • Concepcion's esophagus without dysplasia K22.70   • Colon polyps K63.5   • B12 deficiency E53.8   • Cerebrovascular accident (CVA) I63.9   • History of coronary artery stent placement Z95.5   • Rash of body R21   • Insomnia G47.00   • Cardiomyopathy I42.9   • Benign non-nodular prostatic hyperplasia with lower urinary tract symptoms N40.1   • Prostate hypertrophy N40.0   • Chronic combined systolic and diastolic congestive heart failure I50.42   • Pneumonia J18.9   • Sepsis A41.9   • 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 Z95.0   • Fever and chills R50.9   • UTI (urinary tract infection) due to yeast N39.0   • CKD (chronic kidney disease), stage III N18.3   • PAU (acute kidney injury) N17.9   • Arteriosclerotic coronary artery disease I25.10   • Metabolic encephalopathy G93.41   • Acidosis E87.2   • Mass of upper lobe of left lung R91.8   • Urinary retention R33.9     Dominga Jesus, APRN  10/11/17  10:48 AM          We are following for abdominal bloating and distention, colon polyps, Concepcion's esophagus    I've seen the patient, examined him, my physical exam and assessment and plan as detailed below       Constitutional: He is oriented to person, place, and time. He appears well-developed and well-nourished.   HENT: anicteric and no thyromegaly  Head: Normocephalic and atraumatic.   Eyes: Conjunctivae and EOM are normal.   Neck: Normal range of motion. No tracheal deviation present.   Cardiovascular: Normal rate and regular rhythm.    Pulmonary/Chest: Effort normal and breath sounds normal. No respiratory distress.   Abdominal: Soft. Bowel sounds are normal. He exhibits no distension and no mass. There is no tenderness. There is no rebound and no  guarding.   Musculoskeletal: Normal range of motion.   Neurological: He is alert and oriented to person, place, and time.   Skin: Skin is warm and dry.   Psychiatric: He has a normal mood and affect. Judgment normal.   Nursing note and vitals reviewed.    Assessment:  1) Abdominal distention- resolved.  Abdominal CT 10/2 and KUB 10/7 with no acute findings, no fecal impaction  2) Diarrhea- intermittent, no stools in 2-3 days per pt, diarrhea has resolved.   3) Barretts- by hx, EGD 3/2017 with short segement diehl's, recommend f/u 3 years, we can schedule when necessary  4) Colon polyps- by hx, c-scope 3/2017 with TA and HP per path.  Recommend f/u c-scope one yr based upon multiple TA polyps, we can schedule when necessary  5) Lung Mass- pulmonary following, bx on 10/9, path pending     - f/u stool studies (no specimen provided by pt), doubt c diff with intermittent diarrhea episodes and none over last several days.  The nurse practitioner has initiated Dulcolax.  That is fine  - will arrange for f/u appt with our office with Dr. Corbett, message sent to office staff (initial consult performed 11/2016).  Dr. Corbett will discuss Diehl's esophagus and colon polyps with this patient in great detail as an outpatient    - We will follow

## 2017-10-11 NOTE — THERAPY TREATMENT NOTE
Acute Care - Physical Therapy Treatment Note  [facility]     Patient Name: Aakash Sanchez Jr.  : 1935  MRN: [id number]  Today's Date: 10/11/2017             Admit Date: 2017    Visit Dx:    ICD-10-CM ICD-9-CM   1. Fever and chills R50.9 780.60   2. Altered mental status, unspecified altered mental status type R41.82 780.97   3. Difficulty walking R26.2 719.7     Patient Active Problem List   Diagnosis   • Arthritis   • Hyperlipidemia   • Hypertension   • Hypokalemia   • Lung mass   • Ventricular premature beats   • Coronary artery disease   • Ischemic cardiomyopathy   • PAF (paroxysmal atrial fibrillation)   • COPD (chronic obstructive pulmonary disease)   • VRE (vancomycin-resistant Enterococci) infection   • Type 2 diabetes mellitus with complication   • Diarrhea   • Abdominal aortic aneurysm (AAA) without rupture   • Long term current use of anticoagulant   • Urinary tract infection with hematuria   • CKD (chronic kidney disease), stage III   • Hypotension   • Hyperglycemia   • Imbalance   • Concepcion's esophagus without dysplasia   • Colon polyps   • B12 deficiency   • Cerebrovascular accident (CVA)   • History of coronary artery stent placement   • Rash of body   • Insomnia   • Cardiomyopathy   • Benign non-nodular prostatic hyperplasia with lower urinary tract symptoms   • Prostate hypertrophy   • Chronic combined systolic and diastolic congestive heart failure   • Pneumonia   • Sepsis   • 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   • Fever and chills   • UTI (urinary tract infection) due to yeast   • CKD (chronic kidney disease), stage III   • PAU (acute kidney injury)   • Arteriosclerotic coronary artery disease   • Metabolic encephalopathy   • Acidosis   • Mass of upper lobe of left lung   • Urinary retention               Adult Rehabilitation Note       10/11/17 1100 10/10/17 1043       Rehab Assessment/Intervention    Discipline physical therapy assistant  -CW physical  therapist  -AR     Document Type therapy note (daily note)  -CW therapy note (daily note)  -AR     Subjective Information agree to therapy  -CW agree to therapy  -AR     Patient Effort, Rehab Treatment good  -CW good  -AR     Precautions/Limitations fall precautions  -CW      Recorded by [CW] Bakari Keller [AR] Carolann Rubalcava PT     Pain Assessment    Pain Assessment No/denies pain  -CW No/denies pain  -AR     Recorded by [CW] Bakari Keller [AR] Carolann Rubalcava PT     Cognitive Assessment/Intervention    Current Cognitive/Communication Assessment functional  -CW functional  -AR     Orientation Status oriented x 4  -CW oriented x 4  -AR     Follows Commands/Answers Questions 100% of the time;able to follow single-step instructions;needs cueing  -CW      Personal Safety WNL/WFL  -CW      Personal Safety Interventions fall prevention program maintained;gait belt;muscle strengthening facilitated;nonskid shoes/slippers when out of bed  -CW      Recorded by [CW] Bakari Keller [AR] Carolann Rubalcava PT     Bed Mobility, Assessment/Treatment    Bed Mobility, Assistive Device  bed rails;head of bed elevated  -AR     Bed Mob, Supine to Sit, [place] supervision required  -CW supervision required  -AR     Bed Mob, Sit to Supine, [place] supervision required  -CW not tested  -AR     Recorded by [CW] Bakari Keller [AR] Carolann Rubalcava PT     Transfer Assessment/Treatment    Transfers, Sit-Stand [place] contact guard assist  -CW contact guard assist  -AR     Transfers, Stand-Sit [place] contact guard assist  -CW contact guard assist  -AR     Transfers, Sit-Stand-Sit, Assist Device rolling walker  -CW rolling walker  -AR     Recorded by [CW] Bakari Keller [AR] Carolann Rubalcava PT     Gait Assessment/Treatment    Gait, [place] Level contact guard assist  -CW contact guard assist  -AR     Gait, Assistive Device rolling walker  -CW rolling walker  -AR     Gait, Distance (Feet) 120   -  -AR     Gait, Gait Pattern Analysis swing-through gait  -CW swing-through gait  -AR     Gait, Gait Deviations johnny decreased;step length decreased;stride length decreased  -CW johnny decreased;decreased heel strike;forward flexed posture  -AR     Gait, Safety Issues  step length decreased;weight-shifting ability decreased  -AR     Gait, Impairments  strength decreased;impaired balance  -AR     Gait, Comment  limited by fatigue  -AR     Recorded by [CW] Bakari Keller [AR] Carolann Rubalcava PT     Positioning and Restraints    Pre-Treatment Position in bed  -CW in bed  -AR     Post Treatment Position bed  -CW chair  -AR     In Bed notified nsg;supine;call light within reach;encouraged to call for assist;exit alarm on  -CW      In Chair  reclined;sitting;call light within reach;encouraged to call for assist;exit alarm on  -AR     Recorded by [CW] Bakari Keller [AR] Carolann Rubalcava PT       User Key  (r) = Recorded By, (t) = Taken By, (c) = Cosigned By    Initials Name Effective Dates    AR Carolann Rubalcava, PT 06/27/16 -     CW Bakari Keller 12/13/16 -                 IP PT Goals       10/05/17 1414          Bed Mobility PT LTG    Bed Mobility PT LTG, Date Established 10/05/17  -KH      Bed Mobility PT LTG, Time to Achieve 1 wk  -KH      Bed Mobility PT LTG, Activity Type all bed mobility  -KH      Bed Mobility PT LTG, [place] Level contact guard assist  -KH      Transfer Training PT LTG    Transfer Training PT LTG, Date Established 10/05/17  -KH      Transfer Training PT LTG, Time to Achieve 1 wk  -KH      Transfer Training PT LTG, [place] Level contact guard assist  -KH      Transfer Training PT LTG, Assist Device walker, rolling  -KH      Gait Training PT LTG    Gait Training Goal PT LTG, Date Established 10/05/17  -KH      Gait Training Goal PT LTG, Time to Achieve 1 wk  -KH      Gait Training Goal PT LTG, [place] Level contact guard assist  -KH      Gait Training Goal PT  LTG, Assist Device walker, rolling  -KH      Gait Training Goal PT LTG, Distance to Achieve 75 ft  -KH      Patient Education PT LTG    Patient Education PT LTG, Date Established 10/05/17  -KH      Patient Education PT LTG, Time to Achieve 1 wk  -KH      Patient Education PT LTG, Education Type HEP  -KH      Patient Education PT LTG, Education Understanding demonstrate adequately  -KH        User Key  (r) = Recorded By, (t) = Taken By, (c) = Cosigned By    Initials Name Provider Type    AYE Lanza, PT Physical Therapist          Physical Therapy Education     Title: PT OT SLP Therapies (Done)     Topic: Physical Therapy (Done)     Point: Mobility training (Done)    Learning Progress Summary    Learner Readiness Method Response Comment Documented by Status   Patient Acceptance E,TB VU,DU  CW 10/11/17 1115 Done    Acceptance E NR  AR 10/10/17 1044 Active    Acceptance E VU  KH 10/07/17 1411 Done    Acceptance E,TB NR,VU  EE 10/06/17 1422 Done    Acceptance E VU,NR  KH1 10/05/17 1413 Done               Point: Home exercise program (Done)    Learning Progress Summary    Learner Readiness Method Response Comment Documented by Status   Patient Acceptance E,TB VU,DU  CW 10/11/17 1115 Done    Acceptance E NR  AR 10/10/17 1044 Active    Acceptance E VU,NR  KH1 10/05/17 1413 Done               Point: Body mechanics (Done)    Learning Progress Summary    Learner Readiness Method Response Comment Documented by Status   Patient Acceptance E,TB VU,DU  CW 10/11/17 1115 Done    Acceptance E NR  AR 10/10/17 1044 Active    Acceptance E VU  KH 10/07/17 1411 Done    Acceptance E VU,NR  KH1 10/05/17 1413 Done               Point: Precautions (Done)    Learning Progress Summary    Learner Readiness Method Response Comment Documented by Status   Patient Acceptance E,TB VU,DU  CW 10/11/17 1115 Done    Acceptance E NR  AR 10/10/17 1044 Active    Acceptance E VU,NR  KH1 10/05/17 1413 Done                      User Key      Initials Effective Dates Name Provider Type Discipline    KH1 05/18/15 -  Kiera Lanza, PT Physical Therapist PT    EE 12/01/15 -  Carmita Salcedo, PT Physical Therapist PT    AR 06/27/16 -  Carolann Rubalcava, PT Physical Therapist PT    KH 06/22/16 -  Marie Mtz, PT Physical Therapist PT    CW 12/13/16 -  Bakari Keller Physical Therapy Assistant PT                    PT Recommendation and Plan  Anticipated Discharge Disposition: skilled nursing facility  Planned Therapy Interventions: balance training, bed mobility training, gait training, home exercise program, patient/family education, strengthening, transfer training, ROM (Range of Motion)  PT Frequency: daily  Plan of Care Review  Plan Of Care Reviewed With: patient  Progress: improving  Outcome Summary/Follow up Plan: Pt increasing with strength and balance with use of RWX          Outcome Measures       10/11/17 1100 10/10/17 1000       How much help from another person do you currently need...    Turning from your back to your side while in flat bed without using bedrails? 4  -CW 4  -AR     Moving from lying on back to sitting on the side of a flat bed without bedrails? 4  -CW 4  -AR     Moving to and from a bed to a chair (including a wheelchair)? 3  -CW 3  -AR     Standing up from a chair using your arms (e.g., wheelchair, bedside chair)? 3  -CW 3  -AR     Climbing 3-5 steps with a railing? 3  -CW 3  -AR     To walk in hospital room? 3  -CW 3  -AR     AM-PAC 6 Clicks Score 20  -CW 20  -AR     Functional Assessment    Outcome Measure Options AM-PAC 6 Clicks Basic Mobility (PT)  -CW        User Key  (r) = Recorded By, (t) = Taken By, (c) = Cosigned By    Initials Name Provider Type    AR Carolann Rubalcava, PT Physical Therapist    CW Bakari Keller Physical Therapy Assistant           Time Calculation:         PT Charges       10/11/17 1117          Time Calculation    Start Time 1045  -CW      Stop Time 1100  -CW      Time Calculation (min) 15  min  -CW      PT Received On 10/11/17  -CW      PT - Next Appointment 10/12/17  -CW        User Key  (r) = Recorded By, (t) = Taken By, (c) = Cosigned By    Initials Name Provider Type    CW Bakari Keller Physical Therapy Assistant          Therapy Charges for Today     Code Description Service Date Service Provider Modifiers Qty    93719765034 HC PT THER PROC EA 15 MIN 10/11/2017 Bakari Keller GP 1    38274259365 HC PT THER SUPP EA 15 MIN 10/11/2017 Bakari Keller GP 1          PT G-Codes  Outcome Measure Options: AM-PAC 6 Clicks Basic Mobility (PT)    Bakari Keller  10/11/2017

## 2017-10-11 NOTE — PROGRESS NOTES
NEPHROLOGY PROGRESS NOTE    PATIENT IDENTIFICATION:   Name:  Aakash Sanchez Jr.      MRN:  [id number]     82 y.o.  male             Reason for visit: PAU on CKD3    SUBJECTIVE:   Seen and examined. No SOA or CP.  Whaley is  anchored.  Discussed with the staff.  Patient is more confused today.  He received Ativan last night.  OBJECTIVE:  Vitals:    10/11/17 0450 10/11/17 0711 10/11/17 0740 10/11/17 1322   BP:  149/91  140/72   BP Location:  Right arm  Right arm   Patient Position:  Lying  Lying   Pulse:  94  82   Resp:  16  20   Temp:  96.7 °F (35.9 °C)  97.1 °F (36.2 °C)   TempSrc:  Oral  Oral   SpO2:   99%    Weight: 185 lb 5 oz (84.1 kg)      Height:               Body mass index is 26.59 kg/(m^2).    Intake/Output Summary (Last 24 hours) at 10/11/17 1625  Last data filed at 10/11/17 1355   Gross per 24 hour   Intake              360 ml   Output             1150 ml   Net             -790 ml     Wt Readings from Last 1 Encounters:   10/11/17 0450 185 lb 5 oz (84.1 kg)   10/10/17 0511 190 lb 2 oz (86.2 kg)   10/09/17 0535 190 lb 1 oz (86.2 kg)   10/08/17 0601 194 lb 1.6 oz (88 kg)   10/07/17 0500 194 lb (88 kg)   10/06/17 0458 190 lb 3.2 oz (86.3 kg)   10/05/17 0945 190 lb 9.6 oz (86.5 kg)   10/04/17 0600 193 lb 4.8 oz (87.7 kg)   10/03/17 0617 187 lb 14.4 oz (85.2 kg)   10/02/17 0549 183 lb 5 oz (83.2 kg)   09/30/17 2211 180 lb (81.6 kg)     Wt Readings from Last 3 Encounters:   10/11/17 185 lb 5 oz (84.1 kg)   09/28/17 184 lb (83.5 kg)   09/26/17 181 lb (82.1 kg)         Exam:  General Appearance: alert, oriented x 3, no acute distress, appears to be chronically ill  Skin: warm and dry  HEENT: Nonicteric sclerae, oral mucosa normal,   Neck: supple, no JVD, trachea midline  Lungs: Bilateral rhonchi, unlabored breathing effort  Heart: RRR, normal S1 and S2, no S3, no rub  Abdomen: soft, non-tender, no palpable bladder, BS+, abdomen slightly distended  Extremities: no edema, cyanosis or clubbing  Neuro: normal  speech and mental status      Scheduled meds:      amiodarone 200 mg Oral Q12H   atorvastatin 40 mg Oral Nightly   digoxin 125 mcg Oral Every Other Day   enoxaparin 1 mg/kg Subcutaneous Q12H   famotidine 20 mg Oral Daily   fluconazole 200 mg Oral Daily   isosorbide mononitrate 15 mg Oral Daily   lactobacillus acidophilus 1 capsule Oral Daily   melatonin 5 mg Oral Nightly   metoprolol tartrate 100 mg Oral Q12H   tamsulosin 0.4 mg Oral Daily   warfarin 4 mg Oral Nightly     IV meds:                          Pharmacy to Dose enoxaparin (LOVENOX)        Data Review:      Results from last 7 days  Lab Units 10/11/17  0544 10/10/17  0437 10/09/17  0440   SODIUM mmol/L 142 140 138   POTASSIUM mmol/L 4.2 3.9 4.2   CHLORIDE mmol/L 105 105 105   CO2 mmol/L 22.4 22.0 19.2*   BUN mg/dL 11 13 14   CREATININE mg/dL 1.80* 1.94* 2.01*   CALCIUM mg/dL 9.0 8.6 8.6   BILIRUBIN mg/dL  --   --  0.5   ALK PHOS U/L  --   --  55   ALT (SGPT) U/L  --   --  24   AST (SGOT) U/L  --   --  27   GLUCOSE mg/dL 134* 151* 137*     Estimated Creatinine Clearance: 38.9 mL/min (by C-G formula based on Cr of 1.8).    Results from last 7 days  Lab Units 10/06/17  0538   URIC ACID mg/dL 6.5       Results from last 7 days  Lab Units 10/09/17  0440 10/07/17  0532 10/06/17  0538   MAGNESIUM mg/dL 2.0  --  2.2   PHOSPHORUS mg/dL 2.7 2.2* 2.6         Results from last 7 days  Lab Units 10/10/17  0437 10/09/17  0440 10/06/17  0538 10/05/17  0420   WBC 10*3/mm3 9.01 10.38 6.50 5.79   HEMOGLOBIN g/dL 10.2* 11.7* 10.3* 10.3*   PLATELETS 10*3/mm3 278 342 224 198         Results from last 7 days  Lab Units 10/11/17  0544 10/10/17  0437 10/09/17  0440 10/08/17  0527 10/07/17  0532   INR  1.45* 1.44* 1.46* 1.65* 2.53*             ASSESSMENT:   1.  PAU on CKD3, non-oliguric, unchanged:  Vol fine, and lytes compensated  2.  TME, resolved  3.  Recent cardioversion and pacemaker.  4.  Fever and chills, currently afebrile  5.  Urinary retention, currently has Whaley  catheter anchored in place  6.  Left lung mass   S/P lung biopsy That showed non-small cell lung cancer    PLAN:    Scr has been stable At 1.8 mg/dL  Diflucan for UTI  Volume and lytes are acceptable  BP is well controlled  Patient received Ativan yesterday and he is more confused today.  Avoid narcotic and sedative  Biopsy showed non-small cell lung cancer  Ok for discharge from renal stand point with renal follow up in 2 weeks  Surveillance labs    Rex Guadalupe MD  10/11/2017  4:25 PM

## 2017-10-11 NOTE — PROGRESS NOTES
PROGRESS NOTE   LOS: 10 days   Patient Care Team:  Dimitrios Lindquist MD as PCP - General (Internal Medicine)    Chief Complaint: Normal imaging of the chest with a need for tissue diagnosis    Interval History: Patient came in was UTI he has chronic kidney disease he had negative culture and is currently on Diflucan.  He had CT of the chest that showed the left upper lung mass with brother lesions that are of concern and tissue diagnosis was necessary.  Patient had options of either doing a CT-guided biopsy or navigational bronchoscopy and the pros and cons of each one of these procedure was discussed with the patient in details, he elected to go for the CT-guided biopsy which was successfully done on the morning of October 9, 2017.  Patient had some confusion earlier on that day and was seen by neurology, the plan is to check for any possible AMY by check an overnight oximetry as initial screening test and patient had a CT that was negative for any bleed or obvious acute stroke and the plan is to resume the antibiotic patient as soon as possible to reduce the risk of embolic stroke.  Patient has been having episode of confusion and at this point and in the light of the new biopsy results were not sure if this is from metastatic brain disease or simple metabolic encephalopathy.  Biopsy results from the CT-guided biopsy done on October 9, 2017 came back positive for non-small cell lung cancer the immunohistochemical stain was not very conclusive but it was suggestive of neuroendocrine lineage, adeno markers were negative.  Patient has no hemoptysis, he is hemodynamically stable.  Oncology note reviewed, not a candidate for chemo  Still having some confusion, no respiratory complaints except BROOKS.      REVIEW OF SYSTEMS:   CARDIOVASCULAR: No chest pain, chest pressure or chest discomfort. No palpitations or edema.   RESPIRATORY:no chest pain,  no cough or sputum.   GASTROINTESTINAL: No anorexia, nausea, vomiting or  "diarrhea. No abdominal pain or blood.   HEMATOLOGIC: No bleeding or bruising.     Ventilator/Non-Invasive Ventilation Settings     None            Vital Signs  Temp:  [96.7 °F (35.9 °C)-98.2 °F (36.8 °C)] 97.1 °F (36.2 °C)  Heart Rate:  [82-94] 82  Resp:  [16-20] 20  BP: (139-152)/(65-91) 140/72  SpO2:  [93 %-99 %] 99 %  on  Flow (L/min):  [2] 2 O2 Device: room air    Intake/Output Summary (Last 24 hours) at 10/11/17 1339  Last data filed at 10/11/17 1236   Gross per 24 hour   Intake              360 ml   Output              950 ml   Net             -590 ml     Flowsheet Rows         First Filed Value    Admission Height  70\" (177.8 cm) Documented at 09/30/2017 2211    Admission Weight  180 lb (81.6 kg) Documented at 09/30/2017 2211        Last 3 weights    10/09/17  0535 10/10/17  0511 10/11/17  0450   Weight: 190 lb 1 oz (86.2 kg) 190 lb 2 oz (86.2 kg) 185 lb 5 oz (84.1 kg)       Physical Exam:  GEN:  No acute distress, alert, cooperative, well developed, calm and depressed  EYES:   Sclera clear. No icterus. PERRL. Normal EOM  ENT:   External ears/nose normal, no oral lesions, no thrush, mucous membranes moist  NECK:  Supple, midline trachea, no JVD  LUNGS: Normal chest on inspection, CTAB,Decreased breath sounds , minimal expiratory wheezes on coughing or forced exhalation. Respirations regular, even and unlabored.   CV:  Irregular rhythm with controlled rate. Normal S1/S2. No murmurs, gallops, or rubs noted.  ABD:  Soft, non-tender and no abdominal distention  . Normal bowel sounds. No guarding  EXT:  Moves all extremities well. No cyanosis. No redness. No edema.   Skin: dry, intact, no bleeding    Results Review:        Results from last 7 days  Lab Units 10/11/17  0544 10/10/17  0437 10/09/17  0440 10/07/17  0532 10/06/17  0538 10/05/17  0420   SODIUM mmol/L 142 140 138 137 137  137 138   POTASSIUM mmol/L 4.2 3.9 4.2 4.4 4.4  4.4 4.2   CHLORIDE mmol/L 105 105 105 107 106  106 106   CO2 mmol/L 22.4 22.0 " 19.2* 18.6* 19.0*  19.0* 19.9*   BUN mg/dL 11 13 14 13 15  15 15   CREATININE mg/dL 1.80* 1.94* 2.01* 1.86* 2.01*  2.01* 2.08*   CALCIUM mg/dL 9.0 8.6 8.6 8.2* 8.3*  8.3* 8.3*   AST (SGOT) U/L  --   --  27  --   --   --    ALT (SGPT) U/L  --   --  24  --   --   --    ANION GAP mmol/L 14.6 13.0 13.8 11.4 12.0  12.0 12.1   ALBUMIN g/dL  --   --  2.90* 2.60* 2.70*  --            Results from last 7 days  Lab Units 10/10/17  0437 10/09/17  0440 10/06/17  0538 10/05/17  0420   WBC 10*3/mm3 9.01 10.38 6.50 5.79   HEMOGLOBIN g/dL 10.2* 11.7* 10.3* 10.3*   HEMATOCRIT % 33.1* 36.7* 33.6* 33.7*   PLATELETS 10*3/mm3 278 342 224 198   NEUTROPHIL % %  --   --  57.5  --    LYMPHOCYTE % %  --   --  21.8  --    MONOCYTES % %  --   --  15.1*  --    EOSINOPHIL % %  --   --  4.6  --    BASOPHIL % %  --   --  0.5  --    IMM GRAN % %  --   --  0.5  --        Results from last 7 days  Lab Units 10/11/17  0544 10/10/17  0437 10/09/17  0440   INR  1.45* 1.44* 1.46*       Results from last 7 days  Lab Units 10/09/17  0440 10/06/17  0538   MAGNESIUM mg/dL 2.0 2.2                 No results found for: POCGLU    Results from last 7 days  Lab Units 10/09/17  0440   PROCALCITONIN ng/mL 0.16           Results from last 7 days  Lab Units 10/09/17  0440   URINECX  No growth       Results from last 7 days  Lab Units 10/09/17  0440   NITRITE UA  Negative   WBC UA /HPF 6-12*   BACTERIA UA /HPF None Seen   SQUAM EPITHEL UA /HPF 0-2   URINECX  No growth           Results from last 7 days  Lab Units 10/06/17  0538   URIC ACID mg/dL 6.5     JT CT guided needle biopsy 10/9/2017:    Imaging Results (all)     Procedure Component Value Units Date/Time    CT Chest Without Contrast [[id number]] Collected:  10/01/17 1435     Updated:  10/01/17 1449    Narrative:       CT CHEST WITHOUT CONTRAST     HISTORY: 82-year-old male with abnormal chest x-ray for which CT was  recommended.     TECHNIQUE: Chest CT includes axial imaging from the thoracic inlet to  the  upper abdomen without IV contrast.     FINDINGS: There is a solid-appearing mass within the anterior-inferior  left upper lobe measuring 3.8 x 3.3 cm and contacting the pleura along  the left cardiac border. Anterior to the left pulmonary trunk, there is  a rounded 9 mm lymph node which is upper normal for size. There is no  axillary guerita enlargement. The heart size is enlarged. Small left  pleural effusion layers dependently.     Within the posterior inferior right middle lobe, there is a 1.9 cm focal  area of groundglass opacity. Within the central right upper lobe at the  horizontal level just below the luis, there is a 6 mm pulmonary  nodule. Multilevel bridging endplate spur formation is present in the  thoracic spine. There has been previous median sternotomy. Imaging  through the upper abdomen demonstrates cholecystectomy clips. Aortic  stent graft is noted. Splenic size is mildly enlarged measuring 15 x 9.5  cm.       Impression:       1. 3.8 cm solid-appearing lung mass anterior-inferior left upper lobe  suspicious for neoplasm. Tissue sampling recommended.  2. 0.6 cm right upper lobe pulmonary nodule. 1.9 cm right middle lobe  groundglass nodule.  3. Small left pleural effusion.  4. Cardiomegaly. Mild splenomegaly.      Radiation dose reduction techniques were utilized, including automated  exposure control and exposure modulation based on body size.     This report was finalized on 10/1/2017 2:46 PM by Dr. Jose De La Torre MD.       CT Abdomen Pelvis Without Contrast [[id number]] Collected:  10/02/17 1422     Updated:  10/02/17 1708    Narrative:       CT SCAN OF THE ABDOMEN AND PELVIS WITHOUT CONTRAST     HISTORY: Abdominal pain and fever and chills.     FINDINGS:  The CT scan was performed through the abdomen and pelvis  without contrast and demonstrates the followin. There is a tiny left pleural effusion which is new since the study of  2017. Again noted are several scattered areas of  somewhat focal  groundglass opacity, particularly at the right base which are unchanged  and likely related to inflammatory or infectious change.  2. There is a very small pericardial effusion which is unchanged. The  liver, spleen, pancreas, and both adrenal glands are unremarkable  without intravenous contrast.  3. The kidneys are normal in size with some changes of cortical atrophy.  There is a small intermediate density mass at the lower margin of the  left kidney likely representing a somewhat complex cyst and can be seen  more clearly as a cyst on the enhanced CT scan dating back to 2013.  There is a very tiny cortical cyst in the right kidney also unchanged.  4. There is an abdominal aortic aneurysm treated with a stent graft.  There is no retroperitoneal mass. The large and small bowel loops are  normal in caliber and show no inflammatory change. In the pelvis, there  are isotope seeds within the prostate for treatment of prostate cancer  and these appear unchanged. A Whaley catheter is present within the  urinary bladder which is somewhat decompressed and again noted is some  bladder wall thickening likely related to an element of outlet  obstruction as also noted on CT scans dating back to 2013.     Radiation dose reduction techniques were utilized, including automated  exposure control and exposure modulation based on body size.     This report was finalized on 10/2/2017 5:05 PM by Dr. Isacc Baugh MD.       CT Head Without Contrast [[id number]] Collected:  10/02/17 2217     Updated:  10/02/17 2256    Narrative:       CRANIAL CT SCAN WITHOUT CONTRAST     CLINICAL HISTORY: AMS with lung mass . r/o mets; R50.9-Fever,  unspecified; R41.82-Altered mental status, unspecified     COMPARISON: 06/04/2017.     TECHNIQUE: Radiation dose reduction techniques were utilized, including  automated exposure control and exposure modulation based on body size.  Multiple axial images of the head were obtained without  contrast.      FINDINGS:  There are no abnormal areas of increased density or mass  effect. There is diffuse atrophy. There are mild scattered areas of  decreased density in the white matter likely related to chronic ischemic  gliotic changes.   Ventricles, sulci, and cisterns appear normal. Bone  window images are unremarkable.     If there is high clinical suspicion of intracranial metastatic disease,  a contrasted study or preferably MRI examination would be far superior  to noncontrast CT technique.          Impression:       1. No acute intracranial abnormality.            This study was performed with techniques to keep radiation doses as low  as reasonably achievable (ALARA). Individualized dose reduction  techniques using automated exposure control or adjustment of mA and/or  kV according to the patient size were employed.      This report was finalized on 10/2/2017 10:53 PM by Ousmane Wayne MD.       XR Chest 1 View [[id number]] Collected:  09/30/17 2354     Updated:  10/04/17 0354    Narrative:       X-RAY CHEST 1 VIEW.     HISTORY: Fever.     COMPARISON: 09/19/2017.     FINDINGS:  Cardiomegaly, lung volumes are low. 4.7 cm opacity overlies the left  heart border, is a new finding but was also seen on the study of  09/09/2017, not seen on the study of 02/17/2016.     There is no consolidation or effusion.              Impression:       No acute findings. Abnormal 4.7 cm opacity along the left heart border  may be further evaluated by chest CT scan.     This report was finalized on 10/4/2017 3:50 AM by Dr. Neel Agudelo MD.       XR Abdomen 2 View With Chest 1 View [[id number]] Collected:  10/07/17 2115     Updated:  10/07/17 2115    Narrative:       X-RAY PORTABLE ABDOMEN 2 VIEWS, CHEST X-RAY 1 VIEW.     HISTORY:  Abdominal distention, exclude impaction.     COMPARISON:  No prior studies for comparison.     FINDINGS:   Cardiomediastinal silhouette is within normal limits. Lungs demonstrate  no  consolidation or effusion. Low lung volumes and chronic lung changes.     Bowel gas pattern is unremarkable. No abnormal calcifications are seen.     No free air in the abdomen. No evidence for large amount of stool in the  colon and rectum.       Impression:       1.  No acute findings in the chest, abdomen and pelvis.  2.  No definite evidence for fecal impaction.                   I reviewed the patient's new clinical results.  I personally viewed and interpreted the patient's imaging results:        Medication Review:     amiodarone 200 mg Oral Q12H   atorvastatin 40 mg Oral Nightly   digoxin 125 mcg Oral Every Other Day   enoxaparin 1 mg/kg Subcutaneous Q12H   famotidine 20 mg Oral Daily   fluconazole 200 mg Oral Daily   isosorbide mononitrate 15 mg Oral Daily   lactobacillus acidophilus 1 capsule Oral Daily   metoprolol tartrate 100 mg Oral Q12H   tamsulosin 0.4 mg Oral Daily   warfarin 4 mg Oral Nightly         Pharmacy to Dose enoxaparin (LOVENOX)        ASSESSMENT:   1. 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  2. 3.8cm JT lung mass, 6 mm right upper lobe lung nodule, 1.9 cm right middle lobe groundglass nodule, unsure is related  3. Toxic metabolic encephalopathy -versus brain mets, Ct negative unable to do MRI  4. History of recurrent pneumonia   5. History of AAA status post stent  6. CAD s/p PCI/ 2V CABG(1997)  Now on DAPT   7. Chronic systolic and diastolic heart failure   8. CKD   9. Afib on AC   10. COPD not in exacerbation  11. Admitted with UTI and renal failure  12. Urinary retention requiring placement of Whaley catheter    PLAN:  oncology note reviewed, he is good for rehab, plan for PET scan in few weeks and follow up as outpatient, may be a candidate for radiation , and oral treatment depending on PDL 1 test results  He is good for discharge from my stand point if the confusion is a not a factor in his rehab plan  Will follow up with  a full PFT in 6 weeks.  Will see prn while in the hospital  Disposition: rehab    Latanya Smith MD  10/11/17  1:39 PM          EMR Dragon/Transcription disclaimer:   Much of this encounter note is an electronic transcription/translation of spoken language to printed text. The electronic translation of spoken language may permit erroneous, or at times, nonsensical words or phrases to be inadvertently transcribed; Although I have reviewed the note for such errors, some may still exist.

## 2017-10-11 NOTE — CONSULTS
Subjective     REASON FOR CONSULTATION:  Lung mass  Provide an opinion on any further workup or treatment                             REQUESTING PHYSICIAN:  Carson    RECORDS OBTAINED:  Records of the patients history including those obtained from the referring provider were reviewed and summarized in detail.      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  The patient has a remote history of smoking but states that he has not smoked tobacco since the 1950s.  He is retired from [employer].  The patient has multiple medical comorbidities including coronary artery disease, paroxysmal atrial fibrillation, ischemic cardiomyopathy, chronic kidney disease, diabetes mellitus etc.  He has had multiple hospital admissions the past few months.  He is currently admitted with periodic confusion.  He is still somewhat confused able to tell me the year but states that we are at a public school.    The patient had a CT of the chest 1/27/17 which showed nonspecific groundglass in the left lung and a clustered nodularity in the anterior left upper lobe.  A three-month follow-up was recommended.  This admission, a CT chest on 10/1/17 showed the left upper lobe process to have developed into a more solid-appearing mass measuring 3.8 x 3.37 m contacting the pleura.  Anterior to the left pulmonary trunk is a 9 mm lymph node at upper normal size.  There is a 1.9 cm groundglass opacity in the right middle lobe and a 6 mm nodule in the central right upper lobe.  CT of the abdomen and pelvis shows no clear malignancy.  The patient's confusion has been evaluated with a noncontrasted CT of the brain which shows no clear evidence of metastatic disease.  He is not able to obtain an MRI of the brain because of a pacemaker.  However, he did have an MRI of the brain without contrast in June of this year which  did not show any evidence of malignancy.    The patient underwent a CT-guided biopsy of the left upper lobe mass this admission which which is positive for non-small cell carcinoma with a nonspecific immunophenotype; there is some evidence of neuroendocrine differentiation with the tumor is not small cell.  The tumor expresses G IRA-3 which can be seen with breast or urinary origins.  Lung adenocarcinoma markers are negative.    Past Medical History:   Diagnosis Date   • AAA (abdominal aortic aneurysm)     s/p repair, Dr. Felix   • Acute myocardial infarction    • Altered mental state    • Arteriosclerotic coronary artery disease    • Arthritis    • Atrial fibrillation    • B12 deficiency 6/1/2017   • Cardiomyopathy    • Chicken pox    • Chronic combined systolic and diastolic congestive heart failure 9/5/2017   • Chronic systolic (congestive) heart failure    • CKD (chronic kidney disease), stage III    • COPD (chronic obstructive pulmonary disease) 11/15/2016   • Coronary artery disease     MI 1990's, CABG x 2 V (LIMA-LAD, SVG-PDA), 1997   • Difficulty walking    • Dyspnea    • Dyspnea on exertion 8/25/2017   • Fatigue    • Fever and chills    • Heart attack    • Hyperglycemia 3/29/2017   • Hyperlipidemia    • Hypertension    • Imbalance 3/29/2017   • Insomnia 8/25/2017   • Ischemic cardiomyopathy     LVEF 40%   • Lung mass 04/18/2016    spiculated, being followed   • Lung nodule seen on imaging study    • Malignant neoplasm    • Measles    • Mumps    • Pleural effusion    • Premature ventricular contractions    • Prostate cancer    • PVC (premature ventricular contraction)    • Rash of body 6/21/2017   • SOB (shortness of breath)    • Stroke    • Thoracic aortic ectasia    • Urinary tract infection with hematuria 1/18/2017   • Vitamin B 12 deficiency         Past Surgical History:   Procedure Laterality Date   • APPENDECTOMY     • ARTERIAL ANEURYSM REPAIR     • BRONCHOSCOPY Right 10/5/2016    Procedure:  BRONCHOSCOPY;  Surgeon: Jhony Quintanilla MD;  Location: [facility] ENDOSCOPY;  Service:    • BRONCHOSCOPY N/A 11/29/2016    Procedure: BRONCHOSCOPY WITH FLUORO w/biopsy AND BAL;  Surgeon: Ervin Stiles MD;  Location: [facility] ENDOSCOPY;  Service:    • CARDIAC CATHETERIZATION N/A 6/7/2017    Procedure: Left Heart Cath;  Surgeon: Vincent Middleton MD;  Location: [facility] CATH INVASIVE LOCATION;  Service:    • CARDIAC CATHETERIZATION N/A 6/7/2017    Procedure: Coronary angiography;  Surgeon: Vincent Middleton MD;  Location: [facility] CATH INVASIVE LOCATION;  Service:    • CARDIAC CATHETERIZATION N/A 6/7/2017    Procedure: Left ventriculography;  Surgeon: Vincent Middleton MD;  Location: [facility] CATH INVASIVE LOCATION;  Service:    • CARDIAC CATHETERIZATION N/A 6/7/2017    Procedure: Bypass graft study;  Surgeon: Vincent Middleton MD;  Location: [facility] CATH INVASIVE LOCATION;  Service:    • CARDIAC CATHETERIZATION N/A 6/7/2017    Procedure: Native mammary injection;  Surgeon: Vincent Middleton MD;  Location: [facility] CATH INVASIVE LOCATION;  Service:    • CARDIAC CATHETERIZATION N/A 6/7/2017    Procedure: Stent NYA bypass graft;  Surgeon: Vincent Middleton MD;  Location: [facility] CATH INVASIVE LOCATION;  Service:    • CARDIAC DEFIBRILLATOR PLACEMENT     • CARDIAC ELECTROPHYSIOLOGY PROCEDURE N/A 9/19/2017    Procedure: ICD SC new- Bi Ventricular  CRT-D  MEDTRONIC;  Surgeon: Fady Terrazas MD;  Location: [facility] CATH INVASIVE LOCATION;  Service:    • CARDIAC SURGERY     • CHOLECYSTECTOMY     • COLONOSCOPY N/A 12/16/2016    Procedure: COLONOSCOPY TO ASC COLON  WITH BIOPSY AND POLYPECTOMY;  Surgeon: Lane Brian MD;  Location: [facility] ENDOSCOPY;  Service:    • COLONOSCOPY N/A 3/1/2017    multiple polyps, lipoma in proximal transverse colon/hepatic flexure, repeat in one year   • CORONARY ARTERY BYPASS GRAFT     • ENDOSCOPY N/A 3/1/2017    Concepcion's, HH, gastric mucosal atrophy   • INSERT / REPLACE / REMOVE PACEMAKER     • PROSTATE SURGERY          No  current facility-administered medications on file prior to encounter.      Current Outpatient Prescriptions on File Prior to Encounter   Medication Sig Dispense Refill   • finasteride (PROSCAR) 5 MG tablet Take 1 tablet by mouth Daily. 90 tablet 1   • furosemide (LASIX) 40 MG tablet Take 2 tablets by mouth Daily. 90 tablet 2   • isosorbide mononitrate (IMDUR) 30 MG 24 hr tablet Take 0.5 tablets by mouth Daily. 45 tablet 3   • metoprolol tartrate (LOPRESSOR) 100 MG tablet Take 1 tablet by mouth Every 12 (Twelve) Hours. 60 tablet 1   • pantoprazole (PROTONIX) 40 MG EC tablet Take 40 mg by mouth Daily.     • potassium chloride (K-TAB) 20 MEQ tablet controlled-release ER tablet Take 1 tablet by mouth Daily. 90 tablet 3   • Probiotic Product (PROBIOTIC PO) Take 1 tablet by mouth Daily.     • warfarin (COUMADIN) 4 MG tablet Take 1 tablet by mouth Daily. (Patient taking differently: Take 2 mg by mouth Daily.) 30 tablet 1   • atorvastatin (LIPITOR) 40 MG tablet Take 1 tablet by mouth Every Night. 30 tablet 6   • clopidogrel (PLAVIX) 75 MG tablet Take 1 tablet by mouth Daily. 90 tablet 3   • digoxin (LANOXIN) 125 MCG tablet Take 1 tablet by mouth Every Other Day. 15 tablet 1   • nitroglycerin (NITROSTAT) 0.4 MG SL tablet 1 under the tongue as needed for angina, may repeat q5mins for up three doses 35 tablet 0   • warfarin (COUMADIN) 3 MG tablet TAKE ONE TABLET BY MOUTH DAILY 90 tablet 0        ALLERGIES:  No Known Allergies     Social History     Social History   • Marital status:      Spouse name: N/A   • Number of children: N/A   • Years of education: N/A     Occupational History   • controller [employer]     Social History Main Topics   • Smoking status: Former Smoker     Packs/day: 1.00     Years: 25.00     Types: Cigarettes   • Smokeless tobacco: Former User     Quit date: 9/22/1985      Comment: caffeine use   • Alcohol use No   • Drug use: No   • Sexual activity: Defer      Comment:      Other Topics  Concern   • None     Social History Narrative        3 children     Sonlives with him.        Family History   Problem Relation Age of Onset   • Cancer Mother    • Cancer Father    • COPD Father    • Arthritis Sister    • Diabetes Sister    • Hyperlipidemia Sister    • Arthritis Brother    • Diabetes Brother    • Stroke Brother    • Lung cancer Other    • Stroke Other    • No Known Problems Maternal Grandmother    • No Known Problems Maternal Grandfather    • No Known Problems Paternal Grandmother    • No Known Problems Paternal Grandfather         Review of Systems   Constitutional: Positive for activity change, fatigue and fever. Negative for unexpected weight change.   HENT: Negative.    Eyes: Negative for visual disturbance.   Respiratory: Positive for shortness of breath. Negative for choking and chest tightness.    Cardiovascular: Negative for chest pain, palpitations and leg swelling.   Gastrointestinal: Positive for abdominal distention. Negative for blood in stool and nausea.   Musculoskeletal: Positive for gait problem.   Neurological: Positive for weakness. Negative for light-headedness and headaches.   Psychiatric/Behavioral: Positive for confusion. The patient is not nervous/anxious.          Objective     Vitals:    10/10/17 2347 10/11/17 0450 10/11/17 0711 10/11/17 0740   BP: 139/82  149/91    BP Location: Left arm  Right arm    Patient Position: Lying  Lying    Pulse: 84  94    Resp: 16  16    Temp: 98.2 °F (36.8 °C)  96.7 °F (35.9 °C)    TempSrc: Oral  Oral    SpO2: 98%   99%   Weight:  185 lb 5 oz (84.1 kg)     Height:         No flowsheet data found.    Physical Exam    Gen: pleasant elderly man, nad  NEURO: nonfocal but disoriented to place  CV: irreg irreg  CHEST: diminished bs  ABD: soft, nondistended  MS: tr edema  SKIN: minor bruising    RECENT LABS:  Hematology WBC   Date Value Ref Range Status   10/10/2017 9.01 4.50 - 10.70 10*3/mm3 Final     RBC   Date Value Ref Range Status    10/10/2017 4.15 (L) 4.60 - 6.00 10*6/mm3 Final     Hemoglobin   Date Value Ref Range Status   10/10/2017 10.2 (L) 13.7 - 17.6 g/dL Final     Hematocrit   Date Value Ref Range Status   10/10/2017 33.1 (L) 40.4 - 52.2 % Final     Platelets   Date Value Ref Range Status   10/10/2017 278 140 - 500 10*3/mm3 Final      I personally reviewed CT scan of the chest which shows a 4 cm left upper lobe mass between the pleura and cardiac border    Assessment/Plan     This is a poor performance status 82-year-old man with multiple medical comorbidities including coronary artery disease, ischemic cardiomyopathy, chronic kidney disease, diabetes mellitus, atrial fibrillation etc.  He has been found to have a 4 cm mass in the left upper lobe with biopsy showing a non-small cell carcinoma with nonspecific immunophenotype, potentially some neuroendocrine differentiation.  Lung adenocarcinoma markers were negative.  There are small contralateral lung nodules, unclear if related.  There is no evidence of metastatic disease in the abdomen or pelvis with noncontrasted scan.  The patient has been having confusion and a noncontrasted CT of brain does not show evidence of metastatic disease; he cannot receive IV contrast because of CK D and cannot have an MRI of the brain because of a pacemaker placement.    At this time, the lung mass does not appear to be the cause of the patient's multiple recent hospitalizations as he has multiple comorbidities.  His performance status is poor.  He is not a great candidate for chemotherapy.  I recommended that he go to rehabilitation for a couple of weeks and then we arrange a PET scan outpatient to further stage his disease process.  If disease is isolated to the left upper lobe of the lung, radiation therapy may be an option for treatment.  If there is disseminated disease, we could consider further molecular studies and PDL 1 testing to further guide systemic therapy.  I will see him back in clinic  to review the PET results.    I called and discussed the findings and my recommendations with his daughter Norma.  The patient is okay for discharge from oncology perspective.

## 2017-10-11 NOTE — PLAN OF CARE
"Problem: Patient Care Overview (Adult)  Goal: Plan of Care Review  Outcome: Ongoing (interventions implemented as appropriate)    10/11/17 1543   Coping/Psychosocial Response Interventions   Plan Of Care Reviewed With patient   Outcome Evaluation   Outcome Summary/Follow up Plan VSS. Pt still has moments of confusion and states he saw \"nancyn in his room this morning\", MD castillo. Suppository given. Oncology saw pt today. Plan for d/c to rehab tomorrow    Patient Care Overview   Progress no change       Goal: Adult Individualization and Mutuality  Outcome: Ongoing (interventions implemented as appropriate)  Goal: Discharge Needs Assessment  Outcome: Ongoing (interventions implemented as appropriate)    Problem: Infection, Risk/Actual (Adult)  Goal: Infection Prevention/Resolution  Outcome: Ongoing (interventions implemented as appropriate)    Problem: Fall Risk (Adult)  Goal: Absence of Falls  Outcome: Ongoing (interventions implemented as appropriate)    Problem: Urine Elimination, Impaired (Adult)  Goal: Effective Urinary Elimination  Outcome: Ongoing (interventions implemented as appropriate)  Goal: Effective Containment of Urine  Outcome: Ongoing (interventions implemented as appropriate)  Goal: Reduced Incontinence Episodes  Outcome: Ongoing (interventions implemented as appropriate)    Problem: Pressure Ulcer Risk (Jayme Scale) (Adult,Obstetrics,Pediatric)  Goal: Skin Integrity  Outcome: Ongoing (interventions implemented as appropriate)      "

## 2017-10-11 NOTE — PLAN OF CARE
Problem: Patient Care Overview (Adult)  Goal: Plan of Care Review  Outcome: Ongoing (interventions implemented as appropriate)    10/11/17 1116   Coping/Psychosocial Response Interventions   Plan Of Care Reviewed With patient   Outcome Evaluation   Outcome Summary/Follow up Plan Pt increasing with strength and balance with use of RWX   Patient Care Overview   Progress improving

## 2017-10-11 NOTE — PROGRESS NOTES
Name: Aakash Sanchez Jr. ADMIT: 2017   : 1935  PCP: Dimitrios Lindquist MD    MRN: [id number] LOS: 10 days   AGE/SEX: 82 y.o. male  ROOM: [facility]   Subjective   Subjective  CC/Reason for follow-up: confusion  No acute events.  Patient has no new complaints. He has been confused this AM per daughter and RN with occasional hallucinations but has been redirectable.  Taking PO, no n/v/d. +BM today after suppository.    Objective   Vital Signs  Temp:  [96.7 °F (35.9 °C)-98.2 °F (36.8 °C)] 97.1 °F (36.2 °C)  Heart Rate:  [82-94] 82  Resp:  [16-20] 20  BP: (139-152)/(65-91) 140/72  SpO2:  [94 %-99 %] 99 %  on  Flow (L/min):  [2] 2;   O2 Device: nasal cannula  Body mass index is 26.59 kg/(m^2).    Physical Exam  General: Alert. No distress.   Head: NCAT  Mouth/Throat: Oropharynx is clear and moist.   Eyes: EOMI, conjunctivae normal  Neck: supple, no JVD  Cardiovascular: Normal rate, regular rhythm, and intact distal pulses.    Pulmonary/Chest: Effort normal. Clear to ascultation  Abdominal: Soft. Bowel sounds are normal. There is no tenderness.   Musculoskeletal: No dependent edema. No tenderness.   Neurological: Oriented x2, no gross deficits  Skin: Skin is warm and dry. No rash noted.   Psychiatric: Normal mood and affect  Results Review:       I reviewed the patient's new clinical results.    Results from last 7 days  Lab Units 10/10/17  0437 10/09/17  0440 10/06/17  0538 10/05/17  0420   WBC 10*3/mm3 9.01 10.38 6.50 5.79   HEMOGLOBIN g/dL 10.2* 11.7* 10.3* 10.3*   PLATELETS 10*3/mm3 278 342 224 198     Results from last 7 days  Lab Units 10/11/17  0544 10/10/17  0437 10/09/17  0440 10/07/17  0532 10/06/17  0538 10/05/17  0420   SODIUM mmol/L 142 140 138 137 137  137 138   POTASSIUM mmol/L 4.2 3.9 4.2 4.4 4.4  4.4 4.2   CHLORIDE mmol/L 105 105 105 107 106  106 106   CO2 mmol/L 22.4 22.0 19.2* 18.6* 19.0*  19.0* 19.9*   BUN mg/dL 11 13 14 13 15  15 15   CREATININE mg/dL 1.80* 1.94* 2.01* 1.86* 2.01*   2.01* 2.08*   GLUCOSE mg/dL 134* 151* 137* 142* 130*  130* 134*   Estimated Creatinine Clearance: 38.9 mL/min (by C-G formula based on Cr of 1.8).  Results from last 7 days  Lab Units 10/11/17  0544 10/10/17  0437 10/09/17  0440 10/07/17  0532 10/06/17  0538 10/05/17  0420   CALCIUM mg/dL 9.0 8.6 8.6 8.2* 8.3*  8.3* 8.3*   ALBUMIN g/dL  --   --  2.90* 2.60* 2.70*  --    MAGNESIUM mg/dL  --   --  2.0  --  2.2  --    PHOSPHORUS mg/dL  --   --  2.7 2.2* 2.6  --          amiodarone 200 mg Oral Q12H   atorvastatin 40 mg Oral Nightly   digoxin 125 mcg Oral Every Other Day   enoxaparin 1 mg/kg Subcutaneous Q12H   famotidine 20 mg Oral Daily   fluconazole 200 mg Oral Daily   isosorbide mononitrate 15 mg Oral Daily   lactobacillus acidophilus 1 capsule Oral Daily   melatonin 5 mg Oral Nightly   metoprolol tartrate 100 mg Oral Q12H   tamsulosin 0.4 mg Oral Daily   warfarin 4 mg Oral Nightly       Pharmacy to Dose enoxaparin (LOVENOX)    Diet Regular; Cardiac      Assessment/Plan   Assessment:     Active Hospital Problems (** Indicates Principal Problem)    Diagnosis Date Noted   • **UTI (urinary tract infection) due to yeast [N39.0] 10/01/2017   • Cerebellar stroke [I63.9] 10/11/2017   • Urinary retention [R33.9] 10/09/2017   • Mass of upper lobe of left lung [R91.8] 10/02/2017   • CKD (chronic kidney disease), stage III [N18.3] 10/01/2017   • PAU (acute kidney injury) [N17.9] 10/01/2017   • Arteriosclerotic coronary artery disease [I25.10] 10/01/2017   • Metabolic encephalopathy [G93.41] 10/01/2017   • Acidosis [E87.2] 10/01/2017   • Chronic combined systolic and diastolic congestive heart failure [I50.42] 09/05/2017   • Long term current use of anticoagulant [Z79.01] 12/15/2016   • Abdominal aortic aneurysm (AAA) without rupture [I71.4] 12/15/2016   • Type 2 diabetes mellitus with complication [E11.8] 11/23/2016   • COPD (chronic obstructive pulmonary disease) [J44.9] 11/15/2016   • PAF (paroxysmal atrial fibrillation)  [I48.0]       Resolved Hospital Problems    Diagnosis Date Noted Date Resolved   • Wide-complex tachycardia [I47.2] 10/01/2017 10/08/2017       Plan:   UTI due to candida  -on diflucan for a total of 2 weeks, doing better  -appreciate ID recs    Urinary Retention  -oliver placed, to follow-up with urology as outpatient for voiding trial  -flomax started    Lung Mass  -left upper lobe  -biopsied 10/9, showing NSCLC  -no e/o brain mets on CT head, cannot have MRI currently due to recent CRT device  -oncology has seen, to f/u with PET scan in a couple weeks    PAU  -Cr stable  -appreciate nephrology recs    Metabolic Encephalopathy  -appreciate neuro eval  -has had previous stroke and possibly some component of vascular dementia  -UTI and urinary retention being treated  -will give melatonin tonight to hopefully get him some better sleep; would avoid neuroleptics due to his significant cardiac issues  -d/w daughter, explained that some decree of this is expected with hospitalization and that this should improve     PAF/CHF/NSVT  -ICM with EF 30-35%   -NSVT less frequent on metoprolol and amiodarone, some is expected with his HF  -Has BiV/AICD  -therapeutic on coumadin for Afib  -appreciate cardiology recs    DVT Prophylaxis  -therapeutic on coumadin as above      Disposition  Plan discharge to SNF tomorrow.      Ousmane Byrd MD  [facility]  10/11/17  4:04 PM

## 2017-10-11 NOTE — PROGRESS NOTES
Patient Identification:  NAME:  Aakash Sanchez Jr.  Age:  82 y.o.   Sex:  male   :  1935   MRN:  [id number]       Chief complaint: Lung cancer, metabolic encephalopathy    History of present illness: I reviewed his MRI scan from  of this year which showed a left cerebellar stroke he had some sundowning last night gets some when necessary medication but is better today denies any pain paresthesias or paralysis he is on Coumadin and has a history of paroxysmal A. fib    Past medical history:  Past Medical History:   Diagnosis Date   • AAA (abdominal aortic aneurysm)     s/p repair, Dr. Felix   • Acute myocardial infarction    • Altered mental state    • Arteriosclerotic coronary artery disease    • Arthritis    • Atrial fibrillation    • B12 deficiency 2017   • Cardiomyopathy    • Chicken pox    • Chronic combined systolic and diastolic congestive heart failure 2017   • Chronic systolic (congestive) heart failure    • CKD (chronic kidney disease), stage III    • COPD (chronic obstructive pulmonary disease) 11/15/2016   • Coronary artery disease     MI , CABG x 2 V (LIMA-LAD, SVG-PDA),    • Difficulty walking    • Dyspnea    • Dyspnea on exertion 2017   • Fatigue    • Fever and chills    • Heart attack    • Hyperglycemia 3/29/2017   • Hyperlipidemia    • Hypertension    • Imbalance 3/29/2017   • Insomnia 2017   • Ischemic cardiomyopathy     LVEF 40%   • Lung mass 2016    spiculated, being followed   • Lung nodule seen on imaging study    • Malignant neoplasm    • Measles    • Mumps    • Pleural effusion    • Premature ventricular contractions    • Prostate cancer    • PVC (premature ventricular contraction)    • Rash of body 2017   • SOB (shortness of breath)    • Stroke    • Thoracic aortic ectasia    • Urinary tract infection with hematuria 2017   • Vitamin B 12 deficiency        Allergies:  Review of patient's allergies indicates no known allergies.    Home  medications:  Prescriptions Prior to Admission   Medication Sig Dispense Refill Last Dose   • finasteride (PROSCAR) 5 MG tablet Take 1 tablet by mouth Daily. 90 tablet 1 Taking   • furosemide (LASIX) 40 MG tablet Take 2 tablets by mouth Daily. 90 tablet 2 Taking   • isosorbide mononitrate (IMDUR) 30 MG 24 hr tablet Take 0.5 tablets by mouth Daily. 45 tablet 3 Taking   • metoprolol tartrate (LOPRESSOR) 100 MG tablet Take 1 tablet by mouth Every 12 (Twelve) Hours. 60 tablet 1 Taking   • pantoprazole (PROTONIX) 40 MG EC tablet Take 40 mg by mouth Daily.   Taking   • potassium chloride (K-TAB) 20 MEQ tablet controlled-release ER tablet Take 1 tablet by mouth Daily. 90 tablet 3 Taking   • Probiotic Product (PROBIOTIC PO) Take 1 tablet by mouth Daily.   Taking   • warfarin (COUMADIN) 4 MG tablet Take 1 tablet by mouth Daily. (Patient taking differently: Take 2 mg by mouth Daily.) 30 tablet 1 Taking   • atorvastatin (LIPITOR) 40 MG tablet Take 1 tablet by mouth Every Night. 30 tablet 6 Unknown at Unknown time   • clopidogrel (PLAVIX) 75 MG tablet Take 1 tablet by mouth Daily. 90 tablet 3 Unknown at Unknown time   • digoxin (LANOXIN) 125 MCG tablet Take 1 tablet by mouth Every Other Day. 15 tablet 1 Unknown at Unknown time   • nitroglycerin (NITROSTAT) 0.4 MG SL tablet 1 under the tongue as needed for angina, may repeat q5mins for up three doses 35 tablet 0 Taking   • oxyCODONE-acetaminophen (PERCOCET) 5-325 MG per tablet Take 1 tablet by mouth Every 6 (Six) Hours As Needed.   Unknown at Unknown time   • warfarin (COUMADIN) 3 MG tablet TAKE ONE TABLET BY MOUTH DAILY 90 tablet 0 Taking        Hospital medications:    amiodarone 200 mg Oral Q12H   atorvastatin 40 mg Oral Nightly   digoxin 125 mcg Oral Every Other Day   enoxaparin 1 mg/kg Subcutaneous Q12H   famotidine 20 mg Oral Daily   fluconazole 200 mg Oral Daily   isosorbide mononitrate 15 mg Oral Daily   lactobacillus acidophilus 1 capsule Oral Daily   metoprolol  tartrate 100 mg Oral Q12H   tamsulosin 0.4 mg Oral Daily   warfarin 4 mg Oral Nightly       Pharmacy to Dose enoxaparin (LOVENOX)      •  acetaminophen  •  bisacodyl  •  LORazepam **OR** LORazepam  •  nitroglycerin  •  ondansetron **OR** ondansetron ODT **OR** ondansetron  •  oxyCODONE-acetaminophen  •  Pharmacy to Dose enoxaparin (LOVENOX)  •  polyethylene glycol  •  sodium chloride      Objective:  Vitals Ranges:   Temp:  [96.7 °F (35.9 °C)-98.2 °F (36.8 °C)] 97.1 °F (36.2 °C)  Heart Rate:  [82-94] 82  Resp:  [16-20] 20  BP: (139-152)/(65-91) 140/72      Physical Exam: Patient is awake nose he is at [facility] is a little slow to answer questions normal cranial nerves II through VII good  strength could tell wiggle reflexes 1+ symmetrical post downgoing bilaterally    Results review:   I reviewed the patient's new clinical results.    Data review:  Lab Results (last 24 hours)     Procedure Component Value Units Date/Time    Protime-INR [[id number]]  (Abnormal) Collected:  10/11/17 0544    Specimen:  Blood Updated:  10/11/17 0634     Protime 17.1 (H) Seconds      INR 1.45 (H)    Basic Metabolic Panel [[id number]]  (Abnormal) Collected:  10/11/17 0544    Specimen:  Blood Updated:  10/11/17 0652     Glucose 134 (H) mg/dL      BUN 11 mg/dL      Creatinine 1.80 (H) mg/dL      Sodium 142 mmol/L      Potassium 4.2 mmol/L      Chloride 105 mmol/L      CO2 22.4 mmol/L      Calcium 9.0 mg/dL      eGFR Non African Amer 36 (L) mL/min/1.73      BUN/Creatinine Ratio 6.1 (L)     Anion Gap 14.6 mmol/L     Narrative:       The MDRD GFR formula is only valid for adults with stable renal function between ages 18 and 70.    Fecal Lactoferrin - Stool, Per Rectum [[id number]]  (Abnormal) Collected:  10/11/17 1152    Specimen:  Stool from Per Rectum Updated:  10/11/17 1258     Lactoferrin, Qual Positive (A)           Imaging:  Imaging Results (last 24 hours)     Procedure Component Value Units Date/Time    CT Needle Biopsy Lung  [[id number]] Collected:  10/09/17 1600     Updated:  10/10/17 1718    Narrative:       CT-GUIDED BIOPSY OF LEFT LUNG MASS 10/09/2017     HISTORY: Left lung mass.     TECHNIQUE: After signed informed consent was obtained, the patient was  prepped and draped in the supine position. Lidocaine was used for local  anesthesia.     An 18-gauge biopsy device was used to obtain several core specimens of  the soft tissue mass in the lingula. Confirmatory images were obtained.  Patient tolerated the procedure well with no complications.     Radiation dose reduction techniques were utilized, including automated  exposure control and exposure modulation based on body size.     This report was finalized on 10/10/2017 5:15 PM by Dr. Kelby Espana MD.                Assessment and Plan:     Principal Problem:    UTI (urinary tract infection) due to yeast  Active Problems:    PAF (paroxysmal atrial fibrillation)    COPD (chronic obstructive pulmonary disease)    Type 2 diabetes mellitus with complication    Abdominal aortic aneurysm (AAA) without rupture    Long term current use of anticoagulant    Chronic combined systolic and diastolic congestive heart failure    CKD (chronic kidney disease), stage III    PAU (acute kidney injury)    Arteriosclerotic coronary artery disease    Metabolic encephalopathy    Acidosis    Mass of upper lobe of left lung    Urinary retention    He has been having some recurrent sundowning and still has some metabolic encephalopathy now.  Nonetheless his primary problem will be his newly diagnosed lung cancer.      I will check hemoglobin A1c and lipid panel.  It is noted he is already on Coumadin which is perfect for him with respect to stroke and history of paroxysmal A. Fib. And his left cerebellar stroke that occurred in June.  Yours no evidence of recurrent stroke at this time    Fady Driscoll MD  10/11/17  3:22 PM

## 2017-10-11 NOTE — PROGRESS NOTES
LOS: 10 days   Patient Care Team:  Dimitrios Lindquist MD as PCP - General (Internal Medicine)    Chief Complaint:     F/u a fib    Interval History:     He is still confused. He denies cp, dyspnea, palps and tachy. He has no edema    Lung mass is NSLC.     Objective   Vital Signs  Temp:  [96.6 °F (35.9 °C)-98.2 °F (36.8 °C)] 96.7 °F (35.9 °C)  Heart Rate:  [84-94] 94  Resp:  [16-20] 16  BP: (121-152)/(65-91) 149/91    Intake/Output Summary (Last 24 hours) at 10/11/17 0858  Last data filed at 10/11/17 0740   Gross per 24 hour   Intake                0 ml   Output              950 ml   Net             -950 ml       Comfortable NAD  Neck supple, no JVD or thyromegaly appreciated  S1/S2 RRR, no m/r/g  Lungs CTA B, normal effort  Abdomen S/NT/ND (+) BS, no HSM appreciated  Extremities warm, no clubbing, cyanosis, or edema  No visible or palpable skin lesions  A/Ox4, mood and affect appropriate    Results Review:        Results from last 7 days  Lab Units 10/11/17  0544 10/10/17  0437 10/09/17  0440   SODIUM mmol/L 142 140 138   POTASSIUM mmol/L 4.2 3.9 4.2   CHLORIDE mmol/L 105 105 105   CO2 mmol/L 22.4 22.0 19.2*   BUN mg/dL 11 13 14   CREATININE mg/dL 1.80* 1.94* 2.01*   GLUCOSE mg/dL 134* 151* 137*   CALCIUM mg/dL 9.0 8.6 8.6           Results from last 7 days  Lab Units 10/10/17  0437 10/09/17  0440 10/06/17  0538   WBC 10*3/mm3 9.01 10.38 6.50   HEMOGLOBIN g/dL 10.2* 11.7* 10.3*   HEMATOCRIT % 33.1* 36.7* 33.6*   PLATELETS 10*3/mm3 278 342 224       Results from last 7 days  Lab Units 10/11/17  0544 10/10/17  0437 10/09/17  0440   INR  1.45* 1.44* 1.46*           Results from last 7 days  Lab Units 10/09/17  0440   MAGNESIUM mg/dL 2.0           I reviewed the patient's new clinical results.  I personally viewed and interpreted the patient's EKG/Telemetry data        Medication Review:     amiodarone 200 mg Oral Q12H   atorvastatin 40 mg Oral Nightly   digoxin 125 mcg Oral Every Other Day   enoxaparin 1 mg/kg  Subcutaneous Q12H   famotidine 20 mg Oral Daily   fluconazole 200 mg Oral Daily   isosorbide mononitrate 15 mg Oral Daily   lactobacillus acidophilus 1 capsule Oral Daily   metoprolol tartrate 100 mg Oral Q12H   tamsulosin 0.4 mg Oral Daily   warfarin 3 mg Oral Nightly         Pharmacy to Dose enoxaparin (LOVENOX)        Assessment/Plan     Principal Problem:    UTI (urinary tract infection) due to yeast  Active Problems:    PAF (paroxysmal atrial fibrillation)    COPD (chronic obstructive pulmonary disease)    Type 2 diabetes mellitus with complication    Abdominal aortic aneurysm (AAA) without rupture    Long term current use of anticoagulant    Chronic combined systolic and diastolic congestive heart failure    CKD (chronic kidney disease), stage III    PAU (acute kidney injury)    Arteriosclerotic coronary artery disease    Metabolic encephalopathy    Acidosis    Mass of upper lobe of left lung    Urinary retention    1.  candida UTI, on diflucan  2.  NSVT -- known history of severe CAD and ischemic cardiomyopathy (EF 30-35%), so some NSVT is to be expected, but he's having even more in the setting of acute illness.  He really has frequent salvos of this.  He's on a good dose of metoprolol, and oral amiodarone was added yesterday. Biv/AICD with normal function per Dr. Terrazas.  He is also have bursts of a fib with aberrancy.   3.  PAF -- currently not an issue.  On warfarin with inr target of 2-3.   4.  CAD with chronic angina -- no issue overnight. He's on clopidogrel but not aspirin as he's anticoagulated.  5.  ICM / chronic systolic CHF -- lying flat, no oxygen requirement.  Resume oral diuretic when appropriate.  6. Lung mass is NSLC but neuroendocrine suspected, head ct negative for met disease.   7. Abdominal pain with abdominal fullness, getting better. CT abd and pelvis was unremarkable   8. Fever, resolved - blood culture negative       Will see as needed, f/u in 6 weeks.           Carolyn DONALD  MD Ashley  10/11/17  8:58 AM

## 2017-10-12 NOTE — PROGRESS NOTES
Case Management Discharge Note    Final Note: PT DC'd. Confirmed with [facility] that bed is available today. Son requests ambulance. Had discussed with daughter Norma [facility]. First available is at 1;00. Pt is confused and on new O2 at 2l. DC summary faxed. Rx copied and placed in packet.. Plan is skilled bed at [place], by [facility] ambulance.....[facility]    Discharge Placement     Facility/Agency Request Status Selected? Address Phone Number Fax Number    [facility] Accepted    Yes [address] [address] [phone]    [facility] Accepted     [address] [address] [phone]        Ambulance: Yellow    Discharge Codes: 03  Discharged/transferred to skilled nursing facility (SNF) with Medicare certification in anticipation of skilled care

## 2017-10-12 NOTE — PROGRESS NOTES
"Cc retention  Doing well with oliver for dc soon     LOS: 11 days   Patient Care Team:  Dimitrios Lindquist MD as PCP - General (Internal Medicine)        Subjective     Altered Mental Status         Subjective      Objective     Vital Signs  Temp:  [96.7 °F (35.9 °C)-97.7 °F (36.5 °C)] 97.7 °F (36.5 °C)  Heart Rate:  [82-94] 84  Resp:  [16-20] 18  BP: (140-162)/(72-97) 159/94    Objective:  General Appearance:  Comfortable and well-appearing.    Vital signs: (most recent): Blood pressure 159/94, pulse 84, temperature 97.7 °F (36.5 °C), temperature source Oral, resp. rate 18, height 70\" (177.8 cm), weight 187 lb (84.8 kg), SpO2 99 %.  Vital signs are normal.    Output: Producing urine.    HEENT: Normal HEENT exam.    Lungs:  Normal respiratory rate and normal effort.    Abdomen: Abdomen is soft and flat.              Results Review:  New clinical results reviewed.        Assessment/Plan     Principal Problem:    UTI (urinary tract infection) due to yeast  Active Problems:    PAF (paroxysmal atrial fibrillation)    COPD (chronic obstructive pulmonary disease)    Type 2 diabetes mellitus with complication    Abdominal aortic aneurysm (AAA) without rupture    Long term current use of anticoagulant    Chronic combined systolic and diastolic congestive heart failure    CKD (chronic kidney disease), stage III    PAU (acute kidney injury)    Arteriosclerotic coronary artery disease    Metabolic encephalopathy    Acidosis    Mass of upper lobe of left lung    Urinary retention    Cerebellar stroke      Assessment:  (Retention   Urine is clear voiding trial in two weeks.  My office will call him for fu appt.cont flomax).       Bryan Bernstein MD  10/12/17  6:19 AM            "

## 2017-10-12 NOTE — PROGRESS NOTES
Case Management Discharge Note    Final Note: PT DC'd. Confirmed with [facility] that bed is available today. Son requests ambulance. Had discussed with daughter Norma that [facility]. [facility]. First available is at 1;00. Pt is confused and on new O2 at 2l. DC summary faxed. Rx copied and placed in packet.. Plan is skilled bed at [place], by [facility] ambulance.....[facility]    Discharge Placement     Facility/Agency Request Status Selected? Address Phone Number Fax Number    [facility] Accepted    Yes [address] [facility] [address] [phone]    [facility]     [address] [address] [phone]

## 2017-10-12 NOTE — PROGRESS NOTES
BGA/GI Progress Note   Chief Complaint:  abd distention, diarrhea and then constipation    Subjective     Interval History: Has not had abdominal pain for several days, no longer with any distention.  No diarrhea in several days, actually no BM as of yesterday in 3 days.  Supp given with large brown BM as result.  Not eating much.  More confused over last several days.  Neurology notes reviewed.  Unable to obtain MRI head to assess for met disease or acute neuro issue with hx of pacemaker.      History taken from: patient chart RN    Review of Systems:    The following systems were reviewed and negative;  gastrointestinal    Objective     Vital Signs  Temp:  [96.6 °F (35.9 °C)-97.7 °F (36.5 °C)] 96.6 °F (35.9 °C)  Heart Rate:  [82-84] 84  Resp:  [16-20] 16  BP: (132-162)/(66-97) 132/66  Body mass index is 26.83 kg/(m^2).    Intake/Output Summary (Last 24 hours) at 10/12/17 1010  Last data filed at 10/11/17 1800   Gross per 24 hour   Intake              360 ml   Output              350 ml   Net               10 ml          Physical Exam:   General: patient awake, alert and cooperative   Eyes: Normal lids and lashes, no scleral icterus   Neck: supple, normal ROM, no tracheal deviation   Skin: warm and dry, not jaundiced   Cardiovascular: regular rhythm and rate, no murmurs auscultated   Pulm: clear to auscultation bilaterally, regular and unlabored   Abdomen: soft, round, nontender, nondistended; normal bowel sounds   Rectal: deferred   Extremities: no rash or edema   Neurologic: Periods of confusion, oriented to name and place but confused to recent events    All Medications Have Been Reviewed     Results Review:       Results from last 7 days  Lab Units 10/10/17  0437 10/09/17  0440 10/06/17  0538   WBC 10*3/mm3 9.01 10.38 6.50   HEMOGLOBIN g/dL 10.2* 11.7* 10.3*   HEMATOCRIT % 33.1* 36.7* 33.6*   PLATELETS 10*3/mm3 278 342 224         Results from last 7 days  Lab Units 10/12/17  0425 10/11/17  0544  10/10/17  0437 10/09/17  0440   SODIUM mmol/L 140 142 140 138   POTASSIUM mmol/L 3.9 4.2 3.9 4.2   CHLORIDE mmol/L 104 105 105 105   CO2 mmol/L 22.4 22.4 22.0 19.2*   BUN mg/dL 12 11 13 14   CREATININE mg/dL 1.81* 1.80* 1.94* 2.01*   CALCIUM mg/dL 8.8 9.0 8.6 8.6   BILIRUBIN mg/dL  --   --   --  0.5   ALK PHOS U/L  --   --   --  55   ALT (SGPT) U/L  --   --   --  24   AST (SGOT) U/L  --   --   --  27   GLUCOSE mg/dL 124* 134* 151* 137*         Results from last 7 days  Lab Units 10/12/17  0425 10/11/17  0544 10/10/17  0437   INR  1.67* 1.45* 1.44*       RADIOLOGY:    Imaging Results (last 72 hours)     Procedure Component Value Units Date/Time    CT Head Without Contrast [[id number]] Collected:  10/09/17 1236     Updated:  10/09/17 1334    Narrative:       CT HEAD WITHOUT CONTRAST     HISTORY: Lung mass.     A noncontrasted CT examination of the head was performed.     FINDINGS: The brain and ventricles are symmetrical. There is moderate  atrophy. No focal area of decreased attenuation to suggest acute  infarction or vasogenic edema is identified. Areas of decreased  attenuation are appreciated involving the white matter of the cerebral  hemispheres bilaterally, nonspecific but suggesting mild to moderate  small vessel ischemic changes.       Impression:       Atrophy and small vessel ischemic disease. No evidence of  acute infarction. No obvious metastatic disease is identified on this  noncontrasted MRI examination of the brain. Metastatic disease could not  be excluded. Further evaluation could be performed with a MRI  examination of the brain with and without contrast.     Radiation dose reduction techniques were utilized, including automated  exposure control and exposure modulation based on body size.     This report was finalized on 10/9/2017 1:29 PM by Dr. Asif Herrera MD.       CT Needle Biopsy Lung [[id number]] Collected:  10/09/17 1600     Updated:  10/10/17 1718    Narrative:       CT-GUIDED BIOPSY OF  LEFT LUNG MASS 10/09/2017     HISTORY: Left lung mass.     TECHNIQUE: After signed informed consent was obtained, the patient was  prepped and draped in the supine position. Lidocaine was used for local  anesthesia.     An 18-gauge biopsy device was used to obtain several core specimens of  the soft tissue mass in the lingula. Confirmatory images were obtained.  Patient tolerated the procedure well with no complications.     Radiation dose reduction techniques were utilized, including automated  exposure control and exposure modulation based on body size.     This report was finalized on 10/10/2017 5:15 PM by Dr. Kelby Espana MD.             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.1, Z16.21   • Type 2 diabetes mellitus with complication E11.8   • Diarrhea R19.7   • Abdominal aortic aneurysm (AAA) without rupture I71.4   • Long term current use of anticoagulant Z79.01   • Urinary tract infection with hematuria N39.0, R31.9   • CKD (chronic kidney disease), stage III N18.3   • Hypotension I95.9   • Hyperglycemia R73.9   • Imbalance R26.89   • Concepcion's esophagus without dysplasia K22.70   • Colon polyps K63.5   • B12 deficiency E53.8   • Cerebrovascular accident (CVA) I63.9   • History of coronary artery stent placement Z95.5   • Rash of body R21   • Insomnia G47.00   • Cardiomyopathy I42.9   • Benign non-nodular prostatic hyperplasia with lower urinary tract symptoms N40.1   • Prostate hypertrophy N40.0   • Chronic combined systolic and diastolic congestive heart failure I50.42   • Pneumonia J18.9   • Sepsis A41.9   •  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  No abd pain.  Had BM today after suppository    abd - soft, nontender +bs    Labs reviewed by me      Assessment:  1) Diarrhea- resolved.  Supp yesterday with large brown BM.  2) Concepcion's- by hx.  EGD 3/2017 with short segment Concepcion's  3) Colon polyps- by hx, c-scope 3/2017 with TA and HP per path  4) Lung Mass- bx on 10/9 path c/w NSCL CA, oncology consult pending    Plan:  - his diarrhea has resolved, he can use supp as needed - would avoid laxatives as they may precipitate return of diarrhea.  Would add fiber supplement  - he is not due for any surveillance at this time - further Concepcion's surveillance and screening colonoscopy to de determined based on his health at the time testing is due  - no further recs at this time - will sign off but are available as needed

## 2017-10-12 NOTE — DISCHARGE SUMMARY
Date of Admission: 9/30/2017  Date of Discharge:  10/12/2017  Primary Care Physician: Dimitrios Lindquist MD     Discharge Diagnosis:  Active Hospital Problems (** Indicates Principal Problem)    Diagnosis Date Noted   • **UTI (urinary tract infection) due to yeast [N39.0] 10/01/2017   • Cerebellar stroke [I63.9] 10/11/2017   • Urinary retention [R33.9] 10/09/2017   • Mass of upper lobe of left lung [R91.8] 10/02/2017   • CKD (chronic kidney disease), stage III [N18.3] 10/01/2017   • PAU (acute kidney injury) [N17.9] 10/01/2017   • Arteriosclerotic coronary artery disease [I25.10] 10/01/2017   • Metabolic encephalopathy [G93.41] 10/01/2017   • Acidosis [E87.2] 10/01/2017   • Chronic combined systolic and diastolic congestive heart failure [I50.42] 09/05/2017   • Long term current use of anticoagulant [Z79.01] 12/15/2016   • Abdominal aortic aneurysm (AAA) without rupture [I71.4] 12/15/2016   • Type 2 diabetes mellitus with complication [E11.8] 11/23/2016   • COPD (chronic obstructive pulmonary disease) [J44.9] 11/15/2016   • PAF (paroxysmal atrial fibrillation) [I48.0]       Resolved Hospital Problems    Diagnosis Date Noted Date Resolved   • Wide-complex tachycardia [I47.2] 10/01/2017 10/08/2017       Presenting Problem/History of Present Illness:  Fever and chills [R50.9]  Altered mental status, unspecified altered mental status type [R41.82]     Hospital Course:  The patient is a 82 y.o. male who presented with confusion and was found to have sepsis due to a UTI with fever and elevated heart rate.  Please see admission H&P from 10/1/17 for further details.  On admission, he was started on antibiotics which were narrowed to diflucan after his urine grew only candida.  He improved on this and he is to continue diflucan through 10/20/17 per infectious disease recommendations.  He also had some urinary retention for which a oliver catheter was placed.  Dr. Wan with urology followed the patient and recommended that he  be discharged with the oliver catheter on flomax with plans for an outpatient voiding trial in a couple weeks.    On admission, he was noted to have an PAU on CKD with previous baseline Cr of 1.6-1.8, 2.3 on admission. His diuretics were held and he was given some IVF, after which his Creatinine stabilized around 1.8.  Nephrology and cardiology both saw the patient given his CKD and CHF (EF 30-35% with recent AICD).  He remained stable off diuretics for several days.  His lasix was decreased at the time of discharge and his volume status will need to be followed closely.  He also had some increased NSVT, which was expected given his CHF.  Cardiology put him on amiodarone in addition to his beta blocker which improved this.  On initial workup the patient was noted to have a left upper lobe mass on initial workup.  Dr. Simon with pulmonology was consulted and he recommended a CT-guided biopsy of the mass, which was done on 10/9/17 and showed NSCLC.  Dr. Louis with Oncology was consulted and he recommended an outpatient PET scan when he is closer to completing rehab as this and his functional status will dictate treatment options.  The patient did have some confusion throughout hospitalization which was waxing and waning.  Dr. Driscoll with neurology saw the patient and he felt that this was metabolic encephalopathy.  This did improve but the patient still had occasional moments of confusion.  He was given melatonin for sleep which seemed to help.  Neuroleptics should be avoided if possible due to the patient's significant cardiac issues.  Given his lung mass a head CT was also checked and showed no evidence of metastasis.  Unfortunately he cannot have an MRI for at least 6 weeks following his AICD placement.    Of note, his coumadin (given chronically for afib and history of stroke) had to be held for his lung biopsy.  He was placed back on coumadin with a lovenox bridge.  This will need to be followed closely over  "the next few days as an outpatient.    Exam Today:  Blood pressure 132/66, pulse 84, temperature 96.6 °F (35.9 °C), temperature source Oral, resp. rate 16, height 70\" (177.8 cm), weight 187 lb (84.8 kg), SpO2 99 %.  General: Alert. No distress.   Head: NCAT  Mouth/Throat: Oropharynx is clear and moist.   Eyes: EOMI, conjunctivae normal  Neck: supple, no JVD  Cardiovascular: Normal rate, regular rhythm, and intact distal pulses.    Pulmonary/Chest: Effort normal. Clear to ascultation  Abdominal: Soft. Bowel sounds are normal. There is no tenderness.   Musculoskeletal: No dependent edema. No tenderness.   Neurological: Oriented x2, no gross deficits  Skin: Skin is warm and dry. No rash noted.   Psychiatric: Normal mood and affect    Procedures Performed:  CT-GUIDED BIOPSY OF LEFT LUNG MASS 10/09/2017      HISTORY: Left lung mass.      TECHNIQUE: After signed informed consent was obtained, the patient was  prepped and draped in the supine position. Lidocaine was used for local  anesthesia.      An 18-gauge biopsy device was used to obtain several core specimens of  the soft tissue mass in the lingula. Confirmatory images were obtained.  Patient tolerated the procedure well with no complications.      Radiation dose reduction techniques were utilized, including automated  exposure control and exposure modulation based on body size.      This report was finalized on 10/10/2017 5:15 PM by Dr. Kelby Espana MD.       Consults:   Consults     Date and Time Order Name Status Description    10/10/2017 1718 Inpatient Consult to Hematology & Oncology Completed     10/8/2017 1927 Inpatient Consult to Neurology      10/7/2017 1544 Inpatient Consult to Gastroenterology Completed     10/2/2017 1551 Inpatient Consult to Neurology Completed     10/2/2017 0955 Inpatient Consult to Pulmonology Completed     10/2/2017 0909 Inpatient Consult to Cardiac Electrophysiologist      10/1/2017 1327 Inpatient Consult to Infectious Diseases " Completed     10/1/2017 1153 Inpatient Consult to Cardiology      10/1/2017 1152 Inpatient Consult to Urology Completed     10/1/2017 0443 Inpatient Consult to Nephrology Completed     10/1/2017 0052 [facility] (on-call MD unless specified) Completed     9/12/2017 2050 Inpatient Consult to Urology Completed     9/12/2017 2050 Inpatient Consult to Cardiology Completed     9/12/2017 1911 [facility] (on-call MD unless specified) Completed            Discharge Disposition:  Skilled Nursing Facility (DC - External)    Discharge Medications:   Aakash Sanchez Jr.   Home Medication Instructions KENTRELL:[id number]    Printed on:10/12/17 1052   Medication Information                      amiodarone (PACERONE) 200 MG tablet  Take 1 tablet by mouth Every 12 (Twelve) Hours.             atorvastatin (LIPITOR) 40 MG tablet  Take 1 tablet by mouth Every Night.             bisacodyl (DULCOLAX) 10 MG suppository  Insert 1 suppository into the rectum Daily As Needed for Constipation.             clopidogrel (PLAVIX) 75 MG tablet  Take 1 tablet by mouth Daily.             digoxin (LANOXIN) 125 MCG tablet  Take 1 tablet by mouth Every Other Day.             enoxaparin (LOVENOX) 100 MG/ML solution syringe  Inject 0.9 mL under the skin Every 12 (Twelve) Hours.             famotidine (PEPCID) 20 MG tablet  Take 1 tablet by mouth Daily.             finasteride (PROSCAR) 5 MG tablet  Take 1 tablet by mouth Daily.             fluconazole (DIFLUCAN) 200 MG tablet  Take 1 tablet by mouth Daily for 8 days. Indications: Urinary Tract Infection             furosemide (LASIX) 40 MG tablet  Take 1 tablet by mouth Every Other Day.             isosorbide mononitrate (IMDUR) 30 MG 24 hr tablet  Take 0.5 tablets by mouth Daily.             melatonin 5 MG tablet tablet  Take 1 tablet by mouth Every Night.             metoprolol tartrate (LOPRESSOR) 100 MG tablet  Take 1 tablet by mouth Every 12 (Twelve) Hours.             nitroglycerin (NITROSTAT) 0.4 MG SL tablet  1  under the tongue as needed for angina, may repeat q5mins for up three doses             oxyCODONE-acetaminophen (PERCOCET) 5-325 MG per tablet  Take 1 tablet by mouth Every 6 (Six) Hours As Needed for Moderate Pain  or Severe Pain .             polyethylene glycol (MIRALAX) packet  Take 17 g by mouth Daily.             potassium chloride (K-TAB) 20 MEQ tablet controlled-release ER tablet  Take 1 tablet by mouth Daily.             Probiotic Product (PROBIOTIC PO)  Take 1 tablet by mouth Daily.             tamsulosin (FLOMAX) 0.4 MG capsule 24 hr capsule  Take 1 capsule by mouth Daily.             warfarin (COUMADIN) 4 MG tablet  Take 1 tablet by mouth Daily.                 Discharge Diet:   Diet Instructions     Diet: Cardiac; Thin       Discharge Diet:  Cardiac   Fluid Consistency:  Thin                 Activity at Discharge:   Activity Instructions     Activity as Tolerated                     Follow-up Appointments:  Future Appointments  Date Time Provider Department Center   10/17/2017 9:00 AM PACEART IN OFFICE, [facility] CD [facility] None   10/17/2017 9:40 AM HELEN BustamanteK CD LCGKR None   10/25/2017 7:30 AM [facility]ATA PET ADMIN RM 1 BH RENAAT PET [facility]ENATA   10/25/2017 9:00 AM [facility]ATA PET 1 BH [facility]ENATA PET RENTAA   11/1/2017 9:10 AM LAB CHAIR 6 CBC [facility]  LAB [facility]RES LAG   11/1/2017 9:20 AM Terrie Ivey LCSW [facility]KAL[facility] CBC KRES BH CBC Renata   11/1/2017 9:40 AM Manoj Louis MD MGK CBC KRES BH CBC [facility]   2/19/2018 12:45 PM Carolyn Ramirez MD MGK CD LCGLA None   6/11/2018 2:30 PM HELEN Menendez [facility]KAL[facility] N KRESGE None     Additional Instructions for the Follow-ups that You Need to Schedule     Additional Discharge Follow-Up (Specify Provider)    As directed    To:  Dr. Ervin Rmaon (Nephrology)   Follow Up:  2 Weeks       Additional Discharge Follow-Up (Specify Provider)    As directed    To:  Dr. Louis (Oncology)   Follow Up:  2 Weeks   Follow Up Details:  2-3 wks with PET scan after rehab       Additional Discharge  Follow-Up (Specify Provider)    As directed    To:  Dr. Smith (Pulmonology)   Follow Up:  6 Weeks       Discharge Follow-Up With Specified Provider    As directed    To:  Dr. Wan (Urology)   Follow Up:  2 Weeks   Follow Up Details:  office will call with appointment       Discharge Follow-up with Specialty    As directed    Specialty:  Cardiology ([facility])   Follow Up:  1 Month                 Test Results Pending at Discharge:       Ousmane Byrd MD  10/12/17  10:52 AM    Time Spent on Discharge Activities: Greater than 30 minutes.

## 2017-10-12 NOTE — PLAN OF CARE
Problem: Patient Care Overview (Adult)  Goal: Plan of Care Review  Outcome: Ongoing (interventions implemented as appropriate)    10/12/17 0425   Coping/Psychosocial Response Interventions   Plan Of Care Reviewed With patient   Outcome Evaluation   Outcome Summary/Follow up Plan tylenol given 1x for pain, confused at times, on 2L NC, plan for discharge to [place] in AM, VSS, will continue to monitor   Patient Care Overview   Progress no change         Problem: Infection, Risk/Actual (Adult)  Goal: Infection Prevention/Resolution  Outcome: Ongoing (interventions implemented as appropriate)    Problem: Fall Risk (Adult)  Goal: Absence of Falls  Outcome: Ongoing (interventions implemented as appropriate)    Problem: Urine Elimination, Impaired (Adult)  Goal: Effective Urinary Elimination  Outcome: Ongoing (interventions implemented as appropriate)  Goal: Effective Containment of Urine  Outcome: Ongoing (interventions implemented as appropriate)  Goal: Reduced Incontinence Episodes  Outcome: Ongoing (interventions implemented as appropriate)    Problem: Pressure Ulcer Risk (Jayme Scale) (Adult,Obstetrics,Pediatric)  Goal: Skin Integrity  Outcome: Ongoing (interventions implemented as appropriate)

## 2017-10-12 NOTE — PROGRESS NOTES
[facility]    Physicians Statement of Medical Necessity for Ambulance Transportation    It is medically necessary for:    Patient Name: Aakash Sanchez Jr.    Insurance Information:  Medicare       To be transported by ambulance:    From (if nursing facility, specify level of care: skilled, skilled nursing, etc): [facility]    To (specify level of care if nursing facility): [facility]    Date of Service: 10/12/17    For dialysis patients state date dialysis began: NA    Diagnosis: Sepsis    Past Medical/Surgical History:  Past Medical History:   Diagnosis Date   • AAA (abdominal aortic aneurysm)     s/p repair, Dr. Felix   • Acute myocardial infarction    • Altered mental state    • Arteriosclerotic coronary artery disease    • Arthritis    • Atrial fibrillation    • B12 deficiency 6/1/2017   • Cardiomyopathy    • Chicken pox    • Chronic combined systolic and diastolic congestive heart failure 9/5/2017   • Chronic systolic (congestive) heart failure    • CKD (chronic kidney disease), stage III    • COPD (chronic obstructive pulmonary disease) 11/15/2016   • Coronary artery disease     MI 1990's, CABG x 2 V (LIMA-LAD, SVG-PDA), 1997   • Difficulty walking    • Dyspnea    • Dyspnea on exertion 8/25/2017   • Fatigue    • Fever and chills    • Heart attack    • Hyperglycemia 3/29/2017   • Hyperlipidemia    • Hypertension    • Imbalance 3/29/2017   • Insomnia 8/25/2017   • Ischemic cardiomyopathy     LVEF 40%   • Lung mass 04/18/2016    spiculated, being followed   • Lung nodule seen on imaging study    • Malignant neoplasm    • Measles    • Mumps    • Pleural effusion    • Premature ventricular contractions    • Prostate cancer    • PVC (premature ventricular contraction)    • Rash of body 6/21/2017   • SOB (shortness of breath)    • Stroke    • Thoracic aortic ectasia    • Urinary tract infection with hematuria 1/18/2017   • Vitamin B 12 deficiency       Past Surgical History:   Procedure Laterality Date   •  APPENDECTOMY     • ARTERIAL ANEURYSM REPAIR     • BRONCHOSCOPY Right 10/5/2016    Procedure: BRONCHOSCOPY;  Surgeon: Jhony Quintanilla MD;  [facility];  Service:    • BRONCHOSCOPY N/A 11/29/2016    Procedure: BRONCHOSCOPY WITH FLUORO w/biopsy AND BAL;  Surgeon: Ervin Stiles MD;  [facility];  Service:    • CARDIAC CATHETERIZATION N/A 6/7/2017    Procedure: Left Heart Cath;  Surgeon: Vincent Middleton MD;  [facility];  Service:    • CARDIAC CATHETERIZATION N/A 6/7/2017    Procedure: Coronary angiography;  Surgeon: Vincent Middleton MD;  Location: [facility] CATH INVASIVE LOCATION;  Service:    • CARDIAC CATHETERIZATION N/A 6/7/2017    Procedure: Left ventriculography;  Surgeon: Vincent Middleton MD;  Location: [facility] CATH INVASIVE LOCATION;  Service:    • CARDIAC CATHETERIZATION N/A 6/7/2017    Procedure: Bypass graft study;  Surgeon: Vincent Middleton MD;  Location: [facility] CATH INVASIVE LOCATION;  Service:    • CARDIAC CATHETERIZATION N/A 6/7/2017    Procedure: Native mammary injection;  Surgeon: Vincent Middleton MD;  Location: [facility] CATH INVASIVE LOCATION;  Service:    • CARDIAC CATHETERIZATION N/A 6/7/2017    Procedure: Stent NYA bypass graft;  Surgeon: Vincent Middleton MD;  Location: [facility] CATH INVASIVE LOCATION;  Service:    • CARDIAC DEFIBRILLATOR PLACEMENT     • CARDIAC ELECTROPHYSIOLOGY PROCEDURE N/A 9/19/2017    Procedure: ICD SC new- Bi Ventricular  CRT-D  MEDTRONIC;  Surgeon: Fady Terrazas MD;  Location: [facility] CATH INVASIVE LOCATION;  Service:    • CARDIAC SURGERY     • CHOLECYSTECTOMY     • COLONOSCOPY N/A 12/16/2016    Procedure: COLONOSCOPY TO ASC COLON  WITH BIOPSY AND POLYPECTOMY;  Surgeon: Lane Brian MD;  Location: [facility] ENDOSCOPY;  Service:    • COLONOSCOPY N/A 3/1/2017    multiple polyps, lipoma in proximal transverse colon/hepatic flexure, repeat in one year   • CORONARY ARTERY BYPASS GRAFT     • ENDOSCOPY N/A 3/1/2017    Concepcion's, HH, gastric  mucosal atrophy   • INSERT / REPLACE / REMOVE PACEMAKER     • PROSTATE SURGERY          Current Objective Medical Evidence(including physical exam finding to support reason for limitations):    Immobilization syndrome  Confused/combative: may require restraints  Requires oxygen    Other: NA    Physician Signature:           (RN,NP,PA,CAN, Discharge Planner) Date/Time: 10/12/17     Printed Name:    __________________________________    [facility]   Phone: [phone] Phone: [phone] Phone: [phone]   Fax: [phone] Fax: [phone] Fax: [phone]

## 2017-11-01 PROBLEM — C34.12 MALIGNANT NEOPLASM OF BRONCHUS OF LEFT UPPER LOBE (HCC): Status: ACTIVE | Noted: 2017-01-01

## 2017-11-01 PROBLEM — R91.8 MASS OF UPPER LOBE OF LEFT LUNG: Status: RESOLVED | Noted: 2017-01-01 | Resolved: 2017-01-01

## 2017-11-01 NOTE — PROGRESS NOTES
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  The patient has a remote history of smoking but states that he has not smoked tobacco since the 1950s.  He is retired from [employer].  The patient has multiple medical comorbidities including coronary artery disease, paroxysmal atrial fibrillation, ischemic cardiomyopathy, chronic kidney disease, diabetes mellitus etc.  He has had multiple hospital admissions the past few months    The patient had a CT of the chest 1/27/17 which showed nonspecific groundglass in the left lung and a clustered nodularity in the anterior left upper lobe.  A three-month follow-up was recommended.  This admission, a CT chest on 10/1/17 showed the left upper lobe process to have developed into a more solid-appearing mass measuring 3.8 x 3.37 m contacting the pleura.  Anterior to the left pulmonary trunk is a 9 mm lymph node at upper normal size.  There is a 1.9 cm groundglass opacity in the right middle lobe and a 6 mm nodule in the central right upper lobe.  CT of the abdomen and pelvis shows no clear malignancy.  The patient's confusion was evaluated with a noncontrasted CT of the brain which shows no clear evidence of metastatic disease.  He is not able to obtain an MRI of the brain because of a pacemaker.  However, he did have an MRI of the brain without contrast in June of this year which did not show any evidence of malignancy.     The patient underwent a CT-guided biopsy of the left upper lobe mass 10/9/17 which which is positive for non-small cell carcinoma with a nonspecific immunophenotype; there is some evidence of neuroendocrine differentiation with the tumor is not small cell.  The tumor expresses G IRA-3 which can be seen  "with breast or urinary origins.  Lung adenocarcinoma markers are negative.    The patient was discharged to rehabilitation.  He had a follow-up PET scan performed 10/25/17 which showed the 4.2 cm mass in the left upper lobe to have an SUV of 11.6.  There were no other foci of hypermetabolism in the chest, no mediastinal or hilar lymphadenopathy.  There was no evidence of distant metastatic disease.  Other groundglass opacities mentioned on CT apparently had no uptake.  No bone lesions identified.  The bladder had no uptake.  There was activity in the vertebral bodies of the thoracic and lumbar spine felt most likely due to red marrow hyperplasia.    Since discharge, the patient seems to have improved at rehabilitation.  His strength has improved and his confusion has essentially resolved.  He denies pain in the chest, shortness of breath or cough.  He is having dysuria and on an antibiotic for urinary tract infection.    Past Medical History, Past Surgical History, Social History, Family History have been reviewed and are without significant changes except as mentioned 10/11/17.    Review of Systems   A comprehensive 14 point review of systems was performed and was negative except as mentioned.    Medications:  The current medication list was reviewed in the EMR    ALLERGIES:  No Known Allergies    Objective      Vitals:    11/01/17 0934   BP: 118/76   Pulse: 88   Resp: 16   Temp: 97.5 °F (36.4 °C)   TempSrc: Oral   SpO2: 98%   Weight: 175 lb 6.4 oz (79.6 kg)   Height: 68.5\" (174 cm)   PainSc: 0-No pain  Comment: UTI     Current Status 11/1/2017   ECOG score 1       Physical Exam    Gen: elderly man in nad but poor PS 2-3  HEENT: no icterus  CHEST: CTA, no wheezing  CV: RRR  LN: no supraclavicular or axillary lad  ABD: soft, ntnd, +BS  NEURO: poor memory but alert and oriented  SKIN: minor bruising  MS: tr ankle edema      RECENT LABS:  Hematology WBC   Date Value Ref Range Status   11/01/2017 10.62 (H) 4.00 - 10.00 " 10*3/mm3 Final     RBC   Date Value Ref Range Status   11/01/2017 4.60 4.30 - 5.50 10*6/mm3 Final     Hemoglobin   Date Value Ref Range Status   11/01/2017 11.2 (L) 13.5 - 16.5 g/dL Final     Hematocrit   Date Value Ref Range Status   11/01/2017 35.8 (L) 40.0 - 49.0 % Final     Platelets   Date Value Ref Range Status   11/01/2017 327 150 - 375 10*3/mm3 Final          I personally reviewed a PET dated 10/25/17: There is an isolated hypermetabolic mass in the left upper lung SUV 11.6.  There is no evidence of distant disease spread, no mediastinal or hilar lymphadenopathy.      Assessment/Plan   This is an 82-year-old man with multiple medical comorbidities and poor performance status with a tumor in the left upper lung biopsy proven to be a non-small malignancy with a nonspecific immunophenotype, potentially showing neuroendocrine differentiation.  A PET scan reviewed today shows an isolated lesion in the lung with no mediastinal or hilar lymphadenopathy and no clear evidence of metastatic disease.  Because of his comorbidities and performance status, the patient does not appear to be a candidate for surgical resection.  He is not a candidate for chemotherapy for the same reasons in addition to chronic kidney disease etc.  I recommended consultation with radiation oncology to discuss radiation therapy to the lesion in the left lung.  Assuming this will be treated, I will leave follow-up surveillance to the radiation oncology physicians and plan to see the patient back on an as-needed basis.    I spent 45 minutes today with the patient and his daughter who accompanies him reviewing his imaging results, pathology results, and counseling my recommendation for treatment with radiation.  90% of time was spent directly face-to-face counseling the patient.           1/1/2017      CC:

## 2017-11-08 NOTE — PROGRESS NOTES
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 year old male who has a history of recurrent pneumonia which prompted bronchoscopy on November 29, 2016.  He was noted on CT of the chest on November 26, 2016 to have perhaps some areas of dense nodularity within infiltrate.    Repeat CAT scan of the chest on January 27, 2017 showed densities and interstitial thickening in the left upper and left lower lobes which were new, a clustered area of nodularity in the anterior left upper lobe measuring 1.3 x 1.3 x 0.9 cm, improvement but persistent groundglass nodules in the right lung.    He was admitted again in September, 2017 for pneumonia, atrial fibrillation and urinary retention.  He underwent cardioversion, pacemaker placement and was treated with antibiotics.  CAT scan on October 1, 2017 showed a solid appearing mass in the left upper lobe measuring 3.8 x 3.3 cm, contacting the pleura along the left cardiac border, a 9 mm left pulmonary trunk node, a 6 mm right upper lobe nodule, a 1.9 cm right middle lobe nodule and a small pleural effusion.    He underwent a CAT scan of the abdomen and pelvis on October 2, 2017 which showed no evidence of metastasis and head CT on the same date also showed no evidence of intracranial abnormality.    Therefore he underwent a CT-guided biopsy of the left lung mass on October 9, 2017 which was positive for non-small cell carcinoma with nonspecific immunophenotype and  evidence of neuroendocrine differentiation by immunohistochemistry.    PET scan on October 25, 2017 showed intense hypermetabolism within the biopsy-proven lung neoplasm measuring 4.2 x 3.7 cm with an SUV of 11.6 and no other focus of hypermetabolism in the chest.  There is no evidence of metastatic disease.    He was referred on to the [facility] physicians who felt given his multiple medical comorbidities and performance status, was not a candidate for surgical resection or chemotherapy.  I was asked to see the patient at the request of the referring provider noted above for advice and recommendations regarding this diagnosis.     Clinically, he is improving slowly. He and his daughter feel his mentation is back at it's baseline. He is returning home tomorrow alone with his daughters staying with him for a couple of week. He is ambulating without but also at times with the walker. He is troubled by the logistics of the Whaley catheter but is dealing with that better now. He denies any shortness of breath or cough currently.    Past Medical History: he  has a past medical history of AAA (abdominal aortic aneurysm); Acute myocardial infarction; Altered mental state; Arteriosclerotic coronary artery disease; Arthritis; Atrial fibrillation; B12 deficiency (6/1/2017); Cardiomyopathy; Chicken pox; Chronic combined systolic and diastolic congestive heart failure (9/5/2017); Chronic systolic (congestive) heart failure; CKD (chronic kidney disease), stage III; COPD (chronic obstructive pulmonary disease) (11/15/2016); Coronary artery disease; Difficulty walking; Dyspnea; Dyspnea on exertion (8/25/2017); Fatigue; Fever and chills; Heart attack; Hyperglycemia (3/29/2017); Hyperlipidemia; Hypertension; Imbalance (3/29/2017); Insomnia (8/25/2017); Ischemic cardiomyopathy; Lung mass (04/18/2016); Lung nodule seen on imaging study; Malignant neoplasm; Measles; Mumps; Pleural effusion; Premature ventricular contractions; Prostate cancer;  PVC (premature ventricular contraction); Rash of body (6/21/2017); SOB (shortness of breath); Stroke; Thoracic aortic ectasia; Urinary tract infection with hematuria (1/18/2017); and Vitamin B 12 deficiency.    Past Surgical History:  he has a past surgical history that includes Arterial aneurysm repair; Appendectomy; Coronary artery bypass graft; ostate surgery; Cardiac surgery; Bronchoscopy (N/A, 11/29/2016); Cholecystectomy; Colonoscopy (N/A, 12/16/2016); Esophagogastroduodenoscopy (N/A, 3/1/2017); Colonoscopy (N/A, 3/1/2017); Cardiac catheterization (N/A, 6/7/2017); Cardiac catheterization (N/A, 6/7/2017); Cardiac catheterization (N/A, 6/7/2017); Cardiac catheterization (N/A, 6/7/2017); Cardiac catheterization (N/A, 6/7/2017); Cardiac catheterization (N/A, 6/7/2017); Insert / replace / remove pacemaker; Cardiac defibrillator placement; and Cardiac electrophysiology procedure (N/A, 9/19/2017).    Meds:    Current Outpatient Prescriptions:   •  acetaminophen (TYLENOL) 325 MG tablet, Take 650 mg by mouth Every 8 (Eight) Hours As Needed for Mild Pain ., Disp: , Rfl:   •  amiodarone (PACERONE) 200 MG tablet, Take 1 tablet by mouth Every 12 (Twelve) Hours., Disp: , Rfl:   •  Aspirin (ASPIR-81 PO), Take  by mouth Daily., Disp: , Rfl:   •  atorvastatin (LIPITOR) 40 MG tablet, Take 1 tablet by mouth Every Night., Disp: 30 tablet, Rfl: 6  •  bisacodyl (DULCOLAX) 10 MG suppository, Insert 1 suppository into the rectum Daily As Needed for Constipation., Disp: , Rfl:   •  busPIRone (BUSPAR) 7.5 MG tablet, Take 7.5 mg by mouth 2 (Two) Times a Day., Disp: , Rfl:   •  carvedilol (COREG) 25 MG tablet, Take 25 mg by mouth 2 (Two) Times a Day With Meals., Disp: , Rfl:   •  clopidogrel (PLAVIX) 75 MG tablet, Take 1 tablet by mouth Daily., Disp: 90 tablet, Rfl: 3  •  digoxin (LANOXIN) 125 MCG tablet, Take 1 tablet by mouth Every Other Day., Disp: 15 tablet, Rfl: 1  •  doxazosin (CARDURA) 8 MG tablet, Take 8 mg by mouth Every Night.,  Disp: , Rfl:   •  enoxaparin (LOVENOX) 100 MG/ML solution syringe, Inject 0.9 mL under the skin Every 12 (Twelve) Hours., Disp: 18.9 mL, Rfl:   •  famotidine (PEPCID) 20 MG tablet, Take 1 tablet by mouth Daily., Disp: , Rfl:   •  finasteride (PROSCAR) 5 MG tablet, Take 1 tablet by mouth Daily., Disp: 90 tablet, Rfl: 1  •  fluconazole (DIFLUCAN) 200 MG tablet, Take 200 mg by mouth Daily., Disp: , Rfl:   •  furosemide (LASIX) 40 MG tablet, Take 1 tablet by mouth Every Other Day., Disp: 90 tablet, Rfl: 2  •  Insulin Aspart (NOVOLOG FLEXPEN SC), Inject  under the skin., Disp: , Rfl:   •  insulin detemir (LEVEMIR FLEXPEN) 100 UNIT/ML injection, Inject  under the skin Every Night., Disp: , Rfl:   •  isosorbide mononitrate (IMDUR) 30 MG 24 hr tablet, Take 0.5 tablets by mouth Daily., Disp: 45 tablet, Rfl: 3  •  melatonin 5 MG tablet tablet, Take 1 tablet by mouth Every Night., Disp: , Rfl:   •  metoprolol tartrate (LOPRESSOR) 100 MG tablet, Take 1 tablet by mouth Every 12 (Twelve) Hours., Disp: 60 tablet, Rfl: 1  •  nitroglycerin (NITROSTAT) 0.4 MG SL tablet, 1 under the tongue as needed for angina, may repeat q5mins for up three doses, Disp: 35 tablet, Rfl: 0  •  oxyCODONE-acetaminophen (PERCOCET) 5-325 MG per tablet, Take 1 tablet by mouth Every 6 (Six) Hours As Needed for Moderate Pain  or Severe Pain ., Disp: 12 tablet, Rfl: 0  •  phytonadione (VITAMIN K) 1 MG/0.5ML injection, Inject  into the shoulder, thigh, or buttocks 1 (One) Time., Disp: , Rfl:   •  polyethylene glycol (MIRALAX) packet, Take 17 g by mouth Daily., Disp: , Rfl:   •  potassium chloride (K-TAB) 20 MEQ tablet controlled-release ER tablet, Take 1 tablet by mouth Daily., Disp: 90 tablet, Rfl: 3  •  Probiotic Product (PROBIOTIC PO), Take 1 tablet by mouth Daily., Disp: , Rfl:   •  raNITIdine (ZANTAC) 150 MG tablet, Take 150 mg by mouth Daily., Disp: , Rfl:   •  sulfamethoxazole-trimethoprim (BACTRIM DS,SEPTRA DS) 800-160 MG per tablet, Take 1 tablet by  mouth 2 (Two) Times a Day., Disp: , Rfl:   •  tamsulosin (FLOMAX) 0.4 MG capsule 24 hr capsule, Take 1 capsule by mouth Daily., Disp: 30 capsule, Rfl:   •  traMADol (ULTRAM) 50 MG tablet, Take 50 mg by mouth Every 4 (Four) Hours As Needed for Moderate Pain ., Disp: , Rfl:   •  tuberculin (APLISOL) 5 UNIT/0.1ML injection, Inject  into the skin 1 (One) Time., Disp: , Rfl:   •  warfarin (COUMADIN) 4 MG tablet, Take 1 tablet by mouth Daily. (Patient taking differently: Take 2 mg by mouth Daily.), Disp: 30 tablet, Rfl: 1    Allergies:  No Known Allergies    Family History:  his family history includes Arthritis in his brother and sister; COPD in his father; Cancer in his father and mother; Diabetes in his brother and sister; Hyperlipidemia in his sister; Lung cancer in his other; No Known Problems in his maternal grandfather, maternal grandmother, paternal grandfather, and paternal grandmother; Stroke in his brother and other.    Social History:  he  reports that he has quit smoking. His smoking use included Cigarettes. He has a 25.00 pack-year smoking history. He quit smokeless tobacco use about 32 years ago. He reports that he does not drink alcohol or use illicit drugs.    Pertinent Findings on   Review of Systems   Constitutional: Positive for appetite change, fatigue and unexpected weight change. Negative for chills, diaphoresis and fever.   HENT:   Negative for hearing loss, lump/mass, mouth sores, nosebleeds, sore throat, tinnitus, trouble swallowing and voice change.    Eyes: Negative for eye problems.   Respiratory: Negative for chest tightness, cough, dizziness on exertion, hemoptysis, shortness of breath and wheezing.    Cardiovascular: Negative for chest pain, leg swelling and palpitations.   Gastrointestinal: Negative for abdominal distention, abdominal pain, blood in stool, constipation, diarrhea, nausea, rectal pain and vomiting.   Endocrine: Negative for hot flashes.   Genitourinary: Positive for  difficulty urinating and dysuria. Negative for bladder incontinence, frequency, hematuria, nocturia and pelvic pain.    Musculoskeletal: Positive for back pain and neck stiffness. Negative for arthralgias, flank pain, gait problem, myalgias and neck pain.   Skin: Negative for itching and rash.   Neurological: Negative for dizziness, extremity weakness, gait problem, headaches, light-headedness, numbness, seizures and speech difficulty.   Hematological: Negative for adenopathy. Bruises/bleeds easily.   Psychiatric/Behavioral: Negative for confusion, decreased concentration, depression, sleep disturbance and suicidal ideas. The patient is not nervous/anxious.    :  Vitals:    11/08/17 1025   BP: 90/61   Pulse: 89   Resp: 16   Temp: 97.2 °F (36.2 °C)   TempSrc: Oral   SpO2: 98%   Weight: 175 lb (79.4 kg)   PainSc: 0-No pain       Performance Status: (2) Ambulatory and capable of self care, unable to carry out work activity, up and about > 50% or waking hours    Pertinent Findings on:  Physical Exam   Constitutional: He is oriented to person, place, and time. He appears well-developed and well-nourished.   HENT:   Head: Normocephalic and atraumatic.   Eyes: EOM are normal.   Neck: Normal range of motion.   Pulmonary/Chest: Effort normal.   Abdominal: Soft.   Musculoskeletal: Normal range of motion.   Ambulates slowly, without walker   Neurological: He is alert and oriented to person, place, and time.   Skin: Skin is warm and dry.   Psychiatric: He has a normal mood and affect. His behavior is normal. Judgment and thought content normal.   Vitals reviewed.      Assessment:   1. 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    Plan:   We reviewed today the details of the pathology and imaging studies up to this point and all questions were answered in that regard. There is a good  understanding of the extent and stage of the disease.  We discussed the treatment options as well, specifically surgery, chemotherapy and radiation therapy and we discussed the specific recomendations given the extent of disease in this case, his performance status and the difficulties with chemo and surgery and he expressed understanding. We discuss the role of the radiation therapy and he expressed a desire to pursue treatment for this newly diagnosed lesion and does feel that he is currently up for what we discussed.     I am hopeful we could deliver the treatment stereotactically, even given it's location and we discussed treatment fractionated out or stereotactically and they expressed understanding. We talked thru the 4D CT and the observation of the breathing and I let them know once we developed a treatment plan we would let them know of the treatment number and find a time to complete them.     We then discussed the acute side effects, specifically mild irritation of the skin in the treatment area decreased appetite and fatigue.  We discussed the anticipated time frame for the resolution of the above-mentioned symptoms. We then discussed the small but possible long-term possibility of radiation pneumonitis, fibrosis and the possibility of being more short of air at the conclusion of the radiation therapy simply from those benign changes.  We again discussed the importance of our treatment planning process in limiting the volume of lung treated as much as possible and I believe all questions were answered.     We were able to complete our initial treatment planning scan today. I will be fusing the most recent scans on to our treatment planning scan to insure adequate coverage of the involved area. I am anticipating getting the treatments underway within a week or two.    I spent greater than 45 minutes in face-to-face time with the patient and 30 minutes of that time was spent in counseling and coordination  of care, including review of imaging and pathology; prognosis and differential diagnosis; indications, goals, logistics, benefits and risks of treatment as well as alternatives and surveillance options.

## 2017-11-14 NOTE — PROGRESS NOTES
"Subjective   Aakash Sanchez Jr. is a 82 y.o. male.     History of Present Illness     Aakash Sanchez Jr. is a 82 y.o. male with a past medical history of MI/CAD, s/p 2V CABG 1997, PCI, ICM, HTN, HLD, abdominal aortic aneurysm s/p repair, CKD, afib (warfarin), DM, lung disease and heart failure who presents for follow up after hospital admission.    Patient had multiple hospital admissions this year, most recently form 9/30-10/12 due to cc of \"talking out of my head.\"  Found to have candida UTI and treated with full course of diflucan.  CT chest performed which showed JT mass as well as RUL consolidation area. CT head, abdomen, and pelvis showed no metastasis.  PET scan showed uptake in JT but no other areas.  Biopsy performed and JT mass showed NSCLC with neuroendocrine markings.  PAtient seen by Heme onc and not candidate for chemo or surgery.  Rad onc saw patient in clinic and to start treatments soon.    He developed NSVT while inpatient and begun on amiodarone by cardilogy with good results.  Will continue this for now.  Denies CP, palpitatoinos, PND, orthopnea.  On Lasix daily (PAU on CKD ad admit) and has had stable weight and no LE edema.  Will continue at this time.  INR checked yesterday and low.  Adjusted per cardiology.    Had diarrhea while inpatient.  May be 2/2 to NE tumor. Miralax PRN for constipation    Ultimate diagnosis was candida UTI with PAU on CKD with urinary retention and oliver catheter placed.  Also found to have NSCLC and begun on radiation treatment.  Seen by GI, Pulm, Cards, Urology, and heme onc.  Placed on flomax and plan for trial of d/c oliver this Thursday at outpatient urology appointment.  Patient dx with UTI at rehab facility (for 3 weeks post d/c) and given 7 days of keflex.  Symptoms at that time was hematuria.  Otherwise, no difficulty with oliver.    Today only acute complaint is medication burden.        The following portions of the patient's history were reviewed and " updated as appropriate: allergies, current medications, past family history, past medical history, past social history, past surgical history and problem list.    Review of Systems   Constitutional: Negative for appetite change and fatigue.   HENT: Negative.    Respiratory: Negative for cough and shortness of breath.    Cardiovascular: Negative for chest pain, palpitations and leg swelling.        Lying flat with no PND   Genitourinary: Negative.  Negative for decreased urine volume and dysuria.   Musculoskeletal: Negative.    Skin: Positive for rash.        pruritis   Neurological: Negative.    Hematological: Negative.    Psychiatric/Behavioral: Negative.    All other systems reviewed and are negative.      Objective   Physical Exam   Constitutional: He is oriented to person, place, and time. He appears well-developed and well-nourished. No distress.   HENT:   Head: Normocephalic and atraumatic.   Right Ear: External ear normal.   Left Ear: External ear normal.   Mouth/Throat: No oropharyngeal exudate.   Cardiovascular: Normal rate and regular rhythm.    Murmur heard.  Irregular heart rate     Pulmonary/Chest: Effort normal and breath sounds normal. No respiratory distress. He has no wheezes.   Abdominal: Soft. Bowel sounds are normal. He exhibits no distension.   Musculoskeletal: Normal range of motion. He exhibits no edema.   Neurological: He is alert and oriented to person, place, and time.   Skin: No rash noted.   Psychiatric: He has a normal mood and affect. His behavior is normal.   Vitals reviewed.      Assessment/Plan   Aakash was seen today for lung mass.    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  Amio dose/effect to be assessed at cardiologist.    -  Continue Warfarin with adjustments per cards.  Last checked yesterday and 1.4 per patient  - On Metoprolol, plavix, asa, warfarin, imdur, dig and amio    NSCLC with neuroendocrine markers  - Plan for 5 treatments per Rad onc  - No candidate for surgery or chemo per heme/onc  - Will monitor weight and status closely  - Puritis likely side effect of tumor hormone release  - Likely refer to NE specialist at  post radiation therapy.      Urinary retention  - Plan to be seen by urology in 2 days  - Continue flomax  - Voiding trial at that itme  - may require bladder retraining    CHF  - Weight stable and patient without edema on exam  - Continue current lasix dose    HCM  - Flu shot and tetanus UTD  - Past history of pneumovax.  Will give Prevnar today prior to starting radiation therapy.      Follow up in 1 month     Subhash Urbina MD  Resident Physician, PGY-3        Patient seen and examined with resident physician. Agree with assessment and plan.  Spent 25 min in care of patient.

## 2017-11-15 NOTE — TELEPHONE ENCOUNTER
"Pt missed 1 month post CRT-D implant apt with you and PM as he was admitted with urosepsis. He has connected his [employer] monitor as of today and device function is WNL. However, automated LV threshold are not on to confirm capture. AF burden 100%. We are seeing him in clinic on Tuesday 11/22 for his \"1 month\" in clinic check.  His 3 month in clinic check would be 12/19 or later. He lives in [place] and I did schedule him tentatively at the [place] office on 1/9/18.     Do you want to see him in conjunction with us at the end of Dec or beginning of January for 3 month f/u?   "

## 2017-11-15 NOTE — TELEPHONE ENCOUNTER
I called and spoke with Mr. Sanchez.  He said he is doing fair.  Still very weak.  He still has an indwelling catheter because he failed his voiding trial.  He has been home from [place] a little over a week.  We reviewed his follow up appointments:  Dr. Ramirez for LCG pacemaker implant, and has already followed up with Dr. Lindquist.  He will follow up with our neuro office June 6, 2018.  His home medications include:  Isosorbide Mononitrate 15 mg daily, Lipitor 40 mg at night, Plavix 75 mg daily, Amiodorone 200 mg every 12 hours, Coumadin subject to INR value, Digoxin 0.125mg daily, Metoprolol Tartrate 100 mg every 12 hours.  We reviewed signs and symptoms of stroke and to call 911 immediately.   2.  ASHLEE Crowell RN

## 2017-11-19 PROBLEM — R50.9 FEVER AND CHILLS: Status: RESOLVED | Noted: 2017-01-01 | Resolved: 2017-01-01

## 2017-11-21 PROBLEM — K59.01 SLOW TRANSIT CONSTIPATION: Status: ACTIVE | Noted: 2017-01-01

## 2017-11-22 PROBLEM — Z66 DNR (DO NOT RESUSCITATE): Status: ACTIVE | Noted: 2017-01-01

## 2017-11-27 NOTE — PROGRESS NOTES
Attempted to deliver stereotactic treatment to the left lung ca today; however, though minimal, the fluid in left chest has caused displacement of tumor volume and we were unable to accurately localize treatment field. I will continue to watch progress and CXR and attempt again in a couple of days.

## 2017-11-29 NOTE — PROGRESS NOTES
"  Physician Stereotactic Management Note    Diagnosis:     1. 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    Treatment Number and Dose: fx 2/5 20Gy    I was personally present at the machine for the delivery of the above treatment.     I provided direct supervision of the patient's set up, treatment parameters and image guidance. I provided corrections and approval of the above prior to the treatment, in real time. I was present for any adjustments required due to patient motion, target movement or equipment issues.    The ongoing images for localization, tumor tracking, gating and beam's eye view localization images will also be approved.     Notes on treatment course, films, medical progress and plan:    Doing well. Tolerated treatment without difficulty. No problems or questions.    Problem added:  None  Medications added:   None  Ancillary referrals made: None    I have reviewed and marked as \"reviewed\" the current medications, allergies and problem list in the patients EMR.    Patient's Care Team:  Patient Care Team:  Dimitrios Lindquist MD as PCP - General (Internal Medicine)  Latanya Smith MD as Referring Physician (Pulmonary Disease)  Manoj Louis MD as Consulting Physician (Hematology and Oncology)    Seen and approved by:  Veronica Nolasco MD, 11/29/2017  "

## 2017-12-01 NOTE — PROGRESS NOTES
"Cc g heme and retention  Doing well some blood around oliver and mild g heme in oliver bag.   LOS: 13 days   Patient Care Team:  Dimitrios Lindquist MD as PCP - General (Internal Medicine)  Latanya Smith MD as Referring Physician (Pulmonary Disease)  Manoj Louis MD as Consulting Physician (Hematology and Oncology)        Subjective     History of Present Illness    Subjective      Objective     Vital Signs  Temp:  [97.2 °F (36.2 °C)-97.7 °F (36.5 °C)] 97.7 °F (36.5 °C)  Heart Rate:  [84-85] 85  Resp:  [18] 18  BP: (122-157)/() 128/72    Objective:  General Appearance:  Comfortable.    Vital signs: (most recent): Blood pressure 128/72, pulse 85, temperature 97.7 °F (36.5 °C), temperature source Oral, resp. rate 18, height 70\" (177.8 cm), weight 171 lb (77.6 kg), SpO2 96 %.  Vital signs are normal.    Output: Producing urine.    HEENT: Normal HEENT exam.    Lungs:  Normal respiratory rate and normal effort.    Abdomen: Abdomen is soft.              Results Review:  New clinical results reviewed.        Assessment/Plan     Active Problems:    Hypertension    Coronary artery disease    Ischemic cardiomyopathy    PAF (paroxysmal atrial fibrillation)    COPD (chronic obstructive pulmonary disease)    Long term current use of anticoagulant    Urinary tract infection with hematuria    CKD (chronic kidney disease), stage III    History of coronary artery stent placement    Sepsis    PAU (acute kidney injury)    Slow transit constipation    DNR (do not resuscitate)      Assessment:  (Doing well for dc today and will fu with me as op   ).       Bryan Bernstein MD  12/01/17  6:02 AM            "

## 2017-12-01 NOTE — PROGRESS NOTES
"  Physician Stereotactic Management Note    Diagnosis:     1. 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    Treatment Number and Dose:   Treatment #3/5     Dose: 30/50 Gy    I was personally present at the machine for the delivery of the above treatment.     I provided direct supervision of the patient's set up, treatment parameters and image guidance. I provided corrections and approval of the above prior to the treatment, in real time. I was present for any adjustments required due to patient motion, target movement or equipment issues.    The ongoing images for localization, tumor tracking, gating and beam's eye view localization images will also be approved.     Notes on treatment course, films, medical progress and plan:    Doing well. Tolerated treatment without difficulty. No problems or questions.    Problem added:  None  Medications added:   None  Ancillary referrals made: None    I have reviewed and marked as \"reviewed\" the current medications, allergies and problem list in the patients EMR.    Patient's Care Team:  Patient Care Team:  Dimitrios Lindquist MD as PCP - General (Internal Medicine)  Latanya Smith MD as Referring Physician (Pulmonary Disease)  Manoj Louis MD as Consulting Physician (Hematology and Oncology)    Seen and approved by:  Manoj Givens MD, 12/01/2017  "

## 2017-12-01 NOTE — PROGRESS NOTES
Continued Stay Note  [facility]     Patient Name: Aakash Sanchez Jr.  MRN: [id number]  Today's Date: 12/1/2017    Admit Date: 11/18/2017          Discharge Plan       12/01/17 1344    Case Management/Social Work Plan    Plan Plan [facility] for skilled care with family tansporting for radiation treatments.   MARZENA Cortés    Additional Comments Pt completed radiaiton treatment today.  Pt to be discharged.  Call  (Brii [phone]) and she states [facility] has a bed and can accept pt today. Packet  to RN.  Family to transport.  Plan [facility] for skilled care.  MARZENA Cortés              Discharge Codes     None            Veronica Woo RN

## 2017-12-01 NOTE — DISCHARGE SUMMARY
[facility]    ASSOCIATES  [phone]    DISCHARGE SUMMARY  [facility]    Patient Identification:  Name: Aakash Sanchez Jr.  Age: 82 y.o.  Sex: male  :  1935  MRN: [id number]  Primary Care Physician: Dimitrios Lindquist MD    Admit date: 2017  Discharge date and time:      Discharge Diagnoses:Active Problems:    Sepsis    Urinary tract infection with hematuria    PAU (acute kidney injury)    Hypertension    Coronary artery disease    Ischemic cardiomyopathy    PAF (paroxysmal atrial fibrillation)    COPD (chronic obstructive pulmonary disease)    Long term current use of anticoagulant    CKD (chronic kidney disease), stage III    History of coronary artery stent placement    Slow transit constipation    DNR (do not resuscitate)       History of present illness from H&P:    This an 81yo gentleman who per old records, 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 Pt has a known malignant neoplasm of bronchus of the left upper lobe for which pt is currently being followed by radiation oncology.  Pt presents to the ED with altered mental status in which pt has been confused for the past several days. Pt's family noticed that pt's altered mental status acutely worsened this AM due to which they called the paramedics. Upon paramedics' arrival on scene, pt's temperature was 103.3 F and pt was tachycardic. Consequently, pt was administered 1 L IV fluids en route to the ED; The patient received another 2.3 liters in the ER.  The patient has never been hypotensive.     Has a chronic oliver catheter and it was taken out 2 days with Dr Bernstein.      Hospital Course:     The patient's mental status quickly improved with IV antibiotics.  UTI was suspected to be the source of the patient's mental status changes.  A catheter was replaced on the patient.  The patient about a week prior to discharge developed severe conjunctivitis.   Etiology was not known but it was thought possibly to be allergic in nature.  We asked ophthalmology to see the patient in consultation however we were not able to get ophthalmology.  The patient's vision remained stable grossly by examination.  The patient was placed on ketoprofen drops as well as saline drops and with this the eyes improved.    The patient then developed pain in his neck and right ankle and right knee area and it was thought that this could be related to gout or pseudogout.  The patient's uric acid was not exceedingly high.  The patient received a couple doses of steroids and has had tremendous improvement in these pains.  Patient is now smiling and anxious for discharge.  Around the nurses station in well.    Patient developed hematuria so he's been taken off of his aspirin and Plavix but does remain on Coumadin.  Cardiology saw the patient to help in this decision.    For the hematuria patient was seen by Dr. Bernstein during his stay.    The patient has been going to get radiation treatments for lung cancer.    Further hospital course by problem list:  Sepsis- afebrile for several days and resolved      Neck pain- ct of the neck which was ok and d/w MOE, he does have significant arthritis but no neurologic symptoms, discussed with son and patient. flexiril prn        Urinary tract infection with hematuria- indwelling oliver, treated        PAU (acute kidney injury)- resovled        Hypertension- bp is stable        Coronary artery disease        Ischemic cardiomyopathy        PAF (paroxysmal atrial fibrillation)        COPD (chronic obstructive pulmonary disease)        Long term current use of anticoagulant- monitor the inr at NH wit a goal of 2-3.      CKD (chronic kidney disease), stage III        History of coronary artery stent placement- plavix held for bleeding        Slow transit constipation        DNR (do not resuscitate) and Dr Terrazas deactivated the CRT-D      Likely pseudogout of  the foot and knee and spine, steroids made a big difference in the pain            Consults:     Consults     Date and Time Order Name Status Description    11/28/2017 1815 Inpatient Consult to Cardiology      11/26/2017 1429 Inpatient Consult to Ophthalmology      11/18/2017 2038 Inpatient Consult to Cardiology      11/18/2017 1957 Inpatient Consult to Nephrology Completed     11/18/2017 1921 Inpatient Consult to Urology Completed     11/18/2017 1921 Inpatient Consult to Infectious Diseases Completed     11/18/2017 1448 [facility] (on-call MD unless specified) Completed             Results from last 7 days  Lab Units 12/01/17  0716   WBC 10*3/mm3 11.42*   HEMOGLOBIN g/dL 10.6*   HEMATOCRIT % 35.5*   PLATELETS 10*3/mm3 269         Results from last 7 days  Lab Units 12/01/17  0716   SODIUM mmol/L 136   POTASSIUM mmol/L 4.2   CHLORIDE mmol/L 99   CO2 mmol/L 24.6   BUN mg/dL 27*   CREATININE mg/dL 1.63*   GLUCOSE mg/dL 106*   CALCIUM mg/dL 8.9       Significant Diagnostic Studies:   Lab Results   Component Value Date    WBC 11.42 (H) 12/01/2017    HGB 10.6 (L) 12/01/2017    HCT 35.5 (L) 12/01/2017     12/01/2017     Lab Results   Component Value Date     12/01/2017    K 4.2 12/01/2017    CL 99 12/01/2017    CO2 24.6 12/01/2017    BUN 27 (H) 12/01/2017    CREATININE 1.63 (H) 12/01/2017    GLUCOSE 106 (H) 12/01/2017     Lab Results   Component Value Date    CALCIUM 8.9 12/01/2017    PHOS 3.5 11/29/2017     Lab Results   Component Value Date    AST 12 11/29/2017    ALT 8 11/29/2017    ALKPHOS 55 11/29/2017     Lab Results   Component Value Date    INR 2.14 (H) 12/01/2017     No results found for: COLORU, CLARITYU, SPECGRAV, PHUR, PROTEINUR, GLUCOSEU, KETONESU, BLOODU, NITRITE, LEUKOCYTESUR, BILIRUBINUR, UROBILINOGEN, RBCUA, WBCUA, BACTERIA  No results found for: TROPONINT, TROPONINI, BNP  No components found for: HGBA1C;2  No components found for: TSH;2    Imaging Results (all)     Procedure Component Value Units  Date/Time    XR Chest 1 View [[id number]] Collected:  11/18/17 1449     Updated:  11/18/17 1501    Narrative:       EMERGENCY PORTABLE CHEST SINGLE VIEW     HISTORY: 82-year-old male with left lung cancer presenting for  evaluation of fever     COMPARISON: 10/07/2017     FINDINGS:  1. Left-sided lung mass shows no CVN change.  2. Limited imaging due to body habitus portable technique and low lung  volumes and cardiac enlargement cannot exclude retrocardiac pneumonia.  3. Small bilateral pleural effusions chronic pulmonary change is  vascular calcification pacemaker/defibrillator previous cholecystectomy  spondylosis.     Follow-up CT or two-view chest suggested for more accurate assessment.     This report was finalized on 11/18/2017 2:58 PM by Dr. Ashish Hernandez MD.       CT Abdomen Pelvis Without Contrast [[id number]] Collected:  11/19/17 0005     Updated:  11/19/17 2018    Narrative:       CT SCAN OF THE CHEST WITHOUT CONTRAST.     TECHNIQUE: Radiation dose reduction techniques were utilized, including  automated exposure control and exposure modulation based on body size.  Routine axial images of the chest were obtained with reconstructed  images.     HISTORY: Rule out pyelonephritis. Fever, mental status change.     COMPARISON: 10/01/2017.     FINDINGS:   There is no mediastinal lymphadenopathy or pericardial effusion. Severe  atherosclerotic disease. Mild cardiomegaly.     No consolidation or effusion. 4 x 4.5 cm spiculated nodule in the left  upper lobe is concerning for neoplasm. Spiculated nodule in the right  lung, largest measures 1.8 cm, is likely to be another neoplasm. A few  other spiculated nodules are also seen in the right lung. Mild  emphysema.       Impression:       1.  No consolidation or effusion.  2.  4.5 cm spiculated mass in the left upper lobe concerning for lung  carcinoma.  3.  Smaller spiculated nodules in the right lung are also concerning for  neoplastic process, largest nodule measures  1.8 cm.     ----------------------------------------------------------------     CT ABDOMEN AND PELVIS WITHOUT CONTRAST.     TECHNIQUE: Radiation dose reduction techniques were utilized, including  automated exposure control and exposure modulation based on body size. A  routine CT scan of the abdomen and pelvis was performed with coronal and  sagittal reconstructed images.     HISTORY: Rule out pyelonephritis.     COMPARISON: No prior studies for comparison.     FINDINGS:  Lung bases demonstrate no consolidation or effusion.          Liver demonstrates homogeneous parenchyma, no definite mass.  Postcholecystectomy. No intra or extrahepatic biliary ductal dilatation.        Spleen is enlarged and measures 15 cm. Pancreas is unremarkable, no  pancreatic ductal dilatation. Adrenal glands are within normal limits.     Kidneys do not demonstrate hydronephrosis. No definite renal calculi.  The wall of urinary bladder is thickened with surrounding inflammatory  changes, a small air bubble is seen within the lumen likely from  previous instrumentation.         Small and large bowel loops are within normal limits.         No significant retroperitoneal lymphadenopathy. Aortobiiliac  intraluminal stent, native infrarenal aorta measures 4.5 cm.        IMPRESSION:  1.  Severe cystitis, please clinically correlate.   2.  Diagnosis of pyelonephritis cannot be made on noncontrast CT scan.  3.  Splenomegaly up to 15 cm, indeterminate etiology and significance.     This report was finalized on 11/19/2017 8:15 PM by Dr. Neel Agudelo MD.       CT Chest Without Contrast [[id number]] Collected:  11/19/17 0005     Updated:  11/19/17 2018    Narrative:       CT SCAN OF THE CHEST WITHOUT CONTRAST.     TECHNIQUE: Radiation dose reduction techniques were utilized, including  automated exposure control and exposure modulation based on body size.  Routine axial images of the chest were obtained with reconstructed  images.     HISTORY: Rule  out pyelonephritis. Fever, mental status change.     COMPARISON: 10/01/2017.     FINDINGS:   There is no mediastinal lymphadenopathy or pericardial effusion. Severe  atherosclerotic disease. Mild cardiomegaly.     No consolidation or effusion. 4 x 4.5 cm spiculated nodule in the left  upper lobe is concerning for neoplasm. Spiculated nodule in the right  lung, largest measures 1.8 cm, is likely to be another neoplasm. A few  other spiculated nodules are also seen in the right lung. Mild  emphysema.       Impression:       1.  No consolidation or effusion.  2.  4.5 cm spiculated mass in the left upper lobe concerning for lung  carcinoma.  3.  Smaller spiculated nodules in the right lung are also concerning for  neoplastic process, largest nodule measures 1.8 cm.     ----------------------------------------------------------------     CT ABDOMEN AND PELVIS WITHOUT CONTRAST.     TECHNIQUE: Radiation dose reduction techniques were utilized, including  automated exposure control and exposure modulation based on body size. A  routine CT scan of the abdomen and pelvis was performed with coronal and  sagittal reconstructed images.     HISTORY: Rule out pyelonephritis.     COMPARISON: No prior studies for comparison.     FINDINGS:  Lung bases demonstrate no consolidation or effusion.          Liver demonstrates homogeneous parenchyma, no definite mass.  Postcholecystectomy. No intra or extrahepatic biliary ductal dilatation.        Spleen is enlarged and measures 15 cm. Pancreas is unremarkable, no  pancreatic ductal dilatation. Adrenal glands are within normal limits.     Kidneys do not demonstrate hydronephrosis. No definite renal calculi.  The wall of urinary bladder is thickened with surrounding inflammatory  changes, a small air bubble is seen within the lumen likely from  previous instrumentation.         Small and large bowel loops are within normal limits.         No significant retroperitoneal lymphadenopathy.  Aortobiiliac  intraluminal stent, native infrarenal aorta measures 4.5 cm.        IMPRESSION:  1.  Severe cystitis, please clinically correlate.   2.  Diagnosis of pyelonephritis cannot be made on noncontrast CT scan.  3.  Splenomegaly up to 15 cm, indeterminate etiology and significance.     This report was finalized on 11/19/2017 8:15 PM by Dr. Neel Agudelo MD.       XR Abdomen KUB [[id number]] Collected:  11/21/17 1426     Updated:  11/21/17 1430    Narrative:       SUPINE ABDOMEN     HISTORY: 82-year-old male with nausea and distention     COMPARISON: 10/07/2017     FINDINGS:  1. No obstruction, free air nor dilatation of bowel.  2. AAA stent graft repair, previous cholecystectomy,  pacemaker/defibrillator, median sternotomy, prostate seed implantation  and straight catheter projecting at the midline of the pelvis.     This report was finalized on 11/21/2017 2:27 PM by Dr. Ashish Hernandez MD.       CT Soft Tissue Neck Without Contrast [[id number]] Collected:  11/26/17 1930     Updated:  11/27/17 0814    Narrative:       CT NECK WITHOUT CONTRAST     HISTORY:  Neck pain, possible infection.     A noncontrasted CT examination of the neck was performed. The lack of  contrast reduces the sensitivity of the study.     FINDINGS: The visualized lung apices are clear. The parotid and  submandibular glands appear unremarkable. The cords are symmetrical.  Carotid vascular calcification is noted bilaterally. Small subcentimeter  nodes are identified involving the posterior triangle of the neck  bilaterally. There is no evidence of a dominant mass or of obvious  pathologic adenopathy.       Impression:       Decreased sensitivity secondary to lack of contrast. No  obvious abscess is identified.     There is a grade 1 retrolisthesis of C6 upon C7 and moderate loss of  disc height at C6-7. Multilevel degenerative disease involving the  cervical spine is appreciated including moderate to severe neural  foraminal compromise on  the right at C3-4, and on the left at C4-5, C5-6  and C6-7.     Radiation dose reduction techniques were utilized, including automated  exposure control and exposure modulation based on body size.     This report was finalized on 11/27/2017 8:11 AM by Dr. Asif Herrera MD.             TEST  RESULTS PENDING AT DISCHARGE      Patient Instructions:    Daniel Aakash ARNULFO Suárez   Home Medication Instructions KENTRELL:[id number]    Printed on:12/01/17 1684   Medication Information                      acetaminophen (TYLENOL) 325 MG tablet  Take 650 mg by mouth Every 8 (Eight) Hours As Needed for Mild Pain .             amiodarone (PACERONE) 100 MG tablet  Take 1 tablet by mouth Every 12 (Twelve) Hours.             amLODIPine (NORVASC) 5 MG tablet  Take 1 tablet by mouth Daily.             atorvastatin (LIPITOR) 40 MG tablet  Take 1 tablet by mouth Every Night.             busPIRone (BUSPAR) 7.5 MG tablet  Take 7.5 mg by mouth 2 (Two) Times a Day.             cyclobenzaprine (FLEXERIL) 5 MG tablet  Take 1 tablet by mouth 3 (Three) Times a Day As Needed for Muscle Spasms.             digoxin (LANOXIN) 125 MCG tablet  Take 1 tablet by mouth Every Other Day.             famotidine (PEPCID) 20 MG tablet  Take 1 tablet by mouth Daily.             finasteride (PROSCAR) 5 MG tablet  Take 5 mg by mouth Daily.             isosorbide mononitrate (IMDUR) 30 MG 24 hr tablet  Take 0.5 tablets by mouth Daily.             metoprolol tartrate (LOPRESSOR) 100 MG tablet  Take 1 tablet by mouth Every 12 (Twelve) Hours.             polyethylene glycol (MIRALAX) packet  Take 17 g by mouth Daily.             tamsulosin (FLOMAX) 0.4 MG capsule 24 hr capsule  Take 1 capsule by mouth 2 (Two) Times a Day.             warfarin (COUMADIN) 1 MG tablet  1 mg po qday               Future Appointments  Date Time Provider Department Center   12/4/2017 10:30 AM Kacie Lizama MD [facility] None   12/4/2017 1:30 PM  [facility] [address]  [facility]   12/5/2017  1:30 PM [facility] [address] [facility]   12/6/2017 10:30 AM Manoj Givens MD [facility] None   12/6/2017 11:40 AM Dimitrios Lindquist MD [facility] None   12/6/2017 1:30 PM [facility] [address] [facility]   12/11/2017 10:15 AM [facility] [facility]   12/11/2017 2:30 PM [facility] [facility]   12/12/2017 10:15 AM [facility] [facility]   12/18/2017 2:45 PM Carolyn Ramirez MD [facility] None   1/9/2018 10:30 AM [facility], [facility] L[facility] CD [facility] None   6/6/2018 2:30 PM HELEN Menendez [facility] None     Follow-up Information     Follow up with [facility] .    Specialties:  Skilled Nursing Facility, Intermediate Care Facility    Contact information:    [address] [facility] [address] [phone]        Follow up with Dimitrois Lindquist MD .    Specialties:  Internal Medicine, Pediatrics, Emergency Medicine    Contact information:    [address] [id number] [phone]          Discharge Order     Start     Ordered    12/01/17 1350  Discharge patient  Once     Expected Discharge Date:  12/01/17    Discharge Disposition:  Skilled Nursing Facility (DC - External)        12/01/17 1349        F/u with nursing home physician    Cbc and cmp in one week      Total time spent discharging patient including evaluation, post hospitalization follow up,  medication and post hospitalization instructions and education, total time exceeds 30 minutes.    Signed:  Asif Benitez MD  12/1/2017  1:51 PM

## 2017-12-01 NOTE — PROGRESS NOTES
"   LOS: 13 days    Patient Care Team:  Dimitrios Lindquist MD as PCP - General (Internal Medicine)  Latanya Smith MD as Referring Physician (Pulmonary Disease)  Manoj Louis MD as Consulting Physician (Hematology and Oncology)    Chief Complaint:    Chief Complaint   Patient presents with   • Altered Mental Status       Subjective     Interval History:   Follow up PAU on CKD3.     Review of Systems:     Seen and examined. Feels stronger. Whaley is anchored. Will be discharged today. No new complaints.     Objective     Vital Signs  Temp:  [97.2 °F (36.2 °C)-97.8 °F (36.6 °C)] 97.8 °F (36.6 °C)  Heart Rate:  [84-85] 84  Resp:  [16-18] 16  BP: (128-146)/(72-79) 146/79    Flowsheet Rows         First Filed Value    Admission Height  70\" (177.8 cm) Documented at 11/18/2017 1344    Admission Weight  200 lb (90.7 kg) Documented at 11/18/2017 1344             I/O last 3 completed shifts:  In: -   Out: 2200 [Urine:2200]    Intake/Output Summary (Last 24 hours) at 12/01/17 1419  Last data filed at 12/01/17 0546   Gross per 24 hour   Intake                0 ml   Output             1250 ml   Net            -1250 ml       Physical Exam:  Lying in bed. NAD.   Eyes less erythematous.   Heart RRR no s3 or rub.   Lungs clear to auscultation.   bd + bs , slightly distended, not tender.   Whaley is anchored  Ext no edema. Right knee with possible medial effusion.  ankle not tender or warm.       Results Review:      Results from last 7 days  Lab Units 12/01/17  0716 11/30/17  0559 11/29/17  0651   SODIUM mmol/L 136 140 136   POTASSIUM mmol/L 4.2 4.2 3.9   CHLORIDE mmol/L 99 104 99   CO2 mmol/L 24.6 26.2 27.7   BUN mg/dL 27* 24* 19   CREATININE mg/dL 1.63* 1.56* 1.67*   CALCIUM mg/dL 8.9 8.8 9.0   BILIRUBIN mg/dL  --   --  0.6   ALK PHOS U/L  --   --  55   ALT (SGPT) U/L  --   --  8   AST (SGOT) U/L  --   --  12   GLUCOSE mg/dL 106* 145* 146*       Estimated Creatinine Clearance: 39.7 mL/min (by C-G formula based on Cr of " 1.63).      Results from last 7 days  Lab Units 11/29/17  0651 11/25/17  0530   PHOSPHORUS mg/dL 3.5 2.1*         Results from last 7 days  Lab Units 11/29/17  0651   URIC ACID mg/dL 6.9         Results from last 7 days  Lab Units 12/01/17  0716 11/30/17  0559 11/29/17  0651 11/28/17  0655 11/27/17  0726   WBC 10*3/mm3 11.42* 11.96* 11.77* 10.41 9.77   HEMOGLOBIN g/dL 10.6* 9.6* 10.2* 9.5* 10.9*   PLATELETS 10*3/mm3 269 244 267 286 290         Results from last 7 days  Lab Units 12/01/17  0716 11/30/17  0559 11/29/17  0651 11/28/17  0655 11/27/17  0726   INR  2.14* 2.17* 1.83* 1.99* 1.94*         Imaging Results (last 24 hours)     ** No results found for the last 24 hours. **          amiodarone 100 mg Oral Q12H   amLODIPine 5 mg Oral Q24H   atorvastatin 40 mg Oral Nightly   busPIRone 7.5 mg Oral BID   digoxin 125 mcg Oral Every Other Day   famotidine 20 mg Oral Daily   finasteride 5 mg Oral Daily   isosorbide mononitrate 15 mg Oral Daily   ketotifen 1 drop Both Eyes BID   lactulose 10 g Oral Q48H   metoprolol tartrate 100 mg Oral Q12H   sennosides-docusate sodium 2 tablet Oral Nightly   tamsulosin 0.4 mg Oral BID   warfarin 1 mg Oral Daily          Medication Review:   Current Facility-Administered Medications   Medication Dose Route Frequency Provider Last Rate Last Dose   • acetaminophen (TYLENOL) tablet 650 mg  650 mg Oral Q4H PRN Asif Benitez MD   650 mg at 11/18/17 2242   • amiodarone (PACERONE) tablet 100 mg  100 mg Oral Q12H Nigel Whitley MD   100 mg at 12/01/17 0837   • amLODIPine (NORVASC) tablet 5 mg  5 mg Oral Q24H Rosa Morrow MD   5 mg at 12/01/17 0837   • atorvastatin (LIPITOR) tablet 40 mg  40 mg Oral Nightly Asif Benitez MD   40 mg at 11/30/17 2027   • busPIRone (BUSPAR) tablet 7.5 mg  7.5 mg Oral BID Asif Benitez MD   7.5 mg at 12/01/17 0838   • cyclobenzaprine (FLEXERIL) tablet 5 mg  5 mg Oral TID PRN Asif Benitez MD   5 mg at 11/28/17 1024   • dextrose (D50W)  solution 25 g  25 g Intravenous Q15 Min PRN Asif Benitez MD       • dextrose (GLUTOSE) oral gel 15 g  15 g Oral Q15 Min PRN Asif Benitez MD       • digoxin (LANOXIN) tablet 125 mcg  125 mcg Oral Every Other Day Nigel Whitley MD   125 mcg at 12/01/17 0837   • famotidine (PEPCID) tablet 20 mg  20 mg Oral Daily Nigel Whitley MD   20 mg at 12/01/17 0838   • finasteride (PROSCAR) tablet 5 mg  5 mg Oral Daily Asif Benitez MD   5 mg at 12/01/17 0837   • glucagon (human recombinant) (GLUCAGEN DIAGNOSTIC) injection 1 mg  1 mg Subcutaneous Q15 Min PRN Asif Benitez MD       • Glycerin-Hypromellose- (ARTIFICIAL TEARS) 0.2-0.2-1 % ophthalmic solution solution 2 drop  2 drop Both Eyes Q1H PRN Asif Benitez MD   2 drop at 11/28/17 1651   • isosorbide mononitrate (IMDUR) 24 hr tablet 15 mg  15 mg Oral Daily Asif Benitez MD   15 mg at 12/01/17 0838   • ketotifen (ZADITOR) 0.025 % ophthalmic solution 1 drop  1 drop Both Eyes BID Carmen Byrd MD   1 drop at 11/30/17 1744   • lactulose (CHRONULAC) 10 GM/15ML solution 10 g  10 g Oral Daily PRN Rosa Morrow MD       • lactulose (CHRONULAC) 10 GM/15ML solution 10 g  10 g Oral Q48H Rosa Morrow MD   10 g at 11/30/17 2352   • metoprolol tartrate (LOPRESSOR) tablet 100 mg  100 mg Oral Q12H Asif Benitez MD   100 mg at 12/01/17 0838   • ondansetron (ZOFRAN) tablet 4 mg  4 mg Oral Q6H PRN Asif Benitez MD        Or   • ondansetron ODT (ZOFRAN-ODT) disintegrating tablet 4 mg  4 mg Oral Q6H PRN Asif Benitez MD        Or   • ondansetron (ZOFRAN) injection 4 mg  4 mg Intravenous Q6H PRN Asif Benitez MD   4 mg at 11/20/17 1157   • sennosides-docusate sodium (SENOKOT-S) 8.6-50 MG tablet 2 tablet  2 tablet Oral Nightly Nigel Whitley MD   2 tablet at 11/30/17 2027   • sodium chloride 0.9 % flush 1-10 mL  1-10 mL Intravenous PRN Asif Benitez MD   10 mL at 11/27/17 1523   • tamsulosin (FLOMAX) 24 hr capsule 0.4 mg   0.4 mg Oral BID Bryan Bernstein MD   0.4 mg at 12/01/17 0838   • warfarin (COUMADIN) tablet 1 mg  1 mg Oral Daily Carolyn Ramirez MD   1 mg at 11/30/17 8902       Assessment/Plan   1.  PAU on CKD3, Likely due to sepsis, urinary retention and volume depletion. CR is stable. Patient is being discharged to rehab. Will need renal follow up in 4 weeks. Oliver to remain in and he will follow up with urology.      2.  HTN Better controlled. .   3.  Non-small cell lung cancer left upper lobe. XRT  4.  Urinary tract infection: off antibiotic. .   5.  Urinary retention, with in-dweling oliver for now.  Patient hesitant to do self cath.  Will be discharged with oliver.  6.  Dyslipidemia  7.  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  Metabolic acidosis. Resolved.   9. Chronic afib, paced. On digoxin.   10. Chronic constipation  Lactulose qod  11. Anemia.        Rex Guadalupe MD  12/01/17  2:19 PM

## 2017-12-01 NOTE — PROGRESS NOTES
Continued Stay Note  [facility]     Patient Name: Aakash Sanchez Jr.  MRN: [id number]  Today's Date: 12/1/2017    Admit Date: 11/18/2017          Discharge Plan       12/01/17 1433    Case Management/Social Work Plan    Plan Plan [facility] for skilled care with family transporting for radiaiton treatments.  MARZENA Cortés    Additional Comments Spoke to pt at bedside.  Called and spoke with (Norma  [phone]) pt's daughter and she states family will transport.  MARZENA Cortés      12/01/17 1344    Case Management/Social Work Plan    Plan Plan [facility] for skilled care with family tansporting for radiation treatments.   MARZENA Cortés    Additional Comments Pt completed radiaiton treatment today.  Pt to be discharged.  Call  (Brii [phone]) and she states [facility] has a bed and can accept pt.  Packet  to RN.  Family to transport.  Plan [facility] for skilled care.  MARZENA Cortés              Discharge Codes     None        Expected Discharge Date and Time     Expected Discharge Date Expected Discharge Time    Dec 1, 2017             Veronica Woo, RN

## 2017-12-04 PROBLEM — R50.9 FEVER: Status: ACTIVE | Noted: 2017-01-01

## 2017-12-04 NOTE — TELEPHONE ENCOUNTER
Per patients son Mr. Sanchez is not feeling well and that he would not be in today for treatment. Nurse and therapist notified of call.

## 2017-12-04 NOTE — ED NOTES
Patient very hard stick, two rn and also a tech attempted to get blood cultures. First set was drawn and sent and for the second set only a blue top was drawn. Patient is a difficult stick and also is very agitated and was pulling away and not cooperative with treatment.      Katy Jacob RN  12/04/17 7394

## 2017-12-04 NOTE — ED PROVIDER NOTES
" EMERGENCY DEPARTMENT ENCOUNTER    CHIEF COMPLAINT  Chief Complaint: AMS  History given by: EMS  History limited by: Pt's AMS.   Room Number: 21/21  PMD: Nerissa Falcon MD      HPI:  Pt is a 82 y.o. male who presents via EMS for AMS and unwitnessed fall PTA. Pt was found down laying on a hallway. Facility reports the patient was down for a few minutes, and sent him to the ED to r/o a possible fracture. He arrived on a C-collar and long spinal board. Per family he is prone to have a UTI with a history of frequent falls.     Duration:  PTA  Onset: gradual  Timing: constant  Location: L hip  Radiation: none  Quality: \"pain\"  Intensity/Severity: moderate  Progression: unchanged   Associated Symptoms: unable to determine due to pt's AMS  Aggravating Factors: unable to determine due to pt's AMS  Alleviating Factors: unable to determine due to pt's AMS  Previous Episodes: unable to determine due to pt's AMS  Treatment before arrival: Pt arrived on a C-collar and long spinal board.     PAST MEDICAL HISTORY  Active Ambulatory Problems     Diagnosis Date Noted   • Arthritis 04/18/2016   • Hyperlipidemia 04/18/2016   • Hypertension 04/18/2016   • Hypokalemia 04/18/2016   • Ventricular premature beats 04/18/2016   • Coronary artery disease    • Ischemic cardiomyopathy    • PAF (paroxysmal atrial fibrillation)    • COPD (chronic obstructive pulmonary disease) 11/15/2016   • VRE (vancomycin-resistant Enterococci) infection 11/23/2016   • Type 2 diabetes mellitus with complication 11/23/2016   • Diarrhea 12/14/2016   • Abdominal aortic aneurysm (AAA) without rupture 12/15/2016   • Long term current use of anticoagulant 12/15/2016   • Urinary tract infection with hematuria 01/18/2017   • CKD (chronic kidney disease), stage III 01/18/2017   • Hypotension 02/17/2017   • Hyperglycemia 03/29/2017   • Imbalance 03/29/2017   • Concepcion's esophagus without dysplasia 05/02/2017   • Colon polyps 05/02/2017   • B12 deficiency " 06/01/2017   • Cerebrovascular accident (CVA) 06/04/2017   • History of coronary artery stent placement 06/15/2017   • Rash of body 06/21/2017   • Insomnia 08/25/2017   • Cardiomyopathy 09/01/2017   • Benign non-nodular prostatic hyperplasia with lower urinary tract symptoms 09/05/2017   • Prostate hypertrophy 09/05/2017   • Chronic combined systolic and diastolic congestive heart failure 09/05/2017   • Pneumonia 09/12/2017   • Sepsis 09/12/2017   • 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 09/28/2017   • UTI (urinary tract infection) due to yeast 10/01/2017   • CKD (chronic kidney disease), stage III 10/01/2017   • PAU (acute kidney injury) 10/01/2017   • Arteriosclerotic coronary artery disease 10/01/2017   • Metabolic encephalopathy 10/01/2017   • Acidosis 10/01/2017   • Urinary retention 10/09/2017   • Cerebellar stroke 10/11/2017   • Malignant neoplasm of bronchus of left upper lobe 11/01/2017   • Slow transit constipation 11/21/2017   • DNR (do not resuscitate) 11/22/2017     Resolved Ambulatory Problems     Diagnosis Date Noted   • Lung mass 04/18/2016   • Acute respiratory failure with hypoxia 09/27/2016   • Respiratory failure 09/28/2016   • PAU (acute kidney injury) 11/23/2016   • NSTEMI (non-ST elevated myocardial infarction) 06/04/2017   • Paroxysmal atrial fibrillation 06/15/2017   • Acute on chronic systolic (congestive) heart failure 08/28/2017   • Atrial fibrillation with RVR 09/12/2017   • Fever and chills 10/01/2017   • Wide-complex tachycardia 10/01/2017   • Mass of upper lobe of left lung 10/02/2017     Past Medical History:   Diagnosis Date   • AAA (abdominal aortic aneurysm)    • Acute myocardial infarction    • Altered mental state    • Arteriosclerotic coronary artery disease    • Arthritis    • Atrial fibrillation    • B12 deficiency 6/1/2017   • Cardiomyopathy    • Chicken pox    • Chronic combined systolic and diastolic congestive heart failure 9/5/2017   • Chronic  systolic (congestive) heart failure    • CKD (chronic kidney disease), stage III    • COPD (chronic obstructive pulmonary disease) 11/15/2016   • Coronary artery disease    • Difficulty walking    • Dyspnea    • Dyspnea on exertion 8/25/2017   • Fatigue    • Fever and chills    • Heart attack    • Hyperglycemia 3/29/2017   • Hyperlipidemia    • Hypertension    • Imbalance 3/29/2017   • Insomnia 8/25/2017   • Ischemic cardiomyopathy    • Lung mass 04/18/2016   • Lung nodule seen on imaging study    • Malignant neoplasm    • Measles    • Mumps    • Pleural effusion    • Premature ventricular contractions    • Prostate cancer    • PVC (premature ventricular contraction)    • Rash of body 6/21/2017   • SOB (shortness of breath)    • Stroke    • Thoracic aortic ectasia    • Urinary tract infection with hematuria 1/18/2017   • Vitamin B 12 deficiency        PAST SURGICAL HISTORY  Past Surgical History:   Procedure Laterality Date   • APPENDECTOMY     • ARTERIAL ANEURYSM REPAIR     • BRONCHOSCOPY N/A 11/29/2016    Procedure: BRONCHOSCOPY WITH FLUORO w/biopsy AND BAL;  Surgeon: Ervin Stiles MD;  [place] [facility];  Service:    • CARDIAC CATHETERIZATION N/A 6/7/2017    Procedure: Left Heart Cath;  Surgeon: Vincent Middleton MD;  [facility] [facility];  Service:    • CARDIAC CATHETERIZATION N/A 6/7/2017    Procedure: Coronary angiography;  Surgeon: Vincent Middleton MD;  [facility] [facility];  Service:    • CARDIAC CATHETERIZATION N/A 6/7/2017    Procedure: Left ventriculography;  Surgeon: Vincent Middleton MD;  [facility] [facility];  Service:    • CARDIAC CATHETERIZATION N/A 6/7/2017    Procedure: Bypass graft study;  Surgeon: Vincent Middleton MD;  [facility];  Service:    • CARDIAC CATHETERIZATION N/A 6/7/2017    Procedure: Native mammary injection;  Surgeon: Vincent Middleton MD;  [facility];  Service:    •  CARDIAC CATHETERIZATION N/A 6/7/2017    Procedure: Stent NYA bypass graft;  Surgeon: Vincent Middleton MD;  Location:  [facility];  Service:    • CARDIAC DEFIBRILLATOR PLACEMENT     • CARDIAC ELECTROPHYSIOLOGY PROCEDURE N/A 9/19/2017    Procedure: ICD SC new- Bi Ventricular  CRT-D  MEDTRONIC;  Surgeon: Fady Terrazas MD;  Location:  [facility] INVASIVE LOCATION;  Service:    • CARDIAC SURGERY     • CHOLECYSTECTOMY     • COLONOSCOPY N/A 12/16/2016    Procedure: COLONOSCOPY TO ASC COLON  WITH BIOPSY AND POLYPECTOMY;  Surgeon: Lane Brian MD;  Location:  [facility];  Service:    • COLONOSCOPY N/A 3/1/2017    multiple polyps, lipoma in proximal transverse colon/hepatic flexure, repeat in one year   • CORONARY ARTERY BYPASS GRAFT     • ENDOSCOPY N/A 3/1/2017    Concepcion's, HH, gastric mucosal atrophy   • INSERT / REPLACE / REMOVE PACEMAKER     • PROSTATE SURGERY         FAMILY HISTORY  Family History   Problem Relation Age of Onset   • Cancer Mother    • Cancer Father    • COPD Father    • Arthritis Sister    • Diabetes Sister    • Hyperlipidemia Sister    • Arthritis Brother    • Diabetes Brother    • Stroke Brother    • Lung cancer Other    • Stroke Other    • No Known Problems Maternal Grandmother    • No Known Problems Maternal Grandfather    • No Known Problems Paternal Grandmother    • No Known Problems Paternal Grandfather        SOCIAL HISTORY  Social History     Social History   • Marital status:      Spouse name: N/A   • Number of children: N/A   • Years of education: N/A     Occupational History   • 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       ALLERGIES  Review of patient's allergies indicates no known allergies.    REVIEW OF SYSTEMS  Review of Systems   Unable to perform ROS: Mental status change       PHYSICAL EXAM  ED Triage Vitals   Temp Heart Rate Resp BP SpO2   12/04/17 1607 12/04/17 1607 12/04/17 1607 12/04/17 1607 12/04/17 1607   102.4 °F (39.1 °C) 90 19 148/78 97 %      Temp src Heart Rate Source Patient Position BP Location FiO2 (%)   12/04/17 1607 -- -- -- --   Tympanic           Physical Exam   Constitutional: He appears distressed.   Pt distressed and agitated, not following commands. He is folding himself on arrival. C-collar in place. Exam limited due to pt's cooperation.      HENT:   Head: Normocephalic and atraumatic.   Mouth/Throat: Mucous membranes are dry.   Eyes: EOM are normal. Pupils are equal, round, and reactive to light.   Neck: Normal range of motion. Neck supple.   Cardiovascular: Normal rate, regular rhythm and normal heart sounds.    Pulmonary/Chest: Effort normal and breath sounds normal. No respiratory distress.   Abdominal: Soft. There is no tenderness. There is no rebound and no guarding.   Pt has a oliver catheter in place.    Musculoskeletal: Normal range of motion. He exhibits no edema.   Pt moves all extremities without any difficulties.    Neurological: He is alert. He has normal sensation and normal strength. He displays facial symmetry.   Pt moves all extremities well, however does not follow commands.   Skin: Skin is warm and dry.   Psychiatric: Mood and affect normal.   Nursing note and vitals reviewed.      LAB RESULTS  Lab Results (last 24 hours)     Procedure Component Value Units Date/Time    CBC & Differential [[id number]] Collected:  12/04/17 1652    Specimen:  Blood Updated:  12/04/17 1713    Narrative:       The following orders were created for panel order CBC & Differential.  Procedure                               Abnormality          Status                     ---------                               -----------         ------                     CBC Auto Differential[[id number]]        Abnormal            Final result                 Please view results for these tests on the individual orders.    Comprehensive Metabolic Panel [[id number]]  (Abnormal) Collected:  12/04/17 1652    Specimen:  Blood Updated:  12/04/17 1730     Glucose 153 (H) mg/dL      BUN 25 (H) mg/dL      Creatinine 2.04 (H) mg/dL      Sodium 136 mmol/L      Potassium 4.1 mmol/L      Chloride 99 mmol/L      CO2 23.3 mmol/L      Calcium 8.9 mg/dL      Total Protein 7.8 g/dL      Albumin 3.30 (L) g/dL      ALT (SGPT) 12 U/L      AST (SGOT) 12 U/L      Alkaline Phosphatase 68 U/L      Total Bilirubin 1.1 mg/dL      eGFR Non African Amer 32 (L) mL/min/1.73      Globulin 4.5 gm/dL      A/G Ratio 0.7 g/dL      BUN/Creatinine Ratio 12.3     Anion Gap 13.7 mmol/L     Narrative:       The MDRD GFR formula is only valid for adults with stable renal function between ages 18 and 70.    Protime-INR [[id number]]  (Abnormal) Collected:  12/04/17 1652    Specimen:  Blood Updated:  12/04/17 1745     Protime 19.9 (H) Seconds      INR 1.76 (H)    Lactic Acid, Plasma [[id number]]  (Normal) Collected:  12/04/17 1652    Specimen:  Blood Updated:  12/04/17 1716     Lactate 2.0 mmol/L     Procalcitonin [[id number]]  (Abnormal) Collected:  12/04/17 1652    Specimen:  Blood Updated:  12/04/17 1737     Procalcitonin 0.27 (H) ng/mL     Narrative:       As a Marker for Sepsis (Non-Neonates):   1. <0.5 ng/mL represents a low risk of severe sepsis and/or septic shock.  1. >2 ng/mL represents a high risk of severe sepsis and/or septic shock.    As a Marker for Lower Respiratory Tract Infections that require antibiotic therapy:  PCT on Admission     Antibiotic Therapy             6-12 Hrs later  > 0.5                Strongly Recommended            >0.25 - <0.5         Recommended  0.1 - 0.25           Discouraged     "               Remeasure/reassess PCT  <0.1                 Strongly Discouraged          Remeasure/reassess PCT      As 28 day mortality risk marker: \"Change in Procalcitonin Result\" (> 80 % or <=80 %) if Day 0 (or Day 1) and Day 4 values are available. Refer to http://[email or web address]-pct-calculator.com/   Change in PCT <=80 %   A decrease of PCT levels below or equal to 80 % defines a positive change in PCT test result representing a higher risk for 28-day all-cause mortality of patients diagnosed with severe sepsis or septic shock.  Change in PCT > 80 %   A decrease of PCT levels of more than 80 % defines a negative change in PCT result representing a lower risk for 28-day all-cause mortality of patients diagnosed with severe sepsis or septic shock.                CBC Auto Differential [[id number]]  (Abnormal) Collected:  12/04/17 1652    Specimen:  Blood Updated:  12/04/17 1713     WBC 16.40 (H) 10*3/mm3      RBC 4.57 (L) 10*6/mm3      Hemoglobin 10.8 (L) g/dL      Hematocrit 36.1 (L) %      MCV 79.0 (L) fL      MCH 23.6 (L) pg      MCHC 29.9 (L) g/dL      RDW 20.4 (H) %      RDW-SD 58.1 (H) fl      MPV 9.9 fL      Platelets 259 10*3/mm3      Neutrophil % 89.1 (H) %      Lymphocyte % 3.6 (L) %      Monocyte % 6.9 %      Eosinophil % 0.1 (L) %      Basophil % 0.1 %      Immature Grans % 0.2 %      Neutrophils, Absolute 14.61 (H) 10*3/mm3      Lymphocytes, Absolute 0.59 (L) 10*3/mm3      Monocytes, Absolute 1.13 10*3/mm3      Eosinophils, Absolute 0.01 10*3/mm3      Basophils, Absolute 0.02 10*3/mm3      Immature Grans, Absolute 0.04 (H) 10*3/mm3     Blood Culture - Blood, [[id number]] Collected:  12/04/17 1726    Specimen:  Blood from Arm, Left Updated:  12/04/17 1733    Blood Culture - Blood, [[id number]] Collected:  12/04/17 1751    Specimen:  Blood from Arm, Left Updated:  12/04/17 1751    Respiratory Panel, PCR - Swab, Nasopharynx [[id number]]  (Normal) Collected:  12/04/17 1759    Specimen:  Swab from Nasopharynx " Updated:  12/04/17 1943     ADENOVIRUS, PCR Not Detected     Coronavirus 229E Not Detected     Coronavirus HKU1 Not Detected     Coronavirus NL63 Not Detected     Coronavirus OC43 Not Detected     Human Metapneumovirus Not Detected     Human Rhinovirus/Enterovirus Not Detected     Influenza B PCR Not Detected     Parainfluenza Virus 1 Not Detected     Parainfluenza Virus 2 Not Detected     Parainfluenza Virus 3 Not Detected     Parainfluenza Virus 4 Not Detected     Bordetella pertussis pcr Not Detected     Influenza 2009 H1N1 by PCR Not Detected     Chlamydophila pneumoniae PCR Not Detected     Mycoplasma pneumo by PCR Not Detected     Influenza A PCR Not Detected     Influenza A H3 Not Detected     Influenza A H1 Not Detected     RSV, PCR Not Detected    Urinalysis With / Culture If Indicated - Urine, Catheter [[id number]]  (Abnormal) Collected:  12/04/17 1856    Specimen:  Urine from Urine, Catheter Updated:  12/04/17 1944     Color, UA Red (A)      Any Substance that causes an abnormal urine color can alter the accuracy of the chemical reactions.        Appearance, UA Cloudy (A)     pH, UA 6.0     Specific Gravity, UA 1.018     Glucose, UA Negative     Ketones, UA Trace (A)     Bilirubin, UA Negative     Blood, UA Large (3+) (A)     Protein, UA >=300 mg/dL (3+) (A)     Leuk Esterase, UA Large (3+) (A)     Nitrite, UA Positive (A)     Urobilinogen, UA 1.0 E.U./dL    Urinalysis, Microscopic Only - Urine, Clean Catch [[id number]]  (Abnormal) Collected:  12/04/17 1856    Specimen:  Urine from Urine, Catheter Updated:  12/04/17 1946     RBC, UA Too Numerous to Count (A) /HPF      WBC, UA  /HPF      Unable to determine due to loaded field (A)     Bacteria, UA  /HPF      Unable to determine due to loaded field (A)     Squamous Epithelial Cells, UA  /HPF      Unable to determine due to loaded field (A)     Hyaline Casts, UA  /LPF      Unable to determine due to loaded field     Methodology Manual Light Microscopy    Urine  Culture - Urine, Urine, Clean Catch [[id number]] Collected:  12/04/17 1856    Specimen:  Urine from Urine, Catheter Updated:  12/04/17 1935          I ordered the above labs and reviewed the results    RADIOLOGY  XR Chest 1 View   A single view of the chest demonstrates moderate to severe cardiomegaly,  slightly more prominent as compared to 11/17/2017. There is prominence  of the pulmonary vasculature and interstitium suggesting an element of  congestive changes. The mass involving the left heart border is  partially obscured by the left ventricle but appears similar to the  prior examination given differences in technique. There is no evidence  of effusion. There is mild cephalization of the pulmonary vasculature.      CT Head Without Contrast   Preliminary Result   Multilevel degenerative disease with no evidence of   fracture. Vascular calcification is noted.       The above information was called to and discussed with Dr. Verduzco.       Radiation dose reduction techniques were utilized, including automated   exposure control and exposure modulation based on body size.              CT Cervical Spine Without Contrast   Preliminary Result   Multilevel degenerative disease with no evidence of   fracture. Vascular calcification is noted.       The above information was called to and discussed with Dr. Verduzco.       Radiation dose reduction techniques were utilized, including automated   exposure control and exposure modulation based on body size.                   I ordered the above noted radiological studies. Interpreted by radiologist. Reviewed by me in PACS.       PROCEDURES  Procedures  EKG           EKG time: 1721  Rhythm/Rate: paced, 95 BPM  Underline a-fib    Interpreted Contemporaneously by me, independently viewed  Unchanged compared to prior 11/18/17      PROGRESS AND CONSULTS  ED Course       1629 Ordered blood work including CBC, Procalcitonin, Lactic acid, Blood cultures, UA, Protime-INR, CMP and  Respiratory panel for further evaluation. Ordered EKG, XR chest, and CT C spine/head for further evaluation, IVF bolus for hydration, Tylenol for his fever, and Vancomycin to treat his infection.     1954 Pt is febrile in the ED. He is restless, and is not due for another dose of Tylenol. Plan to give him a dose of aspirin, and admit to [facility]. Family understands and agrees with plan. All questions addressed.     1659 Ordered ASA. Placed consult to [facility].     2017 Discussed pt with  (hospitalist), who will admit to Telemetry.     MEDICAL DECISION MAKING  Results were reviewed/discussed with the patient and they were also made aware of online access. Pt also made aware that some labs, such as cultures, will not be resulted during ER visit and follow up with PMD is necessary.     MDM  Number of Diagnoses or Management Options     Amount and/or Complexity of Data Reviewed  Clinical lab tests: reviewed and ordered (UA Bacteria/WBC - too numerous to count, CBC WBC- 16.40, Procalcitonin 0.27)  Tests in the radiology section of CPT®: reviewed and ordered (XR chest - showed cephalization of the pulmonary vasculature.   CT head/C spine- showed degenerative disease, no acute fracture. )  Tests in the medicine section of CPT®: ordered and reviewed (See note. )  Decide to obtain previous medical records or to obtain history from someone other than the patient: yes  Review and summarize past medical records: yes (Pt was admitted last month for sepsis and a UTI. )  Discuss the patient with other providers: yes ( (hospitalist))  Independent visualization of images, tracings, or specimens: yes    Patient Progress  Patient progress: stable         DIAGNOSIS  Final diagnoses:   Fever, unspecified fever cause   Urinary tract infection associated with catheterization of urinary tract, unspecified indwelling urinary catheter type, initial encounter   Delirium       DISPOSITION  ADMISSION    Discussed treatment plan and  reason for admission with pt/family and admitting physician.  Pt/family voiced understanding of the plan for admission for further testing/treatment as needed.         Latest Documented Vital Signs:  As of 8:18 PM  BP- 130/72 HR- 105 Temp- (!) 103.2 °F (39.6 °C) (Tympanic) O2 sat- 95%    --  Documentation assistance provided by darian Higgins for Dr.William Dax MD.  Information recorded by the scribe was done at my direction and has been verified and validated by me.       Jolene Bonner  12/04/17 2021       Vincent Verduzco MD  12/04/17 [id number]

## 2017-12-04 NOTE — PROGRESS NOTES
Case Management Discharge Note    Final Note: [facility], skilled , Family transportred.     Discharge Placement     Facility/Agency Request Status Selected? Address Phone Number Fax Number    [facility] Accepted    [facility] [address] [address] [phone]        MARU Hoffmann 11/22/2017 16:09    Patient ready for discharge Thursday or Friday               [facility] Pending - Request Sent     [address] [facility] [address] [phone]        MARU Hoffmann 11/22/2017 16:07    Patient maybe ready for discharge tomorrow or friday               [facility] Pending - No Request Sent     [address] [address] [phone]    [facility] Declined   no bed     [address] [facility] [address] [phone]        MARU Hoffmann 11/22/2017 15:41    Patient is ready for discharge, Thursday or Friday. He has been to [facility] in the past. He is agreeable to returning  If you can accept. Please advise                        Discharge Codes: 03  Discharged/transferred to skilled nursing facility (SNF) with Medicare certification in anticipation of skilled care ([facility] skilled )

## 2017-12-05 PROBLEM — A41.9 SEPSIS (HCC): Status: ACTIVE | Noted: 2017-01-01

## 2017-12-05 NOTE — PROCEDURES
Central Venous Catheter Insertion Procedure Note    [facility] PROCEDURE NOTE    Indications:  vascular access    Procedure Details   This was an emergency procedure, no consent was obtained because this was during cardiac arrest.    Maximum sterile technique was used including usual patient drapes, antiseptics and physician sterile garments.    Under sterile conditions the skin above the on the right femoral vein was prepped with Chlorhexidine and covered with a sterile drape. Local anesthesia was applied to the skin and subcutaneous tissues with lidocaine 1%. An 18-gauge needle was then inserted into the vein. A guide wire was then passed easily through the catheter. A quad lumen central venous catheter was then inserted into the vessel over the guide wire.  There was significant difficulty passing the guidewire.  The catheter did not enter the patient completely or satisfactorily.  Patient was undergoing emergent CODE BLUE cardiac resuscitation attempts.  He  at that point.          Kenneth Mcleod MD  2017

## 2017-12-05 NOTE — CODE DOCUMENTATION
I was called stat to the bedside.  The patient experienced cardiac arrest.  CODE BLUE was called and ACLS protocol was immediately initiated with all team members in the room.  The patient received multiple rounds of intravenous epinephrine, amiodarone and bicarbonate.  Please refer to the code documentation sheet for details.  I intubated the patient and ordered secure airway.  I also placed a central venous catheter in the right groin.  In spite of all efforts and proper ACLS protocol, after 25 minutes the patient failed to recover a pulse or blood pressure and the CODE BLUE was terminated.  He .

## 2017-12-05 NOTE — CONSULTS
Upon review of records, looks like patient has recently been evaluated by Dr. Byrd.  Discussed with Dr. Mcleod and he will consult him.

## 2017-12-05 NOTE — CONSULTS
Group: [facility]         CRITICAL CARE CONSULT NOTE    Patient Identification:  Aakash Sanchez Jr.  82 y.o.  male  1935  [id number]            Requesting physician: Dr. Dr. Eliecer Gerard    Reason for Consultation:  Sepsis    CC: FAll    History of Present Illness:  82-year-old male patient who is completely confused and is a very poor historian.  He is agitated and restless.  I obtained the history from a conversation with the nurse at the bedside and review of the history and physical dictated by Dr. Eliecer Soto at 2:45 AM.  Apparently he comes from a skilled nursing facility after he had an unwitnessed fall.  It's unclear what triggered this fall.  The patient was apparently down just for a few minutes.  He does have an indwelling Whaley catheter with history of previous urinary tract infections.  In the emergency room at this hospital he was not found to have any fractures.  He is currently confused and moaning and groaning.  He is not answering any of my questions.  He was admitted to the medical floor on telemetry unit, however he developed    Review of Systems   Unable to perform ROS: Mental status change       Past Medical History:  Past Medical History:   Diagnosis Date   • AAA (abdominal aortic aneurysm)     s/p repair, Dr. Felix   • Acute myocardial infarction    • Altered mental state    • Arteriosclerotic coronary artery disease    • Arthritis    • Atrial fibrillation    • B12 deficiency 6/1/2017   • Cardiomyopathy    • Chicken pox    • Chronic combined systolic and diastolic congestive heart failure 9/5/2017   • Chronic systolic (congestive) heart failure    • CKD (chronic kidney disease), stage III    • COPD (chronic obstructive pulmonary disease) 11/15/2016   • Coronary artery disease     MI 1990's, CABG x 2 V (LIMA-LAD, SVG-PDA), 1997   • Difficulty walking    • Dyspnea    • Dyspnea on exertion 8/25/2017   • Fatigue    • Fever and chills    • Heart attack    •  Hyperglycemia 3/29/2017   • Hyperlipidemia    • Hypertension    • Imbalance 3/29/2017   • Insomnia 8/25/2017   • Ischemic cardiomyopathy     LVEF 40%   • Lung mass 04/18/2016    spiculated, being followed   • Lung nodule seen on imaging study    • Malignant neoplasm    • Measles    • Mumps    • Pleural effusion    • Premature ventricular contractions    • Prostate cancer     seeds    • PVC (premature ventricular contraction)    • Rash of body 6/21/2017   • SOB (shortness of breath)    • Stroke    • Thoracic aortic ectasia    • Urinary tract infection with hematuria 1/18/2017   • Vitamin B 12 deficiency        Past Surgical History:  Past Surgical History:   Procedure Laterality Date   • APPENDECTOMY     • ARTERIAL ANEURYSM REPAIR     • BRONCHOSCOPY N/A 11/29/2016    Procedure: BRONCHOSCOPY WITH FLUORO w/biopsy AND BAL;  Surgeon: Ervin Stiles MD;  Location: [facility] ENDOSCOPY;  Service:    • CARDIAC CATHETERIZATION N/A 6/7/2017    Procedure: Left Heart Cath;  Surgeon: Vincent Middleton MD;  Location: [facility] CATH INVASIVE LOCATION;  Service:    • CARDIAC CATHETERIZATION N/A 6/7/2017    Procedure: Coronary angiography;  Surgeon: Vincent Middleton MD;  Location: [facility] CATH INVASIVE LOCATION;  Service:    • CARDIAC CATHETERIZATION N/A 6/7/2017    Procedure: Left ventriculography;  Surgeon: Vincent Middleton MD;  Location: [facility] CATH INVASIVE LOCATION;  Service:    • CARDIAC CATHETERIZATION N/A 6/7/2017    Procedure: Bypass graft study;  Surgeon: Vincent Middleton MD;  Location: [facility] CATH INVASIVE LOCATION;  Service:    • CARDIAC CATHETERIZATION N/A 6/7/2017    Procedure: Native mammary injection;  Surgeon: Vincent Middleton MD;  Location: [facility] CATH INVASIVE LOCATION;  Service:    • CARDIAC CATHETERIZATION N/A 6/7/2017    Procedure: Stent NYA bypass graft;  Surgeon: Vincent Middleton MD;  Location: [facility] CATH INVASIVE LOCATION;  Service:    • CARDIAC DEFIBRILLATOR PLACEMENT     • CARDIAC ELECTROPHYSIOLOGY PROCEDURE N/A  9/19/2017    Procedure: ICD SC new- Bi Ventricular  CRT-D  MEDTRONIC;  Surgeon: Fady Terrazas MD;  Location: [facility] CATH INVASIVE LOCATION;  Service:    • CARDIAC SURGERY     • CHOLECYSTECTOMY     • COLONOSCOPY N/A 12/16/2016    Procedure: COLONOSCOPY TO ASC COLON  WITH BIOPSY AND POLYPECTOMY;  Surgeon: Lane Brian MD;  Location: [facility] ENDOSCOPY;  Service:    • COLONOSCOPY N/A 3/1/2017    multiple polyps, lipoma in proximal transverse colon/hepatic flexure, repeat in one year   • CORONARY ARTERY BYPASS GRAFT     • ENDOSCOPY N/A 3/1/2017    Concepcion's, HH, gastric mucosal atrophy   • INSERT / REPLACE / REMOVE PACEMAKER     • PROSTATE SURGERY          Home Meds:  Prescriptions Prior to Admission   Medication Sig Dispense Refill Last Dose   • amiodarone (PACERONE) 100 MG tablet Take 1 tablet by mouth Every 12 (Twelve) Hours.      • amLODIPine (NORVASC) 5 MG tablet Take 1 tablet by mouth Daily.      • atorvastatin (LIPITOR) 40 MG tablet Take 1 tablet by mouth Every Night. 30 tablet 6 Taking   • busPIRone (BUSPAR) 7.5 MG tablet Take 7.5 mg by mouth 2 (Two) Times a Day.   Taking   • cyclobenzaprine (FLEXERIL) 5 MG tablet Take 1 tablet by mouth 3 (Three) Times a Day As Needed for Muscle Spasms.      • digoxin (LANOXIN) 125 MCG tablet Take 1 tablet by mouth Every Other Day. (Patient taking differently: Take 125 mcg by mouth Daily.) 15 tablet 1 Taking   • famotidine (PEPCID) 20 MG tablet Take 1 tablet by mouth Daily.   Taking   • finasteride (PROSCAR) 5 MG tablet Take 5 mg by mouth Daily.      • isosorbide mononitrate (IMDUR) 30 MG 24 hr tablet Take 0.5 tablets by mouth Daily. 45 tablet 3 Taking   • metoprolol tartrate (LOPRESSOR) 100 MG tablet Take 1 tablet by mouth Every 12 (Twelve) Hours. 60 tablet 1 Taking   • tamsulosin (FLOMAX) 0.4 MG capsule 24 hr capsule Take 1 capsule by mouth 2 (Two) Times a Day. 30 capsule     • warfarin (COUMADIN) 1 MG tablet 1 mg po qday      • acetaminophen (TYLENOL) 325 MG tablet Take 650  "mg by mouth Every 8 (Eight) Hours As Needed for Mild Pain .   Unknown at Unknown time   • polyethylene glycol (MIRALAX) packet Take 17 g by mouth Daily.   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       Family History:  Family History   Problem Relation Age of Onset   • Cancer Mother    • Cancer Father    • COPD Father    • Arthritis Sister    • Diabetes Sister    • Hyperlipidemia Sister    • Arthritis Brother    • Diabetes Brother    • Stroke Brother    • Lung cancer Other    • Stroke Other    • No Known Problems Maternal Grandmother    • No Known Problems Maternal Grandfather    • No Known Problems Paternal Grandmother    • No Known Problems Paternal Grandfather        Physical Exam:  BP 90/79  Pulse 110  Temp (!) 104.4 °F (40.2 °C) (Rectal)   Resp (!) 40  Ht 182.9 cm (72\")  Wt 74.9 kg (165 lb 2 oz)  SpO2 94%  BMI 22.39 kg/m2 Body mass index is 22.39 kg/(m^2). 94% 74.9 kg (165 lb 2 oz)  Physical Exam   Constitutional: He appears distressed.   Appears acutely ill   HENT:   Head: Normocephalic.   Nose: Nose normal.   Eyes: Conjunctivae are normal. No scleral icterus.   Neck: No tracheal deviation present. No thyromegaly present.   Cardiovascular:   No murmur heard.  Tachycardic, distant heart tones, 1+ edema in feet   Pulmonary/Chest: He is in respiratory distress. He has no " wheezes.   Abdominal: He exhibits no distension and no mass. There is no guarding.   Musculoskeletal: He exhibits no deformity.   Neurological: He is alert.   Awake, agitated, appears acutely ill and he is moaning and groaning of pain   Skin: No rash noted. He is diaphoretic. No erythema. There is pallor.   Cool extremities and pale skin, as well as pill conjunctiva bilaterally   Psychiatric:   Patient is agitated, restless, moaning and groaning   Nursing note and vitals reviewed.      LABS:  Lab Results   Component Value Date    CALCIUM 7.5 (L) 12/05/2017    PHOS 3.5 11/29/2017     Results from last 7 days  Lab Units 12/05/17  0948 12/04/17  1652 12/01/17  0716  11/29/17  0651   MAGNESIUM mg/dL 1.5*  --   --   --   --    SODIUM mmol/L 136 136 136  < > 136   POTASSIUM mmol/L 4.1 4.1 4.2  < > 3.9   CHLORIDE mmol/L 104 99 99  < > 99   CO2 mmol/L 16.2* 23.3 24.6  < > 27.7   BUN mg/dL 35* 25* 27*  < > 19   CREATININE mg/dL 3.35* 2.04* 1.63*  < > 1.67*   GLUCOSE mg/dL 154* 153* 106*  < > 146*   CALCIUM mg/dL 7.5* 8.9 8.9  < > 9.0   WBC 10*3/mm3 13.34* 16.40* 11.42*  < > 11.77*   HEMOGLOBIN g/dL 8.8* 10.8* 10.6*  < > 10.2*   PLATELETS 10*3/mm3 117* 259 269  < > 267   ALT (SGPT) U/L  --  12  --   --  8   AST (SGOT) U/L  --  12  --   --  12   PROCALCITONIN ng/mL  --  0.27*  --   --   --    < > = values in this interval not displayed.  Lab Results   Component Value Date    CKTOTAL 178 10/09/2016    CKMB 2.59 10/01/2016    TROPONINT 0.060 (H) 11/18/2017           Results from last 7 days  Lab Units 12/04/17  1856 12/04/17  1751 12/04/17  1726 11/28/17  2215   BLOODCX   --  Abnormal Stain* Abnormal Stain*  --    WOUNDCX   --   --   --  Rare Mixed Nicki Isolated   GRAM STAIN RESULT   --  Aerobic Bottle Gram negative bacilli Anaerobic Bottle Gram negative bacilli  Aerobic Bottle Gram negative bacilli Few (2+) WBCs seen  No organisms seen   URINECX  Culture in progress  --   --   --    BCIDPCR   --   --  Klebsiella pneumoniae.  Identification by BCID PCR.*  --        Results from last 7 days  Lab Units 12/04/17  1652   PROCALCITONIN ng/mL 0.27*   LACTATE mmol/L 2.0       Results from last 7 days  Lab Units 12/05/17  0846   PH, ARTERIAL pH units 7.483*   PCO2, ARTERIAL mm Hg 20.1*   PO2 ART mm Hg 93.3   FLOW RATE lpm 6   MODALITY  Cannula   O2 SATURATION CALC % 98.1         Results from last 7 days  Lab Units 12/05/17  0948 12/04/17  1652 12/01/17  0716 11/30/17  0559 11/29/17  0651   INR  2.40* 1.76* 2.14* 2.17* 1.83*       Results from last 7 days  Lab Units 12/04/17  1856 12/04/17  1751 12/04/17  1726 11/28/17  2215   BLOODCX   --  Abnormal Stain* Abnormal Stain*  --    WOUNDCX   --   --   --  Rare Mixed Nicki Isolated   GRAM STAIN RESULT   --  Aerobic Bottle Gram negative bacilli Anaerobic Bottle Gram negative bacilli  Aerobic Bottle Gram negative bacilli Few (2+) WBCs seen  No organisms seen   URINECX  Culture in progress  --   --   --    BCIDPCR   --   --  Klebsiella pneumoniae. Identification by BCID PCR.*  --      Lab Results   Component Value Date    TSH 1.170 10/09/2017     Estimated Creatinine Clearance: 18.6 mL/min (by C-G formula based on Cr of 3.35).    Results from last 7 days  Lab Units 12/04/17  1856   NITRITE UA  Positive*   WBC UA /HPF Unable to determine due to loaded field*   BACTERIA UA /HPF Unable to determine due to loaded field*   SQUAM EPITHEL UA /HPF Unable to determine due to loaded field*   URINECX  Culture in progress        Imaging: I personally visualized the images of CT scan of the head performed yesterday.  It does not show any acute intracranial abnormality.    I directly visualized images of his portable chest x-ray obtained last night around 7 PM.  I agree with the radiologist's interpretation:  A single view of the chest demonstrates moderate to severe cardiomegaly,  slightly more prominent as compared to 11/17/2017. There is prominence  of the pulmonary vasculature and interstitium suggesting an  element of  congestive changes. The mass involving the left heart border is  partially obscured by the left ventricle but appears similar to the  prior examination given differences in technique. There is no evidence  of effusion. There is mild cephalization of the pulmonary vasculature.      Assessment:  Severe septic shock  Klebsiella bacteremia  Urinary tract infection with hematuria  Acute kidney injury  Hypomagnesemia  Acute metabolic encephalopathy due to sepsis        Recommendations:  This patient is extremely sick.  He is at high risk of death.  I have put a call out to his daughter Norma Farah at [phone] to discuss CODE STATUS.  But for now, default would be full code with full resuscitative efforts.  He is not responding to fluid bolus.  He is extremely ill and we are going to start vasopressors at this point.  Antibiotics have already been discussed with pharmacy team and we have increased the antibiotic dose to properly cover for gram-negative caitlin bacteremia.    Total critical care time 40 minutes, excluding any separately billable procedure time          Kenneth Mcleod MD  12/5/2017  12:05 PM      Much of this encounter note is an electronic transcription/translation of spoken language to printed text using Dragon Software.

## 2017-12-05 NOTE — SIGNIFICANT NOTE
At 0800 pt noted to have increased RR to 40, rectal temperature of 104.4, BP down to 81/41, HR up to 120s w/ small runs of VT. RRT called. 250 Bolus given to increase BP to 115/68. Dr. Lopez notified of patient status. Orders to tranfer to ICU. Report given to Andra.

## 2017-12-05 NOTE — PROCEDURES
Endotracheal Intubation Procedure Note    [facility] PROCEDURE NOTE    Indication for endotracheal intubation: respiratory failure.  The patient was in an emergent situation, CODE BLUE, cardiac arrest  Sedation: none and The patient was in a CODE BLUE situation, cardiac arrest.  Equipment: A video Glidoscope was used and Size 8 endotracheal tube was inserted without difficulty.  Number of attempts: 1.  ETT location confirmed by by auscultation and ETCO2 monitor.    Chest x-ray pending at time of this dictation    Kenneth Mcleod MD  12/5/2017

## 2017-12-05 NOTE — PLAN OF CARE
Problem: Patient Care Overview (Adult)  Goal: Plan of Care Review  Outcome: Ongoing (interventions implemented as appropriate)    12/05/17 0407   Coping/Psychosocial Response Interventions   Plan Of Care Reviewed With patient   Patient Care Overview   Progress no change   Outcome Evaluation   Outcome Summary/Follow up Plan Admitted with sepsis. Disoriented x 4. Abx's infusing. Fever treated in ER.VSS. Will continue to monitor closely.       Goal: Adult Individualization and Mutuality  Outcome: Ongoing (interventions implemented as appropriate)  Goal: Discharge Needs Assessment  Outcome: Ongoing (interventions implemented as appropriate)    Problem: Sepsis (Adult)  Goal: Signs and Symptoms of Listed Potential Problems Will be Absent or Manageable (Sepsis)  Outcome: Ongoing (interventions implemented as appropriate)    Problem: Fall Risk (Adult)  Goal: Identify Related Risk Factors and Signs and Symptoms  Outcome: Ongoing (interventions implemented as appropriate)  Goal: Absence of Falls  Outcome: Ongoing (interventions implemented as appropriate)    Problem: Skin Integrity Impairment, Risk/Actual (Adult)  Goal: Identify Related Risk Factors and Signs and Symptoms  Outcome: Ongoing (interventions implemented as appropriate)

## 2017-12-05 NOTE — H&P
Name: Aakash Sanchez Jr. ADMIT: 2017   : 1935  PCP: Nerissa Falcon MD    MRN: [id number] LOS: 1 days   AGE/SEX: 82 y.o. male  ROOM: 3/     Chief Complaint   Patient presents with   • Fall     unwitnessed fall, found in middle of hallway so not down for longer than a few minutes. +bruising to right chin  and pt is in cc and bb. + agitation noted and is difficult to redirect. +possible left side hip fx as well.        Subjective   Mr. Sanchez is a 82 y.o. male who presents to [facility] from [facility] after having unwitnessed fall. He has an indwelling oliver catheter and previous UTI. He was down for few minutes. In ER he was found to have UTI and no acute fractures. During my exam he would not answer questions but only groan and occaionally say no or yes. I cannot tell if he is answering questions appropriately. He would maintain gaze and track objects across midline.      History of Present Illness    Past Medical History:   Diagnosis Date   • AAA (abdominal aortic aneurysm)     s/p repair, Dr. Felix   • Acute myocardial infarction    • Altered mental state    • Arteriosclerotic coronary artery disease    • Arthritis    • Atrial fibrillation    • B12 deficiency 2017   • Cardiomyopathy    • Chicken pox    • Chronic combined systolic and diastolic congestive heart failure 2017   • Chronic systolic (congestive) heart failure    • CKD (chronic kidney disease), stage III    • COPD (chronic obstructive pulmonary disease) 11/15/2016   • Coronary artery disease     MI , CABG x 2 V (LIMA-LAD, SVG-PDA),    • Difficulty walking    • Dyspnea    • Dyspnea on exertion 2017   • Fatigue    • Fever and chills    • Heart attack    • Hyperglycemia 3/29/2017   • Hyperlipidemia    • Hypertension    • Imbalance 3/29/2017   • Insomnia 2017   • Ischemic cardiomyopathy     LVEF 40%   • Lung mass 2016    spiculated, being followed   • Lung nodule seen on imaging study    •  Malignant neoplasm    • Measles    • Mumps    • Pleural effusion    • Premature ventricular contractions    • Prostate cancer     seeds    • PVC (premature ventricular contraction)    • Rash of body 6/21/2017   • SOB (shortness of breath)    • Stroke    • Thoracic aortic ectasia    • Urinary tract infection with hematuria 1/18/2017   • Vitamin B 12 deficiency      Past Surgical History:   Procedure Laterality Date   • APPENDECTOMY     • ARTERIAL ANEURYSM REPAIR     • BRONCHOSCOPY N/A 11/29/2016    Procedure: BRONCHOSCOPY WITH FLUORO w/biopsy AND BAL;  Surgeon: Ervin Stiles MD;  Location: [facility] ENDOSCOPY;  Service:    • CARDIAC CATHETERIZATION N/A 6/7/2017    Procedure: Left Heart Cath;  Surgeon: Vincent Middleton MD;  Location: [facility] CATH INVASIVE LOCATION;  Service:    • CARDIAC CATHETERIZATION N/A 6/7/2017    Procedure: Coronary angiography;  Surgeon: Vincent Middleton MD;  Location: [facility] CATH INVASIVE LOCATION;  Service:    • CARDIAC CATHETERIZATION N/A 6/7/2017    Procedure: Left ventriculography;  Surgeon: Vincent Middleton MD;  Location: [facility] CATH INVASIVE LOCATION;  Service:    • CARDIAC CATHETERIZATION N/A 6/7/2017    Procedure: Bypass graft study;  Surgeon: Vincent Middleton MD;  Location: [facility] CATH INVASIVE LOCATION;  Service:    • CARDIAC CATHETERIZATION N/A 6/7/2017    Procedure: Native mammary injection;  Surgeon: Vincent Middleton MD;  Location: [facility] CATH INVASIVE LOCATION;  Service:    • CARDIAC CATHETERIZATION N/A 6/7/2017    Procedure: Stent NYA bypass graft;  Surgeon: Vincent Middleton MD;  Location: [facility] CATH INVASIVE LOCATION;  Service:    • CARDIAC DEFIBRILLATOR PLACEMENT     • CARDIAC ELECTROPHYSIOLOGY PROCEDURE N/A 9/19/2017    Procedure: ICD SC new- Bi Ventricular  CRT-D  MEDTRONIC;  Surgeon: Fady Terrazas MD;  Location: [facility] CATH INVASIVE LOCATION;  Service:    • CARDIAC SURGERY     • CHOLECYSTECTOMY     • COLONOSCOPY N/A 12/16/2016    Procedure: COLONOSCOPY TO ASC COLON  WITH BIOPSY  AND POLYPECTOMY;  Surgeon: Lane Brian MD;  Location: [facility] ENDOSCOPY;  Service:    • COLONOSCOPY N/A 3/1/2017    multiple polyps, lipoma in proximal transverse colon/hepatic flexure, repeat in one year   • CORONARY ARTERY BYPASS GRAFT     • ENDOSCOPY N/A 3/1/2017    Concepcion's, HH, gastric mucosal atrophy   • INSERT / REPLACE / REMOVE PACEMAKER     • PROSTATE SURGERY       Family History   Problem Relation Age of Onset   • Cancer Mother    • Cancer Father    • COPD Father    • Arthritis Sister    • Diabetes Sister    • Hyperlipidemia Sister    • Arthritis Brother    • Diabetes Brother    • Stroke Brother    • Lung cancer Other    • Stroke Other    • No Known Problems Maternal Grandmother    • No Known Problems Maternal Grandfather    • No Known Problems Paternal Grandmother    • No Known Problems Paternal Grandfather      Social History   Substance Use Topics   • Smoking status: Former Smoker     Packs/day: 1.00     Years: 25.00     Types: Cigarettes   • Smokeless tobacco: Former User     Quit date: 9/22/1985      Comment: caffeine use   • Alcohol use No     Prescriptions Prior to Admission   Medication Sig Dispense Refill Last Dose   • amiodarone (PACERONE) 100 MG tablet Take 1 tablet by mouth Every 12 (Twelve) Hours.      • amLODIPine (NORVASC) 5 MG tablet Take 1 tablet by mouth Daily.      • atorvastatin (LIPITOR) 40 MG tablet Take 1 tablet by mouth Every Night. 30 tablet 6 Taking   • busPIRone (BUSPAR) 7.5 MG tablet Take 7.5 mg by mouth 2 (Two) Times a Day.   Taking   • cyclobenzaprine (FLEXERIL) 5 MG tablet Take 1 tablet by mouth 3 (Three) Times a Day As Needed for Muscle Spasms.      • digoxin (LANOXIN) 125 MCG tablet Take 1 tablet by mouth Every Other Day. (Patient taking differently: Take 125 mcg by mouth Daily.) 15 tablet 1 Taking   • famotidine (PEPCID) 20 MG tablet Take 1 tablet by mouth Daily.   Taking   • finasteride (PROSCAR) 5 MG tablet Take 5 mg by mouth Daily.      • isosorbide mononitrate  (IMDUR) 30 MG 24 hr tablet Take 0.5 tablets by mouth Daily. 45 tablet 3 Taking   • metoprolol tartrate (LOPRESSOR) 100 MG tablet Take 1 tablet by mouth Every 12 (Twelve) Hours. 60 tablet 1 Taking   • tamsulosin (FLOMAX) 0.4 MG capsule 24 hr capsule Take 1 capsule by mouth 2 (Two) Times a Day. 30 capsule     • warfarin (COUMADIN) 1 MG tablet 1 mg po qday      • acetaminophen (TYLENOL) 325 MG tablet Take 650 mg by mouth Every 8 (Eight) Hours As Needed for Mild Pain .   Unknown at Unknown time   • polyethylene glycol (MIRALAX) packet Take 17 g by mouth Daily.   Taking     Allergies:  No Known Allergies    Review of Systems   Unable to perform ROS: Mental status change        Objective    Vital Signs  Temp:  [99.6 °F (37.6 °C)-103.2 °F (39.6 °C)] 99.6 °F (37.6 °C)  Heart Rate:  [] 114  Resp:  [18-22] 22  BP: (103-162)/(45-89) 107/45  SpO2:  [95 %-98 %] 97 %  on   ;   O2 Device: room air  Body mass index is 23.27 kg/(m^2).    Physical Exam   Constitutional: He appears well-developed. He appears distressed.   HENT:   Head: Normocephalic and atraumatic.   Nose: Nose normal.   Eyes: EOM are normal. Pupils are equal, round, and reactive to light. No scleral icterus.   Neck: Neck supple.   Cardiovascular: Regular rhythm and intact distal pulses.  Tachycardia present.    Pulmonary/Chest: Effort normal. No stridor. He has decreased breath sounds. He has no wheezes. He has no rales.   Abdominal: Soft. Bowel sounds are normal. He exhibits no distension. There is no tenderness.   Musculoskeletal: He exhibits no edema or tenderness.   Neurological: He is alert. GCS eye subscore is 4. GCS verbal subscore is 3. GCS motor subscore is 5.   Skin: Skin is warm and dry. He is not diaphoretic.   Psychiatric:   UTO/AMS   Nursing note and vitals reviewed.      Results Review:   I reviewed the patient's new clinical results. Reviewed imaging, agree with interpretation. Reviewed EKG, paced rhythm. Reviewed prior records.    Lab Results  (last 24 hours)     Procedure Component Value Units Date/Time    CBC & Differential [[id number]] Collected:  12/04/17 1652    Specimen:  Blood Updated:  12/04/17 1713    Narrative:       The following orders were created for panel order CBC & Differential.  Procedure                               Abnormality         Status                     ---------                               -----------         ------                     CBC Auto Differential[[id number]]        Abnormal            Final result                 Please view results for these tests on the individual orders.    Comprehensive Metabolic Panel [[id number]]  (Abnormal) Collected:  12/04/17 1652    Specimen:  Blood Updated:  12/04/17 1730     Glucose 153 (H) mg/dL      BUN 25 (H) mg/dL      Creatinine 2.04 (H) mg/dL      Sodium 136 mmol/L      Potassium 4.1 mmol/L      Chloride 99 mmol/L      CO2 23.3 mmol/L      Calcium 8.9 mg/dL      Total Protein 7.8 g/dL      Albumin 3.30 (L) g/dL      ALT (SGPT) 12 U/L      AST (SGOT) 12 U/L      Alkaline Phosphatase 68 U/L      Total Bilirubin 1.1 mg/dL      eGFR Non African Amer 32 (L) mL/min/1.73      Globulin 4.5 gm/dL      A/G Ratio 0.7 g/dL      BUN/Creatinine Ratio 12.3     Anion Gap 13.7 mmol/L     Narrative:       The MDRD GFR formula is only valid for adults with stable renal function between ages 18 and 70.    Protime-INR [[id number]]  (Abnormal) Collected:  12/04/17 1652    Specimen:  Blood Updated:  12/04/17 1745     Protime 19.9 (H) Seconds      INR 1.76 (H)    Lactic Acid, Plasma [[id number]]  (Normal) Collected:  12/04/17 1652    Specimen:  Blood Updated:  12/04/17 1716     Lactate 2.0 mmol/L     Procalcitonin [[id number]]  (Abnormal) Collected:  12/04/17 1652    Specimen:  Blood Updated:  12/04/17 1737     Procalcitonin 0.27 (H) ng/mL     Narrative:       As a Marker for Sepsis (Non-Neonates):   1. <0.5 ng/mL represents a low risk of severe sepsis and/or septic shock.  1. >2 ng/mL represents a high risk  "of severe sepsis and/or septic shock.    As a Marker for Lower Respiratory Tract Infections that require antibiotic therapy:  PCT on Admission     Antibiotic Therapy             6-12 Hrs later  > 0.5                Strongly Recommended            >0.25 - <0.5         Recommended  0.1 - 0.25           Discouraged                   Remeasure/reassess PCT  <0.1                 Strongly Discouraged          Remeasure/reassess PCT      As 28 day mortality risk marker: \"Change in Procalcitonin Result\" (> 80 % or <=80 %) if Day 0 (or Day 1) and Day 4 values are available. Refer to http://[email or web address]-pct-calculator.com/   Change in PCT <=80 %   A decrease of PCT levels below or equal to 80 % defines a positive change in PCT test result representing a higher risk for 28-day all-cause mortality of patients diagnosed with severe sepsis or septic shock.  Change in PCT > 80 %   A decrease of PCT levels of more than 80 % defines a negative change in PCT result representing a lower risk for 28-day all-cause mortality of patients diagnosed with severe sepsis or septic shock.                CBC Auto Differential [[id number]]  (Abnormal) Collected:  12/04/17 1652    Specimen:  Blood Updated:  12/04/17 1713     WBC 16.40 (H) 10*3/mm3      RBC 4.57 (L) 10*6/mm3      Hemoglobin 10.8 (L) g/dL      Hematocrit 36.1 (L) %      MCV 79.0 (L) fL      MCH 23.6 (L) pg      MCHC 29.9 (L) g/dL      RDW 20.4 (H) %      RDW-SD 58.1 (H) fl      MPV 9.9 fL      Platelets 259 10*3/mm3      Neutrophil % 89.1 (H) %      Lymphocyte % 3.6 (L) %      Monocyte % 6.9 %      Eosinophil % 0.1 (L) %      Basophil % 0.1 %      Immature Grans % 0.2 %      Neutrophils, Absolute 14.61 (H) 10*3/mm3      Lymphocytes, Absolute 0.59 (L) 10*3/mm3      Monocytes, Absolute 1.13 10*3/mm3      Eosinophils, Absolute 0.01 10*3/mm3      Basophils, Absolute 0.02 10*3/mm3      Immature Grans, Absolute 0.04 (H) 10*3/mm3     Blood Culture - Blood, [[id number]] Collected:  12/04/17 " 1726    Specimen:  Blood from Arm, Left Updated:  12/04/17 1733    Blood Culture - Blood, [[id number]] Collected:  12/04/17 1751    Specimen:  Blood from Arm, Left Updated:  12/04/17 1751    Respiratory Panel, PCR - Swab, Nasopharynx [[id number]]  (Normal) Collected:  12/04/17 1759    Specimen:  Swab from Nasopharynx Updated:  12/04/17 1943     ADENOVIRUS, PCR Not Detected     Coronavirus 229E Not Detected     Coronavirus HKU1 Not Detected     Coronavirus NL63 Not Detected     Coronavirus OC43 Not Detected     Human Metapneumovirus Not Detected     Human Rhinovirus/Enterovirus Not Detected     Influenza B PCR Not Detected     Parainfluenza Virus 1 Not Detected     Parainfluenza Virus 2 Not Detected     Parainfluenza Virus 3 Not Detected     Parainfluenza Virus 4 Not Detected     Bordetella pertussis pcr Not Detected     Influenza 2009 H1N1 by PCR Not Detected     Chlamydophila pneumoniae PCR Not Detected     Mycoplasma pneumo by PCR Not Detected     Influenza A PCR Not Detected     Influenza A H3 Not Detected     Influenza A H1 Not Detected     RSV, PCR Not Detected    Urinalysis With / Culture If Indicated - Urine, Catheter [[id number]]  (Abnormal) Collected:  12/04/17 1856    Specimen:  Urine from Urine, Catheter Updated:  12/04/17 1944     Color, UA Red (A)      Any Substance that causes an abnormal urine color can alter the accuracy of the chemical reactions.        Appearance, UA Cloudy (A)     pH, UA 6.0     Specific Gravity, UA 1.018     Glucose, UA Negative     Ketones, UA Trace (A)     Bilirubin, UA Negative     Blood, UA Large (3+) (A)     Protein, UA >=300 mg/dL (3+) (A)     Leuk Esterase, UA Large (3+) (A)     Nitrite, UA Positive (A)     Urobilinogen, UA 1.0 E.U./dL    Urinalysis, Microscopic Only - Urine, Clean Catch [[id number]]  (Abnormal) Collected:  12/04/17 1856    Specimen:  Urine from Urine, Catheter Updated:  12/04/17 1946     RBC, UA Too Numerous to Count (A) /HPF      WBC, UA  /HPF      Unable to  determine due to loaded field (A)     Bacteria, UA  /HPF      Unable to determine due to loaded field (A)     Squamous Epithelial Cells, UA  /HPF      Unable to determine due to loaded field (A)     Hyaline Casts, UA  /LPF      Unable to determine due to loaded field     Methodology Manual Light Microscopy    Urine Culture - Urine, Urine, Clean Catch [[id number]] Collected:  12/04/17 1856    Specimen:  Urine from Urine, Catheter Updated:  12/04/17 1935          XR Chest 1 View         CT Head Without Contrast   Preliminary Result   Multilevel degenerative disease with no evidence of   fracture. Vascular calcification is noted.       The above information was called to and discussed with Dr. Verduzco.       Radiation dose reduction techniques were utilized, including automated   exposure control and exposure modulation based on body size.              CT Cervical Spine Without Contrast   Preliminary Result   Multilevel degenerative disease with no evidence of   fracture. Vascular calcification is noted.       The above information was called to and discussed with Dr. Verduzco.       Radiation dose reduction techniques were utilized, including automated   exposure control and exposure modulation based on body size.                Assessment/Plan   Active Hospital Problems (** Indicates Principal Problem)    Diagnosis Date Noted   • **Urinary tract infection with hematuria [N39.0, R31.9] 01/18/2017   • Sepsis [A41.9] 12/04/2017   • Metabolic encephalopathy [G93.41] 10/01/2017   • CKD (chronic kidney disease), stage III [N18.3] 10/01/2017   • Type 2 diabetes mellitus with complication [E11.8] 11/23/2016   • PAF (paroxysmal atrial fibrillation) [I48.0]       Resolved Hospital Problems    Diagnosis Date Noted Date Resolved   No resolved problems to display.     - UTI: Septic on admission. Cultures have been drawn and are pending. Previous urine culture showed Ecoli sensitive to Rocephin so will give that for empiric coverage  pending results. Continue IVF, lactate not elevated.  - Metabolic Encephalopathy  - CKD3: Previous basline 1.6. Has degree of renal insufficiency on CKD. Monitor with IVF  - DM2: A1c 6.5. SSI  - PAF: Paced on telemetry. Monitor with fluids and continue home regimen including amiodarone, digoxin, metoprolol. Monitor INR on coumadin.  - Conditional Code/DNR    I discussed the patients findings and my recommendations with patient, nursing staff and consulting provider.    Eliecer Gerard MD  [facility]  12/05/17  2:47 AM

## 2017-12-07 LAB
BACTERIA SPEC AEROBE CULT: ABNORMAL
GRAM STN SPEC: ABNORMAL

## 2017-12-08 NOTE — DISCHARGE SUMMARY
Patient Identification:  Name: Aakash Sanchez Jr.  Age: 82 y.o.  Sex: male  :  1935  MRN: [id number]  Primary Care Physician: Nerissa Falcon MD    Admit date: 2017  Discharge date and time: 2017  4:30 PM   Discharged Condition:     Discharge Diagnoses:  Severe septic shock  Klebsiella bacteremia  Urinary tract infection with hematuria  Acute kidney injury  Hypomagnesemia  Acute metabolic encephalopathy due to sepsis      Hospital Course: Aakash Sanchez Jr. presented to [facility]  infusion and signs and symptoms of sepsis.  He received proper antibiotics and fluids and was transferred to the intensive care unit.  Blood cultures grew gram-negative rods, Klebsiella.  The patient had severe sepsis and rapidly deteriorated.  In spite of proper early goal-directed sepsis therapy per protocol, the patient  in the ICU after cardiac arrest and 25 minutes of ACLS resuscitative efforts.      Consults:   IP CONSULT TO INTERNAL MEDICINE  IP CONSULT TO PULMONOLOGY  IP CONSULT TO INFECTIOUS DISEASES    Significant Diagnostic Studies:   Imaging Results (most recent)     Procedure Component Value Units Date/Time    CT Head Without Contrast [[id number]] Collected:  17     Updated:  17 1309    Narrative:       CT HEAD WITHOUT CONTRAST AND CT CERVICAL SPINE WITHOUT CONTRAST      HISTORY:  Fall, altered mental status. Neck pain. Lung cancer.     A noncontrasted CT examination of the brain was performed.     COMPARISON: CT head 10/09/2017. MRI brain 2017.     There is diffuse atrophy. Vascular calcification is noted. There is no  evidence of intracranial hemorrhage or of a focal area of decreased  attenuation to suggest acute infarction. No convincing metastatic  disease is identified on this noncontrasted CT examination of the brain.  Further evaluation could be performed with a MRI examination of the  brain with and without contrast. Bone windows  showed no evidence of a  calvarial fracture.     CT EXAMINATION CERVICAL SPINE WITHOUT CONTRAST: There is moderate to  severe loss of disc height at C6-7 with a grade 1 retrolisthesis of C6  upon C7 estimated to be 2-3 mm in severity.     Moderate vascular calcification is noted.     C2-3: There is moderate uncovertebral degenerative disease on the right.        C3-4: There is severe facet degenerative disease on the right. There is  moderate neural foraminal compromise bilaterally secondary to  uncovertebral degenerative disease.     C4-5: There is severe facet degenerative disease on the left.     C5-6: There is severe facet degenerative disease on the left. There is  moderate neural foraminal compromise on the left secondary to  uncovertebral degenerative disease.     C6-7: There is moderate neural foraminal compromise bilaterally  secondary to uncovertebral degenerative disease. There is moderate facet  degenerative disease on the left.     C7/T1: There is no evidence of disc bulge or herniation.       Impression:       Multilevel degenerative disease with no evidence of  fracture. Vascular calcification is noted.     The above information was called to and discussed with Dr. Verduzco.     Radiation dose reduction techniques were utilized, including automated  exposure control and exposure modulation based on body size.     This report was finalized on 12/5/2017 1:06 PM by Dr. Asif Herrera MD.       CT Cervical Spine Without Contrast [[id number]] Collected:  12/04/17 1815     Updated:  12/05/17 1309    Narrative:       CT HEAD WITHOUT CONTRAST AND CT CERVICAL SPINE WITHOUT CONTRAST      HISTORY:  Fall, altered mental status. Neck pain. Lung cancer.     A noncontrasted CT examination of the brain was performed.     COMPARISON: CT head 10/09/2017. MRI brain 06/04/2017.     There is diffuse atrophy. Vascular calcification is noted. There is no  evidence of intracranial hemorrhage or of a focal area of  decreased  attenuation to suggest acute infarction. No convincing metastatic  disease is identified on this noncontrasted CT examination of the brain.  Further evaluation could be performed with a MRI examination of the  brain with and without contrast. Bone windows showed no evidence of a  calvarial fracture.     CT EXAMINATION CERVICAL SPINE WITHOUT CONTRAST: There is moderate to  severe loss of disc height at C6-7 with a grade 1 retrolisthesis of C6  upon C7 estimated to be 2-3 mm in severity.     Moderate vascular calcification is noted.     C2-3: There is moderate uncovertebral degenerative disease on the right.        C3-4: There is severe facet degenerative disease on the right. There is  moderate neural foraminal compromise bilaterally secondary to  uncovertebral degenerative disease.     C4-5: There is severe facet degenerative disease on the left.     C5-6: There is severe facet degenerative disease on the left. There is  moderate neural foraminal compromise on the left secondary to  uncovertebral degenerative disease.     C6-7: There is moderate neural foraminal compromise bilaterally  secondary to uncovertebral degenerative disease. There is moderate facet  degenerative disease on the left.     C7/T1: There is no evidence of disc bulge or herniation.       Impression:       Multilevel degenerative disease with no evidence of  fracture. Vascular calcification is noted.     The above information was called to and discussed with Dr. Verduzco.     Radiation dose reduction techniques were utilized, including automated  exposure control and exposure modulation based on body size.     This report was finalized on 12/5/2017 1:06 PM by Dr. Asif Herrera MD.       XR Chest 1 View [[id number]] Collected:  12/04/17 1920     Updated:  12/05/17 1312    Narrative:       PORTABLE CHEST     HISTORY: Altered mental status.     A single view of the chest demonstrates moderate to severe cardiomegaly,  slightly more prominent  as compared to 11/17/2017. There is prominence  of the pulmonary vasculature and interstitium suggesting an element of  congestive changes. The mass involving the left heart border is  partially obscured by the left ventricle but appears similar to the  prior examination given differences in technique. There is no evidence  of effusion. There is mild cephalization of the pulmonary vasculature.     This report was finalized on 12/5/2017 1:08 PM by Dr. Asif Herrera MD.               Results from last 7 days  Lab Units 12/04/17  1856 12/04/17  1751 12/04/17  1726   BLOODCX   --    Klebsiella pneumoniae ESBL*   Klebsiella pneumoniae ESBL*   GRAM STAIN RESULT   --  Aerobic Bottle Gram negative bacilli Anaerobic Bottle Gram negative bacilli  Aerobic Bottle Gram negative bacilli   URINECX    >100,000 CFU/mL Klebsiella pneumoniae ESBL*  --   --    BCIDPCR   --   --  Klebsiella pneumoniae. Identification by BCID PCR.*          Disposition:  [facility] – [place], [place]        Total time spent discharging less than 30 minutes.    Signed:  Kenneth Mcleod MD  12/8/2017  5:34 PM

## 2020-03-19 NOTE — PROGRESS NOTES
"  Infectious Diseases Progress Note    Carmen Byrd MD     [facility]  [place]: 3 days  Patient Identification:  Name: Aakash Sanchez Jr.  Age: 82 y.o.  Sex: male  :  1935  MRN: [id number]         Primary Care Physician: Dimitrios Lindquist MD            Subjective: Felt bad last night when the temperature was high.  Currently does not have any complaints just feels tired and weak.  Diarrhea is better.  Interval History: See consultation note    Objective:    Scheduled Meds:    amiodarone 200 mg Oral Q12H   atorvastatin 40 mg Oral Nightly   digoxin 125 mcg Oral Every Other Day   fluconazole 200 mg Intravenous Daily   isosorbide mononitrate 15 mg Oral Daily   lactobacillus acidophilus 1 capsule Oral Daily   metoprolol tartrate 100 mg Oral Q12H   pantoprazole 40 mg Oral Daily     Continuous Infusions:     Vital signs in last 24 hours:  Temp:  [98.1 °F (36.7 °C)-101.8 °F (38.8 °C)] 98.1 °F (36.7 °C)  Heart Rate:  [] 104  Resp:  [16-18] 18  BP: (104-124)/(56-91) 124/57    Intake/Output:    Intake/Output Summary (Last 24 hours) at 10/04/17 0941  Last data filed at 10/04/17 0600   Gross per 24 hour   Intake             1877 ml   Output              750 ml   Net             1127 ml       Exam:  /57  Pulse 104  Temp 98.1 °F (36.7 °C) (Oral)   Resp 18  Ht 70\" (177.8 cm)  Wt 193 lb 4.8 oz (87.7 kg)  SpO2 100%  BMI 27.74 kg/m2    General Appearance:    Does not appear toxic                          Head:    Normocephalic, without obvious abnormality, atraumatic                           Eyes:    PERRL, conjunctiva/corneas clear, EOM's intact, both eyes                         Throat:   Lips, tongue, gums normal; oral mucosa pink and moist                           Neck:   Supple, symmetrical, trachea midline, no JVD                         Lungs:    Clear to auscultation bilaterally, respirations unlabored                 Chest Wall:    No tenderness or deformity                          " Heart:    Regular rate and rhythm, S1 and S2 normal, no murmur,no  Rub                                      or gallop                  Abdomen:     Soft nontender                 Extremities:   Extremities normal, atraumatic, no cyanosis or edema                        Pulses:   Pulses palpable in all extremities                            Skin:   Skin is warm and dry,  no rashes or palpable lesions                    Data Review:    I reviewed the patient's new clinical results.    Results from last 7 days  Lab Units 10/04/17  0404 10/03/17  0342 10/02/17  0516 10/01/17  0603 09/30/17  2252   WBC 10*3/mm3 5.53 6.27 7.33 8.06 8.98   HEMOGLOBIN g/dL 10.7* 10.9* 11.1* 11.6* 12.7*   PLATELETS 10*3/mm3 182 189 172 202 221       Results from last 7 days  Lab Units 10/04/17  0404 10/03/17  0342 10/02/17  0516 10/01/17  0603 09/30/17  2252   SODIUM mmol/L 138 138 137 141 141   POTASSIUM mmol/L 4.0 3.4* 3.6 3.5 4.1   CHLORIDE mmol/L 106 103 103 105 100   CO2 mmol/L 20.9* 19.7* 18.3* 19.9* 23.8   BUN mg/dL 15 15 16 17 18   CREATININE mg/dL 2.12* 2.14* 2.08* 1.81* 2.28*   CALCIUM mg/dL 8.3* 8.1* 7.8* 7.9* 8.9   GLUCOSE mg/dL 148* 144* 122* 134* 130*     ANCA pending    Assessment:  Principal Problem:    UTI (urinary tract infection)  Active Problems:    PAF (paroxysmal atrial fibrillation)    COPD (chronic obstructive pulmonary disease)    Type 2 diabetes mellitus with complication    Abdominal aortic aneurysm (AAA) without rupture    Long term current use of anticoagulant    Chronic combined systolic and diastolic congestive heart failure    Abnormal CXR    CKD (chronic kidney disease), stage III    PAU (acute kidney injury)    Arteriosclerotic coronary artery disease    Metabolic encephalopathy    Acidosis    Wide-complex tachycardia    Mass of upper lobe of left lung      Plan:See below.  Observe his fever pattern in the next 48 hours on Diflucan  No obvious definite infectious syndrome is identified except for abnormal  urinalysis and a yeast in the urine that is noted.  His white blood cell count is normal and now he has diarrhea.  I think best thing to do is to stop vancomycin and Zosyn and treat his urinary tract infection with Diflucan will ask the microbiology to completely identified this non-albicans candida.  Follow-up on ANCA panel, C. difficile toxin assay and continue with aspiration precautions.    Carmen Byrd MD  10/4/2017  9:41 AM    Much of this encounter note is an electronic transcription/translation of spoken language to printed text. The electronic translation of spoken language may permit erroneous, or at times, nonsensical words or phrases to be inadvertently transcribed; Although I have reviewed the note for such errors, some may still exist     0

## 2020-05-18 NOTE — PROGRESS NOTES
"Cc retention  doing well with oliver  On flomax.  Clear urine  No pain from oliver    LOS: 9 days   Patient Care Team:  Dimitrios Lindquist MD as PCP - General (Internal Medicine)        Subjective     History of Present Illness    Subjective      Objective     Vital Signs  Temp:  [97 °F (36.1 °C)-98.4 °F (36.9 °C)] 98.4 °F (36.9 °C)  Heart Rate:  [84-96] 87  Resp:  [16-18] 16  BP: (129-150)/(65-97) 150/73    Objective:  General Appearance:  Comfortable.    Vital signs: (most recent): Blood pressure 150/73, pulse 87, temperature 98.4 °F (36.9 °C), temperature source Oral, resp. rate 16, height 70\" (177.8 cm), weight 190 lb 2 oz (86.2 kg), SpO2 96 %.  Vital signs are normal.    Output: Producing urine.    HEENT: Normal HEENT exam.    Lungs:  Normal respiratory rate and normal effort.    Abdomen: Abdomen is soft.              Results Review:  New clinical results reviewed.        Assessment/Plan     Principal Problem:    UTI (urinary tract infection) due to yeast  Active Problems:    PAF (paroxysmal atrial fibrillation)    COPD (chronic obstructive pulmonary disease)    Type 2 diabetes mellitus with complication    Abdominal aortic aneurysm (AAA) without rupture    Long term current use of anticoagulant    Chronic combined systolic and diastolic congestive heart failure    CKD (chronic kidney disease), stage III    PAU (acute kidney injury)    Arteriosclerotic coronary artery disease    Metabolic encephalopathy    Acidosis    Mass of upper lobe of left lung    Urinary retention      Assessment:  (Retention   Cont flomax and oliver   Fu with me in two weeks for voiding trial).       Bryan Bernstein MD  10/10/17  7:13 AM            " Never smoker

## 2024-12-18 NOTE — PLAN OF CARE
Problem: Patient Care Overview (Adult)  Goal: Plan of Care Review  Outcome: Ongoing (interventions implemented as appropriate)    12/01/17 0414   Coping/Psychosocial Response Interventions   Plan Of Care Reviewed With patient   Patient Care Overview   Progress improving   Outcome Evaluation   Outcome Summary/Follow up Plan Pt did not c/o pain during shift. Rested well. Medications administered per orders. Anticipating d/c today. Vitals stable. No s/s of distress at this time. Will continue to monitor.        Goal: Adult Individualization and Mutuality  Outcome: Ongoing (interventions implemented as appropriate)  Goal: Discharge Needs Assessment  Outcome: Ongoing (interventions implemented as appropriate)    Problem: Fall Risk (Adult)  Goal: Absence of Falls  Outcome: Ongoing (interventions implemented as appropriate)    Problem: Pressure Ulcer Risk (Jayme Scale) (Adult,Obstetrics,Pediatric)  Goal: Skin Integrity  Outcome: Ongoing (interventions implemented as appropriate)    Problem: Pain, Acute (Adult)  Goal: Acceptable Pain Control/Comfort Level  Outcome: Ongoing (interventions implemented as appropriate)    Problem: Cardiac: Heart Failure (Adult)  Goal: Signs and Symptoms of Listed Potential Problems Will be Absent or Manageable (Cardiac: Heart Failure)  Outcome: Ongoing (interventions implemented as appropriate)      
declines

## (undated) DEVICE — DECANT BG O JET

## (undated) DEVICE — GUIDE WIRE WITH HYDROPHILIC COATING: Brand: ACUITY WHISPER VIEW™

## (undated) DEVICE — LIMB HOLDER, WRIST/ANKLE: Brand: DEROYAL

## (undated) DEVICE — Device: Brand: DEFENDO AIR/WATER/SUCTION AND BIOPSY VALVE

## (undated) DEVICE — Device

## (undated) DEVICE — PAD GRND REM POLYHESIVE A/ DISP

## (undated) DEVICE — SKIN AFFIX SURG ADHESIVE 72/CS 0.55ML: Brand: MEDLINE

## (undated) DEVICE — DEV INDEFLATOR

## (undated) DEVICE — AIRLIFE™ NASAL OXYGEN CANNULA CURVED, NONFLARED TIP WITH 21 FOOT (6.4 M) CRUSH-RESISTANT TUBING, OVER-THE-EAR STYLE: Brand: AIRLIFE™

## (undated) DEVICE — GW EMR FIX EXCHG J STD .035 3MM 260CM

## (undated) DEVICE — TUBING, SUCTION, 1/4" X 10', STRAIGHT: Brand: MEDLINE

## (undated) DEVICE — LOU PACE DEFIB: Brand: MEDLINE INDUSTRIES, INC.

## (undated) DEVICE — KT MANIFLD CARDIAC

## (undated) DEVICE — RADIFOCUS GLIDEWIRE: Brand: GLIDEWIRE

## (undated) DEVICE — NC TREK CORONARY DILATATION CATHETER 3.5 MM X 15 MM / RAPID-EXCHANGE: Brand: NC TREK

## (undated) DEVICE — CATH DIAG IMPULSE IMT 5F 100CM

## (undated) DEVICE — FRCP BX RADJAW4 NDL 2.8 240CM LG OG BX40

## (undated) DEVICE — ST INF PRI SMRTSTE 20DRP 2VLV 24ML 117

## (undated) DEVICE — INTRO SHEATH PRELUDE SNAP .038 9F 13CM W/SDPRT BLK

## (undated) DEVICE — PK CATH CARD 40

## (undated) DEVICE — INTRO SHEATH PRELUDE SNAP .038 7F 13CM W/SDPRT

## (undated) DEVICE — Device: Brand: WEBSTER

## (undated) DEVICE — GUIDE CATHETER: Brand: MACH1™

## (undated) DEVICE — CANN NASL CO2 TRULINK W/O2 A/

## (undated) DEVICE — ELECTRD ECG CARBON/SNP RL FM A/ 5PK

## (undated) DEVICE — TR BAND RADIAL ARTERY COMPRESSION DEVICE: Brand: TR BAND

## (undated) DEVICE — BITEBLOCK OMNI BLOC

## (undated) DEVICE — GW HITORQUE/BAL MID/WT J W/HCOAT .014 3X190CM

## (undated) DEVICE — SOL NS 500ML

## (undated) DEVICE — SNAR POLYP SENSATION STDOVL 27 240 BX40

## (undated) DEVICE — SOL IRR NACL 0.9PCT BT 1000ML

## (undated) DEVICE — CATH DIAG IMPULSE FL4 5F 100CM

## (undated) DEVICE — THE SINGLE USE ETRAP – POLYP TRAP IS USED FOR SUCTION RETRIEVAL OF ENDOSCOPICALLY REMOVED POLYPS.: Brand: ETRAP

## (undated) DEVICE — BALN CORNRY SINUS 6F 1X80CM

## (undated) DEVICE — GLIDESHEATH BASIC HYDROPHILIC COATED INTRODUCER SHEATH: Brand: GLIDESHEATH

## (undated) DEVICE — THE TORRENT IRRIGATION SCOPE CONNECTOR IS USED WITH THE TORRENT IRRIGATION TUBING TO PROVIDE IRRIGATION FLUIDS SUCH AS STERILE WATER DURING GASTROINTESTINAL ENDOSCOPIC PROCEDURES WHEN USED IN CONJUNCTION WITH AN IRRIGATION PUMP (OR ELECTROSURGICAL UNIT).: Brand: TORRENT

## (undated) DEVICE — CATH DIAG IMPULSE FR4 5F 100CM

## (undated) DEVICE — CATH VENT MIV RADL PIG ST TIP 4F 110CM